# Patient Record
Sex: FEMALE | Race: WHITE | NOT HISPANIC OR LATINO | Employment: UNEMPLOYED | URBAN - METROPOLITAN AREA
[De-identification: names, ages, dates, MRNs, and addresses within clinical notes are randomized per-mention and may not be internally consistent; named-entity substitution may affect disease eponyms.]

---

## 2017-02-14 ENCOUNTER — OFFICE VISIT (OUTPATIENT)
Dept: DERMATOLOGY | Facility: CLINIC | Age: 57
End: 2017-02-14
Payer: COMMERCIAL

## 2017-02-14 VITALS — WEIGHT: 168 LBS | BODY MASS INDEX: 28.68 KG/M2 | HEIGHT: 64 IN

## 2017-02-14 DIAGNOSIS — L57.3 POIKILODERMA OF CIVATTE: ICD-10-CM

## 2017-02-14 DIAGNOSIS — Z12.83 SKIN CANCER SCREENING: ICD-10-CM

## 2017-02-14 DIAGNOSIS — D22.9 MULTIPLE BENIGN NEVI: ICD-10-CM

## 2017-02-14 DIAGNOSIS — Z80.8 FAMILY HISTORY OF MELANOMA: Primary | ICD-10-CM

## 2017-02-14 DIAGNOSIS — L81.4 LENTIGINES: ICD-10-CM

## 2017-02-14 PROCEDURE — 99999 PR PBB SHADOW E&M-EST. PATIENT-LVL II: CPT | Mod: PBBFAC,,, | Performed by: DERMATOLOGY

## 2017-02-14 PROCEDURE — 99214 OFFICE O/P EST MOD 30 MIN: CPT | Mod: S$GLB,,, | Performed by: DERMATOLOGY

## 2017-02-14 NOTE — PROGRESS NOTES
Subjective:       Patient ID:  Clemencia Knight is a 56 y.o. female who presents for   Chief Complaint   Patient presents with    Skin Check    Lesion     HPI Comments: Pt here for IV skin check. Last seen by Betzaida Benson NP on 7-28-14  Discoloration on neck for 2 years not treated  Lesion on right cheek for 6 months, asymptomatic, not treated    No phx of skin cancer  Brother hx of Melanoma       Review of Systems   Skin: Positive for daily sunscreen use and activity-related sunscreen use. Negative for recent sunburn.        Objective:    Physical Exam   Constitutional: She appears well-developed and well-nourished. No distress.   HENT:   Head:       Mouth/Throat: Lips normal.    Eyes: Lids are normal.  No conjunctival no injection.   Cardiovascular: There is no local extremity swelling and no dependent edema.     Neurological: She is alert and oriented to person, place, and time. She is not disoriented.   Psychiatric: She has a normal mood and affect.   Skin:   Areas Examined (abnormalities noted in diagram):   Head / Face Inspection Performed  Neck Inspection Performed  Chest / Axilla Inspection Performed  Abdomen Inspection Performed  Genitals / Buttocks / Groin Inspection Performed  Back Inspection Performed  RUE Inspected  LUE Inspection Performed  RLE Inspected  LLE Inspection Performed              Diagram Legend     Erythematous scaling macule/papule c/w actinic keratosis       Vascular papule c/w angioma      Pigmented verrucoid papule/plaque c/w seborrheic keratosis      Yellow umbilicated papule c/w sebaceous hyperplasia      Irregularly shaped tan macule c/w lentigo     1-2 mm smooth white papules consistent with Milia      Movable subcutaneous cyst with punctum c/w epidermal inclusion cyst      Subcutaneous movable cyst c/w pilar cyst      Firm pink to brown papule c/w dermatofibroma      Pedunculated fleshy papule(s) c/w skin tag(s)      Evenly pigmented macule c/w junctional nevus     Mildly  variegated pigmented, slightly irregular-bordered macule c/w mildly atypical nevus      Flesh colored to evenly pigmented papule c/w intradermal nevus       Pink pearly papule/plaque c/w basal cell carcinoma      Erythematous hyperkeratotic cursted plaque c/w SCC      Surgical scar with no sign of skin cancer recurrence      Open and closed comedones      Inflammatory papules and pustules      Verrucoid papule consistent consistent with wart     Erythematous eczematous patches and plaques     Dystrophic onycholytic nail with subungual debris c/w onychomycosis     Umbilicated papule    Erythematous-base heme-crusted tan verrucoid plaque consistent with inflamed seborrheic keratosis     Erythematous Silvery Scaling Plaque c/w Psoriasis     See annotation      Assessment / Plan:        Family history of melanoma    Total body skin examination performed today including at least 12 points as noted in physical examination. No lesions suspicious for malignancy noted.      Poikiloderma of [place]  Reassurance  Discussed with patient the importance of sun precautions, including broad spectrum sunscreen use with minimum SPF 30, wearing sun protective clothing and wide-brim hat as well as sun avoidance during peak hours between 10 am and 4 pm.   Consider laser therapy in future, Dr. Jaime Abebe, declines today    Skin cancer screening    Total body skin examination performed today including at least 12 points as noted in physical examination. No lesions suspicious for malignancy noted.      Lentigines, face and trunk  This is a benign hyperpigmented sun induced lesion. Daily sun protection will reduce the number of new lesions. Treatment of these benign lesions are considered cosmetic.      Multiple benign nevi, trunk and extremities  Discussed ABCDE's of nevi.  Monitor for new mole or moles that are becoming bigger, darker, irritated, or developing irregular borders.                Return in about 1 year (around 2/14/2018).

## 2017-03-07 ENCOUNTER — PATIENT MESSAGE (OUTPATIENT)
Dept: OBSTETRICS AND GYNECOLOGY | Facility: CLINIC | Age: 57
End: 2017-03-07

## 2017-03-07 ENCOUNTER — PATIENT MESSAGE (OUTPATIENT)
Dept: FAMILY MEDICINE | Facility: CLINIC | Age: 57
End: 2017-03-07

## 2017-03-09 ENCOUNTER — OFFICE VISIT (OUTPATIENT)
Dept: FAMILY MEDICINE | Facility: CLINIC | Age: 57
End: 2017-03-09
Payer: COMMERCIAL

## 2017-03-09 VITALS
HEART RATE: 78 BPM | SYSTOLIC BLOOD PRESSURE: 116 MMHG | BODY MASS INDEX: 29.74 KG/M2 | WEIGHT: 174.19 LBS | HEIGHT: 64 IN | DIASTOLIC BLOOD PRESSURE: 76 MMHG

## 2017-03-09 DIAGNOSIS — F41.9 ANXIETY: Primary | ICD-10-CM

## 2017-03-09 PROCEDURE — 99213 OFFICE O/P EST LOW 20 MIN: CPT | Mod: S$GLB,,, | Performed by: NURSE PRACTITIONER

## 2017-03-09 PROCEDURE — 99999 PR PBB SHADOW E&M-EST. PATIENT-LVL III: CPT | Mod: PBBFAC,,, | Performed by: NURSE PRACTITIONER

## 2017-03-09 PROCEDURE — 1160F RVW MEDS BY RX/DR IN RCRD: CPT | Mod: S$GLB,,, | Performed by: NURSE PRACTITIONER

## 2017-03-09 RX ORDER — ESCITALOPRAM OXALATE 10 MG/1
10 TABLET ORAL DAILY
Qty: 30 TABLET | Refills: 1 | Status: SHIPPED | OUTPATIENT
Start: 2017-03-09 | End: 2017-04-06 | Stop reason: SDUPTHER

## 2017-03-09 NOTE — MR AVS SNAPSHOT
[facility]  [address] [facility] [address]  [facility] [address]  Phone: [phone]  Fax: [phone]                  Clemencia Knight   3/9/2017 8:40 AM   Office Visit    Description:  Female : 1960   Provider:  Belinda Duran NP   Department:  [facility]           Reason for Visit     Anxiety           Diagnoses this Visit        Comments    Anxiety    -  Primary            To Do List           Future Appointments        Provider Department Dept Phone    2017 7:40 AM Belinda Duran NP [facility] [phone]    2017 9:40 AM Mary Franklin MD [facility] [phone]      Goals (5 Years of Data)     None      Follow-Up and Disposition     Return in about 4 weeks (around 2017).       These Medications        Disp Refills Start End    escitalopram oxalate (LEXAPRO) 10 MG tablet 30 tablet 1 3/9/2017 2017    Take 1 tablet (10 mg total) by mouth once daily. - Oral    [facility]: [facility] [address] [address] [address] [facility] [email or web address] Ph #: [phone]         [facility]     [facility] Care [facility] -  Assistance  Registered nurses in the [facility] provide clinical advisement, health education, appointment booking, and other advisory services.  Call for this free service at [phone].             Medications           Message regarding Medications     Verify the changes and/or additions to your medication regime listed below are the same as discussed with your clinician today.  If any of these changes or additions are incorrect, please notify your healthcare provider.        START taking these NEW medications        Refills    escitalopram oxalate (LEXAPRO) 10 MG tablet 1    Sig: Take 1 tablet (10 mg total) by mouth once daily.    Class: Normal    Route: Oral           Verify that the below list of medications is an accurate representation of the  "medications you are currently taking.  If none reported, the list may be blank. If incorrect, please contact your healthcare provider. Carry this list with you in case of emergency.           Current Medications     acetaminophen (TYLENOL) 325 MG tablet Take 650 mg by mouth every 6 (six) hours as needed for Pain.    ascorbic acid, vitamin C, (VITAMIN C) 100 MG tablet Take 100 mg by mouth once daily.    b complex vitamins capsule Take 1 capsule by mouth once daily.    bifidobacterium infantis (ALIGN) 4 mg Cap Take by mouth.    calcium phosphate-vitamin D3 125 mg calcium- 125 unit Chew Take by mouth. VITAFUSION CHEWABLES    cetirizine (ZYRTEC) 10 MG tablet Take 10 mg by mouth once daily.    fluticasone (FLONASE) 50 mcg/actuation nasal spray 1 spray by Each Nare route once daily.    folic acid (FOLVITE) 1 MG tablet Take 1 mg by mouth once daily.    glucosamine-chondroitin 500-400 mg tablet Take 1 tablet by mouth 3 (three) times daily.    MULTIVITAMIN ORAL Take by mouth once daily.    escitalopram oxalate (LEXAPRO) 10 MG tablet Take 1 tablet (10 mg total) by mouth once daily.           Clinical Reference Information           Your Vitals Were     BP Pulse Height Weight BMI    116/76 (BP Location: Left arm, Patient Position: Sitting, BP Method: Manual) 78 5' 4" (1.626 m) 79 kg (174 lb 2.6 oz) 29.9 kg/m2      Blood Pressure          Most Recent Value    BP  116/76      Allergies as of 3/9/2017     Flexeril [Cyclobenzaprine]    Hydrocodone      Immunizations Administered on Date of Encounter - 3/9/2017     None      Language Assistance Services     ATTENTION: Language assistance services are available, free of charge. Please call [phone].      ATENCIÓN: Si habla español, tiene a krishna disposición servicios gratuitos de asistencia lingüística. Llame al [phone].     PAULA Ý: N?u b?n nói Ti?ng Vi?t, có các d?ch v? h? tr? ngôn ng? mi?n phí dành cho b?n. G?i s? [phone].         [facility] " complies with applicable Federal civil rights laws and does not discriminate on the basis of race, color, national origin, age, disability, or sex.

## 2017-03-21 NOTE — PROGRESS NOTES
Subjective:       Patient ID: Clemencia Knight is a 56 y.o. female.    Chief Complaint: Anxiety    Anxiety   Presents for follow-up visit. Symptoms include decreased concentration, irritability and nervous/anxious behavior. Patient reports no chest pain, compulsions, confusion, dizziness, excessive worry, feeling of choking, hyperventilation, impotence, insomnia, malaise, muscle tension, nausea, obsessions, palpitations, panic, restlessness, shortness of breath or suicidal ideas.         Review of Systems   Constitutional: Positive for activity change and irritability. Negative for unexpected weight change.   HENT: Negative for hearing loss, rhinorrhea and trouble swallowing.    Eyes: Negative for discharge and visual disturbance.   Respiratory: Negative for chest tightness, shortness of breath and wheezing.    Cardiovascular: Negative for chest pain and palpitations.   Gastrointestinal: Negative for blood in stool, constipation, diarrhea, nausea and vomiting.   Endocrine: Negative for polydipsia and polyuria.   Genitourinary: Negative for difficulty urinating, dysuria, hematuria, impotence and menstrual problem.   Musculoskeletal: Positive for arthralgias. Negative for joint swelling.   Neurological: Negative for dizziness, weakness and headaches.   Psychiatric/Behavioral: Positive for decreased concentration and dysphoric mood. Negative for confusion and suicidal ideas. The patient is nervous/anxious. The patient does not have insomnia.        Objective:      Physical Exam   Constitutional: She is oriented to person, place, and time. She appears well-nourished.   Cardiovascular: Normal rate, regular rhythm, normal heart sounds and intact distal pulses.    Pulmonary/Chest: Effort normal and breath sounds normal. She has no wheezes. She has no rales.   Abdominal: Soft. Bowel sounds are normal. There is no tenderness.   Neurological: She is alert and oriented to person, place, and time.   Skin: Skin is warm and dry. No  rash noted.   Psychiatric: She has a normal mood and affect. Her behavior is normal. Judgment and thought content normal.   Vitals reviewed.      Assessment:       1. Anxiety        Plan:       Clemencia was seen today for anxiety.    Diagnoses and all orders for this visit:    Anxiety  -     escitalopram oxalate (LEXAPRO) 10 MG tablet; Take 1 tablet (10 mg total) by mouth once daily.    Return in about 4 weeks (around 4/6/2017).

## 2017-04-06 ENCOUNTER — OFFICE VISIT (OUTPATIENT)
Dept: FAMILY MEDICINE | Facility: CLINIC | Age: 57
End: 2017-04-06
Payer: COMMERCIAL

## 2017-04-06 VITALS
DIASTOLIC BLOOD PRESSURE: 80 MMHG | HEIGHT: 64 IN | SYSTOLIC BLOOD PRESSURE: 142 MMHG | WEIGHT: 173.5 LBS | HEART RATE: 68 BPM | BODY MASS INDEX: 29.62 KG/M2

## 2017-04-06 DIAGNOSIS — Z00.00 ROUTINE MEDICAL EXAM: ICD-10-CM

## 2017-04-06 DIAGNOSIS — F41.9 ANXIETY: Primary | ICD-10-CM

## 2017-04-06 PROCEDURE — 99999 PR PBB SHADOW E&M-EST. PATIENT-LVL III: CPT | Mod: PBBFAC,,, | Performed by: NURSE PRACTITIONER

## 2017-04-06 PROCEDURE — 1160F RVW MEDS BY RX/DR IN RCRD: CPT | Mod: S$GLB,,, | Performed by: NURSE PRACTITIONER

## 2017-04-06 PROCEDURE — 99213 OFFICE O/P EST LOW 20 MIN: CPT | Mod: S$GLB,,, | Performed by: NURSE PRACTITIONER

## 2017-04-06 RX ORDER — ESCITALOPRAM OXALATE 10 MG/1
TABLET ORAL
Qty: 45 TABLET | Refills: 0 | Status: SHIPPED | OUTPATIENT
Start: 2017-04-06 | End: 2017-04-06 | Stop reason: SDUPTHER

## 2017-04-06 RX ORDER — ESCITALOPRAM OXALATE 10 MG/1
TABLET ORAL
Qty: 45 TABLET | Refills: 5 | Status: SHIPPED | OUTPATIENT
Start: 2017-04-06 | End: 2018-06-28 | Stop reason: SDUPTHER

## 2017-04-06 NOTE — MR AVS SNAPSHOT
[facility]  [address]  [place] [address]  Phone: [phone]  Fax: [phone]                  Clemencia Knight   2017 7:40 AM   Office Visit    Description:  Female : 1960   Provider:  Belinda Duran NP   Department:  [facility]           Reason for Visit     Follow-up     Otalgia           Diagnoses this Visit        Comments    Anxiety    -  Primary     Routine medical exam                To Do List           Future Appointments        Provider Department Dept Phone    2017 9:40 AM Mary Franklin MD [facility] - [phone]      Goals (5 Years of Data)     None       These Medications        Disp Refills Start End    escitalopram oxalate (LEXAPRO) 10 MG tablet 45 tablet 5 2017     Take 1/2 tablets daily    Pharmacy: [facility] [address] #: [phone]         [facility] On Call     [facility] On Call Nurse Care Line -  Assistance  Unless otherwise directed by your provider, please contact [facility], our nurse care line that is available for  assistance.     Registered nurses in the [facility] On Call Center provide: appointment scheduling, clinical advisement, health education, and other advisory services.  Call: [phone] (toll free)               Medications           Message regarding Medications     Verify the changes and/or additions to your medication regime listed below are the same as discussed with your clinician today.  If any of these changes or additions are incorrect, please notify your healthcare provider.        CHANGE how you are taking these medications     Start Taking Instead of    escitalopram oxalate (LEXAPRO) 10 MG tablet escitalopram oxalate (LEXAPRO) 10 MG tablet    Dosage:  Take 1/2 tablets daily Dosage:  Take 1 tablet (10 mg total) by mouth once daily.    Reason for Change:  Reorder       STOP taking these medications      "bifidobacterium infantis (ALIGN) 4 mg Cap Take by mouth.    folic acid (FOLVITE) 1 MG tablet Take 1 mg by mouth once daily.    glucosamine-chondroitin 500-400 mg tablet Take 1 tablet by mouth 3 (three) times daily.           Verify that the below list of medications is an accurate representation of the medications you are currently taking.  If none reported, the list may be blank. If incorrect, please contact your healthcare provider. Carry this list with you in case of emergency.           Current Medications     acetaminophen (TYLENOL) 325 MG tablet Take 650 mg by mouth every 6 (six) hours as needed for Pain.    ascorbic acid, vitamin C, (VITAMIN C) 100 MG tablet Take 100 mg by mouth once daily.    b complex vitamins capsule Take 1 capsule by mouth once daily.    calcium phosphate-vitamin D3 125 mg calcium- 125 unit Chew Take by mouth. VITAFUSION CHEWABLES    cetirizine (ZYRTEC) 10 MG tablet Take 10 mg by mouth once daily.    escitalopram oxalate (LEXAPRO) 10 MG tablet Take 1/2 tablets daily    fluticasone (FLONASE) 50 mcg/actuation nasal spray 1 spray by Each Nare route once daily.    MULTIVITAMIN ORAL Take by mouth once daily.           Clinical Reference Information           Your Vitals Were     BP Pulse Height Weight BMI    142/80 68 5' 4" (1.626 m) 78.7 kg (173 lb 8 oz) 29.78 kg/m2      Blood Pressure          Most Recent Value    BP  (!)  142/80      Allergies as of 4/6/2017     Flexeril [Cyclobenzaprine]    Hydrocodone      Immunizations Administered on Date of Encounter - 4/6/2017     None      Orders Placed During Today's Visit     Future Labs/Procedures Expected by Expires    Comprehensive metabolic panel  4/6/2017 7/5/2017    Hepatitis C antibody  4/6/2017 6/5/2018    Lipid panel  4/6/2017 6/5/2018      Language Assistance Services     ATTENTION: Language assistance services are available, free of charge. Please call [phone].      ATENCIÓN: Si britni baldwin a krishna disposición servicios " aartios de asistencia lingüística. Elliot leiva [phone].     PAULA Ý: N?u b?n nói Ti?ng Vi?t, có các d?ch v? h? tr? ngôn ng? mi?n phí dành cho b?n. G?i s? [phone].         [facility] complies with applicable Federal civil rights laws and does not discriminate on the basis of race, color, national origin, age, disability, or sex.

## 2017-04-07 ENCOUNTER — LAB VISIT (OUTPATIENT)
Dept: LAB | Facility: HOSPITAL | Age: 57
End: 2017-04-07
Attending: FAMILY MEDICINE
Payer: COMMERCIAL

## 2017-04-07 DIAGNOSIS — Z00.00 ROUTINE MEDICAL EXAM: ICD-10-CM

## 2017-04-07 LAB
ALBUMIN SERPL BCP-MCNC: 3.9 G/DL
ALP SERPL-CCNC: 85 U/L
ALT SERPL W/O P-5'-P-CCNC: 11 U/L
ANION GAP SERPL CALC-SCNC: 12 MMOL/L
AST SERPL-CCNC: 16 U/L
BILIRUB SERPL-MCNC: 0.8 MG/DL
BUN SERPL-MCNC: 22 MG/DL
CALCIUM SERPL-MCNC: 9.6 MG/DL
CHLORIDE SERPL-SCNC: 104 MMOL/L
CHOLEST/HDLC SERPL: 4.8 {RATIO}
CO2 SERPL-SCNC: 25 MMOL/L
CREAT SERPL-MCNC: 0.8 MG/DL
EST. GFR  (AFRICAN AMERICAN): >60 ML/MIN/1.73 M^2
EST. GFR  (NON AFRICAN AMERICAN): >60 ML/MIN/1.73 M^2
GLUCOSE SERPL-MCNC: 103 MG/DL
HCV AB SERPL QL IA: NEGATIVE
HDL/CHOLESTEROL RATIO: 20.7 %
HDLC SERPL-MCNC: 222 MG/DL
HDLC SERPL-MCNC: 46 MG/DL
LDLC SERPL CALC-MCNC: 137 MG/DL
NONHDLC SERPL-MCNC: 176 MG/DL
POTASSIUM SERPL-SCNC: 4.5 MMOL/L
PROT SERPL-MCNC: 7.6 G/DL
SODIUM SERPL-SCNC: 141 MMOL/L
TRIGL SERPL-MCNC: 195 MG/DL

## 2017-04-07 PROCEDURE — 36415 COLL VENOUS BLD VENIPUNCTURE: CPT | Mod: PO

## 2017-04-07 PROCEDURE — 80053 COMPREHEN METABOLIC PANEL: CPT

## 2017-04-07 PROCEDURE — 86803 HEPATITIS C AB TEST: CPT

## 2017-04-07 PROCEDURE — 80061 LIPID PANEL: CPT

## 2017-04-10 NOTE — PROGRESS NOTES
Subjective:       Patient ID: Clemencia Knight is a 56 y.o. female.    Chief Complaint: Follow-up (anxiety) and Otalgia (L ear)    Anxiety   Presents for follow-up visit. Symptoms include nervous/anxious behavior (improved). Patient reports no chest pain, compulsions, confusion, depressed mood, dizziness, dry mouth, excessive worry, feeling of choking, hyperventilation, impotence, insomnia, irritability, malaise, muscle tension, nausea, obsessions, palpitations, panic, restlessness, shortness of breath or suicidal ideas. The quality of sleep is good.         Review of Systems   Constitutional: Negative for irritability.   HENT: Positive for ear pain. Negative for ear discharge, hearing loss, rhinorrhea and sore throat.    Respiratory: Negative for cough and shortness of breath.    Cardiovascular: Negative for chest pain and palpitations.   Gastrointestinal: Negative for abdominal pain, diarrhea, nausea and vomiting.   Genitourinary: Negative for impotence.   Musculoskeletal: Negative for neck pain.   Skin: Negative for rash.   Neurological: Positive for headaches. Negative for dizziness.   Psychiatric/Behavioral: Negative for confusion and suicidal ideas. The patient is nervous/anxious (improved). The patient does not have insomnia.        Objective:      Physical Exam   Constitutional: She is oriented to person, place, and time. She appears well-nourished.   HENT:   Head: Normocephalic and atraumatic.   Right Ear: Hearing, tympanic membrane, external ear and ear canal normal.   Left Ear: Hearing, tympanic membrane, external ear and ear canal normal.   Nose: No mucosal edema or rhinorrhea.   Cardiovascular: Normal rate, regular rhythm, normal heart sounds and intact distal pulses.    Pulmonary/Chest: Effort normal and breath sounds normal.   Abdominal: Soft. Bowel sounds are normal. There is no tenderness.   Neurological: She is alert and oriented to person, place, and time.   Skin: Skin is warm and dry. No rash  noted.   Vitals reviewed.      Assessment:       1. Anxiety    2. Routine medical exam        Plan:       Clemencia was seen today for follow-up and otalgia.    Diagnoses and all orders for this visit:    Anxiety  -     escitalopram oxalate (LEXAPRO) 10 MG tablet; Take 1/2 tablets daily  Doing well. Continue current dose    Routine medical exam  -     Hepatitis C antibody; Future  -     Comprehensive metabolic panel; Future  -     Lipid panel; Future  Adequate rest and increase fluids  Encouraged healthy eating routine exercise  Keep appt with PCP for routine follow up

## 2017-04-21 ENCOUNTER — TELEPHONE (OUTPATIENT)
Dept: FAMILY MEDICINE | Facility: CLINIC | Age: 57
End: 2017-04-21

## 2017-04-21 NOTE — TELEPHONE ENCOUNTER
Called pt, notified of Dr. Higgins message and she verbally understood. Also scheduled appt for Monday she verbally understood.

## 2017-04-21 NOTE — TELEPHONE ENCOUNTER
Spoke to pt. Pt states she is congested and is leaving on 5/1 to go out of the country. Pt wondering what she can take OTC to clear it up. Please advise.

## 2017-04-21 NOTE — TELEPHONE ENCOUNTER
----- Message from Magaly Parham sent at 4/21/2017  8:51 AM CDT -----  Contact: self  Patient would like to speak to a nurse   She called for an appointment for a cold with congestion   She is leaving to out of the country soon   Nothing showing until 4/27/17   Please call  [phone] to advise.    Thanks

## 2017-04-28 ENCOUNTER — PATIENT MESSAGE (OUTPATIENT)
Dept: PLASTIC SURGERY | Facility: CLINIC | Age: 57
End: 2017-04-28

## 2017-05-03 DIAGNOSIS — F41.9 ANXIETY: ICD-10-CM

## 2017-05-03 RX ORDER — ESCITALOPRAM OXALATE 10 MG/1
TABLET ORAL
Qty: 30 TABLET | Refills: 0 | Status: SHIPPED | OUTPATIENT
Start: 2017-05-03 | End: 2017-06-05

## 2017-05-26 RX ORDER — SALMONELLA TYPHI TY2 VI POLYSACCHARIDE ANTIGEN 25 UG/.5ML
INJECTION, SOLUTION INTRAMUSCULAR
Refills: 0 | COMMUNITY
Start: 2017-04-06 | End: 2019-03-22

## 2017-05-26 RX ORDER — ESCITALOPRAM OXALATE 10 MG/1
15 TABLET ORAL DAILY
Qty: 135 TABLET | Refills: 3 | Status: SHIPPED | OUTPATIENT
Start: 2017-05-26 | End: 2017-06-05

## 2017-05-26 NOTE — TELEPHONE ENCOUNTER
Pt needs [facility] the 1.5 tablet daily, the other rx was sent to [facility]. please advise, rx pending.

## 2017-05-26 NOTE — TELEPHONE ENCOUNTER
----- Message from Danica Dee sent at 5/26/2017  8:55 AM CDT -----  Contact: Patient  Patient needs to have a new prescription for Escitalopram oxalate (LEXAPRO) 10 MG tablet with dosage 1 and 1/2 tablet rivera. Please send new prescription to [facility].  Any questions call patient at [phone].

## 2017-06-05 ENCOUNTER — OFFICE VISIT (OUTPATIENT)
Dept: OBSTETRICS AND GYNECOLOGY | Facility: CLINIC | Age: 57
End: 2017-06-05
Payer: COMMERCIAL

## 2017-06-05 ENCOUNTER — HOSPITAL ENCOUNTER (OUTPATIENT)
Dept: RADIOLOGY | Facility: HOSPITAL | Age: 57
Discharge: HOME | End: 2017-06-05
Attending: OBSTETRICS & GYNECOLOGY
Payer: COMMERCIAL

## 2017-06-05 VITALS — SYSTOLIC BLOOD PRESSURE: 122 MMHG | DIASTOLIC BLOOD PRESSURE: 80 MMHG | WEIGHT: 165 LBS | BODY MASS INDEX: 28.32 KG/M2

## 2017-06-05 DIAGNOSIS — Z12.31 VISIT FOR SCREENING MAMMOGRAM: ICD-10-CM

## 2017-06-05 DIAGNOSIS — Z78.0 MENOPAUSE: ICD-10-CM

## 2017-06-05 DIAGNOSIS — Z01.419 ROUTINE GYNECOLOGICAL EXAMINATION: Primary | ICD-10-CM

## 2017-06-05 PROCEDURE — 77067 SCR MAMMO BI INCL CAD: CPT | Mod: TC

## 2017-06-05 PROCEDURE — 99396 PREV VISIT EST AGE 40-64: CPT | Mod: S$GLB,,, | Performed by: OBSTETRICS & GYNECOLOGY

## 2017-06-05 PROCEDURE — 77067 SCR MAMMO BI INCL CAD: CPT | Mod: 26,,, | Performed by: RADIOLOGY

## 2017-06-05 PROCEDURE — 77063 BREAST TOMOSYNTHESIS BI: CPT | Mod: 26,,, | Performed by: RADIOLOGY

## 2017-06-05 PROCEDURE — 99999 PR PBB SHADOW E&M-EST. PATIENT-LVL III: CPT | Mod: PBBFAC,,, | Performed by: OBSTETRICS & GYNECOLOGY

## 2017-06-05 RX ORDER — HYDROCORTISONE 25 MG/G
CREAM TOPICAL 2 TIMES DAILY
Qty: 28 G | Refills: 1 | Status: SHIPPED | OUTPATIENT
Start: 2017-06-05 | End: 2018-10-03

## 2017-06-05 NOTE — PROGRESS NOTES
Chief Complaint   Patient presents with    Well Woman     Pelvic only, mammogram    Hemorrhoids     requesting medication to treat     History of Present Illness: Clemencia Knight is a 56 y.o. female that presents today 6/5/2017 for well gyn visit.    Past Medical History:   Diagnosis Date    Allergy     General anesthetics causing adverse effect in therapeutic use     pt. somewhat aware of extubation with one of her surgeries    GERD (gastroesophageal reflux disease)     Restless leg syndrome     Sciatica        Past Surgical History:   Procedure Laterality Date    BELT ABDOMINOPLASTY      breast implants      CERVICAL FUSION      COLONOSCOPY  10/2012    repeat in 10    DEXA      WNL    HYSTERECTOMY  2000    OOPHORECTOMY  7/16/2013    laparotomy BSO for benign 10cm tubal cyst    SALPINGOOPHORECTOMY  2013    with removal of fallopian cyst    SHOULDER SURGERY      right    TLH  2000    ovaries remain, benign    TONSILLECTOMY, ADENOIDECTOMY, BILATERAL MYRINGOTOMY AND TUBES         Current Outpatient Prescriptions   Medication Sig Dispense Refill    ascorbic acid, vitamin C, (VITAMIN C) 100 MG tablet Take 100 mg by mouth once daily.      b complex vitamins capsule Take 1 capsule by mouth once daily.      calcium phosphate-vitamin D3 125 mg calcium- 125 unit Chew Take by mouth. VITAFUSION CHEWABLES      cetirizine (ZYRTEC) 10 MG tablet Take 10 mg by mouth once daily.      fluticasone (FLONASE) 50 mcg/actuation nasal spray 1 spray by Each Nare route once daily.      MULTIVITAMIN ORAL Take by mouth once daily.      acetaminophen (TYLENOL) 325 MG tablet Take 650 mg by mouth every 6 (six) hours as needed for Pain.      escitalopram oxalate (LEXAPRO) 10 MG tablet Take 1/2 tablets daily 45 tablet 5    hydrocortisone 2.5 % cream Apply topically 2 (two) times daily. 28 g 1    TYPHIM VI 25 mcg/0.5 mL injection ADM 0.5ML IM UTD  0     No current facility-administered medications for this visit.         Review of patient's allergies indicates:   Allergen Reactions    Flexeril [cyclobenzaprine] Itching    Hydrocodone Itching       Family History   Problem Relation Age of Onset    Cancer Father      bladder    Diabetes Father     Heart disease Father     Diabetes Mother     Melanoma Brother     Breast cancer Neg Hx     Ovarian cancer Neg Hx     Anesthesia problems Neg Hx        Social History     Social History    Marital status:      Spouse name: N/A    Number of children: N/A    Years of education: N/A     Occupational History     Other     Social History Main Topics    Smoking status: Never Smoker    Smokeless tobacco: Never Used    Alcohol use Yes      Comment: rarely    Drug use: No    Sexual activity: Yes     Partners: Male     Birth control/ protection: Surgical     Other Topics Concern    Are You Pregnant Or Think You May Be? No     Social History Narrative    None       OB History    Para Term  AB Living   2 2 2   2   SAB TAB Ectopic Multiple Live Births       2      # Outcome Date GA Lbr Marc/2nd Weight Sex Delivery Anes PTL Lv   2 Term 86   3.544 kg (7 lb 13 oz) F Vag-Spont EPI N VARSHA   1 Term 85   3.572 kg (7 lb 14 oz) M Vag-Spont EPI N VARSHA          Review of Symptoms:  GENERAL: Denies weight gain or weight loss. Feeling well overall.   SKIN: Denies rash or lesions.   HEAD: Denies head injury or headache.   NODES: Denies enlarged lymph nodes.   CHEST: Denies chest pain or shortness of breath.   CARDIOVASCULAR: Denies palpitations or left sided chest pain.   ABDOMEN: No abdominal pain, constipation, diarrhea, nausea, vomiting or rectal bleeding.   URINARY: No frequency, dysuria, hematuria, or burning on urination.  HEMATOLOGIC: No easy bruisability or excessive bleeding.   MUSCULOSKELETAL: Denies joint pain or swelling.     /80   Wt 74.8 kg (165 lb)   Physical Exam:  APPEARANCE: Well nourished, well developed, in no acute distress.  SKIN:  Normal skin turgor, no lesions.  NECK: Neck symmetric without masses   RESPIRATORY: Normal respiratory effort with no retractions or use of accessory muscles  CARDIOVASCULAR: Peripheral vascular system with no swelling no varicosities and palpation of pulses normal  LYMPHATIC: No enlargements of the lymph nodes noted in the neck, axillae, or groin  ABDOMEN: Soft. No tenderness or masses. No hepatosplenomegaly. No hernias.  BREASTS: Symmetrical, no skin changes or visible lesions. No palpable masses, nipple discharge or adenopathy bilaterally.  PELVIC: Normal external female genitalia without lesions. Normal hair distribution. Adequate perineal body, normal urethral meatus. Urethra with no masses.  Bladder nontender. Vagina moist and well rugated without lesions or discharge. No significant cystocele or rectocele.  Adnexa without masses or tenderness. Urethra and bladder normal.   EXTREMITIES: No clubbing cyanosis or edema.    ASSESSMENT/PLAN:  Routine gynecological examination    Visit for screening mammogram  -     Mammo Digital Screening Bilat With CAD; Future; Expected date: 06/05/2017    Menopause  -     DXA Bone Density Spine And Hip; Future; Expected date: 06/05/2017    Other orders  -     Cancel: Liquid-based pap smear, screening  -     Cancel: HPV High Risk Genotypes, PCR  -     hydrocortisone 2.5 % cream; Apply topically 2 (two) times daily.  Dispense: 28 g; Refill: 1          Patient was counseled today on Pap guidelines, recommendation for yearly exams, mammograms every other year after the age of 40 and annually after the age of 50, Colonoscopy after the age of 50, Dexa Bone Scan and calcium and vitamin D supplementation in menopause and to see her PCP for other health maintenance.   FOLLOW-UP:prn

## 2017-06-22 DIAGNOSIS — F41.9 ANXIETY: ICD-10-CM

## 2017-06-22 RX ORDER — ESCITALOPRAM OXALATE 10 MG/1
TABLET ORAL
Qty: 30 TABLET | Refills: 5 | Status: SHIPPED | OUTPATIENT
Start: 2017-06-22 | End: 2018-06-05 | Stop reason: SDUPTHER

## 2017-07-21 ENCOUNTER — PATIENT MESSAGE (OUTPATIENT)
Dept: OBSTETRICS AND GYNECOLOGY | Facility: CLINIC | Age: 57
End: 2017-07-21

## 2017-07-24 ENCOUNTER — HOSPITAL ENCOUNTER (OUTPATIENT)
Dept: RADIOLOGY | Facility: HOSPITAL | Age: 57
Discharge: HOME | End: 2017-07-24
Attending: OBSTETRICS & GYNECOLOGY
Payer: COMMERCIAL

## 2017-07-24 DIAGNOSIS — Z78.0 MENOPAUSE: ICD-10-CM

## 2017-07-24 PROCEDURE — 77080 DXA BONE DENSITY AXIAL: CPT | Mod: TC,PO

## 2017-07-24 PROCEDURE — 77080 DXA BONE DENSITY AXIAL: CPT | Mod: 26,,, | Performed by: RADIOLOGY

## 2017-07-27 ENCOUNTER — PATIENT MESSAGE (OUTPATIENT)
Dept: OBSTETRICS AND GYNECOLOGY | Facility: CLINIC | Age: 57
End: 2017-07-27

## 2017-08-14 ENCOUNTER — PATIENT MESSAGE (OUTPATIENT)
Dept: OBSTETRICS AND GYNECOLOGY | Facility: CLINIC | Age: 57
End: 2017-08-14

## 2017-11-28 DIAGNOSIS — M25.569 KNEE PAIN, UNSPECIFIED CHRONICITY, UNSPECIFIED LATERALITY: Primary | ICD-10-CM

## 2017-11-29 ENCOUNTER — OFFICE VISIT (OUTPATIENT)
Dept: ORTHOPEDICS | Facility: CLINIC | Age: 57
End: 2017-11-29
Payer: COMMERCIAL

## 2017-11-29 ENCOUNTER — HOSPITAL ENCOUNTER (OUTPATIENT)
Dept: RADIOLOGY | Facility: HOSPITAL | Age: 57
Discharge: HOME | End: 2017-11-29
Attending: ORTHOPAEDIC SURGERY
Payer: COMMERCIAL

## 2017-11-29 VITALS
WEIGHT: 165 LBS | DIASTOLIC BLOOD PRESSURE: 86 MMHG | BODY MASS INDEX: 28.17 KG/M2 | SYSTOLIC BLOOD PRESSURE: 142 MMHG | HEART RATE: 70 BPM | HEIGHT: 64 IN

## 2017-11-29 DIAGNOSIS — M25.569 KNEE PAIN, UNSPECIFIED CHRONICITY, UNSPECIFIED LATERALITY: ICD-10-CM

## 2017-11-29 DIAGNOSIS — M72.2 PLANTAR FASCIITIS: Primary | ICD-10-CM

## 2017-11-29 PROCEDURE — 73564 X-RAY EXAM KNEE 4 OR MORE: CPT | Mod: 26,RT,, | Performed by: RADIOLOGY

## 2017-11-29 PROCEDURE — 99999 PR PBB SHADOW E&M-EST. PATIENT-LVL III: CPT | Mod: PBBFAC,,, | Performed by: ORTHOPAEDIC SURGERY

## 2017-11-29 PROCEDURE — 99213 OFFICE O/P EST LOW 20 MIN: CPT | Mod: S$GLB,,, | Performed by: ORTHOPAEDIC SURGERY

## 2017-11-29 PROCEDURE — 73562 X-RAY EXAM OF KNEE 3: CPT | Mod: 26,59,LT, | Performed by: RADIOLOGY

## 2017-11-29 PROCEDURE — 73564 X-RAY EXAM KNEE 4 OR MORE: CPT | Mod: TC,PO,RT

## 2017-11-29 NOTE — PROGRESS NOTES
Past Medical History:   Diagnosis Date    Allergy     General anesthetics causing adverse effect in therapeutic use     pt. somewhat aware of extubation with one of her surgeries    GERD (gastroesophageal reflux disease)     Restless leg syndrome     Sciatica        Past Surgical History:   Procedure Laterality Date    BELT ABDOMINOPLASTY      breast implants      CERVICAL FUSION      COLONOSCOPY  10/2012    repeat in 10    DEXA      WNL    HYSTERECTOMY  2000    OOPHORECTOMY  7/16/2013    laparotomy BSO for benign 10cm tubal cyst    SALPINGOOPHORECTOMY  2013    with removal of fallopian cyst    SHOULDER SURGERY      right    TLH  2000    ovaries remain, benign    TONSILLECTOMY, ADENOIDECTOMY, BILATERAL MYRINGOTOMY AND TUBES         Current Outpatient Prescriptions   Medication Sig    acetaminophen (TYLENOL) 325 MG tablet Take 650 mg by mouth every 6 (six) hours as needed for Pain.    ascorbic acid, vitamin C, (VITAMIN C) 100 MG tablet Take 100 mg by mouth once daily.    b complex vitamins capsule Take 1 capsule by mouth once daily.    calcium phosphate-vitamin D3 125 mg calcium- 125 unit Chew Take by mouth. VITAFUSION CHEWABLES    cetirizine (ZYRTEC) 10 MG tablet Take 10 mg by mouth once daily.    escitalopram oxalate (LEXAPRO) 10 MG tablet Take 1/2 tablets daily    escitalopram oxalate (LEXAPRO) 10 MG tablet TAKE 1 TABLET BY MOUTH ONCE DAILY    fluticasone (FLONASE) 50 mcg/actuation nasal spray 1 spray by Each Nare route once daily.    MULTIVITAMIN ORAL Take by mouth once daily.    TYPHIM VI 25 mcg/0.5 mL injection ADM 0.5ML IM UTD    hydrocortisone 2.5 % cream Apply topically 2 (two) times daily.     No current facility-administered medications for this visit.        Allergies   Allergen Reactions    Flexeril [Cyclobenzaprine] Itching    Hydrocodone Itching       Family History   Problem Relation Age of Onset    Cancer Father      bladder    Diabetes Father     Heart disease Father      Diabetes Mother     Melanoma Brother     Breast cancer Neg Hx     Ovarian cancer Neg Hx     Anesthesia problems Neg Hx        Social History     Social History    Marital status:      Spouse name: N/A    Number of children: N/A    Years of education: N/A     Occupational History     Other     Social History Main Topics    Smoking status: Never Smoker    Smokeless tobacco: Never Used    Alcohol use Yes      Comment: rarely    Drug use: No    Sexual activity: Yes     Partners: Male     Birth control/ protection: Surgical     Other Topics Concern    Are You Pregnant Or Think You May Be? No     Social History Narrative    No narrative on file       Chief Complaint:   Chief Complaint   Patient presents with    Right Knee - Pain    Right Foot - Pain       History of present illness: Is a 57-year-old female seen for right heel and knee pain seen in consultation for Belinda Duran.  Patient started having similar pain about a year ago.  She's tried the stretching and it got better to some degree.  Feels like a hot poker in her heel occasionally.  Pain in the knee is located on the medial knee.  Gets bruising spontaneously sometimes.  She rates the pain as a 3 out of 10.      Review of Systems:    Constitution: Negative for chills, fever, and sweats.  Negative for unexplained weight loss.    HENT:  Negative for headaches and blurry vision.    Cardiovascular:Negative for chest pain or irregular heart beat. Negative for hypertension.    Respiratory:  Negative for cough and shortness of breath.    Gastrointestinal: Negative for abdominal pain, heartburn, melena, nausea, and vomitting.    Genitourinary:  Negative bladder incontinence and dysuria.    Musculoskeletal:  See HPI    Neurological: Negative for numbness.    Psychiatric/Behavioral: Negative for depression.  The patient is not nervous/anxious.      Endocrine: Negative for polyuria    Hematologic/Lymphatic: Negative for bleeding problem.  Does  not bruise/bleed easily.    Skin: Negative for poor would healing and rash      Physical Examination:    Vital Signs:    Vitals:    11/29/17 1342   BP: (!) 142/86   Pulse: 70       Body mass index is 28.32 kg/m².    This a well-developed, well nourished patient in no acute distress.  They are alert and oriented and cooperative to examination.  Pt. walks without an antalgic gait.      Examination of the patient's right foot and ankle shows no signs of rashes or erythema. The patient has no ecchymosis or effusion or masses. The patient has a negative anterior drawer and talar tilting exam. The patient has full range of motion of ankle dorsiflexion, plantarflexion, inversion, and eversion. Patient has 5 out of 5 motor strength in all muscle groups. Patient has 2+ dorsalis pedis pulses and intact light touch sensation. The patient is significant tender of the plantar fascial origin        Examination of the right knee shows no rashes or erythema. There are no masses ecchymosis or effusion. Patient has full range of motion from 0-130°. Patient is nontender to palpation over lateral joint line and mildly tender to palpation over the medial joint line. Patient has a - Lachman exam, - anterior drawer exam, and - posterior drawer exam. - Ginger's exam. Knee is stable to varus and valgus stress. 5 out of 5 motor strength. Palpable distal pulses. Intact light touch sensation. Negative Patellofemoral crepitus      X-rays: X-rays of the right foot are  reviewed which show no sign of acute fracture injury  X-rays of the right knee are ordered and reviewed which show well-maintained joint space     Assessment:: Right plantar fasciitis    Plan:  I reviewed the findings with her today.  I recommended formal physical therapy for modalities for the plantar fasciitis.  She's had this for over a year now and is done the self-directed stretching but it has not gone away.

## 2018-03-31 ENCOUNTER — PATIENT MESSAGE (OUTPATIENT)
Dept: OBSTETRICS AND GYNECOLOGY | Facility: CLINIC | Age: 58
End: 2018-03-31

## 2018-04-30 ENCOUNTER — PATIENT MESSAGE (OUTPATIENT)
Dept: FAMILY MEDICINE | Facility: CLINIC | Age: 58
End: 2018-04-30

## 2018-04-30 RX ORDER — ESCITALOPRAM OXALATE 10 MG/1
TABLET ORAL
Qty: 135 TABLET | Refills: 0 | Status: SHIPPED | OUTPATIENT
Start: 2018-04-30 | End: 2018-06-05 | Stop reason: SDUPTHER

## 2018-04-30 NOTE — TELEPHONE ENCOUNTER
Temporary refill given for lexapro. She needs an appt with her PCP team. Please schedule. Thank you

## 2018-05-16 ENCOUNTER — TELEPHONE (OUTPATIENT)
Dept: FAMILY MEDICINE | Facility: CLINIC | Age: 58
End: 2018-05-16

## 2018-05-16 DIAGNOSIS — I10 HYPERTENSION, UNSPECIFIED TYPE: Primary | ICD-10-CM

## 2018-05-16 NOTE — TELEPHONE ENCOUNTER
----- Message from Rip Mandel sent at 5/16/2018 12:30 PM CDT -----  Contact: Clemencia  Calling to get lab orders for annual put in 08/10 for fasting labs. Please call her when it is done so they will be aware. Thanks!

## 2018-05-23 ENCOUNTER — PATIENT MESSAGE (OUTPATIENT)
Dept: NEUROSURGERY | Facility: CLINIC | Age: 58
End: 2018-05-23

## 2018-05-23 ENCOUNTER — TELEPHONE (OUTPATIENT)
Dept: NEUROSURGERY | Facility: CLINIC | Age: 58
End: 2018-05-23

## 2018-05-23 DIAGNOSIS — Z98.1 S/P CERVICAL SPINAL FUSION: Primary | ICD-10-CM

## 2018-05-29 ENCOUNTER — HOSPITAL ENCOUNTER (OUTPATIENT)
Dept: RADIOLOGY | Facility: HOSPITAL | Age: 58
Discharge: HOME | End: 2018-05-29
Attending: NEUROLOGICAL SURGERY
Payer: COMMERCIAL

## 2018-05-29 DIAGNOSIS — Z98.1 S/P CERVICAL SPINAL FUSION: ICD-10-CM

## 2018-05-29 PROCEDURE — 72040 X-RAY EXAM NECK SPINE 2-3 VW: CPT | Mod: 26,,, | Performed by: RADIOLOGY

## 2018-05-29 PROCEDURE — 72040 X-RAY EXAM NECK SPINE 2-3 VW: CPT | Mod: TC,FY,PO

## 2018-06-05 ENCOUNTER — OFFICE VISIT (OUTPATIENT)
Dept: NEUROSURGERY | Facility: CLINIC | Age: 58
End: 2018-06-05
Payer: COMMERCIAL

## 2018-06-05 VITALS
WEIGHT: 174.63 LBS | HEIGHT: 64 IN | HEART RATE: 70 BPM | BODY MASS INDEX: 29.81 KG/M2 | TEMPERATURE: 98 F | SYSTOLIC BLOOD PRESSURE: 139 MMHG | DIASTOLIC BLOOD PRESSURE: 79 MMHG

## 2018-06-05 DIAGNOSIS — M50.10 CERVICAL DISC DISORDER WITH RADICULOPATHY: Primary | ICD-10-CM

## 2018-06-05 DIAGNOSIS — R93.7 ABNORMAL X-RAY OF CERVICAL SPINE: ICD-10-CM

## 2018-06-05 PROCEDURE — 99214 OFFICE O/P EST MOD 30 MIN: CPT | Mod: S$GLB,,, | Performed by: NEUROLOGICAL SURGERY

## 2018-06-05 PROCEDURE — 99999 PR PBB SHADOW E&M-EST. PATIENT-LVL III: CPT | Mod: PBBFAC,,, | Performed by: NEUROLOGICAL SURGERY

## 2018-06-05 RX ORDER — METHOCARBAMOL 750 MG/1
750 TABLET, FILM COATED ORAL
Qty: 90 TABLET | Refills: 2 | Status: SHIPPED | OUTPATIENT
Start: 2018-06-05 | End: 2018-06-15

## 2018-06-05 RX ORDER — NAPROXEN 500 MG/1
500 TABLET ORAL 2 TIMES DAILY WITH MEALS
Qty: 90 TABLET | Refills: 2 | Status: SHIPPED | OUTPATIENT
Start: 2018-06-05 | End: 2018-07-05

## 2018-06-05 NOTE — PROGRESS NOTES
"Subjective:    I, Cony Miranda, attest that this documentation has been prepared under the direction and in the presence of TAMIKA White MD.     Patient ID: Clemencia Knight is a 57 y.o. female.    Chief Complaint: Consult    HPI   Pt is a 57 y.o. female who presents for evaluation of of cervical spine. She had a C6-7 ACDF done in 2009. Pt states that she endures chronic neck pain, described as "tightness". Pt states that she began to endure RUE pain, similar to preop, radiating to middle finger and thumb.Pt has received PT, currently on her 37 th visit. Pt denies tobacco use.     Review of Systems   Constitutional: Negative for chills, fatigue and fever.   HENT: Negative for sinus pressure and trouble swallowing.    Eyes: Negative.  Negative for visual disturbance.   Respiratory: Negative.    Cardiovascular: Negative.    Gastrointestinal: Negative.  Negative for nausea and vomiting.   Endocrine: Negative.    Genitourinary: Negative.    Musculoskeletal: Positive for neck pain and neck stiffness.   Neurological: Negative for dizziness, seizures, syncope, speech difficulty, weakness and numbness.       Objective:      Physical Exam:  Nursing note and vitals reviewed.    Constitutional: She appears well-developed.     Eyes: Pupils are equal, round, and reactive to light. Conjunctivae and EOM are normal.     Cardiovascular: Normal rate, regular rhythm, normal pulses and intact distal pulses.     Abdominal: Soft.     Psych/Behavior: She is alert. She is oriented to person, place, and time. She has a normal mood and affect.     Musculoskeletal: Gait is normal.        Neck: Range of motion is full. There is no tenderness. Muscle strength is 5/5. Tone is normal.        Back: Range of motion is full. There is no tenderness. Muscle strength is 5/5. Tone is normal.        Right Upper Extremities: Range of motion is full. There is no tenderness. Muscle strength is 5/5. Tone is normal.        Left Upper Extremities: Range of motion is " full. There is no tenderness. Muscle strength is 5/5. Tone is normal.       Right Lower Extremities: Range of motion is full. There is no tenderness. Muscle strength is 5/5. Tone is normal.        Left Lower Extremities: Range of motion is full. There is no tenderness. Muscle strength is 5/5. Tone is normal.     Neurological:        Coordination: She has a normal Romberg Test, normal finger to nose coordination, normal heel to shin coordination and normal tandem walking coordination.        DTRs: DTRs are normal. Tricep reflexes are 2+ on the right side and 2+ on the left side. Bicep reflexes are 2+ on the right side and 2+ on the left side. Brachioradialis reflexes are 2+ on the right side and 2+ on the left side. Patellar reflexes are 2+ on the right side and 2+ on the left side. Achilles reflexes are 2+ on the right side and 2+ on the left side.        Cranial nerves: Cranial nerve(s) II, III, IV, V, VI, VII, VIII, IX, X, XI and XII are intact.       Pt has done very well for many years after ACDF, but has developed some neck pain paraspinal pain to the left. Radiculopathy radiating into the thumb and first 2 digits and right sided radiculopathy. Pt has had some PT, but has not improved.     Full strength.   No Reyes's, no clonus.   Normal sensation.   Complain of pain in right C6 distribution.   Wound well healed.     Imaging:   X-ray C spine, dated 5/29/2018, shows hardware is at C6-7.     TAMIKA SIERRA MD, personally reviewed the imaging and interpreted independent of the radiology report.    Assessment/Plan:   Pt with distant history of C6-7 ACDF in 2009. Now with clinical signs of C6 radiculopathy. Possibility of an adjacent level disc herniation. We will need to get updated MRI scan and I would also like to get a CT scan to evaluate fusion and hardware. We will also get her some anti and muscle relaxants and continue with PT. See me back after new scans are done.     TAMIKA SIERRA MD, personally performed the  services described in this documentation. All medical record entries made by the scribe, Cony Miranda, were at my direction and in my presence.  I have reviewed the chart and agree that the record reflects my personal performance and is accurate and complete.

## 2018-06-28 DIAGNOSIS — F41.9 ANXIETY: ICD-10-CM

## 2018-06-29 RX ORDER — ESCITALOPRAM OXALATE 10 MG/1
TABLET ORAL
Qty: 135 TABLET | Refills: 0 | Status: SHIPPED | OUTPATIENT
Start: 2018-06-29 | End: 2018-08-14 | Stop reason: ALTCHOICE

## 2018-07-18 ENCOUNTER — OFFICE VISIT (OUTPATIENT)
Dept: NEUROSURGERY | Facility: CLINIC | Age: 58
End: 2018-07-18
Attending: NEUROLOGICAL SURGERY
Payer: COMMERCIAL

## 2018-07-18 ENCOUNTER — HOSPITAL ENCOUNTER (OUTPATIENT)
Dept: RADIOLOGY | Facility: HOSPITAL | Age: 58
Discharge: HOME | End: 2018-07-18
Attending: NEUROLOGICAL SURGERY
Payer: COMMERCIAL

## 2018-07-18 VITALS
BODY MASS INDEX: 30 KG/M2 | TEMPERATURE: 98 F | SYSTOLIC BLOOD PRESSURE: 132 MMHG | DIASTOLIC BLOOD PRESSURE: 73 MMHG | HEART RATE: 73 BPM | WEIGHT: 174.81 LBS

## 2018-07-18 DIAGNOSIS — M50.10 CERVICAL DISC DISORDER WITH RADICULOPATHY: Primary | ICD-10-CM

## 2018-07-18 DIAGNOSIS — R93.7 ABNORMAL X-RAY OF CERVICAL SPINE: ICD-10-CM

## 2018-07-18 DIAGNOSIS — Z98.1 S/P CERVICAL SPINAL FUSION: ICD-10-CM

## 2018-07-18 PROCEDURE — 3008F BODY MASS INDEX DOCD: CPT | Mod: CPTII,S$GLB,, | Performed by: NEUROLOGICAL SURGERY

## 2018-07-18 PROCEDURE — 72141 MRI NECK SPINE W/O DYE: CPT | Mod: 26,,, | Performed by: RADIOLOGY

## 2018-07-18 PROCEDURE — 99214 OFFICE O/P EST MOD 30 MIN: CPT | Mod: S$GLB,,, | Performed by: NEUROLOGICAL SURGERY

## 2018-07-18 PROCEDURE — 72125 CT NECK SPINE W/O DYE: CPT | Mod: 26,,, | Performed by: RADIOLOGY

## 2018-07-18 PROCEDURE — 3075F SYST BP GE 130 - 139MM HG: CPT | Mod: CPTII,S$GLB,, | Performed by: NEUROLOGICAL SURGERY

## 2018-07-18 PROCEDURE — 72125 CT NECK SPINE W/O DYE: CPT | Mod: TC

## 2018-07-18 PROCEDURE — 99999 PR PBB SHADOW E&M-EST. PATIENT-LVL III: CPT | Mod: PBBFAC,,, | Performed by: NEUROLOGICAL SURGERY

## 2018-07-18 PROCEDURE — 3078F DIAST BP <80 MM HG: CPT | Mod: CPTII,S$GLB,, | Performed by: NEUROLOGICAL SURGERY

## 2018-07-18 PROCEDURE — 72141 MRI NECK SPINE W/O DYE: CPT | Mod: TC

## 2018-07-18 RX ORDER — METHOCARBAMOL 750 MG/1
500 TABLET, FILM COATED ORAL DAILY PRN
COMMUNITY
End: 2018-08-14 | Stop reason: ALTCHOICE

## 2018-07-18 RX ORDER — NAPROXEN 500 MG/1
TABLET ORAL
Refills: 2 | COMMUNITY
Start: 2018-07-07 | End: 2018-08-14

## 2018-07-18 NOTE — PROGRESS NOTES
Subjective:    I, Cony Miranda, attest that this documentation has been prepared under the direction and in the presence of TAMIKA White MD.     Patient ID: Clemencia Knight is a 57 y.o. female.    Chief Complaint: Follow-up    HPI   Pt is a 57 y.o. female who is a patient with history of  C6-7 ACDF last time there were concerning signs for radiculopathy and prompted up to evaluate for adjacent level disease. Pt states that she continues to endure neck pain, unable to do minimal house work. Pt does note she receives significant relief with muscle relaxants. Pt denies previous injections or recent PT.     Review of Systems   Constitutional: Negative for chills, fatigue and fever.   HENT: Negative for sinus pressure and trouble swallowing.    Eyes: Negative.  Negative for visual disturbance.   Respiratory: Negative.    Cardiovascular: Negative.    Gastrointestinal: Negative.  Negative for nausea and vomiting.   Endocrine: Negative.    Genitourinary: Negative.    Musculoskeletal: Positive for back pain and neck pain.   Neurological: Negative for dizziness, seizures, syncope, speech difficulty, weakness and numbness.       Objective:      Physical Exam:  Nursing note and vitals reviewed.    Constitutional: She appears well-developed.     Eyes: Pupils are equal, round, and reactive to light. Conjunctivae and EOM are normal.     Cardiovascular: Normal rate, regular rhythm, normal pulses and intact distal pulses.     Abdominal: Soft.     Psych/Behavior: She is alert. She is oriented to person, place, and time. She has a normal mood and affect.     Musculoskeletal: Gait is normal.        Neck: Range of motion is full. There is no tenderness. Muscle strength is 5/5. Tone is normal.        Back: Range of motion is full. There is no tenderness. Muscle strength is 5/5. Tone is normal.        Right Upper Extremities: Range of motion is full. There is no tenderness. Muscle strength is 5/5. Tone is normal.        Left Upper Extremities:  Range of motion is full. There is no tenderness. Muscle strength is 5/5. Tone is normal.       Right Lower Extremities: Range of motion is full. There is no tenderness. Muscle strength is 5/5. Tone is normal.        Left Lower Extremities: Range of motion is full. There is no tenderness. Muscle strength is 5/5. Tone is normal.     Neurological:        Coordination: She has a normal Romberg Test, normal finger to nose coordination, normal heel to shin coordination and normal tandem walking coordination.        DTRs: DTRs are normal. Tricep reflexes are 2+ on the right side and 2+ on the left side. Bicep reflexes are 2+ on the right side and 2+ on the left side. Brachioradialis reflexes are 2+ on the right side and 2+ on the left side. Patellar reflexes are 2+ on the right side and 2+ on the left side. Achilles reflexes are 2+ on the right side and 2+ on the left side.        Cranial nerves: Cranial nerve(s) II, III, IV, V, VI, VII, VIII, IX, X, XI and XII are intact.       Pt still has fair amount of neck pain and right shoulder and arm pain.    No real myelopathy.  No weakness.   Full strength.   Normal sensation.     Imaging:   MRI C spine, dated 7/18/2018 shows progression of kyphosis and disc bulge at C4-5, C5-6 that is slightly more prominent than it was in 2014. Mild central stenosis and moderate foraminal stenosis.     CT C spine, dated 7/18/2018 shows she is fused across previous level fusion     ITAMIKA MD, personally reviewed the imaging and interpreted independent of the radiology report.    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 She has disc protrusions at 2 levels above and some kyphosis. I think she is symptomatic from C5-6. I don't think this is surgical at this point. I would like to try to get her PT with traction and try right sided transforaminal injection and see if that would be beneficial.     TAMIKA SIERRA MD, personally performed the services described in this  documentation. All medical record entries made by the scribe, Cony Miranda, were at my direction and in my presence.  I have reviewed the chart and agree that the record reflects my personal performance and is accurate and complete.

## 2018-07-24 ENCOUNTER — PATIENT MESSAGE (OUTPATIENT)
Dept: NEUROSURGERY | Facility: CLINIC | Age: 58
End: 2018-07-24

## 2018-07-31 ENCOUNTER — TELEPHONE (OUTPATIENT)
Dept: PAIN MEDICINE | Facility: CLINIC | Age: 58
End: 2018-07-31

## 2018-07-31 ENCOUNTER — TELEPHONE (OUTPATIENT)
Dept: NEUROSURGERY | Facility: CLINIC | Age: 58
End: 2018-07-31

## 2018-07-31 DIAGNOSIS — M50.10 CERVICAL DISC DISORDER WITH RADICULOPATHY: Primary | ICD-10-CM

## 2018-07-31 NOTE — TELEPHONE ENCOUNTER
----- Message from Caroline Alvarado RN sent at 7/31/2018  2:58 PM CDT -----  Can we get this patient scheduled for Right C5-6 TF MAGGI please

## 2018-08-01 NOTE — TELEPHONE ENCOUNTER
----- Message from Caroline Alvarado RN sent at 8/1/2018  2:30 PM CDT -----  Can we get this patient scheduled for a right transforaminal C5-6 MAGGI please

## 2018-08-04 ENCOUNTER — PATIENT MESSAGE (OUTPATIENT)
Dept: NEUROSURGERY | Facility: CLINIC | Age: 58
End: 2018-08-04

## 2018-08-06 ENCOUNTER — TELEPHONE (OUTPATIENT)
Dept: NEUROSURGERY | Facility: CLINIC | Age: 58
End: 2018-08-06

## 2018-08-06 ENCOUNTER — PATIENT MESSAGE (OUTPATIENT)
Dept: NEUROSURGERY | Facility: CLINIC | Age: 58
End: 2018-08-06

## 2018-08-06 ENCOUNTER — CLINICAL SUPPORT (OUTPATIENT)
Dept: REHABILITATION | Facility: HOSPITAL | Age: 58
End: 2018-08-06
Attending: NEUROLOGICAL SURGERY
Payer: COMMERCIAL

## 2018-08-06 DIAGNOSIS — M54.12 CERVICAL RADICULAR PAIN: ICD-10-CM

## 2018-08-06 DIAGNOSIS — R29.898 DECREASED ROM OF NECK: ICD-10-CM

## 2018-08-06 PROCEDURE — 97110 THERAPEUTIC EXERCISES: CPT | Mod: PO | Performed by: PHYSICAL THERAPIST

## 2018-08-06 PROCEDURE — 97161 PT EVAL LOW COMPLEX 20 MIN: CPT | Mod: PO | Performed by: PHYSICAL THERAPIST

## 2018-08-06 PROCEDURE — 97140 MANUAL THERAPY 1/> REGIONS: CPT | Mod: PO | Performed by: PHYSICAL THERAPIST

## 2018-08-06 NOTE — PATIENT INSTRUCTIONS
Flexibility: Upper Trapezius Stretch    Sit up tall and place one hand behind your back or under your thigh and the other on top of your head.  Gently draw your head towards the side of your top hand. Do not over-stretch.  Hold 10 seconds.   Repeat 3 times each side per set. Do 1 sets per session. Do 2 sessions per day.      Levator Scapula Stretch, Sitting        Chin Tuck, Sitting / Standing    Stand or sit, head in comfortable, centered position. Draw chin in towards throat, pulling head straight back, keeping jaw and eyes level. Do not hold your breath. Hold 5 seconds.  Repeat 10 times per session. Do 5 sessions per day.      Chin Tuck, Supine    Lie on your back, head on small, rolled towel. Gently tuck chin and bring toward your throat while pushing the back of your head down. Do not lift your head or look towards your feet. Hold 5 seconds. Do not hold your breath.  Repeat 5 times per session. Do 2 sessions per day.      Scapular Retraction (Standing)    With arms at sides, squeeze shoulder blades together. Do not shrug and do not hold your breath. Hold 5 seconds.  Repeat 10 times per session. Do 5 sessions per day.         CERVICAL ROTATION    Turn your head towards the side, then return back to looking straight ahead. 3x 10 sec ea

## 2018-08-06 NOTE — PLAN OF CARE
[facility]  Physical Therapy Initial Evaluation    Name: Clemencia Knight  Clinic Number: [id number]    Therapy Diagnosis:   Encounter Diagnoses   Name Primary?    Decreased ROM of neck     Cervical radicular pain      Physician: Trav White MD    Physician Orders: PT Eval and Treat, include cervical traction  Medical Diagnosis from Referral: Cervical disc disorder with radiculopathy, S/P cervical spinal fusion  Evaluation Date: 8/6/2018  Authorization Period Expiration: 12/31/18  Plan of Care Expiration: October 1, 2018  Visit # / Visits authorized: 1/ 30    Time In: 12:00  Time Out: 1:00  Total Billable Time: 60 minutes    Precautions: Standard    Subjective   Date of onset: March 2018  History of current condition - Clemencia reports: having had a fusion of C6-C7 in 2009.  She had current onset of pain in march of 2018.  She states cervical pain is constant and worsens with movement of her head and neck. She feels like her head is too heavy for her neck.  She has headaches 2-3x/week.  Sleep is limited to 4 hours.  Pain radiates to right elbow and wrist and has paresthesias in first three digits.  She feels right hand is swollen. Cervical pain is worse on left.  She occasionally has pain in left UE as well. Traction has helped in past.  She has difficulty with housework and doing work with arms while leaning forward.       Past Medical History:   Diagnosis Date    Allergy     General anesthetics causing adverse effect in therapeutic use     pt. somewhat aware of extubation with one of her surgeries    GERD (gastroesophageal reflux disease)     Restless leg syndrome     Sciatica      Clemencia Knight  has a past surgical history that includes breast implants; Cervical fusion; Shoulder surgery; TONSILLECTOMY, ADENOIDECTOMY, BILATERAL yringotomy and tubes; TLH (2000); Colonoscopy (10/2012); DEXA; Oophorectomy (7/16/2013); Hysterectomy (2000); Salpingoophorectomy (2013); and Belt  abdominoplasty.    Clemencia has a current medication list which includes the following prescription(s): acetaminophen, ascorbic acid (vitamin c), b complex vitamins, calcium phosphate-vitamin d3, cetirizine, escitalopram oxalate, fluticasone, hydrocortisone, methocarbamol, multivitamin, naproxen, typhim vi, and albuterol.    Review of patient's allergies indicates:   Allergen Reactions    Flexeril [cyclobenzaprine] Itching    Hydrocodone Itching        Imaging, MRI studies:Degenerative changes at C5-C6, above level of fusion.    Prior Therapy: yes, 37 visits at [facility] History:  lives with their spouse  Occupation: housewife  Prior Level of Function: Pt was independent with household chores  Current Level of Function: Moderate difficulty with vacuuming, mopping and reading.    Pain:  Current 7/10, worst 10/10, best 3/10   Location: bilateral arms and neck   Description: Aching, Throbbing, Tight, Sharp and Shooting  Aggravating Factors: Lifting, sleeping, household chores  Easing Factors: lying down and rest    Pts goals: To have no cervical pain and to be able to sleep comfortably.    Objective     Posture: Pt presents with slight forward head posturing and shoulders elevated with scapular protraction.    Cervical Range of Motion: No reproduction of right UE sx with cervical mobility testing   Degrees Pain   Flexion 30 Left cervical     Extension 30 Left cervical     Right Rotation 70 no     Left Rotation 65 Left cervical     Right Side Bending 10 Left cervical   Left Side Bending 10 Left cervical          Upper Extremity Strength  (R) UE  (L) UE    Shoulder flexion: 5/5 Shoulder flexion: 5/5   Shoulder Abduction: 5/5 Shoulder abduction: 5/5   Shoulder ER 5/5 Shoulder ER 5/5   Shoulder IR 5/5 Shoulder IR 5/5   Elbow flexion: 5/5 Elbow flexion: 5/5   Elbow extension: 5/5 Elbow extension: 5/5   Wrist flexion: 5/5 Wrist flexion: 5/5   Wrist extension: 5/5 Wrist extension: 5/5    5/5 : 5/5    Lower Trap 4/5 Lower Trap 4/5   Middle Trap 4/5 Middle Trap 4/5   Rhomboids 4/5 Rhomboids 4/5         Special Tests:  Distraction Relief of sx   Compression Increased sx     Palpation: Pt has moderate palpable tightness of upper traps, levator scapulae, suboccipital and paracervical muscles.      Sensation: intact    Flexibility: Tight pectorals and latissimus dorsi muscles.        CMS Impairment/Limitation/Restriction for FOTO cervical Survey    Therapist reviewed FOTO scores for Clemencia Knight on 8/6/2018.   FOTO documents entered into EPIC - see Media section.    Limitation Score: 34%  Category: Body Position    Current : CJ = at least 20% but < 40% impaired, limited or restricted  Goal: CJ = at least 20% but < 40% impaired, limited or restricted         TREATMENT   Treatment Time In: 12;30  Treatment Time Out: 1:00  Total Treatment time separate from Evaluation: 30 minutes    Clemencia received therapeutic exercises to develop ROM, flexibility and posture for 15 minutes including:  Cervical retraction x5 sitting  Scapula retraction x 10  Cervical sidebending 10 sec x 3  Cervical sidebend with diagonal stretch 10 sec 3x  Cervical rotation 10 sec x3    Clemencia received the following manual therapy techniques: Soft tissue mobilzation/massage of cervical/upper trap musculature and manual stretching for 15 minutes to improve ROM and flexibility of cervical spine.    Home Exercises and Patient Education Provided    Education provided:   - Pt was instructed in posture, sleep position, body mechanics, cervical ROM and scapular strengthening.    Written Home Exercises Provided: yes.  Exercises were reviewed and Clemencia was able to demonstrate them prior to the end of the session.  Clemencia demonstrated good  understanding of the education provided.     See EMR under Patient Instructions for exercises provided 8/6/2018.    Assessment   Clemencia is a 57 y.o. female referred to outpatient Physical Therapy with a medical diagnosis of  cervical disc disorder wuth radiculopathy. Pt presents with limited cervical ROM, poor posture and sleep position, palpable tightness of cervical musculature and decreased scapulo-thoracic strength.    Pt prognosis is Good.   Pt will benefit from skilled outpatient Physical Therapy to address the deficits stated above and in the chart below, provide pt/family education, and to maximize pt's level of independence.     Plan of care discussed with patient: Yes  Pt's spiritual, cultural and educational needs considered and patient is agreeable to the plan of care and goals as stated below:     Anticipated Barriers for therapy: none    Medical Necessity is demonstrated by the following  History  Co-morbidities and personal factors that may impact the plan of care Co-morbidities:   difficulty sleeping and prior cervical surgery    Personal Factors:   no deficits     low   Examination  Body Structures and Functions, activity limitations and participation restrictions that may impact the plan of care Body Regions:   neck    Body Systems:    ROM  strength    Participation Restrictions:   None      Activity limitations:   Learning and applying knowledge  no deficits    General Tasks and Commands  no deficits    Communication  no deficits    Mobility  lifting and carrying objects    Self care  no deficits    Domestic Life  doing house work (cleaning house, washing dishes, laundry)    Interactions/Relationships  no deficits    Life Areas  no deficits    Community and Social Life  no deficits         moderate   Clinical Presentation stable and uncomplicated low   Decision Making/ Complexity Score: low     Goals:  Short Term Goals: 4 weeks   Pt will be able to sleep 6 hours with better positioning without increase of pain.  Pt demonstrates improved cervical rotation by 10 degrees for improved turning head to drive,    Long Term Goals: 8 weeks   Pt has pain less than 2/10 with household chores.  Pt sleeps 8 hours with corrected  sleep position awakening with pain less than 2/10  Pt demonstrates cervical ROM WFL in ADLS and ex.  Pt aware of corrected posture and is able to reduce pain with improved posture.  5/5 scapulo-thoracic strength needed for improved ability to do household chores and have decreased pain.    Plan   Plan of care Certification: 8/6/2018 to 10/1/18.    Outpatient Physical Therapy 2 times weekly for 8 weeks to include the following interventions: Cervical/Lumbar Traction, Manual Therapy, Neuromuscular Re-ed, Patient Education and Therapeutic Exercise.   Add dry needling.    Yajaira Cameron, PT

## 2018-08-08 ENCOUNTER — CLINICAL SUPPORT (OUTPATIENT)
Dept: REHABILITATION | Facility: HOSPITAL | Age: 58
End: 2018-08-08
Attending: NEUROLOGICAL SURGERY
Payer: COMMERCIAL

## 2018-08-08 ENCOUNTER — PATIENT MESSAGE (OUTPATIENT)
Dept: NEUROSURGERY | Facility: CLINIC | Age: 58
End: 2018-08-08

## 2018-08-08 DIAGNOSIS — M54.2 NECK PAIN ON LEFT SIDE: ICD-10-CM

## 2018-08-08 DIAGNOSIS — R29.898 DECREASED ROM OF NECK: ICD-10-CM

## 2018-08-08 PROCEDURE — 97110 THERAPEUTIC EXERCISES: CPT | Mod: PO | Performed by: PHYSICAL THERAPIST

## 2018-08-08 PROCEDURE — 97140 MANUAL THERAPY 1/> REGIONS: CPT | Mod: PO | Performed by: PHYSICAL THERAPIST

## 2018-08-08 NOTE — PROGRESS NOTES
Physical Therapy Daily Treatment Note     Name: Clemencia Knight  Clinic Number: [id number]    Therapy Diagnosis:   Encounter Diagnoses   Name Primary?    Decreased ROM of neck     Neck pain on left side      Physician: Trav White MD    Visit Date: 8/8/2018  Physician Orders: PT Eval and Treat, include cervical traction  Medical Diagnosis from Referral: Cervical disc disorder with radiculopathy, S/P cervical spinal fusion  Evaluation Date: 8/6/2018  Authorization Period Expiration: 12/31/18  Plan of Care Expiration: October 1, 2018  Visit # / Visits authorized: 2/ 30      Time In: 2:45  Time Out: 3:20  Total Billable Time: 35 minutes    Precautions: Standard    Subjective     Pt reports: some difficulty getting towel right in pillow. Couldn't quite get the same position as in clinic.  Some increase in right UE sx, but also had some radicular pain on left..  She was compliant with home exercise program.  Response to previous treatment: Continued right UE pain and cervical tightness greater on left.  Functional change: No change yet.    Pain: 5/10  Location: right arm, left cervical    Objective   Clemencia received therapeutic exercises to develop ROM, flexibility and posture for 15 minutes including:  Cervical retraction x5 sitting  Scapula retraction x 10  Cervical sidebending 10 sec x 3  Cervical sidebend with diagonal stretch 10 sec 3x  Cervical rotation 10 sec x3  ( Add scapulo-thoracic strengthening next visit)    Clemencia received the following manual therapy techniques: Soft tissue mobilzation/massage of cervical/upper trap musculature and manual stretching for 20 minutes to improve ROM and flexibility of cervical spine, as well as manual cervical traction.       Home Exercises Provided and Patient Education Provided     Education provided:   - Continue focus on posture, sleep position and frequent stretching.    Written Home Exercises Provided: Patient instructed to cont prior HEP.  Exercises were reviewed and  Clemencia was able to demonstrate them prior to the end of the session.  Clemencia demonstrated good  understanding of the education provided.     See EMR under Patient Instructions for exercises provided prior visit.    Assessment     Clemencia has significant tightness of cervical and upper scapular musculature and moderate loss of cervical sidebending and rotation. Peripheral sx are most often in right UE, as well as complaints of swelling in right hand. Cervical pain is greater on left.  She does occasionally have left UE sx as well.  Clemencia is progressing well towards her goals.   Pt prognosis is Good.     Pt will continue to benefit from skilled outpatient physical therapy to address the deficits listed in the problem list box on initial evaluation, provide pt/family education and to maximize pt's level of independence in the home and community environment.     Pt's spiritual, cultural and educational needs considered and pt agreeable to plan of care and goals.    Anticipated barriers to physical therapy: none    Goals:   Short Term Goals: 4 weeks   Pt will be able to sleep 6 hours with better positioning without increase of pain.  Pt demonstrates improved cervical rotation by 10 degrees for improved turning head to drive,     Long Term Goals: 8 weeks   Pt has pain less than 2/10 with household chores.  Pt sleeps 8 hours with corrected sleep position awakening with pain less than 2/10  Pt demonstrates cervical ROM WFL in ADLS and ex.  Pt aware of corrected posture and is able to reduce pain with improved posture.  5/5 scapulo-thoracic strength needed for improved ability to do household chores and have decreased pain.       Plan     Continue with manual cervical traction, soft tissue mobilization, manual and self stretching ex.     Outpatient Physical Therapy 2 times weekly for 8 weeks to include the following interventions: Cervical/Lumbar Traction, Manual Therapy, Neuromuscular Re-ed, Patient Education and Therapeutic  Exercise.          Yajaira Cameron, PT

## 2018-08-09 ENCOUNTER — LAB VISIT (OUTPATIENT)
Dept: LAB | Facility: HOSPITAL | Age: 58
End: 2018-08-09
Attending: FAMILY MEDICINE
Payer: COMMERCIAL

## 2018-08-09 DIAGNOSIS — I10 HYPERTENSION, UNSPECIFIED TYPE: ICD-10-CM

## 2018-08-09 LAB
ALBUMIN SERPL BCP-MCNC: 4.3 G/DL
ALP SERPL-CCNC: 67 U/L
ALT SERPL W/O P-5'-P-CCNC: 15 U/L
ANION GAP SERPL CALC-SCNC: 7 MMOL/L
AST SERPL-CCNC: 19 U/L
BILIRUB SERPL-MCNC: 0.7 MG/DL
BUN SERPL-MCNC: 22 MG/DL
CALCIUM SERPL-MCNC: 9.9 MG/DL
CHLORIDE SERPL-SCNC: 104 MMOL/L
CHOLEST SERPL-MCNC: 238 MG/DL
CHOLEST/HDLC SERPL: 4.3 {RATIO}
CO2 SERPL-SCNC: 28 MMOL/L
CREAT SERPL-MCNC: 0.9 MG/DL
EST. GFR  (AFRICAN AMERICAN): >60 ML/MIN/1.73 M^2
EST. GFR  (NON AFRICAN AMERICAN): >60 ML/MIN/1.73 M^2
GLUCOSE SERPL-MCNC: 107 MG/DL
HDLC SERPL-MCNC: 56 MG/DL
HDLC SERPL: 23.5 %
LDLC SERPL CALC-MCNC: 162.6 MG/DL
NONHDLC SERPL-MCNC: 182 MG/DL
POTASSIUM SERPL-SCNC: 4.3 MMOL/L
PROT SERPL-MCNC: 7.8 G/DL
SODIUM SERPL-SCNC: 139 MMOL/L
TRIGL SERPL-MCNC: 97 MG/DL

## 2018-08-09 PROCEDURE — 80061 LIPID PANEL: CPT

## 2018-08-09 PROCEDURE — 36415 COLL VENOUS BLD VENIPUNCTURE: CPT | Mod: PO

## 2018-08-09 PROCEDURE — 80053 COMPREHEN METABOLIC PANEL: CPT

## 2018-08-14 ENCOUNTER — OFFICE VISIT (OUTPATIENT)
Dept: FAMILY MEDICINE | Facility: CLINIC | Age: 58
End: 2018-08-14
Payer: COMMERCIAL

## 2018-08-14 VITALS
WEIGHT: 176.56 LBS | HEART RATE: 61 BPM | HEIGHT: 64 IN | DIASTOLIC BLOOD PRESSURE: 84 MMHG | SYSTOLIC BLOOD PRESSURE: 124 MMHG | BODY MASS INDEX: 30.14 KG/M2

## 2018-08-14 DIAGNOSIS — M54.12 CERVICAL RADICULAR PAIN: ICD-10-CM

## 2018-08-14 DIAGNOSIS — Z00.00 ROUTINE HEALTH MAINTENANCE: Primary | ICD-10-CM

## 2018-08-14 PROCEDURE — 90471 IMMUNIZATION ADMIN: CPT | Mod: S$GLB,,, | Performed by: FAMILY MEDICINE

## 2018-08-14 PROCEDURE — 3079F DIAST BP 80-89 MM HG: CPT | Mod: CPTII,S$GLB,, | Performed by: FAMILY MEDICINE

## 2018-08-14 PROCEDURE — 3074F SYST BP LT 130 MM HG: CPT | Mod: CPTII,S$GLB,, | Performed by: FAMILY MEDICINE

## 2018-08-14 PROCEDURE — 99999 PR PBB SHADOW E&M-EST. PATIENT-LVL IV: CPT | Mod: PBBFAC,,, | Performed by: FAMILY MEDICINE

## 2018-08-14 PROCEDURE — 90715 TDAP VACCINE 7 YRS/> IM: CPT | Mod: S$GLB,,, | Performed by: FAMILY MEDICINE

## 2018-08-14 PROCEDURE — 99396 PREV VISIT EST AGE 40-64: CPT | Mod: 25,S$GLB,, | Performed by: FAMILY MEDICINE

## 2018-08-14 RX ORDER — DIAZEPAM 5 MG/1
TABLET ORAL
Refills: 0 | COMMUNITY
Start: 2018-08-06 | End: 2018-08-14 | Stop reason: SINTOL

## 2018-08-14 RX ORDER — TIZANIDINE 4 MG/1
4 TABLET ORAL EVERY 8 HOURS
Qty: 90 TABLET | Refills: 3 | Status: SHIPPED | OUTPATIENT
Start: 2018-08-14 | End: 2018-12-10

## 2018-08-14 RX ORDER — DULOXETIN HYDROCHLORIDE 60 MG/1
60 CAPSULE, DELAYED RELEASE ORAL DAILY
Qty: 90 CAPSULE | Refills: 3 | Status: SHIPPED | OUTPATIENT
Start: 2018-08-14 | End: 2018-09-13

## 2018-08-14 RX ORDER — DULOXETIN HYDROCHLORIDE 30 MG/1
30 CAPSULE, DELAYED RELEASE ORAL DAILY
Qty: 30 CAPSULE | Refills: 0 | Status: SHIPPED | OUTPATIENT
Start: 2018-08-14 | End: 2018-08-28 | Stop reason: DRUGHIGH

## 2018-08-14 NOTE — PROGRESS NOTES
HPI  Clemencia Knight is a 57 y.o. female with multiple medical diagnoses as listed in the medical history and problem list that presents for Annual Exam  .      HPI  Here today for routine health maintenance.    PAST MEDICAL HISTORY:  Past Medical History:   Diagnosis Date    Allergy     General anesthetics causing adverse effect in therapeutic use     pt. somewhat aware of extubation with one of her surgeries    GERD (gastroesophageal reflux disease)     Restless leg syndrome     Sciatica        PAST SURGICAL HISTORY:  Past Surgical History:   Procedure Laterality Date    BELT ABDOMINOPLASTY      breast implants      CERVICAL FUSION      COLONOSCOPY  10/2012    repeat in 10    DEXA      WNL    HYSTERECTOMY  2000    OOPHORECTOMY  7/16/2013    laparotomy BSO for benign 10cm tubal cyst    SALPINGOOPHORECTOMY  2013    with removal of fallopian cyst    SHOULDER SURGERY      right    TLH  2000    ovaries remain, benign    TONSILLECTOMY, ADENOIDECTOMY, BILATERAL MYRINGOTOMY AND TUBES         SOCIAL HISTORY:  Social History     Socioeconomic History    Marital status:      Spouse name: Not on file    Number of children: Not on file    Years of education: Not on file    Highest education level: Not on file   Social Needs    Financial resource strain: Not on file    Food insecurity - worry: Not on file    Food insecurity - inability: Not on file    Transportation needs - medical: Not on file    Transportation needs - non-medical: Not on file   Occupational History     Employer: OTHER   Tobacco Use    Smoking status: Never Smoker    Smokeless tobacco: Never Used   Substance and Sexual Activity    Alcohol use: Yes     Comment: rarely    Drug use: No    Sexual activity: Yes     Partners: Male     Birth control/protection: Surgical   Other Topics Concern    Are you pregnant or think you may be? No    Breast-feeding Not Asked   Social History Narrative    Not on file       FAMILY  HISTORY:  Family History   Problem Relation Age of Onset    Cancer Father         bladder    Diabetes Father     Heart disease Father     Diabetes Mother     Melanoma Brother     Charcot-Karla-Tooth disease Brother     Breast cancer Neg Hx     Ovarian cancer Neg Hx     Anesthesia problems Neg Hx        ALLERGIES AND MEDICATIONS: updated and reviewed.  Review of patient's allergies indicates:   Allergen Reactions    Flexeril [cyclobenzaprine] Itching    Hydrocodone Itching     Current Outpatient Medications   Medication Sig Dispense Refill    acetaminophen (TYLENOL) 325 MG tablet Take 650 mg by mouth every 6 (six) hours as needed for Pain.      cetirizine (ZYRTEC) 10 MG tablet Take 10 mg by mouth once daily.      TYPHIM VI 25 mcg/0.5 mL injection ADM 0.5ML IM UTD  0    ascorbic acid, vitamin C, (VITAMIN C) 100 MG tablet Take 100 mg by mouth once daily.      b complex vitamins capsule Take 1 capsule by mouth once daily.      calcium phosphate-vitamin D3 125 mg calcium- 125 unit Chew Take by mouth. VITAFUSION CHEWABLES      DULoxetine (CYMBALTA) 30 MG capsule Take 1 capsule (30 mg total) by mouth once daily. 30 capsule 0    DULoxetine (CYMBALTA) 60 MG capsule Take 1 capsule (60 mg total) by mouth once daily. 90 capsule 3    fluticasone (FLONASE) 50 mcg/actuation nasal spray 1 spray by Each Nare route once daily.      hydrocortisone 2.5 % cream Apply topically 2 (two) times daily. 28 g 1    MULTIVITAMIN ORAL Take by mouth once daily.      tiZANidine (ZANAFLEX) 4 MG tablet Take 1 tablet (4 mg total) by mouth every 8 (eight) hours. 90 tablet 3     No current facility-administered medications for this visit.        ROS  Review of Systems   Constitutional: Positive for activity change. Negative for unexpected weight change.   HENT: Positive for hearing loss (seeintg ENT). Negative for rhinorrhea and trouble swallowing.    Eyes: Negative for discharge and visual disturbance.   Respiratory: Negative for  "chest tightness and wheezing.    Cardiovascular: Negative for chest pain and palpitations.   Gastrointestinal: Negative for blood in stool, constipation, diarrhea and vomiting.   Endocrine: Negative for polydipsia and polyuria.   Genitourinary: Negative for difficulty urinating, dysuria, hematuria and menstrual problem.   Musculoskeletal: Positive for arthralgias, joint swelling and neck pain (seeing Neurosurgery who recommended Pain Management and PT.  No relief with Robaxin).   Neurological: Positive for weakness and headaches.   Psychiatric/Behavioral: Positive for dysphoric mood. Negative for confusion.     Physical Exam  Vitals:    08/14/18 0742   BP: 124/84   Pulse: 61    Body mass index is 30.31 kg/m².  Weight: 80.1 kg (176 lb 9.4 oz)   Height: 5' 4" (162.6 cm)     Physical Exam   Constitutional: She is oriented to person, place, and time. She appears well-developed and well-nourished.   HENT:   Head: Normocephalic and atraumatic.   Right Ear: External ear normal.   Left Ear: External ear normal.   Nose: Nose normal.   Mouth/Throat: Oropharynx is clear and moist.   Eyes: Conjunctivae and EOM are normal. Pupils are equal, round, and reactive to light. No scleral icterus.   Neck: Normal range of motion. Neck supple. No JVD present. No thyromegaly present.   Cardiovascular: Normal rate, regular rhythm and normal heart sounds. Exam reveals no gallop and no friction rub.   No murmur heard.  Pulmonary/Chest: Effort normal and breath sounds normal. She has no wheezes. She has no rales.   Abdominal: Soft. Bowel sounds are normal. She exhibits no distension and no mass. There is no tenderness. There is no rebound and no guarding.   Musculoskeletal: Normal range of motion. She exhibits no edema.   Lymphadenopathy:     She has no cervical adenopathy.   Neurological: She is alert and oriented to person, place, and time. She has normal strength. No cranial nerve deficit or sensory deficit.   Skin: Skin is warm and dry. No " rash noted.   Vitals reviewed.    Results for orders placed or performed in visit on 08/09/18   Comprehensive metabolic panel   Result Value Ref Range    Sodium 139 136 - 145 mmol/L    Potassium 4.3 3.5 - 5.1 mmol/L    Chloride 104 95 - 110 mmol/L    CO2 28 23 - 29 mmol/L    Glucose 107 70 - 110 mg/dL    BUN, Bld 22 (H) 6 - 20 mg/dL    Creatinine 0.9 0.5 - 1.4 mg/dL    Calcium 9.9 8.7 - 10.5 mg/dL    Total Protein 7.8 6.0 - 8.4 g/dL    Albumin 4.3 3.5 - 5.2 g/dL    Total Bilirubin 0.7 0.1 - 1.0 mg/dL    Alkaline Phosphatase 67 55 - 135 U/L    AST 19 10 - 40 U/L    ALT 15 10 - 44 U/L    Anion Gap 7 (L) 8 - 16 mmol/L    eGFR if African American >60.0 >60 mL/min/1.73 m^2    eGFR if non African American >60.0 >60 mL/min/1.73 m^2   Lipid panel   Result Value Ref Range    Cholesterol 238 (H) 120 - 199 mg/dL    Triglycerides 97 30 - 150 mg/dL    HDL 56 40 - 75 mg/dL    LDL Cholesterol 162.6 (H) 63.0 - 159.0 mg/dL    HDL/Chol Ratio 23.5 20.0 - 50.0 %    Total Cholesterol/HDL Ratio 4.3 2.0 - 5.0    Non-HDL Cholesterol 182 mg/dL         Health Maintenance       Date Due Completion Date    TETANUS VACCINE 08/21/1980 ---    Influenza Vaccine 08/01/2018 11/3/2015    Mammogram 06/07/2019 6/7/2017    Colonoscopy 10/30/2022 10/30/2012    Lipid Panel 08/09/2023 8/9/2018          Assessment & Plan    Routine health maintenance  - Health maintenance reviewed  - Diet and exercise education.    Cervical radicular pain  -     DULoxetine (CYMBALTA) 30 MG capsule; Take 1 capsule (30 mg total) by mouth once daily.  Dispense: 30 capsule; Refill: 0  -     DULoxetine (CYMBALTA) 60 MG capsule; Take 1 capsule (60 mg total) by mouth once daily.  Dispense: 90 capsule; Refill: 3  - Follow-up in about 3 months (around 11/14/2018).    Other orders  -     tiZANidine (ZANAFLEX) 4 MG tablet; Take 1 tablet (4 mg total) by mouth every 8 (eight) hours.  Dispense: 90 tablet; Refill: 3        Follow-up in about 3 months (around 11/14/2018).

## 2018-08-15 ENCOUNTER — CLINICAL SUPPORT (OUTPATIENT)
Dept: REHABILITATION | Facility: HOSPITAL | Age: 58
End: 2018-08-15
Attending: NEUROLOGICAL SURGERY
Payer: COMMERCIAL

## 2018-08-15 DIAGNOSIS — M54.2 NECK PAIN ON LEFT SIDE: ICD-10-CM

## 2018-08-15 DIAGNOSIS — R29.898 DECREASED ROM OF NECK: ICD-10-CM

## 2018-08-15 PROCEDURE — 97110 THERAPEUTIC EXERCISES: CPT | Mod: PO | Performed by: PHYSICAL THERAPIST

## 2018-08-15 PROCEDURE — 97140 MANUAL THERAPY 1/> REGIONS: CPT | Mod: PO | Performed by: PHYSICAL THERAPIST

## 2018-08-15 NOTE — PATIENT INSTRUCTIONS
RETRACTION / CHIN TUCK    Slowly draw your head back so that your ears line up with your shoulders.        SCAPULAR RETRACTIONS    Draw your shoulder blades back and down.          90 degree Pec stretch    Step into doorway with one foot in front of other. Elbows even with shoulders, use hips to push forward Into doorway.       Shoulder External Rotation at 90 Degree Abduction     Shoulder External Rotation with Towel Roll    Stand against wall with a towel roll along your spine.  Bring your arms up level with the floor and bend your elbows to 90 degrees.  First, squeeze your shoulders together.  Then, try to press the backs of your hands against the wall.  DO NOT overstretch into pain.  Keep it to a light stretch.  Relax and repeat.        Pec Stretch on Foam Roller     Laying on a foam roller, keep your knees bent and arms out to the side. The stretch can be changed by moving arms at different angles       Foam Roller Progression Level 1, Arm Scissors    Arm Scissors, Part 1  Start with one of your arms above your head and the other arm at your side. Raise one arm over your head and simultaneously bring your other arm down toward your side.      CHEST STRETCH FOAM ROLLER - Y    Lie down on a foam roll and raise your arms overhead as shown.     Hold for a gentle stretch across your chest.    Hugs on Foam Roll    Lie down length-wise on a foam roller, as shown. Place your feet flat on the floor, giving yourself a stable base of support.  Open your arms out wide, with your palms up towards the ceiling, until you feel a stretch through your chest.  Then, bring your arms across your chest, hugging yourself.      Each time you hug yourself, alternate which arm sits on top.        ELASTIC BAND ROWS     Holding elastic band with both hands, draw back the band as you bend your elbows. Keep your elbows near the side of your body.      Shoulder extension w/scap stability    Start position: pull your shoulder blades  together and maintain position    End position: while maintaining scapular stability pull your arms into extension against resistance from the band. Slowly return to start position.    ELASTIC BAND LAT PULLS    Hold an elastic band with both arms in front of you and with your elbows straight. Your arms should be elevated. Next, pull the band downwards and back towards your side as you bend your elbows.

## 2018-08-15 NOTE — PROGRESS NOTES
"  Physical Therapy Daily Treatment Note     Name: Clemencia Knight  Clinic Number: [id number]    Therapy Diagnosis:   Encounter Diagnoses   Name Primary?    Decreased ROM of neck     Neck pain on left side      Physician: Trav White MD    Visit Date: 8/15/2018  Physician Orders: PT Eval and Treat, include cervical traction  Medical Diagnosis from Referral: Cervical disc disorder with radiculopathy, S/P cervical spinal fusion  Evaluation Date: 8/6/2018  Authorization Period Expiration: 12/31/18  Plan of Care Expiration: October 1, 2018  Visit # / Visits authorized: 3/ 30      Time In: 10:40  Time Out: 11:25  Total Billable Time: 45 minutes    Precautions: Standard    Subjective     Pt reports: Increased pain in neck and upper traps with stretching.  Pt states she has been stretching a lot, but gets no relief.  She has not had UE pain or paresthesias or swelling in right hand. Dr. Higgins put her on Symbalta which helped her sleep better. Cervical roll has also helped.  She was compliant with home exercise program.  Response to previous treatment: Cervical pain worse with stretching.  Functional change: Sleeping better. Decreased radicular sx.    Pain: 6/10  Location: right arm, left cervical    Objective   Clemencia received therapeutic exercises to develop ROM, flexibility and posture for 25 minutes including:  Cervical retraction x5 sitting  Scapula retraction x 10  Cervical sidebending 10 sec x 3 NP  Cervical sidebend with diagonal stretch 10 sec 3x NP  Cervical rotation 10 sec x3 NP  Foam roll: Lying with arms out 1 min                   Alternate arms overhead x 10 ea                   Both arms 'V' overhead x 10                   "Hug the barrel" x 10  Latissimus stretch by wall 10x 5 sec ea  Pec stretch in doorway 3 ways 10 sec ea  Tband orange: Rows x 20                           Extension x 20                          Pull down x 20    Clemencia received the following manual therapy techniques: Soft tissue " mobilzation/massage of cervical/upper trap musculature and manual stretching for 20 minutes to improve ROM and flexibility of cervical spine, as well as manual cervical traction.       Home Exercises Provided and Patient Education Provided     Education provided:   - Continue focus on posture, sleep position and frequent stretching.    Written Home Exercises Provided: Patient instructed to cont prior HEP.  Exercises were reviewed and Clemencia was able to demonstrate them prior to the end of the session.  Clemencia demonstrated good  understanding of the education provided.     See EMR under Patient Instructions for exercises provided prior visit.    Assessment     Clemencia has significant tightness of cervical and upper scapular musculature and moderate loss of cervical sidebending and rotation.  Increased pain with cervical stretching, so focused on manual techniques to decrease tension in cervical muscles, scapulo-thoracic mob and stretching and strengthening to pull scapulae back and down as antagonists to overactive upper traps and scalenes.  .Clemencia is progressing well towards her goals.   Pt prognosis is Good.     Pt will continue to benefit from skilled outpatient physical therapy to address the deficits listed in the problem list box on initial evaluation, provide pt/family education and to maximize pt's level of independence in the home and community environment.     Pt's spiritual, cultural and educational needs considered and pt agreeable to plan of care and goals.    Anticipated barriers to physical therapy: none    Goals:   Short Term Goals: 4 weeks   Pt will be able to sleep 6 hours with better positioning without increase of pain. NOT MET  Pt demonstrates improved cervical rotation by 10 degrees for improved turning head to drive. NOT MET     Long Term Goals: 8 weeks NOT MET  Pt has pain less than 2/10 with household chores.  Pt sleeps 8 hours with corrected sleep position awakening with pain less than 2/10  Pt  demonstrates cervical ROM WFL in ADLS and ex.  Pt aware of corrected posture and is able to reduce pain with improved posture.  5/5 scapulo-thoracic strength needed for improved ability to do household chores and have decreased pain.       Plan     Continue with manual cervical traction, soft tissue mobilization, scapula mobilization, pec and latissimus stretching and scap-thor strengthening.  Request trial of dry needling for next visit.     Outpatient Physical Therapy 2 times weekly for 8 weeks to include the following interventions: Cervical/Lumbar Traction, Manual Therapy, Neuromuscular Re-ed, Patient Education and Therapeutic Exercise.          Yajaira Cameron, PT

## 2018-08-17 ENCOUNTER — CLINICAL SUPPORT (OUTPATIENT)
Dept: REHABILITATION | Facility: HOSPITAL | Age: 58
End: 2018-08-17
Attending: NEUROLOGICAL SURGERY
Payer: COMMERCIAL

## 2018-08-17 DIAGNOSIS — M54.2 NECK PAIN ON LEFT SIDE: ICD-10-CM

## 2018-08-17 DIAGNOSIS — R29.898 DECREASED ROM OF NECK: ICD-10-CM

## 2018-08-17 PROCEDURE — 97140 MANUAL THERAPY 1/> REGIONS: CPT | Mod: PO | Performed by: PHYSICAL THERAPIST

## 2018-08-17 NOTE — PROGRESS NOTES
"  Physical Therapy Daily Treatment Note     Name: Clemencia Knight  Clinic Number: [id number]    Therapy Diagnosis:   Encounter Diagnoses   Name Primary?    Decreased ROM of neck     Neck pain on left side      Physician: Trav White MD    Visit Date: 8/17/2018  Physician Orders: PT Eval and Treat, include cervical traction  Medical Diagnosis from Referral: Cervical disc disorder with radiculopathy, S/P cervical spinal fusion  Evaluation Date: 8/6/2018  Authorization Period Expiration: 12/31/18  Plan of Care Expiration: October 1, 2018  Visit # / Visits authorized: 4/ 30      Time In: 8:59  Time Out: 9:20  Total Billable Time: 30 minutes    Precautions: Standard    Subjective     Pt reports: Increased cervical and right UE sx following last visit with paresthesias in first three digits.  Requests not to do ex today.  :She was compliant with home exercise program.  Response to previous treatment: Increased pain and headache  Functional change: Difficulty sleeping and with household activities x last two days    Pain: 6/10  Location: right arm, left cervical    Objective     HELD ON ALL EXERCISES TODAY  Clemencia received therapeutic exercises to develop ROM, flexibility and posture for 0 minutes including:  Cervical retraction x5 sitting NP  Scapula retraction x 10 NP  Cervical sidebending 10 sec x 3 NP  Cervical sidebend with diagonal stretch 10 sec 3x NP  Cervical rotation 10 sec x3 NP  Foam roll: Lying with arms out 1 min NP                   Alternate arms overhead x 10 ea NP                   Both arms 'V' overhead x 10 NP                   "Hug the barrel" x 10 NP  Latissimus stretch by wall 10x 5 sec ea NP  Pec stretch in doorway 3 ways 10 sec ea NP  Tband orange: Rows x 20 NP                           Extension x 20 NP                          Pull down x 20 NP    Clemencia received the following manual therapy techniques: Soft tissue mobilzation/massage of cervical/upper trap musculature and manual stretching for 30 " minutes to improve ROM and flexibility of cervical spine, as well as manual cervical traction.       Home Exercises Provided and Patient Education Provided     Education provided:   - Continue focus on posture, sleep position and frequent stretching.    Written Home Exercises Provided: Patient instructed to cont prior HEP.  Exercises were reviewed and Clemencia was able to demonstrate them prior to the end of the session.  Clemencia demonstrated good  understanding of the education provided.     See EMR under Patient Instructions for exercises provided prior visit.    Assessment     Clemencia reports increased pain x last two days.  Held on all exercise today and focused on manual techniques of cervical traction and soft tissue mobilization.  If she begins feeling better this weekend, she will resume scapular exercises first.  .Clemencia is progressing well towards her goals.   Pt prognosis is Good.     Pt will continue to benefit from skilled outpatient physical therapy to address the deficits listed in the problem list box on initial evaluation, provide pt/family education and to maximize pt's level of independence in the home and community environment.     Pt's spiritual, cultural and educational needs considered and pt agreeable to plan of care and goals.    Anticipated barriers to physical therapy: none    Goals:   Short Term Goals: 4 weeks   Pt will be able to sleep 6 hours with better positioning without increase of pain. NOT MET  Pt demonstrates improved cervical rotation by 10 degrees for improved turning head to drive. NOT MET     Long Term Goals: 8 weeks NOT MET  Pt has pain less than 2/10 with household chores.  Pt sleeps 8 hours with corrected sleep position awakening with pain less than 2/10  Pt demonstrates cervical ROM WFL in ADLS and ex.  Pt aware of corrected posture and is able to reduce pain with improved posture.  5/5 scapulo-thoracic strength needed for improved ability to do household chores and have  decreased pain.       Plan     Continue with manual cervical traction, soft tissue mobilization, scapula mobilization, pec and latissimus stretching and scap-thor strengthening.  Request trial of dry needling for next visit.     Outpatient Physical Therapy 2 times weekly for 8 weeks to include the following interventions: Cervical/Lumbar Traction, Manual Therapy, Neuromuscular Re-ed, Patient Education and Therapeutic Exercise.          Yajaira Cameron, PT

## 2018-08-20 ENCOUNTER — PATIENT MESSAGE (OUTPATIENT)
Dept: FAMILY MEDICINE | Facility: CLINIC | Age: 58
End: 2018-08-20

## 2018-08-20 ENCOUNTER — CLINICAL SUPPORT (OUTPATIENT)
Dept: REHABILITATION | Facility: HOSPITAL | Age: 58
End: 2018-08-20
Attending: NEUROLOGICAL SURGERY
Payer: COMMERCIAL

## 2018-08-20 DIAGNOSIS — M54.2 NECK PAIN ON LEFT SIDE: ICD-10-CM

## 2018-08-20 DIAGNOSIS — R29.898 DECREASED ROM OF NECK: ICD-10-CM

## 2018-08-20 PROCEDURE — 97140 MANUAL THERAPY 1/> REGIONS: CPT | Mod: PO | Performed by: PHYSICAL THERAPIST

## 2018-08-20 PROCEDURE — 97110 THERAPEUTIC EXERCISES: CPT | Mod: PO | Performed by: PHYSICAL THERAPIST

## 2018-08-20 NOTE — PROGRESS NOTES
"  Physical Therapy Daily Treatment Note     Name: Clemencia Knight  Clinic Number: [id number]    Therapy Diagnosis:   Encounter Diagnoses   Name Primary?    Decreased ROM of neck     Neck pain on left side      Physician: Trav White MD    Visit Date: 8/20/2018  Physician Orders: PT Eval and Treat, include cervical traction  Medical Diagnosis from Referral: Cervical disc disorder with radiculopathy, S/P cervical spinal fusion  Evaluation Date: 8/6/2018  Authorization Period Expiration: 12/31/18  Plan of Care Expiration: October 1, 2018  Visit # / Visits authorized: 5/ 30      Time In: 12:45  Time Out: 1:20  Total Billable Time: 35 minutes    Precautions: Standard    Subjective     Pt reports: Difficulty sleeping last night.  Most pain last few days with sleeping, not during day.  No peripheral sx today. To have epidural injection tomorrow.  :She was compliant with home exercise program.  Response to previous treatment: Decreased pain and tightness; no Has.  Functional change: Difficulty sleeping.    Pain: 4/10  Location: right arm, left cervical    Objective     Clemencia received therapeutic exercises to develop ROM, flexibility and posture for20 minutes including:  Cervical retraction x5 sitting   Scapula retraction x 10   Cervical sidebending 10 sec x 3 NP  Cervical sidebend with diagonal stretch 10 sec 3x NP  Cervical rotation 10 sec x3 NP  Foam roll: Lying with arms out 1 min                    Alternate arms overhead x 10 ea                    Both arms 'V' overhead x 10                    "Hug the barrel" x 10   Latissimus stretch by wall 10x 5 sec ea   Pec stretch in doorway 3 ways 10 sec ea   Tband orange: Rows x 20                            Extension x 20                           Pull down x 20     Clemencia received the following manual therapy techniques: Soft tissue mobilzation/massage of cervical/upper trap musculature and manual stretching for 15 minutes to improve ROM and flexibility of cervical spine, as " well as manual cervical traction.       Home Exercises Provided and Patient Education Provided     Education provided:   - Continue focus on posture, sleep position and frequent stretching.    Written Home Exercises Provided: Patient instructed to cont prior HEP.  Exercises were reviewed and Clemencia was able to demonstrate them prior to the end of the session.  Clemencia demonstrated good  understanding of the education provided.     See EMR under Patient Instructions for exercises provided prior visit.    Assessment     Clemencia reports pain as 4/10 prior to Rx.  Able to resume postural and scapulo-thoracic ex without sx.  Did not resume cervical stretching as pt feels this increases her sx.  Pain 0/10 following treatment above.  .Clemencia is progressing well towards her goals.   Pt prognosis is Good.     Pt will continue to benefit from skilled outpatient physical therapy to address the deficits listed in the problem list box on initial evaluation, provide pt/family education and to maximize pt's level of independence in the home and community environment.     Pt's spiritual, cultural and educational needs considered and pt agreeable to plan of care and goals.    Anticipated barriers to physical therapy: none    Goals:   Short Term Goals: 4 weeks   Pt will be able to sleep 6 hours with better positioning without increase of pain. NOT MET  Pt demonstrates improved cervical rotation by 10 degrees for improved turning head to drive.PROGRESSING, NOT MET     Long Term Goals: 8 weeks PROGRESSING,NOT MET  Pt has pain less than 2/10 with household chores.  Pt sleeps 8 hours with corrected sleep position awakening with pain less than 2/10  Pt demonstrates cervical ROM WFL in ADLS and ex.  Pt aware of corrected posture and is able to reduce pain with improved posture.  5/5 scapulo-thoracic strength needed for improved ability to do household chores and have decreased pain.       Plan     Continue with manual cervical traction, soft  tissue mobilization, scapula mobilization, pec and latissimus stretching and scap-thor strengthening.  Request trial of dry needling for next visit.     Outpatient Physical Therapy 2 times weekly for 8 weeks to include the following interventions: Cervical/Lumbar Traction, Manual Therapy, Neuromuscular Re-ed, Patient Education and Therapeutic Exercise.          Yajaira Cameron, PT

## 2018-08-21 ENCOUNTER — HOSPITAL ENCOUNTER (OUTPATIENT)
Facility: OTHER | Age: 58
Discharge: HOME | End: 2018-08-21
Attending: ANESTHESIOLOGY | Admitting: ANESTHESIOLOGY
Payer: COMMERCIAL

## 2018-08-21 VITALS
TEMPERATURE: 99 F | HEART RATE: 66 BPM | HEIGHT: 64 IN | OXYGEN SATURATION: 95 % | RESPIRATION RATE: 18 BRPM | DIASTOLIC BLOOD PRESSURE: 82 MMHG | WEIGHT: 170 LBS | SYSTOLIC BLOOD PRESSURE: 129 MMHG | BODY MASS INDEX: 29.02 KG/M2

## 2018-08-21 DIAGNOSIS — M54.12 CERVICAL RADICULAR PAIN: Primary | ICD-10-CM

## 2018-08-21 DIAGNOSIS — M54.12 CERVICAL RADICULOPATHY: ICD-10-CM

## 2018-08-21 PROCEDURE — 25500020 PHARM REV CODE 255: Performed by: ANESTHESIOLOGY

## 2018-08-21 PROCEDURE — 99152 MOD SED SAME PHYS/QHP 5/>YRS: CPT | Mod: ,,, | Performed by: ANESTHESIOLOGY

## 2018-08-21 PROCEDURE — 63600175 PHARM REV CODE 636 W HCPCS: Performed by: ANESTHESIOLOGY

## 2018-08-21 PROCEDURE — 64479 NJX AA&/STRD TFRM EPI C/T 1: CPT | Performed by: ANESTHESIOLOGY

## 2018-08-21 PROCEDURE — 25000003 PHARM REV CODE 250: Performed by: STUDENT IN AN ORGANIZED HEALTH CARE EDUCATION/TRAINING PROGRAM

## 2018-08-21 PROCEDURE — 25000003 PHARM REV CODE 250: Performed by: ANESTHESIOLOGY

## 2018-08-21 PROCEDURE — 64479 NJX AA&/STRD TFRM EPI C/T 1: CPT | Mod: RT,,, | Performed by: ANESTHESIOLOGY

## 2018-08-21 RX ORDER — DEXAMETHASONE SODIUM PHOSPHATE 100 MG/10ML
INJECTION INTRAMUSCULAR; INTRAVENOUS
Status: DISCONTINUED | OUTPATIENT
Start: 2018-08-21 | End: 2018-08-21 | Stop reason: HOSPADM

## 2018-08-21 RX ORDER — SODIUM CHLORIDE 9 MG/ML
500 INJECTION, SOLUTION INTRAVENOUS CONTINUOUS
Status: DISCONTINUED | OUTPATIENT
Start: 2018-08-21 | End: 2018-08-21 | Stop reason: HOSPADM

## 2018-08-21 RX ORDER — LIDOCAINE HYDROCHLORIDE 10 MG/ML
INJECTION INFILTRATION; PERINEURAL
Status: DISCONTINUED | OUTPATIENT
Start: 2018-08-21 | End: 2018-08-21 | Stop reason: HOSPADM

## 2018-08-21 RX ORDER — LIDOCAINE HYDROCHLORIDE 10 MG/ML
INJECTION, SOLUTION EPIDURAL; INFILTRATION; INTRACAUDAL; PERINEURAL
Status: DISCONTINUED | OUTPATIENT
Start: 2018-08-21 | End: 2018-08-21 | Stop reason: HOSPADM

## 2018-08-21 RX ORDER — FENTANYL CITRATE 50 UG/ML
INJECTION, SOLUTION INTRAMUSCULAR; INTRAVENOUS
Status: DISCONTINUED | OUTPATIENT
Start: 2018-08-21 | End: 2018-08-21 | Stop reason: HOSPADM

## 2018-08-21 RX ORDER — MIDAZOLAM HYDROCHLORIDE 1 MG/ML
INJECTION INTRAMUSCULAR; INTRAVENOUS
Status: DISCONTINUED | OUTPATIENT
Start: 2018-08-21 | End: 2018-08-21 | Stop reason: HOSPADM

## 2018-08-21 RX ADMIN — SODIUM CHLORIDE 500 ML: 0.9 INJECTION, SOLUTION INTRAVENOUS at 12:08

## 2018-08-21 NOTE — H&P
"HPI  Patient is a 58 year old female with h/o cervical radiculopathy here today for right C5 and C6 TFESI.    PMHx, PSHx, Allergies, Medications reviewed in epic    ROS negative except pain complaints in HPI    OBJECTIVE:    /68   Pulse 65   Temp 98.7 °F (37.1 °C) (Oral)   Resp 18   Ht 5' 4" (1.626 m)   Wt 77.1 kg (170 lb)   SpO2 98%   BMI 29.18 kg/m²     PHYSICAL EXAMINATION:    GENERAL: Well appearing, in no acute distress, alert and oriented x3.  PSYCH:  Mood and affect appropriate.  SKIN: Skin color, texture, turgor normal, no rashes or lesions.  CV: RRR with palpation of the radial artery.  PULM: No evidence of respiratory difficulty, symmetric chest rise. Clear to auscultation.  NEURO: Cranial nerves grossly intact.    Plan:    Proceed with procedure as planned - right C5 and C6 TFESI    Michelet Jarvis  08/21/2018    "

## 2018-08-21 NOTE — DISCHARGE INSTRUCTIONS
Thank you for allowing us to care for you today. You may receive a survey about the care we provided. Your feedback is valuable and helps us provide excellent care throughout the community.     Home Care Instructions for Pain Management:    1. DIET:   You may resume your normal diet today.   2. BATHING:   You may shower with luke warm water. No tub baths or anything that will soak injection sites under water for the next 24 hours.  3. DRESSING:   You may remove your bandage today.   4. ACTIVITY LEVEL:   You may resume your normal activities 24 hrs after your procedure. Nothing strenuous today.  5. MEDICATIONS:   You may resume your normal medications today. To restart blood thinners, ask your doctor.  6. DRIVING    If you have received any sedatives by mouth today, you may not drive for 12 hours.    If you have received any sedation through your IV, you may not drive for 24 hrs.   7. SPECIAL INSTRUCTIONS:   No heat to the injection site for 24 hrs including, hot bath or shower, heating pad, moist heat, or hot tubs.    Use ice pack to injection site for any pain or discomfort.  Apply ice packs for 20 minute intervals as needed.    IF you have diabetes, be sure to monitor your blood sugar more closely. IF your injection contained steroids your blood sugar levels may become higher than normal.    If you are still having pain upon discharge:  Your pain may improve over the next 48 hours. The anesthetic (numbing medication) works immediately to 48 hours. IF your injection contained a steroid (anti-inflammatory medication), it takes approximately 3 days to start feeling relief and 7-10 days to see your greatest results from the medication. It is possible you may need subsequent injections. This would be discussed at your follow up appointment with pain management or your referring doctor.      PLEASE CALL YOUR DOCTOR IF:  1. Redness or swelling around the injection site.  2. Fever of 101 degrees or more  3. Drainage  (pus) from the injection site.  4. For any continuous bleeding (some dried blood over the incision is normal.)    FOR EMERGENCIES:   If any unusual problems or difficulties occur during clinic hours, call [phone] or 151. Adult Procedural Sedation Instructions    Recovery After Procedural Sedation (Adult)  You have been given medicine by vein to make you sleep during your surgery. This may have included both a pain medicine and sleeping medicine. Most of the effects have worn off. But you may still have some drowsiness for the next 6 to 8 hours.  Home care  Follow these guidelines when you get home:  · For the next 8 hours, you should be watched by a responsible adult. This person should make sure your condition is not getting worse.  · Don't drink any alcohol for the next 24 hours.  · Don't drive, operate dangerous machinery, or make important business or personal decisions during the next 24 hours.  Note: Your healthcare provider may tell you not to take any medicine by mouth for pain or sleep in the next 4 hours. These medicines may react with the medicines you were given in the hospital. This could cause a much stronger response than usual.  Follow-up care  Follow up with your healthcare provider if you are not alert and back to your usual level of activity within 12 hours.  When to seek medical advice  Call your healthcare provider right away if any of these occur:  · Drowsiness gets worse  · Weakness or dizziness gets worse  · Repeated vomiting  · You can't be awakened   Date Last Reviewed: 10/18/2016  © 4446-9200 [employer]. [address] [place] [address], [place] [address]. All rights reserved. This information is not intended as a substitute for professional medical care. Always follow your healthcare professional's instructions.

## 2018-08-21 NOTE — OP NOTE
1. Cervical Transforaminal Epidural Steroid Injection  Time-out taken to identify patient and procedure side prior to starting the procedure.   I attest that I have reviewed the patient's home medications prior to the procedure and no contraindication have been identified. I  re-evaluated the patient after the patient was positioned for the procedure in the procedure room immediately before the procedural time-out. The vital signs are current and represent the current state of the patient which has not significantly changed since the preprocedure assessment.                                                                Date of Service: 08/21/2018    PCP: Dallas Higgins MD    Referring Physician:    PROCEDURE:  Right C6 cervical transforaminal epidural steroid injection under fluoroscopy    REASON FOR PROCEDURE: right Osteoarthritis of cervical spine, unspecified spinal osteoarthritis complication status [M47.812].  1. Cervical radicular pain    2. Cervical radiculopathy      POSTPROCEDURE DIAGNOSIS:   Osteoarthritis of cervical spine, unspecified spinal osteoarthritis complication status [M47.812]    1. Cervical radicular pain    2. Cervical radiculopathy           PHYSICIAN: Denita Richards MD  ASSISTANTS: Donald Beatty MD    MEDICATIONS INJECTED: Preservative-free dexamethasone 5mg and 0.5ml of Xylocaine-MPF 1%  SEDATION MEDICATIONS: Midazolam 2 mg, Fentanyl 50 mcg.    ESTIMATED BLOOD LOSS:  None.  COMPLICATION:  None.    TECHNIQUE:   Laying in theleftoblique position, the patient was prepped and draped in the usual sterile fashion using ChloraPrep and fenestrated drape.  The area to be injected was determined under fluoroscopic guidance.  A 26-gauge 3.5 inch spinal needed was introduced towards the desired pedicle.  When the tip of the needle reached the periosteum it was worked anteriorly along the facet joint line.  Negative pressure applied to make sure no blood is seen in the hub.  Omnipaque was  injected and digital subtraction was applied to make sure that there was no vascular runoff whatsoever.  It was also injected to confirm placement in the appropriate area.  The medication was then injected slowly.  The patient tolerated the procedure well.    PAIN BEFORE THE PROCEDURE:  7/10.    PAIN AFTER THE PROCEDURE:  4/10.    The patient was monitored after the procedure.  Patient was given post procedure and discharge instructions to follow at home.  We will see the patient back in two weeks or the patient may call to inform of status. The patient was discharged in a stable condition

## 2018-08-21 NOTE — DISCHARGE SUMMARY
Discharge Diagnosis:Osteoarthritis of cervical spine, unspecified spinal osteoarthritis complication status [M47.812]  Condition on Discharge: Stable.  Diet on Discharge: Same as before.  Activity: as per instruction sheet.  Discharge to: Home with a responsible adult.  Follow up: 2-4 weeks &/or as per Discharge instructions

## 2018-08-22 ENCOUNTER — PATIENT MESSAGE (OUTPATIENT)
Dept: FAMILY MEDICINE | Facility: CLINIC | Age: 58
End: 2018-08-22

## 2018-08-22 DIAGNOSIS — H91.90 HEARING DIFFICULTY, UNSPECIFIED LATERALITY: Primary | ICD-10-CM

## 2018-08-23 ENCOUNTER — OFFICE VISIT (OUTPATIENT)
Dept: OTOLARYNGOLOGY | Facility: CLINIC | Age: 58
End: 2018-08-23
Payer: COMMERCIAL

## 2018-08-23 ENCOUNTER — PATIENT MESSAGE (OUTPATIENT)
Dept: OTOLARYNGOLOGY | Facility: CLINIC | Age: 58
End: 2018-08-23

## 2018-08-23 ENCOUNTER — CLINICAL SUPPORT (OUTPATIENT)
Dept: AUDIOLOGY | Facility: CLINIC | Age: 58
End: 2018-08-23
Payer: COMMERCIAL

## 2018-08-23 VITALS — WEIGHT: 170 LBS | BODY MASS INDEX: 29.02 KG/M2 | HEIGHT: 64 IN

## 2018-08-23 DIAGNOSIS — H90.41 SENSORINEURAL HEARING LOSS (SNHL) OF RIGHT EAR WITH UNRESTRICTED HEARING OF LEFT EAR: Primary | ICD-10-CM

## 2018-08-23 DIAGNOSIS — H90.3 ASYMMETRICAL SENSORINEURAL HEARING LOSS: Primary | ICD-10-CM

## 2018-08-23 DIAGNOSIS — H93.13 TINNITUS, BILATERAL: ICD-10-CM

## 2018-08-23 PROCEDURE — 99204 OFFICE O/P NEW MOD 45 MIN: CPT | Mod: S$GLB,,, | Performed by: OTOLARYNGOLOGY

## 2018-08-23 PROCEDURE — 99999 PR PBB SHADOW E&M-EST. PATIENT-LVL I: CPT | Mod: PBBFAC,,,

## 2018-08-23 PROCEDURE — 92567 TYMPANOMETRY: CPT | Mod: S$GLB,,, | Performed by: AUDIOLOGIST-HEARING AID FITTER

## 2018-08-23 PROCEDURE — 92557 COMPREHENSIVE HEARING TEST: CPT | Mod: S$GLB,,, | Performed by: AUDIOLOGIST-HEARING AID FITTER

## 2018-08-23 PROCEDURE — 3008F BODY MASS INDEX DOCD: CPT | Mod: CPTII,S$GLB,, | Performed by: OTOLARYNGOLOGY

## 2018-08-23 PROCEDURE — 99999 PR PBB SHADOW E&M-EST. PATIENT-LVL III: CPT | Mod: PBBFAC,,, | Performed by: OTOLARYNGOLOGY

## 2018-08-23 NOTE — PROGRESS NOTES
Clemencia Knight was seen 08/23/2018 for an audiological evaluation. Patient complains of hearing loss. Pt reports she has difficulty hearing conversation, especially in noisy environments. She denies family Hx of hearing loss and noise exposure Hx. She reports very light tinnitus AU.     Results reveal a mild-to-moderate sensorineural hearing loss for the right ear, and  normal hearing for the left ear.    Speech Reception Thresholds were  35 dBHL for the right ear and 10 dBHL for the left ear.    Word recognition scores were excellent for the right ear and good for the left ear.   Tympanograms were Type A for the right ear and Type A for the left ear.    Audiogram results were reviewed in detail with patient and all questions were answered. Results will be reviewed by ENT at the completion of this note. Recommend further medical eval for the asymmetry, right ear amplification pending medical clearance, annual hearing tests to monitor hearing and hearing protection in loud noise. Pt scheduled a HA consult for 8/27/28 at 3pm.

## 2018-08-23 NOTE — LETTER
August 24, 2018      Dallas Higgins MD  [address] [facility] [address] [place] [address]           [place] [facility]  [address] [facility] [address] [place] [address]  Phone: [phone]  Fax: [phone]          Patient: Clemencia Knight   MR Number: [id number]   YOB: 1960   Date of Visit: 8/23/2018       Dear Dr. Dallas Higgins:    Thank you for referring Clemencia Knight to me for evaluation. Attached you will find relevant portions of my assessment and plan of care.    If you have questions, please do not hesitate to call me. I look forward to following Clemencia Knight along with you.    Sincerely,    Jonathan Krishnan MD    Enclosure  CC:  No Recipients    If you would like to receive this communication electronically, please contact [email or web address] or [phone] to request more information on [employer] Link access.    For providers and/or their staff who would like to refer a patient to [facility], please contact us through our one-stop-shop provider referral line, [facility], at [phone].    If you feel you have received this communication in error or would no longer like to receive these types of communications, please e-mail [email or web address]

## 2018-08-24 ENCOUNTER — CLINICAL SUPPORT (OUTPATIENT)
Dept: REHABILITATION | Facility: HOSPITAL | Age: 58
End: 2018-08-24
Attending: NEUROLOGICAL SURGERY
Payer: COMMERCIAL

## 2018-08-24 DIAGNOSIS — M54.2 NECK PAIN ON LEFT SIDE: ICD-10-CM

## 2018-08-24 DIAGNOSIS — R29.898 DECREASED ROM OF NECK: ICD-10-CM

## 2018-08-24 PROCEDURE — 97140 MANUAL THERAPY 1/> REGIONS: CPT | Mod: PO | Performed by: PHYSICAL THERAPIST

## 2018-08-24 PROCEDURE — 97110 THERAPEUTIC EXERCISES: CPT | Mod: PO | Performed by: PHYSICAL THERAPIST

## 2018-08-24 NOTE — PROGRESS NOTES
"  Physical Therapy Daily Treatment Note     Name: Clemencia Knight  Clinic Number: [id number]    Therapy Diagnosis:   No diagnosis found.  Physician: Trav White MD    Visit Date: 8/24/2018  Physician Orders: PT Eval and Treat, include cervical traction  Medical Diagnosis from Referral: Cervical disc disorder with radiculopathy, S/P cervical spinal fusion  Evaluation Date: 8/6/2018  Authorization Period Expiration: 12/31/18  Plan of Care Expiration: October 1, 2018  Visit # / Visits authorized: 6/ 30      Time In: 9:30  Time Out: 10:05  Total Billable Time: 35 minutes    Precautions: Standard    Subjective     Pt reports: Pt had injection of right cervical area 3 days ago.  Reports significant decrease in pain and improvement in flexibility.  Continues with left upper trap tightness. She expresses desire to have left side injected.  :She was compliant with home exercise program.  Response to previous treatment:Feeling much better since injection.  Functional change: Sleeping better.  Able to tilt head and turn better.  Pain: 0/10  Location: right arm, left cervical    Objective     Clemencia received therapeutic exercises to develop ROM, flexibility and posture for20 minutes including:  Cervical retraction x5 sitting   Scapula retraction x 10   Cervical sidebending 10 sec x 3 NP  Cervical sidebend with diagonal stretch 10 sec 3x NP  Cervical rotation 10 sec x3 NP  Foam roll: Lying with arms out 1 min                    Alternate arms overhead x 10 ea                    Both arms 'V' overhead x 10                    "Hug the barrel" x 10   Latissimus stretch by wall 10x 5 sec ea   Pec stretch in doorway 3 ways 10 sec ea   Tband orange: Rows x 20                            Extension x 20                           Pull down x 20     Clemencia received the following manual therapy techniques: Soft tissue mobilzation/massage of cervical/upper trap musculature and manual stretching for 15 minutes to improve ROM and flexibility of " cervical spine, as well as manual cervical traction.       Home Exercises Provided and Patient Education Provided     Education provided:   - Continue focus on posture, sleep position and frequent stretching.    Written Home Exercises Provided: Patient instructed to cont prior HEP.  Exercises were reviewed and Clemencia was able to demonstrate them prior to the end of the session.  Clemencia demonstrated good  understanding of the education provided.     See EMR under Patient Instructions for exercises provided prior visit.    Assessment     Pt has moderate palpable tightness of left upper trap and levator scapula.  She feels tight with right SB. Improved SB following manual soft tissue mob techniques. Left shoulder is elevated compared to right.  Right cervical tightness much improved.   Pain 0/10 following treatment above.  .Clemencia is progressing well towards her goals.   Pt prognosis is Good.     Pt will continue to benefit from skilled outpatient physical therapy to address the deficits listed in the problem list box on initial evaluation, provide pt/family education and to maximize pt's level of independence in the home and community environment.     Pt's spiritual, cultural and educational needs considered and pt agreeable to plan of care and goals.    Anticipated barriers to physical therapy: none    Goals:   Short Term Goals: 4 weeks   Pt will be able to sleep 6 hours with better positioning without increase of pain. PROGRESSING  Pt demonstrates improved cervical rotation by 10 degrees for improved turning head to drive. MET     Long Term Goals: 8 weeks PROGRESSING,NOT MET  Pt has pain less than 2/10 with household chores.  Pt sleeps 8 hours with corrected sleep position awakening with pain less than 2/10  Pt demonstrates cervical ROM WFL in ADLS and ex.  Pt aware of corrected posture and is able to reduce pain with improved posture.  5/5 scapulo-thoracic strength needed for improved ability to do household chores  and have decreased pain.       Plan     Continue with manual cervical traction, soft tissue mobilization, scapula mobilization, pec and latissimus stretching and scap-thor strengthening.  Request trial of dry needling for next visit.     Outpatient Physical Therapy 2 times weekly for 8 weeks to include the following interventions: Cervical/Lumbar Traction, Manual Therapy, Neuromuscular Re-ed, Patient Education and Therapeutic Exercise.          Yajaira Cameron, PT

## 2018-08-27 ENCOUNTER — CLINICAL SUPPORT (OUTPATIENT)
Dept: AUDIOLOGY | Facility: CLINIC | Age: 58
End: 2018-08-27
Payer: COMMERCIAL

## 2018-08-27 ENCOUNTER — CLINICAL SUPPORT (OUTPATIENT)
Dept: REHABILITATION | Facility: HOSPITAL | Age: 58
End: 2018-08-27
Attending: NEUROLOGICAL SURGERY
Payer: COMMERCIAL

## 2018-08-27 DIAGNOSIS — R29.898 DECREASED ROM OF NECK: ICD-10-CM

## 2018-08-27 DIAGNOSIS — Z71.89 HEARING AID CONSULTATION: Primary | ICD-10-CM

## 2018-08-27 DIAGNOSIS — M54.2 NECK PAIN ON LEFT SIDE: ICD-10-CM

## 2018-08-27 PROCEDURE — 97110 THERAPEUTIC EXERCISES: CPT | Mod: PO | Performed by: PHYSICAL THERAPIST

## 2018-08-27 PROCEDURE — 97140 MANUAL THERAPY 1/> REGIONS: CPT | Mod: PO | Performed by: PHYSICAL THERAPIST

## 2018-08-27 NOTE — PROGRESS NOTES
"  Physical Therapy Daily Treatment Note     Name: Clemencia Knight  Clinic Number: [id number]    Therapy Diagnosis:   Encounter Diagnoses   Name Primary?    Decreased ROM of neck     Neck pain on left side      Physician: Trav White MD    Visit Date: 8/27/2018  Physician Orders: PT Eval and Treat, include cervical traction  Medical Diagnosis from Referral: Cervical disc disorder with radiculopathy, S/P cervical spinal fusion  Evaluation Date: 8/6/2018  Authorization Period Expiration: 12/31/18  Plan of Care Expiration: October 1, 2018  Visit # / Visits authorized: 7/ 30      Time In: 10:55  Time Out: 11:30  Total Billable Time: 35 minutes    Precautions: Standard    Subjective     Pt reports: increased pain and tightness, greater on left after doing some housework yesterday.  :She was compliant with home exercise program.  Response to previous treatment:Decreased pain and tightness following last visit.  Functional change: Difficulty doing light housework  Pain: 7/10  Location: right arm, left cervical    Objective     Clemencia received therapeutic exercises to develop ROM, flexibility and posture for20 minutes including:  Cervical retraction x5 sitting   Scapula retraction x 10   Cervical sidebending 10 sec x 3 NP  Cervical sidebend with diagonal stretch 10 sec 3x NP  Cervical rotation 10 sec x3 NP  Foam roll: Lying with arms out 1 min                    Alternate arms overhead x 10 ea                    Both arms 'V' overhead x 10                    "Hug the barrel" x 10   Latissimus stretch by wall 10x 5 sec ea   Pec stretch in doorway 3 ways 10 sec ea   Tband orange: Rows x 20                            Extension x 20                           Pull down x 20     Clemencia received the following manual therapy techniques: Soft tissue mobilzation/massage of cervical/upper trap musculature and manual stretching for 15 minutes to improve ROM and flexibility of cervical spine, as well as manual cervical traction.   "     Home Exercises Provided and Patient Education Provided     Education provided:   - Continue focus on posture, sleep position and frequent stretching.    Written Home Exercises Provided: Patient instructed to cont prior HEP.  Exercises were reviewed and Clemencia was able to demonstrate them prior to the end of the session.  Clemencia demonstrated good  understanding of the education provided.     See EMR under Patient Instructions for exercises provided prior visit.    Assessment     Pt frustrated that she continues to have tightness of cervical musculature, that it doesn't stay looser.  She does not do cervical stretching when she is hurting, as she feels it makes her worse.  Left upper trap and levator scapulae have moderate palpable tightness.  Right is better since injection.  She also has mild aching in right upper arm, but not more distal sx.  .Clemencia is progressing well towards her goals.   Pt prognosis is Good.     Pt will continue to benefit from skilled outpatient physical therapy to address the deficits listed in the problem list box on initial evaluation, provide pt/family education and to maximize pt's level of independence in the home and community environment.     Pt's spiritual, cultural and educational needs considered and pt agreeable to plan of care and goals.    Anticipated barriers to physical therapy: none    Goals:   Short Term Goals: 4 weeks   Pt will be able to sleep 6 hours with better positioning without increase of pain. PROGRESSING  Pt demonstrates improved cervical rotation by 10 degrees for improved turning head to drive. MET     Long Term Goals: 8 weeks PROGRESSING,NOT MET  Pt has pain less than 2/10 with household chores.  Pt sleeps 8 hours with corrected sleep position awakening with pain less than 2/10  Pt demonstrates cervical ROM WFL in ADLS and ex.  Pt aware of corrected posture and is able to reduce pain with improved posture.  5/5 scapulo-thoracic strength needed for improved  ability to do household chores and have decreased pain.       Plan     Continue with manual cervical traction, soft tissue mobilization, scapula mobilization, pec and latissimus stretching and scap-thor strengthening.  Request trial of dry needling for next visit.     Outpatient Physical Therapy 2 times weekly for 8 weeks to include the following interventions: Cervical/Lumbar Traction, Manual Therapy, Neuromuscular Re-ed, Patient Education and Therapeutic Exercise.          Yajaira Cameron, PT

## 2018-08-27 NOTE — PROGRESS NOTES
Clemencia Knight was seen on 08/27/2018 for a hearing aid consult. Patient's audiogram was discussed in detail. We discussed the different sizes and levels of technology and decided based on the patients lifestyle that the best choice would be Phonak Virto B70-10 in color pink for the right ear. The price quoted was $2650.oo for 1 with tax included. Pt was informed that the hearing test would be valid for 6 months for the purchase of hearing aids and that they would need to pay for the hearing aids in full and be reimbursed by their insurance company for any hearing aid benefits they may have. [employer] said they would cover the cost of her hearing aid so pt will file on her own with the 1500 form to be given at time of fitting. Pt scheduled a HA fitting on 9/11/18.

## 2018-08-28 ENCOUNTER — PATIENT MESSAGE (OUTPATIENT)
Dept: FAMILY MEDICINE | Facility: CLINIC | Age: 58
End: 2018-08-28

## 2018-08-28 ENCOUNTER — OFFICE VISIT (OUTPATIENT)
Dept: NEUROSURGERY | Facility: CLINIC | Age: 58
End: 2018-08-28
Payer: COMMERCIAL

## 2018-08-28 ENCOUNTER — HOSPITAL ENCOUNTER (OUTPATIENT)
Dept: RADIOLOGY | Facility: HOSPITAL | Age: 58
Discharge: HOME | End: 2018-08-28
Attending: PHYSICIAN ASSISTANT
Payer: COMMERCIAL

## 2018-08-28 VITALS
WEIGHT: 174.63 LBS | DIASTOLIC BLOOD PRESSURE: 3 MMHG | TEMPERATURE: 98 F | BODY MASS INDEX: 29.97 KG/M2 | SYSTOLIC BLOOD PRESSURE: 141 MMHG | HEART RATE: 69 BPM

## 2018-08-28 DIAGNOSIS — M62.838 MUSCLE SPASMS OF NECK: ICD-10-CM

## 2018-08-28 DIAGNOSIS — M54.2 CHRONIC NECK PAIN: ICD-10-CM

## 2018-08-28 DIAGNOSIS — M47.22 CERVICAL SPONDYLOSIS WITH RADICULOPATHY: Primary | ICD-10-CM

## 2018-08-28 DIAGNOSIS — G89.29 CHRONIC NECK PAIN: ICD-10-CM

## 2018-08-28 PROCEDURE — 3077F SYST BP >= 140 MM HG: CPT | Mod: CPTII,S$GLB,, | Performed by: PHYSICIAN ASSISTANT

## 2018-08-28 PROCEDURE — 72100 X-RAY EXAM L-S SPINE 2/3 VWS: CPT | Mod: TC

## 2018-08-28 PROCEDURE — 99214 OFFICE O/P EST MOD 30 MIN: CPT | Mod: S$GLB,,, | Performed by: PHYSICIAN ASSISTANT

## 2018-08-28 PROCEDURE — 3078F DIAST BP <80 MM HG: CPT | Mod: CPTII,S$GLB,, | Performed by: PHYSICIAN ASSISTANT

## 2018-08-28 PROCEDURE — 72100 X-RAY EXAM L-S SPINE 2/3 VWS: CPT | Mod: 26,,, | Performed by: RADIOLOGY

## 2018-08-28 PROCEDURE — 99999 PR PBB SHADOW E&M-EST. PATIENT-LVL IV: CPT | Mod: PBBFAC,,, | Performed by: PHYSICIAN ASSISTANT

## 2018-08-28 PROCEDURE — 3008F BODY MASS INDEX DOCD: CPT | Mod: CPTII,S$GLB,, | Performed by: PHYSICIAN ASSISTANT

## 2018-08-28 NOTE — PROGRESS NOTES
CHIEF COMPLAINT:  Cervical radiculopathy.    HISTORY OF PRESENT ILLNESS:  Ms. Knight is a 58-year-old female with history of   ACDF at C6-C7 with adjacent level disk disease, most significant at C5-C6.  The   patient was last seen by Dr. White on July 23rd of this year.  She also has loss   of normal lordotic curvature with mild kyphosis.  She has been going to physical   therapy with some improvement, but still having considerable neck pain and   primarily left-sided muscle spasm.  She had an epidural steroid injection that   was done at C5-C6 on the right.  On 08/21/2018, the patient states that she got   about 50% improvement of her right-sided radicular symptoms.  She is currently   happy with progress; however, currently still having considerable left-sided   muscle spasm.  She denies any new weakness today.  She denies change in function   of her hands.  She denies balance difficulty.  No bowel or bladder dysfunction.    PHYSICAL EXAMINATION:  GENERAL:  The patient is awake, alert, oriented, in no apparent distress.  NEUROLOGIC:  Cranial nerves II-XII are grossly intact.  Her gait is normal.  She   can stand on her heels.  She can stand on her toes.  Strength in her upper   extremities is 5/5 bilaterally at deltoids, biceps, triceps, wrist flexion,   wrist extension, hand  and hand intrinsics.  Lower extremity strength is 5/5   bilaterally in hip flexion, knee flexion, knee extension, dorsiflexion,   plantarflexion and EHL.  Sensation is intact to light touch.  She has negative   Hans sign, negative ankle clonus.    IMAGING:  No recent.    ASSESSMENT AND PLAN:  Ms. Knight is a 58-year-old female with history of ACF at   C6-C7 and now adjacent level disk disease at C5-C6 and right-sided cervical   radiculopathy, which is now improving status post transforaminal epidural   steroid injection.  She is neurologically intact without any focal weakness or   sinus symptoms or cervical myelopathy.  She is having  some continued left-sided   muscle spasms.  She is taking tizanidine.  She says does help, but makes her   tired and discussed switching to something milder like Robaxin; however, she   would like to hold off at this point in time.  She has been going to physical   therapy and making some progress instead doing some dry needing, I have given   her a prescription for this.  In addition, she has a pain management doctor back   home, has more convenient for her.  We give her prescription for this,   potential trigger point injections of the neck.  She has chronic back pain with   intermittent right lower extremity radiculopathy.  She has previous imaging with   her lumbar spinous prior that she will updated lumbar x-rays.  Today, we plan   on see her back in six to eight weeks if she develops any new or worsening   issues or ask any questions or concern, she is encouraged to give us a call.    DICTATED BY:  Etienne Kiran Jr., TERESITA PAVON/NEELAM  dd: 08/28/2018 10:59:10 (CDT)  td: 08/29/2018 02:31:05 (CDT)  Doc ID   #[id number]  Job ID #[id number]    CC:

## 2018-08-29 ENCOUNTER — CLINICAL SUPPORT (OUTPATIENT)
Dept: REHABILITATION | Facility: HOSPITAL | Age: 58
End: 2018-08-29
Attending: NEUROLOGICAL SURGERY
Payer: COMMERCIAL

## 2018-08-29 DIAGNOSIS — R29.898 DECREASED ROM OF NECK: ICD-10-CM

## 2018-08-29 DIAGNOSIS — M54.2 NECK PAIN ON LEFT SIDE: ICD-10-CM

## 2018-08-29 PROCEDURE — 97140 MANUAL THERAPY 1/> REGIONS: CPT | Mod: PO | Performed by: PHYSICAL THERAPIST

## 2018-08-29 PROCEDURE — 97110 THERAPEUTIC EXERCISES: CPT | Mod: PO | Performed by: PHYSICAL THERAPIST

## 2018-08-29 NOTE — TELEPHONE ENCOUNTER
----- Message from Shiloh Reilly sent at 8/29/2018  9:28 AM CDT -----  Type: Needs Medical Advice    Who Called: spouse- Hu Knight  Symptoms (please be specific):  Na  How long has patient had these symptoms:  [facility]  Pharmacy name and phone #:  [facility]  Best Call Back Number: [phone]    Additional Information:Stating has a referral to pain management for [place], but would like recommendation for pain management on the [facility] area. Please advise

## 2018-08-29 NOTE — PROGRESS NOTES
"  Physical Therapy Daily Treatment Note     Name: Clemencia Knight  Clinic Number: [id number]    Therapy Diagnosis:   Encounter Diagnoses   Name Primary?    Decreased ROM of neck     Neck pain on left side      Physician: Trav White MD    Visit Date: 8/29/2018  Physician Orders: PT Eval and Treat, include cervical traction  Medical Diagnosis from Referral: Cervical disc disorder with radiculopathy, S/P cervical spinal fusion  Evaluation Date: 8/6/2018  Authorization Period Expiration: 12/31/18  Plan of Care Expiration: October 1, 2018  Visit # / Visits authorized: 8/ 30      Time In: 1050  Time Out: 10:20  Total Billable Time: 30 minutes    Precautions: Standard    Subjective     Pt reports: Received orders for dry needling.  Left not quite as tight as it was.  Right side feeling better since injection.  :She was compliant with home exercise program.  Response to previous treatment:Typically feels better following PT, but tightness returns later.  Functional change:Overall better, but no recent change.  Pain: 5/10  Location: right arm, left cervical    Objective     Clemencia received therapeutic exercises to develop ROM, flexibility and posture for20 minutes including:  Cervical retraction x5 sitting   Scapula retraction x 10   Cervical sidebending 10 sec x 3 NP  Cervical sidebend with diagonal stretch 10 sec 3x NP  Cervical rotation 10 sec x3 NP  Foam roll: Lying with arms out 1 min                    Alternate arms overhead x 10 ea                    Both arms 'V' overhead x 10                    "Hug the barrel" x 10   Latissimus stretch by wall 10x 5 sec ea   Pec stretch in doorway 3 ways 10 sec ea   Tband orange: Rows x 20                            Extension x 20                           Pull down x 20     Clemencia received the following manual therapy techniques: Soft tissue mobilzation/massage of cervical/upper trap musculature and manual stretching for 15 minutes to improve ROM and flexibility of cervical " spine, as well as manual cervical traction.       Home Exercises Provided and Patient Education Provided     Education provided:   - Continue focus on posture, sleep position and frequent stretching.    Written Home Exercises Provided: Patient instructed to cont prior HEP.  Exercises were reviewed and Clemencia was able to demonstrate them prior to the end of the session.  Clemencia demonstrated good  understanding of the education provided.     See EMR under Patient Instructions for exercises provided prior visit.    Assessment     Pt frustrated that she continues to have tightness of cervical musculature, that it doesn't stay looser.  She does not do cervical stretching when she is hurting, as she feels it makes her worse.  Left upper trap and levator scapulae have moderate palpable tightness.  Right is better since injection.  Initiated dry needling.  Pt feels she has plateaued with current course of treatment.  .Clemencia is progressing well towards her goals.   Pt prognosis is Good.     Pt will continue to benefit from skilled outpatient physical therapy to address the deficits listed in the problem list box on initial evaluation, provide pt/family education and to maximize pt's level of independence in the home and community environment.     Pt's spiritual, cultural and educational needs considered and pt agreeable to plan of care and goals.    Anticipated barriers to physical therapy: none    Goals:   Short Term Goals: 4 weeks   Pt will be able to sleep 6 hours with better positioning without increase of pain. PROGRESSING  Pt demonstrates improved cervical rotation by 10 degrees for improved turning head to drive. MET     Long Term Goals: 8 weeks   Pt has pain less than 2/10 with household chores.PROGRESSING  Pt sleeps 8 hours with corrected sleep position awakening with pain less than 2/10. PROGRESSING  Pt demonstrates cervical ROM WFL in ADLS and ex. PROGRESSING  Pt aware of corrected posture and is able to reduce pain  with improved posture.PROGRESSING  5/5 scapulo-thoracic strength needed for improved ability to do household chores and have decreased pain.MET       Plan     Pt to continue with Benedicto Vicente PT to try a course of dry needling.  She plans to continue on her own with her HEP.          Yajaira Cameron, PT

## 2018-08-29 NOTE — TELEPHONE ENCOUNTER
Inquired about pain management doctors at the [facility]. Provided patient with providers listed. Stated will make an appt.

## 2018-08-29 NOTE — PROGRESS NOTES
Physical Therapy Daily Treatment Note     Name: Clemencia Knight  Clinic Number: [id number]    Therapy Diagnosis:   Encounter Diagnoses   Name Primary?    Decreased ROM of neck     Neck pain on left side      Physician: Trav White MD    Visit Date: 8/29/2018  Physician Orders: PT Eval and Treat, include cervical traction  Medical Diagnosis from Referral: Cervical disc disorder with radiculopathy, S/P cervical spinal fusion  Evaluation Date: 8/6/2018  Authorization Period Expiration: 12/31/18  Plan of Care Expiration: October 1, 2018  Visit # / Visits authorized: 8/ 30    Time In: 10:20  Time Out: 10:50  Total Billable Time: 30 minutes    Precautions: Standard    Subjective     Pt reports: wanting to attempt dry needling due to continued neck pain .      Objective         Clemencia received the following manual therapy techniques: DNT was performed:  25 minutes, including:  C5,6,7 paraspinals on the left, dorsal scapular point, and spinal accessory point on the left. Needle left in situ for 20 minutes       Home Exercises Provided and Patient Education Provided     Education provided:   - On possible soreness post DNT  - On possible side effects of DNT        Assessment   Reduced muscle tightness following DNT. She will benefit from   Clemencia is progressing well towards her goals.   Pt prognosis is Good.     Pt will continue to benefit from skilled outpatient physical therapy to address the deficits listed in the problem list box on initial evaluation, provide pt/family education and to maximize pt's level of independence in the home and community environment.     Pt's spiritual, cultural and educational needs considered and pt agreeable to plan of care and goals.    Anticipated barriers to physical therapy:     Goals:   Short Term Goals: 4 weeks   Pt will be able to sleep 6 hours with better positioning without increase of pain. PROGRESSING  Pt demonstrates improved cervical rotation by 10 degrees for improved  turning head to drive. MET     Long Term Goals: 8 weeks   Pt has pain less than 2/10 with household chores.PROGRESSING  Pt sleeps 8 hours with corrected sleep position awakening with pain less than 2/10. PROGRESSING  Pt demonstrates cervical ROM WFL in ADLS and ex. PROGRESSING  Pt aware of corrected posture and is able to reduce pain with improved posture.PROGRESSING  5/5 scapulo-thoracic strength needed for improved ability to do household chores and have decreased pain.MET       Plan     Continue with DNT and determine frequency next week     Benedicto Vicente, PT

## 2018-09-04 ENCOUNTER — PATIENT MESSAGE (OUTPATIENT)
Dept: NEUROSURGERY | Facility: CLINIC | Age: 58
End: 2018-09-04

## 2018-09-05 ENCOUNTER — CLINICAL SUPPORT (OUTPATIENT)
Dept: REHABILITATION | Facility: HOSPITAL | Age: 58
End: 2018-09-05
Attending: NEUROLOGICAL SURGERY
Payer: COMMERCIAL

## 2018-09-05 DIAGNOSIS — M54.2 NECK PAIN ON LEFT SIDE: ICD-10-CM

## 2018-09-05 DIAGNOSIS — R29.898 DECREASED ROM OF NECK: ICD-10-CM

## 2018-09-05 PROCEDURE — 97140 MANUAL THERAPY 1/> REGIONS: CPT | Mod: PO | Performed by: PHYSICAL THERAPIST

## 2018-09-05 NOTE — PROGRESS NOTES
Physical Therapy Daily Treatment Note     Name: Clemencia Knight  Clinic Number: [id number]    Therapy Diagnosis:   Encounter Diagnoses   Name Primary?    Decreased ROM of neck     Neck pain on left side      Physician: Etienne Kiran, *    Visit Date: 9/5/2018  Physician Orders: PT Eval and Treat, include cervical traction  Medical Diagnosis from Referral: Cervical disc disorder with radiculopathy, S/P cervical spinal fusion  Evaluation Date: 8/6/2018  Authorization Period Expiration: 12/31/18  Plan of Care Expiration: October 1, 2018  Visit # / Visits authorized: 8/ 30    Time In: 3:00  Time Out: 3:32  Total Billable Time: 32 minutes    Precautions: Standard    Subjective     Pt reports: Reduced neck pain following initial dry needling session. I did something to my back over the weekend and it is still bothering me. The pain is going down my leg.       Objective       Clemencia received the following manual therapy techniques: DNT was performed:  25 minutes, including:  C5,6,7 paraspinals on the left, dorsal scapular point, and spinal accessory point on the left. Needle left in situ for 20 minutes       Home Exercises Provided and Patient Education Provided     Education provided:   -Avoidance of exacerbating activities for LBP  -Extension based activities for LBP       Assessment   Decreased neck pain and muscle tightness following dry needling. Very positive response.   Clemencia is progressing well towards her goals.   Pt prognosis is Good.     Pt will continue to benefit from skilled outpatient physical therapy to address the deficits listed in the problem list box on initial evaluation, provide pt/family education and to maximize pt's level of independence in the home and community environment.     Pt's spiritual, cultural and educational needs considered and pt agreeable to plan of care and goals.    Anticipated barriers to physical therapy:     Goals:   Short Term Goals: 4 weeks   Pt will be able to sleep  6 hours with better positioning without increase of pain. PROGRESSING  Pt demonstrates improved cervical rotation by 10 degrees for improved turning head to drive. MET     Long Term Goals: 8 weeks   Pt has pain less than 2/10 with household chores.PROGRESSING  Pt sleeps 8 hours with corrected sleep position awakening with pain less than 2/10. PROGRESSING  Pt demonstrates cervical ROM WFL in ADLS and ex. PROGRESSING  Pt aware of corrected posture and is able to reduce pain with improved posture.PROGRESSING  5/5 scapulo-thoracic strength needed for improved ability to do household chores and have decreased pain.MET       Plan     Continue with DNT and determine frequency next week     Benedicto Vicente, PT

## 2018-09-06 DIAGNOSIS — M54.16 LUMBAR RADICULOPATHY: Primary | ICD-10-CM

## 2018-09-07 ENCOUNTER — CLINICAL SUPPORT (OUTPATIENT)
Dept: REHABILITATION | Facility: HOSPITAL | Age: 58
End: 2018-09-07
Attending: NEUROLOGICAL SURGERY
Payer: COMMERCIAL

## 2018-09-07 DIAGNOSIS — M54.2 NECK PAIN ON LEFT SIDE: ICD-10-CM

## 2018-09-07 DIAGNOSIS — R29.898 DECREASED ROM OF NECK: ICD-10-CM

## 2018-09-07 PROCEDURE — 97140 MANUAL THERAPY 1/> REGIONS: CPT | Mod: PO | Performed by: PHYSICAL THERAPIST

## 2018-09-07 NOTE — PROGRESS NOTES
"    Physical Therapy Daily Treatment Note     Name: Clemencia Knight  Clinic Number: [id number]    Therapy Diagnosis:   Encounter Diagnoses   Name Primary?    Decreased ROM of neck     Neck pain on left side      Physician: Trav White MD    Visit Date: 9/7/2018  Physician Orders: PT Eval and Treat, include cervical traction  Medical Diagnosis from Referral: Cervical disc disorder with radiculopathy, S/P cervical spinal fusion  Evaluation Date: 8/6/2018  Authorization Period Expiration: 12/31/18  Plan of Care Expiration: October 1, 2018  Visit # / Visits authorized: 8/ 30    Time In: 10:00  Time Out: 10:23  Total Billable Time: 23 minutes    Precautions: Standard    Subjective     Pt reports: Still having a "knot" in her neck, but feels like the dry needling is helping       Objective     Clemencia received the following manual therapy techniques: DNT was performed:  23 minutes, including:  C5,6,7 paraspinals on the left, dorsal scapular point, and spinal accessory point on the left. Needle left in situ for 18 minutes       Home Exercises Provided and Patient Education Provided     Education provided:   -Avoidance of exacerbating activities for LBP  -Extension based activities for LBP       Assessment   Positive response to early DNT. Back pain remains elevated    Clemencia is progressing well towards her goals.   Pt prognosis is Good.     Pt will continue to benefit from skilled outpatient physical therapy to address the deficits listed in the problem list box on initial evaluation, provide pt/family education and to maximize pt's level of independence in the home and community environment.     Pt's spiritual, cultural and educational needs considered and pt agreeable to plan of care and goals.    Anticipated barriers to physical therapy:     Goals:   Short Term Goals: 4 weeks   Pt will be able to sleep 6 hours with better positioning without increase of pain. PROGRESSING  Pt demonstrates improved cervical rotation by 10 " degrees for improved turning head to drive. MET     Long Term Goals: 8 weeks   Pt has pain less than 2/10 with household chores.PROGRESSING  Pt sleeps 8 hours with corrected sleep position awakening with pain less than 2/10. PROGRESSING  Pt demonstrates cervical ROM WFL in ADLS and ex. PROGRESSING  Pt aware of corrected posture and is able to reduce pain with improved posture.PROGRESSING  5/5 scapulo-thoracic strength needed for improved ability to do household chores and have decreased pain.MET       Plan     Pt. To have injections in her neck next week and we will determine need for continued physical therapy after that.     Benedicto Vicente, PT

## 2018-09-10 ENCOUNTER — OFFICE VISIT (OUTPATIENT)
Dept: PAIN MEDICINE | Facility: CLINIC | Age: 58
End: 2018-09-10
Payer: COMMERCIAL

## 2018-09-10 VITALS — HEIGHT: 64 IN | BODY MASS INDEX: 29.71 KG/M2 | WEIGHT: 174 LBS

## 2018-09-10 DIAGNOSIS — M51.36 DDD (DEGENERATIVE DISC DISEASE), LUMBAR: ICD-10-CM

## 2018-09-10 DIAGNOSIS — M50.30 DDD (DEGENERATIVE DISC DISEASE), CERVICAL: Primary | ICD-10-CM

## 2018-09-10 DIAGNOSIS — M54.12 CERVICAL RADICULOPATHY: Primary | ICD-10-CM

## 2018-09-10 DIAGNOSIS — M54.12 CERVICAL RADICULAR PAIN: ICD-10-CM

## 2018-09-10 DIAGNOSIS — M47.892 OTHER SPONDYLOSIS, CERVICAL REGION: ICD-10-CM

## 2018-09-10 PROCEDURE — 99244 OFF/OP CNSLTJ NEW/EST MOD 40: CPT | Mod: S$GLB,,, | Performed by: ANESTHESIOLOGY

## 2018-09-10 PROCEDURE — 99999 PR PBB SHADOW E&M-EST. PATIENT-LVL III: CPT | Mod: PBBFAC,,, | Performed by: ANESTHESIOLOGY

## 2018-09-10 RX ORDER — DULOXETIN HYDROCHLORIDE 30 MG/1
CAPSULE, DELAYED RELEASE ORAL
Qty: 30 CAPSULE | Refills: 11 | Status: SHIPPED | OUTPATIENT
Start: 2018-09-10 | End: 2018-10-03

## 2018-09-10 NOTE — PROGRESS NOTES
This note was completed with dictation software and grammatical errors may exist.    Referring Physician: Dallas Higgins MD    PCP: Dallas Higgins MD      CC: neck and lower back pain    HPI:   Clemencia Knight is a 58 y.o. female referred for neck and lower back pain. Patient with history of cervical fusion in C6-7 in 2009.  She initially had cervical fusion due neck and radiating right arm pain. Pain improved postoperatively but has gradually worsened over past 6 months.  No recent traumatic incident.  She presents with constant aching, deep posterior neck pain.  Pain radiates to her bilateral shoulders.  Pain worsens bending, flexing, getting up.  She also has worsening lower back pain.  Pain radiates down her bilateral legs.  Pain worsens with sitting, bending, flexing, lifting getting up as well.  She recently has tried physical therapy with some mild benefits.  She recently had updated cervical MRI.  She is scheduled for lumbar MRI near future.  Neck pain is currently more bothersome.  She underwent a right C5-6 transforaminal epidural steroid injection recently which provided moderate benefit.  She continues to have pain over the left side of her neck.  She wishes to have similar procedure refer her cervical spine.  She denies any worsening weakness.  No bowel bladder changes.    ROS:  CONSTITUTIONAL: No fevers, chills, night sweats, wt. loss, appetite changes  SKIN: no rashes or itching  ENT: No headaches, head trauma, vision changes, or eye pain  LYMPH NODES: None noticed   CV: No chest pain, palpitations.   RESP: No shortness of breath, dyspnea on exertion, cough, wheezing, or hemoptysis  GI: No nausea, emesis, diarrhea, constipation, melena, hematochezia, pain.    : No dysuria, hematuria, urgency, or frequency   HEME: No easy bruising, bleeding problems  PSYCHIATRIC: No depression, anxiety, psychosis, hallucinations.  NEURO: No seizures, memory loss, dizziness or difficulty sleeping  MSK:   Positive HPI      Past Medical History:   Diagnosis Date    Allergy     General anesthetics causing adverse effect in therapeutic use     pt. somewhat aware of extubation with one of her surgeries    GERD (gastroesophageal reflux disease)     Restless leg syndrome     Sciatica      Past Surgical History:   Procedure Laterality Date    ABDOMINOPLASTY     Abdominoplasty with Lipo Abdomen Bilateral 5/30/2016    Performed by [facility] at [facility] OR 2ND FLR    BELT ABDOMINOPLASTY      breast implants      CAPSULECTOMY-BREAST Removal of Implants Bilateral 5/30/2016    Performed by [facility] at [facility] OR 2ND FLR    CERVICAL FUSION      COLONOSCOPY  10/2012    repeat in 10    COLONOSCOPY N/A 10/30/2012    [facility]    DEXA      WNL    MAGGI-TRANSFORAMINAL N/A 6/4/2015    Performed by [facility] CATH LAB    HYSTERECTOMY  2000    INJECTION,STEROID,EPIDURAL,TRANSFORAMINAL APPROACH Right 8/21/2018    Performed by [facility] Richards [facility] at [facility] PAIN MGT    LIPOSUCTION Hips and Flanks Bilateral 5/30/2016    Performed by [facility] at [facility] OR 2ND FLR    MASTOPEXY Implants Removal with Mastopexy Bilateral 5/30/2016    Performed by [facility] at [facility] OR 2ND FLR    OOPHORECTOMY  7/16/2013    laparotomy BSO for benign 10cm tubal cyst    SALPINGOOPHORECTOMY  2013    with removal of fallopian cyst    SHOULDER SURGERY      right    TLH  2000    ovaries remain, benign    TONSILLECTOMY, ADENOIDECTOMY, BILATERAL MYRINGOTOMY AND TUBES       Family History   Problem Relation Age of Onset    Cancer Father         bladder    Diabetes Father     Heart disease Father     Diabetes Mother     Melanoma Brother     Charcot-Karla-Tooth disease Brother     Breast cancer Neg Hx     Ovarian cancer Neg Hx     Anesthesia problems Neg Hx      Social History     Socioeconomic History    Marital status:       "Spouse name: None    Number of children: None    Years of education: None    Highest education level: None   Social Needs    Financial resource strain: None    Food insecurity - worry: None    Food insecurity - inability: None    Transportation needs - medical: None    Transportation needs - non-medical: None   Occupational History     Employer: OTHER   Tobacco Use    Smoking status: Never Smoker    Smokeless tobacco: Never Used   Substance and Sexual Activity    Alcohol use: Yes     Comment: rarely    Drug use: No    Sexual activity: Yes     Partners: Male     Birth control/protection: Surgical   Other Topics Concern    Are you pregnant or think you may be? No    Breast-feeding Not Asked   Social History Narrative    None         Medications/Allergies: See med card    Vitals:    09/10/18 0826   Weight: 78.9 kg (174 lb)   Height: 5' 4" (1.626 m)   PainSc:   8   PainLoc: Neck         Physical exam:    GENERAL: A and O x3, the patient appears well groomed and is in no acute distress.  Skin: No rashes or obvious lesions  HEENT: normocephalic, atraumatic  CARDIOVASCULAR:  Palpable peripheral pulses  LUNGS: easy work of breathing  ABDOMEN: soft, nontender   UPPER EXTREMITIES: Normal alignment, normal range of motion, no atrophy, no skin changes,  hair growth and nail growth normal and equal bilaterally. No swelling, no tenderness.    LOWER EXTREMITIES:  Normal alignment, normal range of motion, no atrophy, no skin changes,  hair growth and nail growth normal and equal bilaterally. No swelling, no tenderness.  CERVICAL SPINE:  Cervical spine: ROM is full in flexion, extension and lateral rotation with moderate increased pain.  Spurling's maneuver causes no neck pain to either side.  Myofascial exam: Moderate Tenderness to palpation across cervical paraspinous region bilaterally.      LUMBAR SPINE  Lumbar spine: ROM is full with flexion extension and oblique extension with mild increased pain.    Silvio's " test causes no increased pain on either side.    Supine straight leg raise is negative bilaterally.    Internal and external rotation of the hip causes no increased pain on either side.  Myofascial exam: No tenderness to palpation across lumbar paraspinous muscles.      MENTAL STATUS: normal orientation, speech, language, and fund of knowledge for social situation.  Emotional state appropriate.    CRANIAL NERVES:  II:  PERRL bilaterally,   III,IV,VI: EOMI.    V:  Facial sensation equal bilaterally  VII:  Facial motor function normal.  VIII:  Hearing equal to finger rub bilaterally  IX/X: Gag normal, palate symmetric  XI:  Shoulder shrug equal, head turn equal  XII:  Tongue midline without fasciculations      MOTOR: Tone and bulk: normal bilateral upper and lower Strength: normal   Delt Bi Tri WE WF     R 5 5 5 5 5 5   L 5 5 5 5 5 5     IP ADD ABD Quad TA Gas HAM  R 5 5 5 5 5 5 5  L 5 5 5 5 5 5 5    SENSATION: Light touch and pinprick intact bilaterally  REFLEXES: normal, symmetric, nonbrisk.  Toes down, no clonus. No hoffmans.  GAIT: normal rise, base, steps, and arm swing.        Imaging:  MRI C-spine 7/2018  C2-C3: Facet arthropathy, left greater than right.  No significant spinal canal stenosis or neural foraminal narrowing.    C3-C4: Mild posterior disc osteophyte complex resulting in mild right neural foraminal narrowing.  No significant spinal canal stenosis or left neural foraminal narrowing.    C4-C5: Mild posterior disc osteophyte complex.  No significant spinal canal stenosis or neural foraminal narrowing.    C5-C6: Retrolisthesis as above.  Posterior disc osteophyte complex and uncovertebral spurring resulting in mild spinal canal stenosis and moderate right, mild left neural foraminal narrowing.    C6-C7: Postoperative changes above.  No significant spinal canal stenosis or neural foraminal narrowing.    C7-T1: No significant disease.  No significant spinal canal stenosis or neural foraminal  narrowing.    Assessment:  Patient referred for neck and lower back pain  1. DDD (degenerative disc disease), cervical    2. DDD (degenerative disc disease), lumbar    3. Other spondylosis, cervical region          Plan:  1. I have stressed the importance of physical activity and exercise to improve overall health  2.  Reviewed MRI imaging with patient.  3. I think that the patient's neck pain and radicular arm symptoms are due to degenerative disc disease and have recommended a cervical epidural steroid injection.   4. Follow up after procedure      Thank you for referring this interesting patient, and I look forward to continuing to collaborate in her care.

## 2018-09-10 NOTE — H&P (VIEW-ONLY)
This note was completed with dictation software and grammatical errors may exist.    Referring Physician: Dallas Higgins MD    PCP: Dallas Higgins MD      CC: neck and lower back pain    HPI:   Clemencia Knight is a 58 y.o. female referred for neck and lower back pain. Patient with history of cervical fusion in C6-7 in 2009.  She initially had cervical fusion due neck and radiating right arm pain. Pain improved postoperatively but has gradually worsened over past 6 months.  No recent traumatic incident.  She presents with constant aching, deep posterior neck pain.  Pain radiates to her bilateral shoulders.  Pain worsens bending, flexing, getting up.  She also has worsening lower back pain.  Pain radiates down her bilateral legs.  Pain worsens with sitting, bending, flexing, lifting getting up as well.  She recently has tried physical therapy with some mild benefits.  She recently had updated cervical MRI.  She is scheduled for lumbar MRI near future.  Neck pain is currently more bothersome.  She underwent a right C5-6 transforaminal epidural steroid injection recently which provided moderate benefit.  She continues to have pain over the left side of her neck.  She wishes to have similar procedure refer her cervical spine.  She denies any worsening weakness.  No bowel bladder changes.    ROS:  CONSTITUTIONAL: No fevers, chills, night sweats, wt. loss, appetite changes  SKIN: no rashes or itching  ENT: No headaches, head trauma, vision changes, or eye pain  LYMPH NODES: None noticed   CV: No chest pain, palpitations.   RESP: No shortness of breath, dyspnea on exertion, cough, wheezing, or hemoptysis  GI: No nausea, emesis, diarrhea, constipation, melena, hematochezia, pain.    : No dysuria, hematuria, urgency, or frequency   HEME: No easy bruising, bleeding problems  PSYCHIATRIC: No depression, anxiety, psychosis, hallucinations.  NEURO: No seizures, memory loss, dizziness or difficulty sleeping  MSK:   Positive HPI      Past Medical History:   Diagnosis Date    Allergy     General anesthetics causing adverse effect in therapeutic use     pt. somewhat aware of extubation with one of her surgeries    GERD (gastroesophageal reflux disease)     Restless leg syndrome     Sciatica      Past Surgical History:   Procedure Laterality Date    ABDOMINOPLASTY     Abdominoplasty with Lipo Abdomen Bilateral 5/30/2016    [facility] at [facility] OR 2ND FLR    BELT ABDOMINOPLASTY      breast implants      CAPSULECTOMY-BREAST Removal of Implants Bilateral 5/30/2016    Performed [facility] at [facility] OR 2ND FLR    CERVICAL FUSION      COLONOSCOPY  10/2012    repeat in 10    COLONOSCOPY N/A 10/30/2012    Performed by [facility] Paul [facility] at [facility]    DEXA      WNL    MAGGI-TRANSFORAMINAL N/A 6/4/2015    Performed by [facility] CATH LAB    HYSTERECTOMY  2000    INJECTION,STEROID,EPIDURAL,TRANSFORAMINAL APPROACH Right 8/21/2018    Performed by [facility] Richards [facility] at [facility] PAIN MGT    LIPOSUCTION Hips and Flanks Bilateral 5/30/2016    Performed by [facility] at [facility] OR 2ND FLR    MASTOPEXY Implants Removal with Mastopexy Bilateral 5/30/2016    Performed by [facility] at [facility] OR 2ND FLR    OOPHORECTOMY  7/16/2013    laparotomy BSO for benign 10cm tubal cyst    SALPINGOOPHORECTOMY  2013    with removal of fallopian cyst    SHOULDER SURGERY      right    TLH  2000    ovaries remain, benign    TONSILLECTOMY, ADENOIDECTOMY, BILATERAL MYRINGOTOMY AND TUBES       Family History   Problem Relation Age of Onset    Cancer Father         bladder    Diabetes Father     Heart disease Father     Diabetes Mother     Melanoma Brother     Charcot-Karla-Tooth disease Brother     Breast cancer Neg Hx     Ovarian cancer Neg Hx     Anesthesia problems Neg Hx      Social History     Socioeconomic History    Marital status:       "Spouse name: None    Number of children: None    Years of education: None    Highest education level: None   Social Needs    Financial resource strain: None    Food insecurity - worry: None    Food insecurity - inability: None    Transportation needs - medical: None    Transportation needs - non-medical: None   Occupational History     Employer: OTHER   Tobacco Use    Smoking status: Never Smoker    Smokeless tobacco: Never Used   Substance and Sexual Activity    Alcohol use: Yes     Comment: rarely    Drug use: No    Sexual activity: Yes     Partners: Male     Birth control/protection: Surgical   Other Topics Concern    Are you pregnant or think you may be? No    Breast-feeding Not Asked   Social History Narrative    None         Medications/Allergies: See med card    Vitals:    09/10/18 0826   Weight: 78.9 kg (174 lb)   Height: 5' 4" (1.626 m)   PainSc:   8   PainLoc: Neck         Physical exam:    GENERAL: A and O x3, the patient appears well groomed and is in no acute distress.  Skin: No rashes or obvious lesions  HEENT: normocephalic, atraumatic  CARDIOVASCULAR:  Palpable peripheral pulses  LUNGS: easy work of breathing  ABDOMEN: soft, nontender   UPPER EXTREMITIES: Normal alignment, normal range of motion, no atrophy, no skin changes,  hair growth and nail growth normal and equal bilaterally. No swelling, no tenderness.    LOWER EXTREMITIES:  Normal alignment, normal range of motion, no atrophy, no skin changes,  hair growth and nail growth normal and equal bilaterally. No swelling, no tenderness.  CERVICAL SPINE:  Cervical spine: ROM is full in flexion, extension and lateral rotation with moderate increased pain.  Spurling's maneuver causes no neck pain to either side.  Myofascial exam: Moderate Tenderness to palpation across cervical paraspinous region bilaterally.      LUMBAR SPINE  Lumbar spine: ROM is full with flexion extension and oblique extension with mild increased pain.    Silvio's " test causes no increased pain on either side.    Supine straight leg raise is negative bilaterally.    Internal and external rotation of the hip causes no increased pain on either side.  Myofascial exam: No tenderness to palpation across lumbar paraspinous muscles.      MENTAL STATUS: normal orientation, speech, language, and fund of knowledge for social situation.  Emotional state appropriate.    CRANIAL NERVES:  II:  PERRL bilaterally,   III,IV,VI: EOMI.    V:  Facial sensation equal bilaterally  VII:  Facial motor function normal.  VIII:  Hearing equal to finger rub bilaterally  IX/X: Gag normal, palate symmetric  XI:  Shoulder shrug equal, head turn equal  XII:  Tongue midline without fasciculations      MOTOR: Tone and bulk: normal bilateral upper and lower Strength: normal   Delt Bi Tri WE WF     R 5 5 5 5 5 5   L 5 5 5 5 5 5     IP ADD ABD Quad TA Gas HAM  R 5 5 5 5 5 5 5  L 5 5 5 5 5 5 5    SENSATION: Light touch and pinprick intact bilaterally  REFLEXES: normal, symmetric, nonbrisk.  Toes down, no clonus. No hoffmans.  GAIT: normal rise, base, steps, and arm swing.        Imaging:  MRI C-spine 7/2018  C2-C3: Facet arthropathy, left greater than right.  No significant spinal canal stenosis or neural foraminal narrowing.    C3-C4: Mild posterior disc osteophyte complex resulting in mild right neural foraminal narrowing.  No significant spinal canal stenosis or left neural foraminal narrowing.    C4-C5: Mild posterior disc osteophyte complex.  No significant spinal canal stenosis or neural foraminal narrowing.    C5-C6: Retrolisthesis as above.  Posterior disc osteophyte complex and uncovertebral spurring resulting in mild spinal canal stenosis and moderate right, mild left neural foraminal narrowing.    C6-C7: Postoperative changes above.  No significant spinal canal stenosis or neural foraminal narrowing.    C7-T1: No significant disease.  No significant spinal canal stenosis or neural foraminal  narrowing.    Assessment:  Patient referred for neck and lower back pain  1. DDD (degenerative disc disease), cervical    2. DDD (degenerative disc disease), lumbar    3. Other spondylosis, cervical region          Plan:  1. I have stressed the importance of physical activity and exercise to improve overall health  2.  Reviewed MRI imaging with patient.  3. I think that the patient's neck pain and radicular arm symptoms are due to degenerative disc disease and have recommended a cervical epidural steroid injection.   4. Follow up after procedure      Thank you for referring this interesting patient, and I look forward to continuing to collaborate in her care.

## 2018-09-11 ENCOUNTER — HOSPITAL ENCOUNTER (OUTPATIENT)
Dept: RADIOLOGY | Facility: HOSPITAL | Age: 58
Discharge: HOME | End: 2018-09-11
Attending: OTOLARYNGOLOGY
Payer: COMMERCIAL

## 2018-09-11 ENCOUNTER — CLINICAL SUPPORT (OUTPATIENT)
Dept: AUDIOLOGY | Facility: CLINIC | Age: 58
End: 2018-09-11

## 2018-09-11 ENCOUNTER — HOSPITAL ENCOUNTER (OUTPATIENT)
Dept: RADIOLOGY | Facility: HOSPITAL | Age: 58
Discharge: HOME | End: 2018-09-11
Attending: PHYSICIAN ASSISTANT
Payer: COMMERCIAL

## 2018-09-11 DIAGNOSIS — Z46.1 ENCOUNTER FOR HEARING AID FITTING OF RIGHT EAR: Primary | ICD-10-CM

## 2018-09-11 DIAGNOSIS — M54.16 LUMBAR RADICULOPATHY: ICD-10-CM

## 2018-09-11 DIAGNOSIS — H90.3 ASYMMETRICAL SENSORINEURAL HEARING LOSS: ICD-10-CM

## 2018-09-11 PROCEDURE — 25500020 PHARM REV CODE 255: Mod: PO | Performed by: OTOLARYNGOLOGY

## 2018-09-11 PROCEDURE — V5050 HEARING AID MONAURAL IN EAR: HCPCS | Mod: CSM,,, | Performed by: AUDIOLOGIST-HEARING AID FITTER

## 2018-09-11 PROCEDURE — 72148 MRI LUMBAR SPINE W/O DYE: CPT | Mod: TC,PO

## 2018-09-11 PROCEDURE — 70553 MRI BRAIN STEM W/O & W/DYE: CPT | Mod: TC,PO

## 2018-09-11 PROCEDURE — 70553 MRI BRAIN STEM W/O & W/DYE: CPT | Mod: 26,,, | Performed by: RADIOLOGY

## 2018-09-11 PROCEDURE — 72148 MRI LUMBAR SPINE W/O DYE: CPT | Mod: 26,,, | Performed by: RADIOLOGY

## 2018-09-11 PROCEDURE — COVTAX COVINGTON PRESCRIBED 4.25%: Mod: CSM,,, | Performed by: AUDIOLOGIST-HEARING AID FITTER

## 2018-09-11 PROCEDURE — A9585 GADOBUTROL INJECTION: HCPCS | Mod: PO | Performed by: OTOLARYNGOLOGY

## 2018-09-11 RX ORDER — GADOBUTROL 604.72 MG/ML
7 INJECTION INTRAVENOUS
Status: COMPLETED | OUTPATIENT
Start: 2018-09-11 | End: 2018-09-11

## 2018-09-11 RX ADMIN — GADOBUTROL 7 ML: 604.72 INJECTION INTRAVENOUS at 02:09

## 2018-09-11 NOTE — PROGRESS NOTES
Clemencia Knight was seen on 09/11/2018  for a hearing aid fitting.     HA model and style: Phonak virto B70-10   Right S/N: [id number]    Repair warranty expires: 10/3/21    Set target gain to 80% with instructions that pt will not be hearing to total capacity for a couple weeks when the target gain is increased, performed feedback test, set occlusion compensation to weak, deactivated push button, demonstrated low battery signal to pt. Paired ComPilot II (SN:[id number]) with instructions on use. Paired CP with her phone and performed a test phone call. Reviewed insertion and removal, daily care and maintenance, and battery and wax filter change procedure with patient. Patient was able to manipulate hearing aids well and reported satisfaction with sound quality to date.  She was counseled on realistic expectations and acclimation strategies.  She was given a copy of the hearing aid purchase agreement and will pay in full today and was reminded that she should file for insurance reimbursement on her own. Patient's one month trial period will begin today and was informed of the 30 day return policy with a $200 fitting fee if returned. She will follow up in one week for adjustments. Pt was reminded that she should file for insurance reimbursement on her own.

## 2018-09-12 ENCOUNTER — PATIENT MESSAGE (OUTPATIENT)
Dept: NEUROSURGERY | Facility: CLINIC | Age: 58
End: 2018-09-12

## 2018-09-14 ENCOUNTER — PATIENT MESSAGE (OUTPATIENT)
Dept: SURGERY | Facility: AMBULARY SURGERY CENTER | Age: 58
End: 2018-09-14

## 2018-09-18 ENCOUNTER — CLINICAL SUPPORT (OUTPATIENT)
Dept: AUDIOLOGY | Facility: CLINIC | Age: 58
End: 2018-09-18
Payer: COMMERCIAL

## 2018-09-18 DIAGNOSIS — Z97.4 WEARS HEARING AID IN RIGHT EAR: Primary | ICD-10-CM

## 2018-09-18 NOTE — PROGRESS NOTES
Clemencia Knight came in on 09/18/2018 for a 1 week hearing aid follow up. Pt stated she has had trouble hearing with the CP. Set TG to auto acc from % over 7 days and activated VC for volume up with instructions on use. Demonstrated cleaning with pt cleaning her own aid and changing the wax filter. Reviewed insertion and removal, daily care and maintenance, and battery and wax filter change procedure with patient. Pt will return to the clinic in 3 weeks for her 1 month f/u.

## 2018-09-24 ENCOUNTER — HOSPITAL ENCOUNTER (OUTPATIENT)
Facility: AMBULARY SURGERY CENTER | Age: 58
Discharge: HOME | End: 2018-09-24
Attending: ANESTHESIOLOGY | Admitting: ANESTHESIOLOGY
Payer: COMMERCIAL

## 2018-09-24 DIAGNOSIS — M54.12 CERVICAL RADICULAR PAIN: Primary | ICD-10-CM

## 2018-09-24 DIAGNOSIS — M54.12 CERVICAL RADICULITIS: ICD-10-CM

## 2018-09-24 PROCEDURE — 99152 MOD SED SAME PHYS/QHP 5/>YRS: CPT | Mod: ,,, | Performed by: ANESTHESIOLOGY

## 2018-09-24 PROCEDURE — 62321 NJX INTERLAMINAR CRV/THRC: CPT | Performed by: ANESTHESIOLOGY

## 2018-09-24 PROCEDURE — 62321 NJX INTERLAMINAR CRV/THRC: CPT | Mod: ,,, | Performed by: ANESTHESIOLOGY

## 2018-09-24 RX ORDER — MIDAZOLAM HYDROCHLORIDE 2 MG/2ML
INJECTION, SOLUTION INTRAMUSCULAR; INTRAVENOUS
Status: DISCONTINUED | OUTPATIENT
Start: 2018-09-24 | End: 2018-09-24 | Stop reason: HOSPADM

## 2018-09-24 RX ORDER — MIDAZOLAM HYDROCHLORIDE 1 MG/ML
INJECTION INTRAMUSCULAR; INTRAVENOUS
Status: DISCONTINUED
Start: 2018-09-24 | End: 2018-09-24 | Stop reason: HOSPADM

## 2018-09-24 RX ORDER — METHYLPREDNISOLONE ACETATE 80 MG/ML
INJECTION, SUSPENSION INTRA-ARTICULAR; INTRALESIONAL; INTRAMUSCULAR; SOFT TISSUE
Status: DISCONTINUED
Start: 2018-09-24 | End: 2018-09-24 | Stop reason: HOSPADM

## 2018-09-24 RX ORDER — LIDOCAINE HYDROCHLORIDE 10 MG/ML
INJECTION, SOLUTION EPIDURAL; INFILTRATION; INTRACAUDAL; PERINEURAL
Status: DISCONTINUED | OUTPATIENT
Start: 2018-09-24 | End: 2018-09-24 | Stop reason: HOSPADM

## 2018-09-24 RX ORDER — METHYLPREDNISOLONE ACETATE 80 MG/ML
INJECTION, SUSPENSION INTRA-ARTICULAR; INTRALESIONAL; INTRAMUSCULAR; SOFT TISSUE
Status: DISCONTINUED | OUTPATIENT
Start: 2018-09-24 | End: 2018-09-24 | Stop reason: HOSPADM

## 2018-09-24 RX ORDER — FENTANYL CITRATE 50 UG/ML
INJECTION, SOLUTION INTRAMUSCULAR; INTRAVENOUS
Status: DISCONTINUED | OUTPATIENT
Start: 2018-09-24 | End: 2018-09-24 | Stop reason: HOSPADM

## 2018-09-24 RX ORDER — FENTANYL CITRATE 50 UG/ML
INJECTION, SOLUTION INTRAMUSCULAR; INTRAVENOUS
Status: DISCONTINUED
Start: 2018-09-24 | End: 2018-09-24 | Stop reason: HOSPADM

## 2018-09-24 RX ORDER — SODIUM CHLORIDE, SODIUM LACTATE, POTASSIUM CHLORIDE, CALCIUM CHLORIDE 600; 310; 30; 20 MG/100ML; MG/100ML; MG/100ML; MG/100ML
INJECTION, SOLUTION INTRAVENOUS CONTINUOUS
Status: DISCONTINUED | OUTPATIENT
Start: 2018-09-24 | End: 2018-09-24 | Stop reason: HOSPADM

## 2018-09-24 RX ORDER — SODIUM CHLORIDE 9 MG/ML
INJECTION, SOLUTION INTRAMUSCULAR; INTRAVENOUS; SUBCUTANEOUS
Status: DISCONTINUED | OUTPATIENT
Start: 2018-09-24 | End: 2018-09-24 | Stop reason: HOSPADM

## 2018-09-24 RX ADMIN — SODIUM CHLORIDE, SODIUM LACTATE, POTASSIUM CHLORIDE, CALCIUM CHLORIDE: 600; 310; 30; 20 INJECTION, SOLUTION INTRAVENOUS at 11:09

## 2018-09-24 NOTE — OP NOTE
PROCEDURE DATE: 9/24/2018    Procedure: C7-T1 cervical interlaminar epidural steroid injection under utilizing fluoroscopy.    Diagnosis: Cervical Degenerative Disc Diease; Cervical Radiculitis  POSTOP DIAGNOSIS: SAME    Physician: Myles Rocha MD    Medications injected:  Depomedrol 80mg followed by a slow injection of 4 mL sterile, preservative-free normal saline.    Local anesthetic used: Lidocaine 1%, 2 ml.    Sedation Medications: RN IV sedation    Complications:  None    Estimated blood loss: None    Technique:  A time-out was taken to identify patient and procedure prior to starting the procedure.  With the patient laying in a prone position with the neck in a mid-flexed forward position, the area was prepped and draped in the usual sterile fashion using ChloraPrep and a fenestrated drape.  The area was determined under AP fluoroscopic guidance.  Local anesthetic was given using a 25-gauge 1.5 inch needle by raising a wheal and then infiltrating ventrally.  A 3.5 inch 20-gauge Touhy needle was introduced under fluoroscopic guidance to meet the lamina of C7.  The needle was then hinged under the lamina then advanced using loss of resistance technique.  Once the tip of the needle was in the desired position, the 1ml contrast dye Omnipaque was injected to determine placement and no uptake.  The steroid was then injected slowly followed by a slow injection of 4 mL of the sterile preservative-free normal saline.  The patient tolerated the procedure well.    The patient was monitored after the procedure and was given post-procedure and discharge instructions to follow at home. The patient was discharged in a stable condition.

## 2018-09-24 NOTE — PLAN OF CARE
Patient is going home with her . She was able to walk out of the building without problems. Her upcoming appointments were reviewed.

## 2018-09-24 NOTE — DISCHARGE SUMMARY
[facility]  Discharge Note  Short Stay    Admit Date: 9/24/2018    Discharge Date and Time: 9/24/2018    Attending Physician: Myles Rocha MD     Discharge Provider: Myles Rocha    Diagnoses:  Active Hospital Problems    Diagnosis  POA    *Cervical radiculitis [M54.12]  Yes      Resolved Hospital Problems   No resolved problems to display.       Hospital Course: Cervical MAGGI  Discharged Condition: Good    Final Diagnoses:   Active Hospital Problems    Diagnosis  POA    *Cervical radiculitis [M54.12]  Yes      Resolved Hospital Problems   No resolved problems to display.       Disposition: Home or Self Care    Follow up/Patient Instructions:    Medications:  Reconciled Home Medications:      Medication List      CHANGE how you take these medications    DULoxetine 30 MG capsule  Commonly known as:  CYMBALTA  TAKE 1 CAPSULE(30 MG) BY MOUTH EVERY DAY  What changed:  See the new instructions.        CONTINUE taking these medications    acetaminophen 325 MG tablet  Commonly known as:  TYLENOL  Take 650 mg by mouth every 6 (six) hours as needed for Pain.     cetirizine 10 MG tablet  Commonly known as:  ZYRTEC  Take 10 mg by mouth once daily.     fluticasone 50 mcg/actuation nasal spray  Commonly known as:  FLONASE  1 spray by Each Nare route once daily.     hydrocortisone 2.5 % cream  Apply topically 2 (two) times daily.     MULTIVITAMIN ORAL  Take by mouth once daily.     tiZANidine 4 MG tablet  Commonly known as:  ZANAFLEX  Take 1 tablet (4 mg total) by mouth every 8 (eight) hours.     TYPHIM VI 25 mcg/0.5 mL injection  Generic drug:  typhoid polysaccharide  ADM 0.5ML IM UTD          Discharge Procedure Orders   Call MD for:  temperature >100.4     Call MD for:  persistent nausea and vomiting or diarrhea     Call MD for:  severe uncontrolled pain     Call MD for:  redness, tenderness, or signs of infection (pain, swelling, redness, odor or green/yellow discharge around incision site)     Call MD for:   difficulty breathing or increased cough     Call MD for:  severe persistent headache        Follow up with MD in 2-3 weeks    Discharge Procedure Orders (must include Diet, Follow-up, Activity):   Discharge Procedure Orders (must include Diet, Follow-up, Activity)   Call MD for:  temperature >100.4     Call MD for:  persistent nausea and vomiting or diarrhea     Call MD for:  severe uncontrolled pain     Call MD for:  redness, tenderness, or signs of infection (pain, swelling, redness, odor or green/yellow discharge around incision site)     Call MD for:  difficulty breathing or increased cough     Call MD for:  severe persistent headache

## 2018-09-24 NOTE — INTERVAL H&P NOTE
The patient has been examined and the H&P has been reviewed:    I concur with the findings and no changes have occurred since H&P was written.   This patient has been cleared for surgery in an ambulatory surgical facility    ASA 3,  Mallampatti Score 3  No history of anesthetic complications  Plan for RN IV sedation      Anesthesia/Surgery risks, benefits and alternative options discussed and understood by patient/family.          Active Hospital Problems    Diagnosis  POA    Cervical radiculitis [M54.12]  Yes      Resolved Hospital Problems   No resolved problems to display.

## 2018-09-24 NOTE — DISCHARGE INSTRUCTIONS
Recovery After Procedural Sedation (Adult)  You have been given medicine by vein to make you sleep during your surgery. This may have included both a pain medicine and sleeping medicine. Most of the effects have worn off. But you may still have some drowsiness for the next 6 to 8 hours.  Home care  Follow these guidelines when you get home:  · For the next 8 hours, you should be watched by a responsible adult. This person should make sure your condition is not getting worse.  · Don't drink any alcohol for the next 24 hours.  · Don't drive, operate dangerous machinery, or make important business or personal decisions during the next 24 hours.  Note: Your healthcare provider may tell you not to take any medicine by mouth for pain or sleep in the next 4 hours. These medicines may react with the medicines you were given in the hospital. This could cause a much stronger response than usual.  Follow-up care  Follow up with your healthcare provider if you are not alert and back to your usual level of activity within 12 hours.  When to seek medical advice  Call your healthcare provider right away if any of these occur:  · Drowsiness gets worse  · Weakness or dizziness gets worse  · Repeated vomiting  · You can't be awakened   Date Last Reviewed: 10/18/2016  © 0269-2644 [employer]. [address] [place] [address], [place] [address]. All rights reserved. This information is not intended as a substitute for professional medical care. Always follow your healthcare professional's instructions.      Pain injection instructions:     Steroids take about a 2 weeks to relieve pain.  Initially you may get pain relief from the local anesthetic but this may wear off before the steroid works.    No driving for 24 hrs.   Activity as tolerated- gradually increase activities.  Dont lift over 10 lbs for 24 hrs   No heat at injection sites x 2 days. No heating pads, hot tubs, saunas, or swimming in any body of water or pool for 2  days.  Use ice pack for mild swelling and for comfort , apply for 20 minutes, remove for 20 minute intervals. No direct contact of ice itself  to skin.  May shower today. No tub baths for two days.      Resume Aspirin, Plavix, or Coumadin the day after the procedure unless otherwise instructed.   If diabetic,monitor your glucose carefully as steroids can increase your glucose level    Seek immediate medical help for:   Severe increase in your usual pain or appearance of new pain.  Prolonged (mor than 8 hours) or increasing weakness or numbness in the legs or arms.    - Numbing medicine was injected that affects nerves that carry information from       muscles to the  brain and the brain to the muscles.  This numbness can last 6-8 hrs so be very careful and get assistance when standing or walking.    Fever above 101 ,Drainage,redness,active bleeding, or increased swelling at the injection site.  Headache, shortness of breath, chest pain, or breathing problems.

## 2018-09-25 VITALS
RESPIRATION RATE: 17 BRPM | OXYGEN SATURATION: 97 % | DIASTOLIC BLOOD PRESSURE: 72 MMHG | BODY MASS INDEX: 29.71 KG/M2 | SYSTOLIC BLOOD PRESSURE: 118 MMHG | TEMPERATURE: 98 F | WEIGHT: 174 LBS | HEART RATE: 66 BPM | HEIGHT: 64 IN

## 2018-10-03 ENCOUNTER — OFFICE VISIT (OUTPATIENT)
Dept: NEUROSURGERY | Facility: CLINIC | Age: 58
End: 2018-10-03
Payer: COMMERCIAL

## 2018-10-03 VITALS
WEIGHT: 173.63 LBS | BODY MASS INDEX: 29.8 KG/M2 | SYSTOLIC BLOOD PRESSURE: 123 MMHG | DIASTOLIC BLOOD PRESSURE: 70 MMHG | TEMPERATURE: 98 F | HEART RATE: 78 BPM

## 2018-10-03 DIAGNOSIS — M47.816 LUMBAR FACET ARTHROPATHY: ICD-10-CM

## 2018-10-03 DIAGNOSIS — M47.22 CERVICAL SPONDYLOSIS WITH RADICULOPATHY: Primary | ICD-10-CM

## 2018-10-03 PROCEDURE — 99999 PR PBB SHADOW E&M-EST. PATIENT-LVL III: CPT | Mod: PBBFAC,,, | Performed by: PHYSICIAN ASSISTANT

## 2018-10-03 PROCEDURE — 3074F SYST BP LT 130 MM HG: CPT | Mod: CPTII,S$GLB,, | Performed by: PHYSICIAN ASSISTANT

## 2018-10-03 PROCEDURE — 3008F BODY MASS INDEX DOCD: CPT | Mod: CPTII,S$GLB,, | Performed by: PHYSICIAN ASSISTANT

## 2018-10-03 PROCEDURE — 99214 OFFICE O/P EST MOD 30 MIN: CPT | Mod: S$GLB,,, | Performed by: PHYSICIAN ASSISTANT

## 2018-10-03 PROCEDURE — 3078F DIAST BP <80 MM HG: CPT | Mod: CPTII,S$GLB,, | Performed by: PHYSICIAN ASSISTANT

## 2018-10-03 RX ORDER — DULOXETIN HYDROCHLORIDE 60 MG/1
60 CAPSULE, DELAYED RELEASE ORAL DAILY
COMMUNITY
Start: 2018-08-14 | End: 2019-08-26 | Stop reason: SDUPTHER

## 2018-10-03 NOTE — LETTER
October 3, 2018      [place] Higgins MD  [address] [facility] [address] [place] [address]           [facility] - Neurosurgery 7th Fl  [address]  [facility] [address]  Phone: [phone]          Patient: Clemencia Knight   MR Number: [id number]   YOB: 1960   Date of Visit: 10/3/2018       Dear Dr. [place] Higgins:    Thank you for referring Clemencia Knight to me for evaluation. Attached you will find relevant portions of my assessment and plan of care.    If you have questions, please do not hesitate to call me. I look forward to following Clemencia Knight along with you.    Sincerely,    GRANT Quiroz    Enclosure  CC:  No Recipients    If you would like to receive this communication electronically, please contact [email or web address] or [phone] to request more information on [employer] Link access.    For providers and/or their staff who would like to refer a patient to [facility], please contact us through our one-stop-shop provider referral line, [facility], at [phone].    If you feel you have received this communication in error or would no longer like to receive these types of communications, please e-mail [email or web address]

## 2018-10-04 ENCOUNTER — PATIENT MESSAGE (OUTPATIENT)
Dept: FAMILY MEDICINE | Facility: CLINIC | Age: 58
End: 2018-10-04

## 2018-10-04 NOTE — PROGRESS NOTES
CHIEF COMPLAINT:  Lower extremity pain, paresthesias.    HISTORY OF PRESENT ILLNESS:  Ms. Knight is a 58-year-old female who is here   today for followup of nighttime pain and paresthesias in bilateral lower   extremities.  I had followed her previously for cervical radiculopathy.  She has   a prior ACDF at C6 to C7, adjacent level disk disease and radiculopathy.  She   does not have any symptoms of cervical myelopathy, we send her for steroid   injection, which improved.  She at last visit was complaining of paresthesias in   the bilateral lower extremities, nondermatomal, occur primarily at night.  Got   updated MRI of the lumbar spine ____.  She denies any weakness.  She denies any   bowel or bladder dysfunction.  She denies clumsiness in her hands.  She denies   weakness.  She denies balance difficulty.    PHYSICAL EXAMINATION:  GENERAL:  The patient awake, alert, oriented, in no apparent distress.  NEUROLOGIC:  Cranial nerves II through XII are grossly intact.  Her gait is   normal.  She can stand on her heels.  She can stand on her toes.  No difficulty   with tandem gait.  Strength in her upper extremities is 5/5 bilaterally in   deltoids, biceps, triceps, wrist flexion, wrist extension and hand  and hand   intrinsics.  Lower extremity strength is 5/5 bilaterally in hip flexion, knee   flexion, knee extension, dorsiflexion, plantar flexion and EHL.  She has   negative Hans sign.  Negative ankle clonus.  Toes are downgoing.  Negative   straight leg raise.    IMAGING:  MRI of the lumbar spine that was obtained on 09/11/2018 shows mild   multilevel degenerative changes of the lumbar spine with facet arthropathy seen   at L3 to L4 and L4 to L5 with no significant central canal stenosis or foraminal   stenosis appreciated.    ASSESSMENT AND PLAN:  Ms. Knight is a 58-year-old female with a prior ACDF at C6   to C7 and adjacent level disease in her neck.  She has gotten continued relief   from epidural  steroid injections for her neck.  She has no signs and symptoms of   cervical myelopathy.  She is neurologically intact.  Her low back MRI shows   mild facet arthropathy, but no significant central canal or neuroforaminal   stenosis to explain her lower extremity symptoms.  I discussed with her.  She   has a followup with her primary care doctor.  Sounds like some of her symptoms   again recently after starting Cymbalta.  Have her follow up regarding this.  She   may need to be evaluated by Neurology for restless legs syndrome.  Otherwise,   she can follow up with Neurosurgery in three to four months.  She was encouraged   to call the clinic with questions or concerns that she might have in the   meantime.    DICTATED BY:  TERESITA Ford  dd: 10/03/2018 10:43:43 (CDT)  td: 10/04/2018 02:28:59 (CDT)  Doc ID   #[id number]  Job ID #[id number]    CC:

## 2018-10-23 ENCOUNTER — IMMUNIZATION (OUTPATIENT)
Dept: PHARMACY | Facility: CLINIC | Age: 58
End: 2018-10-23
Payer: COMMERCIAL

## 2018-10-29 ENCOUNTER — TELEPHONE (OUTPATIENT)
Dept: AUDIOLOGY | Facility: CLINIC | Age: 58
End: 2018-10-29

## 2018-10-29 ENCOUNTER — DOCUMENTATION ONLY (OUTPATIENT)
Dept: REHABILITATION | Facility: HOSPITAL | Age: 58
End: 2018-10-29

## 2018-10-29 DIAGNOSIS — R29.898 DECREASED ROM OF NECK: ICD-10-CM

## 2018-10-29 DIAGNOSIS — M54.2 NECK PAIN ON LEFT SIDE: ICD-10-CM

## 2018-10-29 NOTE — PROGRESS NOTES
Outpatient Therapy Discharge Summary     Name: Clemencia Knight  Clinic Number: [id number]    Therapy Diagnosis:   Encounter Diagnoses   Name Primary?    Decreased ROM of neck     Neck pain on left side      Physician: Trav White MD     Physician Orders: PT Eval and Treat, include cervical traction  Medical Diagnosis from Referral: Cervical disc disorder with radiculopathy, S/P cervical spinal fusion  Evaluation Date: 8/6/2018      Date of Last visit: 8/29/18  Total Visits Received: 8  Cancelled Visits: 0  No Show Visits: 0    Assessment    Goals:   Short Term Goals: 4 weeks   Pt will be able to sleep 6 hours with better positioning without increase of pain. PROGRESSING  Pt demonstrates improved cervical rotation by 10 degrees for improved turning head to drive. MET     Long Term Goals: 8 weeks   Pt has pain less than 2/10 with household chores.PROGRESSING  Pt sleeps 8 hours with corrected sleep position awakening with pain less than 2/10. PROGRESSING  Pt demonstrates cervical ROM WFL in ADLS and ex. PROGRESSING  Pt aware of corrected posture and is able to reduce pain with improved posture.PROGRESSING  5/5 scapulo-thoracic strength needed for improved ability to do household chores and have decreased pain.MET         Discharge reason: Patient has reached the maximum rehab potential for the present time    Plan   This patient is discharged from Physical Therapy

## 2018-10-30 ENCOUNTER — TELEPHONE (OUTPATIENT)
Dept: FAMILY MEDICINE | Facility: CLINIC | Age: 58
End: 2018-10-30

## 2018-10-30 ENCOUNTER — PATIENT MESSAGE (OUTPATIENT)
Dept: PAIN MEDICINE | Facility: CLINIC | Age: 58
End: 2018-10-30

## 2018-10-30 NOTE — TELEPHONE ENCOUNTER
Spoke to pt. Pt states she gets about 2 headaches a day and has some nausea with it. Pt wondering if it could be the celebrex or from her neck? Pt wondering what she should do. Please advise.

## 2018-10-30 NOTE — TELEPHONE ENCOUNTER
----- Message from Jennifer Crowley sent at 10/30/2018 11:45 AM CDT -----  Contact: Hu  Type: Needs Medical Advice    Who Called:  Patient's spouse hu  Symptoms (please be specific):    How long has patient had these symptoms:    Pharmacy name and phone #:   Best Call Back Number: [phone]  Additional Information: requesting a call back regarding medication,having severe headaches

## 2018-11-01 NOTE — TELEPHONE ENCOUNTER
Spoke to pt and notified pt of message below. Pt verbalized understanding and states she will wait to see Dr. Higgins on 11/14.

## 2018-11-07 DIAGNOSIS — M54.12 CERVICAL RADICULOPATHY: Primary | ICD-10-CM

## 2018-11-14 ENCOUNTER — PATIENT MESSAGE (OUTPATIENT)
Dept: FAMILY MEDICINE | Facility: CLINIC | Age: 58
End: 2018-11-14

## 2018-11-14 ENCOUNTER — OFFICE VISIT (OUTPATIENT)
Dept: FAMILY MEDICINE | Facility: CLINIC | Age: 58
End: 2018-11-14
Payer: COMMERCIAL

## 2018-11-14 VITALS
RESPIRATION RATE: 16 BRPM | OXYGEN SATURATION: 98 % | HEIGHT: 64 IN | DIASTOLIC BLOOD PRESSURE: 89 MMHG | HEART RATE: 77 BPM | BODY MASS INDEX: 30.71 KG/M2 | SYSTOLIC BLOOD PRESSURE: 130 MMHG | WEIGHT: 179.88 LBS | TEMPERATURE: 98 F

## 2018-11-14 DIAGNOSIS — M54.12 CERVICAL RADICULOPATHY: Primary | ICD-10-CM

## 2018-11-14 PROCEDURE — 99214 OFFICE O/P EST MOD 30 MIN: CPT | Mod: S$GLB,,, | Performed by: FAMILY MEDICINE

## 2018-11-14 PROCEDURE — 3075F SYST BP GE 130 - 139MM HG: CPT | Mod: CPTII,S$GLB,, | Performed by: FAMILY MEDICINE

## 2018-11-14 PROCEDURE — 3008F BODY MASS INDEX DOCD: CPT | Mod: CPTII,S$GLB,, | Performed by: FAMILY MEDICINE

## 2018-11-14 PROCEDURE — 3079F DIAST BP 80-89 MM HG: CPT | Mod: CPTII,S$GLB,, | Performed by: FAMILY MEDICINE

## 2018-11-14 PROCEDURE — 99999 PR PBB SHADOW E&M-EST. PATIENT-LVL III: CPT | Mod: PBBFAC,,, | Performed by: FAMILY MEDICINE

## 2018-11-14 RX ORDER — PREGABALIN 75 MG/1
75 CAPSULE ORAL 2 TIMES DAILY
Qty: 60 CAPSULE | Refills: 3 | Status: SHIPPED | OUTPATIENT
Start: 2018-11-14 | End: 2018-11-20 | Stop reason: SDUPTHER

## 2018-11-14 RX ORDER — PREGABALIN 75 MG/1
75 CAPSULE ORAL 2 TIMES DAILY
Qty: 60 CAPSULE | Refills: 3 | Status: SHIPPED | OUTPATIENT
Start: 2018-11-14 | End: 2018-11-14 | Stop reason: SDUPTHER

## 2018-11-14 NOTE — TELEPHONE ENCOUNTER
----- Message from Radha rBito sent at 11/14/2018  9:55 AM CST -----  Contact: pt  ..Type: Needs Medical Advice    Who Called:  Pt  Pharmacy name and phone #  [facility] [place]   [address] [address]  [phone]  Best Call Back Number: [phone]  Additional Information: Pt is requesting for a prescription  For (pregabalin (LYRICA) 75 MG capsule) to be sent to [facility]. Pt stated  medication cost $311.00 at [facility] which is too expensive.  Please call to advise  Thanks

## 2018-11-14 NOTE — H&P (VIEW-ONLY)
Subjective:       Patient ID: Clemencia Knight is a 58 y.o. female    Chief Complaint: Follow-up    HPI  Here today for interval evaluation  She has had stabilization of her mood.    She has had continued neck pain. She has a pending epidural steroid injection    Review of Systems   Constitutional: Positive for activity change. Negative for unexpected weight change.   HENT: Negative for hearing loss, rhinorrhea and trouble swallowing.    Eyes: Negative for discharge and visual disturbance.   Respiratory: Negative for chest tightness and wheezing.    Cardiovascular: Negative for chest pain and palpitations.   Gastrointestinal: Negative for blood in stool, constipation, diarrhea and vomiting.   Endocrine: Negative for polydipsia and polyuria.   Genitourinary: Negative for difficulty urinating, dysuria, hematuria and menstrual problem.   Musculoskeletal: Positive for arthralgias, joint swelling and neck pain.   Neurological: Positive for headaches (increased clenching of her jaw). Negative for weakness.   Psychiatric/Behavioral: Negative for confusion and dysphoric mood.        Objective:   Physical Exam   Constitutional: She is oriented to person, place, and time. She appears well-developed and well-nourished.   Neurological: She is alert and oriented to person, place, and time.   Vitals reviewed.       Assessment:       1. Cervical radiculopathy          Plan:       Cervical radiculopathy  -     ADD pregabalin (LYRICA) 75 MG capsule; Take 1 capsule (75 mg total) by mouth 2 (two) times daily.  Dispense: 60 capsule; Refill: 3  - Continue current therapy  - Continue Pain Management  - Consider [facility]  - Follow-up in about 3 months (around 2/14/2019).  - Dental evaluation for possible bite guard

## 2018-11-15 NOTE — TELEPHONE ENCOUNTER
----- Message from Farrah Grande sent at 11/15/2018  3:27 PM CST -----  Type:  Patient Returning Call    Who Called:  /Hu  Who Left Message for Patient:  AMELIA LAM  Does the patient know what this is regarding?:  Medications  Best Call Back Number:  [phone]

## 2018-11-15 NOTE — TELEPHONE ENCOUNTER
----- Message from Stephany Arnold RT sent at 11/15/2018  9:16 AM CST -----  Contact: Hu,,[phone]   Hu,,[phone], requesting a call back soon seeking medical dvise for the pt's medication lyrica, thanks.

## 2018-11-15 NOTE — TELEPHONE ENCOUNTER
Spoke to pt . Pt  states that the lyrica is too expensive and is wondering of there is an alternative that is cheaper, and is also wondering what would be the best thing for pt to take for headaches. Please advise.

## 2018-11-16 NOTE — TELEPHONE ENCOUNTER
Please see message below from Dayna. Please advise.    Yony Alegria,     So I spoke to Ms. Knight. A more affordable option is to have Dr. Higgins change the Lyrica Rx to 150mg 1 po once a day to decrease the quantity. I have a coupon I can add to lower the cost some more, but If he is ok with that please send the Rx here to our pharmacy in [place] so I can provide savings. Thanks (Routing comment)

## 2018-11-16 NOTE — TELEPHONE ENCOUNTER
Good morning,    This pt was prescribed Lyrica, but it is too expensive for her. Can you assist please? Thank you!

## 2018-11-20 ENCOUNTER — PATIENT MESSAGE (OUTPATIENT)
Dept: FAMILY MEDICINE | Facility: CLINIC | Age: 58
End: 2018-11-20

## 2018-11-20 ENCOUNTER — HOSPITAL ENCOUNTER (OUTPATIENT)
Facility: AMBULARY SURGERY CENTER | Age: 58
Discharge: HOME | End: 2018-11-20
Attending: ANESTHESIOLOGY | Admitting: ANESTHESIOLOGY
Payer: COMMERCIAL

## 2018-11-20 DIAGNOSIS — M54.12 CERVICAL RADICULITIS: Primary | ICD-10-CM

## 2018-11-20 PROCEDURE — 99152 MOD SED SAME PHYS/QHP 5/>YRS: CPT | Mod: ,,, | Performed by: ANESTHESIOLOGY

## 2018-11-20 PROCEDURE — 62321 NJX INTERLAMINAR CRV/THRC: CPT | Performed by: ANESTHESIOLOGY

## 2018-11-20 PROCEDURE — 62321 NJX INTERLAMINAR CRV/THRC: CPT | Mod: ,,, | Performed by: ANESTHESIOLOGY

## 2018-11-20 RX ORDER — MIDAZOLAM HYDROCHLORIDE 2 MG/2ML
INJECTION, SOLUTION INTRAMUSCULAR; INTRAVENOUS
Status: DISCONTINUED | OUTPATIENT
Start: 2018-11-20 | End: 2018-11-20 | Stop reason: HOSPADM

## 2018-11-20 RX ORDER — PREGABALIN 75 MG/1
75 CAPSULE ORAL 2 TIMES DAILY
Qty: 60 CAPSULE | Refills: 3 | Status: CANCELLED | OUTPATIENT
Start: 2018-11-20 | End: 2019-05-21

## 2018-11-20 RX ORDER — SODIUM CHLORIDE 9 MG/ML
INJECTION, SOLUTION INTRAMUSCULAR; INTRAVENOUS; SUBCUTANEOUS
Status: DISCONTINUED | OUTPATIENT
Start: 2018-11-20 | End: 2018-11-20 | Stop reason: HOSPADM

## 2018-11-20 RX ORDER — FENTANYL CITRATE 50 UG/ML
INJECTION, SOLUTION INTRAMUSCULAR; INTRAVENOUS
Status: DISCONTINUED
Start: 2018-11-20 | End: 2018-11-20 | Stop reason: HOSPADM

## 2018-11-20 RX ORDER — SODIUM CHLORIDE, SODIUM LACTATE, POTASSIUM CHLORIDE, CALCIUM CHLORIDE 600; 310; 30; 20 MG/100ML; MG/100ML; MG/100ML; MG/100ML
INJECTION, SOLUTION INTRAVENOUS CONTINUOUS
Status: DISCONTINUED | OUTPATIENT
Start: 2018-11-20 | End: 2018-11-20 | Stop reason: HOSPADM

## 2018-11-20 RX ORDER — FENTANYL CITRATE 50 UG/ML
INJECTION, SOLUTION INTRAMUSCULAR; INTRAVENOUS
Status: DISCONTINUED | OUTPATIENT
Start: 2018-11-20 | End: 2018-11-20 | Stop reason: HOSPADM

## 2018-11-20 RX ORDER — MIDAZOLAM HYDROCHLORIDE 1 MG/ML
INJECTION INTRAMUSCULAR; INTRAVENOUS
Status: DISCONTINUED
Start: 2018-11-20 | End: 2018-11-20 | Stop reason: HOSPADM

## 2018-11-20 RX ORDER — DEXAMETHASONE SODIUM PHOSPHATE 10 MG/ML
INJECTION INTRAMUSCULAR; INTRAVENOUS
Status: DISCONTINUED
Start: 2018-11-20 | End: 2018-11-20 | Stop reason: HOSPADM

## 2018-11-20 RX ORDER — PREGABALIN 75 MG/1
75 CAPSULE ORAL 2 TIMES DAILY
Qty: 180 CAPSULE | Refills: 0 | Status: SHIPPED | OUTPATIENT
Start: 2018-11-20 | End: 2019-02-21

## 2018-11-20 RX ORDER — DEXAMETHASONE SODIUM PHOSPHATE 10 MG/ML
INJECTION INTRAMUSCULAR; INTRAVENOUS
Status: DISCONTINUED | OUTPATIENT
Start: 2018-11-20 | End: 2018-11-20 | Stop reason: HOSPADM

## 2018-11-20 RX ORDER — LIDOCAINE HYDROCHLORIDE 10 MG/ML
INJECTION, SOLUTION EPIDURAL; INFILTRATION; INTRACAUDAL; PERINEURAL
Status: DISCONTINUED
Start: 2018-11-20 | End: 2018-11-20 | Stop reason: HOSPADM

## 2018-11-20 RX ORDER — LIDOCAINE HYDROCHLORIDE 10 MG/ML
INJECTION, SOLUTION EPIDURAL; INFILTRATION; INTRACAUDAL; PERINEURAL
Status: DISCONTINUED | OUTPATIENT
Start: 2018-11-20 | End: 2018-11-20 | Stop reason: HOSPADM

## 2018-11-20 RX ADMIN — SODIUM CHLORIDE, SODIUM LACTATE, POTASSIUM CHLORIDE, CALCIUM CHLORIDE: 600; 310; 30; 20 INJECTION, SOLUTION INTRAVENOUS at 11:11

## 2018-11-20 NOTE — DISCHARGE SUMMARY
[facility]  Discharge Note  Short Stay    Admit Date: 11/20/2018    Discharge Date and Time: 11/20/2018    Attending Physician: Myles Rocha MD     Discharge Provider: Myles Rocha    Diagnoses:  Active Hospital Problems    Diagnosis  POA    *Cervical radiculitis [M54.12]  Yes      Resolved Hospital Problems   No resolved problems to display.       Hospital Course: Cervical MAGGI  Discharged Condition: Good    Final Diagnoses:   Active Hospital Problems    Diagnosis  POA    *Cervical radiculitis [M54.12]  Yes      Resolved Hospital Problems   No resolved problems to display.       Disposition: Home or Self Care    Follow up/Patient Instructions:    Medications:  Reconciled Home Medications:      Medication List      CONTINUE taking these medications    acetaminophen 325 MG tablet  Commonly known as:  TYLENOL  Take 650 mg by mouth every 6 (six) hours as needed for Pain.     cetirizine 10 MG tablet  Commonly known as:  ZYRTEC  Take 10 mg by mouth once daily.     DULoxetine 60 MG capsule  Commonly known as:  CYMBALTA  Take 60 mg by mouth once daily.     fluticasone 50 mcg/actuation nasal spray  Commonly known as:  FLONASE  1 spray by Each Nare route once daily.     LYRICA 75 MG capsule  Generic drug:  pregabalin  Take 1 capsule (75 mg total) by mouth 2 (two) times daily.     MULTIVITAMIN ORAL  Take by mouth once daily.     tiZANidine 4 MG tablet  Commonly known as:  ZANAFLEX  Take 1 tablet (4 mg total) by mouth every 8 (eight) hours.     TYPHIM VI 25 mcg/0.5 mL injection  Generic drug:  typhoid polysaccharide  ADM 0.5ML IM UTD          Discharge Procedure Orders   Call MD for:  temperature >100.4     Call MD for:  persistent nausea and vomiting or diarrhea     Call MD for:  severe uncontrolled pain     Call MD for:  redness, tenderness, or signs of infection (pain, swelling, redness, odor or green/yellow discharge around incision site)     Call MD for:  difficulty breathing or increased cough     Call MD for:   severe persistent headache        Follow up with MD in 2-3 weeks    Discharge Procedure Orders (must include Diet, Follow-up, Activity):   Discharge Procedure Orders (must include Diet, Follow-up, Activity)   Call MD for:  temperature >100.4     Call MD for:  persistent nausea and vomiting or diarrhea     Call MD for:  severe uncontrolled pain     Call MD for:  redness, tenderness, or signs of infection (pain, swelling, redness, odor or green/yellow discharge around incision site)     Call MD for:  difficulty breathing or increased cough     Call MD for:  severe persistent headache

## 2018-11-20 NOTE — PLAN OF CARE
"Pt states ready to go home , stable, tolerating fluids. Pt states pain "3", slight headache .. Injection site EDUIN no drainage noted. Ambulated to car accompanied by nurse. Home w/ spouse.  "

## 2018-11-20 NOTE — OP NOTE
PROCEDURE DATE: 11/20/2018    Procedure: C7-T1 cervical interlaminar epidural steroid injection under utilizing fluoroscopy.    Diagnosis: Cervical Degenerative Disc Diease; Cervical Radiculitis  POSTOP DIAGNOSIS: SAME    Physician: Myles Rocha MD    Medications injected:  Dexamethasone 10mg followed by a slow injection of 4 mL sterile, preservative-free normal saline.    Local anesthetic used: Lidocaine 1%, 2 ml.    Sedation Medications: RN IV sedation    Complications:  None    Estimated blood loss: None    Technique:  A time-out was taken to identify patient and procedure prior to starting the procedure.  With the patient laying in a prone position with the neck in a mid-flexed forward position, the area was prepped and draped in the usual sterile fashion using ChloraPrep and a fenestrated drape.  The area was determined under AP fluoroscopic guidance.  Local anesthetic was given using a 25-gauge 1.5 inch needle by raising a wheal and then infiltrating ventrally.  A 3.5 inch 20-gauge Touhy needle was introduced under fluoroscopic guidance to meet the lamina of C7.  The needle was then hinged under the lamina then advanced using loss of resistance technique.  Once the tip of the needle was in the desired position, the 1ml contrast dye Omnipaque was injected to determine placement and no uptake.  The steroid was then injected slowly followed by a slow injection of 4 mL of the sterile preservative-free normal saline.  The patient tolerated the procedure well.    The patient was monitored after the procedure and was given post-procedure and discharge instructions to follow at home. The patient was discharged in a stable condition.

## 2018-11-21 VITALS
BODY MASS INDEX: 30.56 KG/M2 | DIASTOLIC BLOOD PRESSURE: 92 MMHG | WEIGHT: 179 LBS | HEART RATE: 71 BPM | RESPIRATION RATE: 18 BRPM | HEIGHT: 64 IN | OXYGEN SATURATION: 100 % | SYSTOLIC BLOOD PRESSURE: 135 MMHG | TEMPERATURE: 98 F

## 2018-12-10 ENCOUNTER — OFFICE VISIT (OUTPATIENT)
Dept: FAMILY MEDICINE | Facility: CLINIC | Age: 58
End: 2018-12-10
Payer: COMMERCIAL

## 2018-12-10 VITALS
OXYGEN SATURATION: 96 % | WEIGHT: 179.69 LBS | TEMPERATURE: 98 F | HEART RATE: 87 BPM | BODY MASS INDEX: 30.84 KG/M2 | SYSTOLIC BLOOD PRESSURE: 136 MMHG | DIASTOLIC BLOOD PRESSURE: 88 MMHG

## 2018-12-10 DIAGNOSIS — J01.01 ACUTE RECURRENT MAXILLARY SINUSITIS: Primary | ICD-10-CM

## 2018-12-10 PROCEDURE — 99999 PR PBB SHADOW E&M-EST. PATIENT-LVL III: CPT | Mod: PBBFAC,,, | Performed by: PHYSICIAN ASSISTANT

## 2018-12-10 PROCEDURE — 3079F DIAST BP 80-89 MM HG: CPT | Mod: CPTII,S$GLB,, | Performed by: PHYSICIAN ASSISTANT

## 2018-12-10 PROCEDURE — 3008F BODY MASS INDEX DOCD: CPT | Mod: CPTII,S$GLB,, | Performed by: PHYSICIAN ASSISTANT

## 2018-12-10 PROCEDURE — 99214 OFFICE O/P EST MOD 30 MIN: CPT | Mod: S$GLB,,, | Performed by: PHYSICIAN ASSISTANT

## 2018-12-10 PROCEDURE — 3075F SYST BP GE 130 - 139MM HG: CPT | Mod: CPTII,S$GLB,, | Performed by: PHYSICIAN ASSISTANT

## 2018-12-10 RX ORDER — PROMETHAZINE HYDROCHLORIDE AND DEXTROMETHORPHAN HYDROBROMIDE 6.25; 15 MG/5ML; MG/5ML
5 SYRUP ORAL NIGHTLY PRN
Qty: 118 ML | Refills: 0 | Status: SHIPPED | OUTPATIENT
Start: 2018-12-10 | End: 2018-12-27 | Stop reason: SDUPTHER

## 2018-12-10 RX ORDER — AMOXICILLIN AND CLAVULANATE POTASSIUM 875; 125 MG/1; MG/1
1 TABLET, FILM COATED ORAL EVERY 12 HOURS
Qty: 20 TABLET | Refills: 0 | Status: SHIPPED | OUTPATIENT
Start: 2018-12-10 | End: 2018-12-20

## 2018-12-10 RX ORDER — TIZANIDINE 4 MG/1
4 TABLET ORAL EVERY 6 HOURS PRN
COMMUNITY
End: 2019-07-15 | Stop reason: SDUPTHER

## 2018-12-10 RX ORDER — PREDNISONE 20 MG/1
40 TABLET ORAL DAILY
Qty: 6 TABLET | Refills: 0 | Status: SHIPPED | OUTPATIENT
Start: 2018-12-10 | End: 2018-12-13

## 2018-12-10 RX ORDER — AMOXICILLIN AND CLAVULANATE POTASSIUM 875; 125 MG/1; MG/1
1 TABLET, FILM COATED ORAL EVERY 12 HOURS
Qty: 20 TABLET | Refills: 0 | Status: SHIPPED | OUTPATIENT
Start: 2018-12-10 | End: 2018-12-10 | Stop reason: SDUPTHER

## 2018-12-10 NOTE — PROGRESS NOTES
Subjective:       Patient ID: Clemencia Knight is a 58 y.o. female    Chief Complaint: URI    HPI  The patient is new to me PCP is Dr. Higgins.  She presents today complaining of sinus congestion for over 1 week.  She complains of cough, sinus pressure and fatigue.  The cough is worse at night when she lies back to sleep and is keeping her awake.    Review of Systems   Constitutional: Negative for chills and fever.   HENT: Positive for congestion.    Eyes: Negative for visual disturbance.   Respiratory: Positive for cough.    Cardiovascular: Negative for chest pain.   Gastrointestinal: Negative for abdominal pain, diarrhea, nausea and vomiting.   Skin: Negative for rash.   Neurological: Negative for headaches.        Objective:   Physical Exam   Constitutional: She is oriented to person, place, and time. She appears well-developed and well-nourished.   HENT:   Head: Normocephalic and atraumatic.   Right Ear: External ear normal.   Left Ear: External ear normal.   Nose: Nose normal.   Mouth/Throat: Oropharynx is clear and moist. No oropharyngeal exudate.   Eyes: Conjunctivae and EOM are normal. Pupils are equal, round, and reactive to light.   Neck: Normal range of motion. Neck supple. No thyromegaly present.   Cardiovascular: Normal rate, regular rhythm, normal heart sounds and intact distal pulses. Exam reveals no gallop and no friction rub.   No murmur heard.  Pulmonary/Chest: Effort normal and breath sounds normal. No respiratory distress. She has no wheezes. She has no rales.   Musculoskeletal: Normal range of motion. She exhibits no edema.   Lymphadenopathy:     She has no cervical adenopathy.   Neurological: She is alert and oriented to person, place, and time. She has normal reflexes.   Skin: Skin is warm and dry. No rash noted.   Psychiatric: She has a normal mood and affect. Her behavior is normal. Judgment and thought content normal.        Assessment:       1. Acute recurrent maxillary sinusitis   amoxicillin-clavulanate 875-125mg (AUGMENTIN) 875-125 mg per tablet    predniSONE (DELTASONE) 20 MG tablet    promethazine-dextromethorphan (PROMETHAZINE-DM) 6.25-15 mg/5 mL Syrp    DISCONTINUED: amoxicillin-clavulanate 875-125mg (AUGMENTIN) 875-125 mg per tablet        Plan:       Acute recurrent maxillary sinusitis  -     amoxicillin-clavulanate 875-125mg (AUGMENTIN) 875-125 mg per tablet; Take 1 tablet by mouth every 12 (twelve) hours. for 10 days  Dispense: 20 tablet; Refill: 0  -     predniSONE (DELTASONE) 20 MG tablet; Take 2 tablets (40 mg total) by mouth once daily. for 3 days  Dispense: 6 tablet; Refill: 0  -     promethazine-dextromethorphan (PROMETHAZINE-DM) 6.25-15 mg/5 mL Syrp; Take 5 mLs by mouth nightly as needed (cough).  Dispense: 118 mL; Refill: 0

## 2018-12-13 ENCOUNTER — TELEPHONE (OUTPATIENT)
Dept: AUDIOLOGY | Facility: CLINIC | Age: 58
End: 2018-12-13

## 2018-12-13 NOTE — TELEPHONE ENCOUNTER
Spoke with Clemencia and her  on speaker phone regarding the insurance claim denial for the hearing aid fit on 9/11/18.Her  said the claim was denied because on pre authorization was obtained. I reminded him that we don't usually do pre authorization because we don't take insurance for hearing aids. I reminded him that I did make a courtesy call to [facility] for preauthorization and the agent told me that it would probably go through. I suggested he get the medical records from Dr Krishnan's visit stating that she needed a hearing aid to send in to the company. I called [facility] once again who said they need a fax with a letter of medical necessity. Will call the pt again to let them know when the letter is done for them to  at the clinic to fax to [facility] themselves.         12/14/18 Spoke with Ms Knight to let her know I left the letter, audiogram and copy of the purchase agreement at the . Informed her that she should file the information with [facility] on her own.

## 2018-12-17 ENCOUNTER — OFFICE VISIT (OUTPATIENT)
Dept: PAIN MEDICINE | Facility: CLINIC | Age: 58
End: 2018-12-17
Payer: COMMERCIAL

## 2018-12-17 VITALS
SYSTOLIC BLOOD PRESSURE: 126 MMHG | WEIGHT: 179 LBS | HEIGHT: 64 IN | DIASTOLIC BLOOD PRESSURE: 83 MMHG | HEART RATE: 82 BPM | BODY MASS INDEX: 30.56 KG/M2

## 2018-12-17 DIAGNOSIS — M50.30 DDD (DEGENERATIVE DISC DISEASE), CERVICAL: ICD-10-CM

## 2018-12-17 DIAGNOSIS — M54.12 CERVICAL RADICULOPATHY: Primary | ICD-10-CM

## 2018-12-17 DIAGNOSIS — M51.36 DDD (DEGENERATIVE DISC DISEASE), LUMBAR: ICD-10-CM

## 2018-12-17 PROCEDURE — 3074F SYST BP LT 130 MM HG: CPT | Mod: CPTII,S$GLB,, | Performed by: PHYSICIAN ASSISTANT

## 2018-12-17 PROCEDURE — 99999 PR PBB SHADOW E&M-EST. PATIENT-LVL III: CPT | Mod: PBBFAC,,, | Performed by: PHYSICIAN ASSISTANT

## 2018-12-17 PROCEDURE — 99214 OFFICE O/P EST MOD 30 MIN: CPT | Mod: S$GLB,,, | Performed by: PHYSICIAN ASSISTANT

## 2018-12-17 PROCEDURE — 3008F BODY MASS INDEX DOCD: CPT | Mod: CPTII,S$GLB,, | Performed by: PHYSICIAN ASSISTANT

## 2018-12-17 PROCEDURE — 3079F DIAST BP 80-89 MM HG: CPT | Mod: CPTII,S$GLB,, | Performed by: PHYSICIAN ASSISTANT

## 2018-12-17 NOTE — PROGRESS NOTES
Referring Physician: No ref. provider found    PCP: Dallas Higgins MD      CC: neck and lower back pain    Interval History:  Clemencia Knight is a 58 y.o. female with chronic neck and low back pain who presents today for f/u s/p cervical MAGGI. Reports 80% relief. She is very happy with results. She feels this was more beneficial than TF MAGGI at C5-6 on right. She does have some low back pain but it is tolerable. Denies b/b changes or LE weakness. Pain today is rated 2/10.    Pt has been seen in the clinic before, however pt is new to me.     History below per Dr. Rocha    HPI:   Clemencia Knight is a 58 y.o. female referred for neck and lower back pain. Patient with history of cervical fusion in C6-7 in 2009.  She initially had cervical fusion due neck and radiating right arm pain. Pain improved postoperatively but has gradually worsened over past 6 months.  No recent traumatic incident.  She presents with constant aching, deep posterior neck pain.  Pain radiates to her bilateral shoulders.  Pain worsens bending, flexing, getting up.  She also has worsening lower back pain.  Pain radiates down her bilateral legs.  Pain worsens with sitting, bending, flexing, lifting getting up as well.  She recently has tried physical therapy with some mild benefits.  She recently had updated cervical MRI.  She is scheduled for lumbar MRI near future.  Neck pain is currently more bothersome.  She underwent a right C5-6 transforaminal epidural steroid injection recently which provided moderate benefit.  She continues to have pain over the left side of her neck.  She wishes to have similar procedure refer her cervical spine.  She denies any worsening weakness.  No bowel bladder changes.    ROS:  CONSTITUTIONAL: No fevers, chills, night sweats, wt. loss, appetite changes  SKIN: no rashes or itching  ENT: No headaches, head trauma, vision changes, or eye pain  LYMPH NODES: None noticed   CV: No chest pain, palpitations.   RESP: No shortness  of breath, dyspnea on exertion, cough, wheezing, or hemoptysis  GI: No nausea, emesis, diarrhea, constipation, melena, hematochezia, pain.    : No dysuria, hematuria, urgency, or frequency   HEME: No easy bruising, bleeding problems  PSYCHIATRIC: No depression, anxiety, psychosis, hallucinations.  NEURO: No seizures, memory loss, dizziness or difficulty sleeping  MSK:  Positive HPI      Past Medical History:   Diagnosis Date    Allergy     General anesthetics causing adverse effect in therapeutic use     pt. somewhat aware of extubation with one of her surgeries    GERD (gastroesophageal reflux disease)     Restless leg syndrome     Sciatica      Past Surgical History:   Procedure Laterality Date    ABDOMINOPLASTY     Abdominoplasty with Lipo Abdomen Bilateral 5/30/2016    Performed by [facility] at [facility] OR 2ND FLR    BELT ABDOMINOPLASTY      breast implants      CAPSULECTOMY-BREAST Removal of Implants Bilateral 5/30/2016    Performed by [facility] at [facility] OR 2ND FLR    CERVICAL FUSION      COLONOSCOPY  10/2012    repeat in 10    COLONOSCOPY N/A 10/30/2012    [facility] at [facility] ENDO    DEXA      WNL    EPIDURAL STEROID INJECTION INTO CERVICAL SPINE N/A 9/24/2018    Procedure: Injection-steroid-epidural-cervical;  Surgeon: Myles Rocha MD;  Location: [facility] OR;  Service: Pain Management;  Laterality: N/A;  C7-T1    EPIDURAL STEROID INJECTION INTO CERVICAL SPINE N/A 11/20/2018    Procedure: Injection-steroid-epidural-cervical;  Surgeon: Myles Rocha MD;  Location: [facility] OR;  Service: Pain Management;  Laterality: N/A;  C7-T1    MAGGI-TRANSFORAMINAL N/A 6/4/2015    Performed by Dosc [facility] Provider at [facility] CATH LAB    HYSTERECTOMY  2000    INJECTION,STEROID,EPIDURAL,TRANSFORAMINAL APPROACH Right 8/21/2018    Performed by Denita Richards [facility] at [facility] PAIN MGT    Injection-steroid-epidural-cervical N/A 11/20/2018    Performed by [facility]les [facility] at [facility]  "OR    Injection-steroid-epidural-cervical N/A 9/24/2018    Performed by [facility]les [facility] at [facility] OR    LIPOSUCTION Hips and Flanks Bilateral 5/30/2016    Performed by [facility]o MD at [facility] OR 2ND FLR    MASTOPEXY Implants Removal with Mastopexy Bilateral 5/30/2016    Performed by Silas Cao MD at [facility] OR 2ND FLR    OOPHORECTOMY  7/16/2013    laparotomy BSO for benign 10cm tubal cyst    SALPINGOOPHORECTOMY  2013    with removal of fallopian cyst    SHOULDER SURGERY      right    TLH  2000    ovaries remain, benign    TONSILLECTOMY, ADENOIDECTOMY, BILATERAL MYRINGOTOMY AND TUBES       Family History   Problem Relation Age of Onset    Cancer Father         bladder    Diabetes Father     Heart disease Father     Diabetes Mother     Melanoma Brother     Charcot-Karla-Tooth disease Brother     Breast cancer Neg Hx     Ovarian cancer Neg Hx     Anesthesia problems Neg Hx      Social History     Socioeconomic History    Marital status:      Spouse name: None    Number of children: None    Years of education: None    Highest education level: None   Social Needs    Financial resource strain: None    Food insecurity - worry: None    Food insecurity - inability: None    Transportation needs - medical: None    Transportation needs - non-medical: None   Occupational History     Employer: OTHER   Tobacco Use    Smoking status: Never Smoker    Smokeless tobacco: Never Used   Substance and Sexual Activity    Alcohol use: Yes     Comment: rarely    Drug use: No    Sexual activity: Yes     Partners: Male     Birth control/protection: Surgical   Other Topics Concern    Are you pregnant or think you may be? No    Breast-feeding Not Asked   Social History Narrative    None         Medications/Allergies: See med card    Vitals:    12/17/18 1054   BP: 126/83   Pulse: 82   Weight: 81.2 kg (179 lb)   Height: 5' 4" (1.626 m)   PainSc:   2   PainLoc: Neck         Physical " exam:    GENERAL: A and O x3, the patient appears well groomed and is in no acute distress.  Skin: No rashes or obvious lesions  HEENT: normocephalic, atraumatic  CARDIOVASCULAR:  RRR  LUNGS: non labored  breathing  ABDOMEN: soft, nontender   UPPER EXTREMITIES: Normal alignment, normal range of motion, no atrophy, no skin changes,  hair growth and nail growth normal and equal bilaterally. No swelling, no tenderness.    LOWER EXTREMITIES:  Normal alignment, normal range of motion, no atrophy, no skin changes,  hair growth and nail growth normal and equal bilaterally. No swelling, no tenderness.  CERVICAL SPINE:  Cervical spine: ROM is full in flexion, extension and lateral rotation with mild increased pain.  Spurling's maneuver causes no neck pain to either side.  Myofascial exam: Mild Tenderness to palpation across cervical paraspinous region bilaterally.      LUMBAR SPINE  Lumbar spine: ROM is full with flexion extension and oblique extension with mild increased pain.    Silvio's test causes no increased pain on either side.    Supine straight leg raise is negative bilaterally.    Internal and external rotation of the hip causes no increased pain on either side.  Myofascial exam: No tenderness to palpation across lumbar paraspinous muscles.      MENTAL STATUS: normal orientation, speech, language, and fund of knowledge for social situation.  Emotional state appropriate.    CRANIAL NERVES:  II:  PERRL bilaterally,   III,IV,VI: EOMI.    V:  Facial sensation equal bilaterally  VII:  Facial motor function normal.  VIII:  Hearing equal to finger rub bilaterally  IX/X: Gag normal, palate symmetric  XI:  Shoulder shrug equal, head turn equal  XII:  Tongue midline without fasciculations      MOTOR: Tone and bulk: normal bilateral upper and lower Strength: normal   Delt Bi Tri WE WF     R 5 5 5 5 5 5   L 5 5 5 5 5 5     IP ADD ABD Quad TA Gas HAM  R 5 5 5 5 5 5 5  L 5 5 5 5 5 5 5    SENSATION: Light touch and pinprick  intact bilaterally  REFLEXES: normal, symmetric, nonbrisk.  Toes down, no clonus. No hoffmans.  GAIT: normal rise, base, steps, and arm swing.        Imaging:  MRI C-spine 7/2018  C2-C3: Facet arthropathy, left greater than right.  No significant spinal canal stenosis or neural foraminal narrowing.    C3-C4: Mild posterior disc osteophyte complex resulting in mild right neural foraminal narrowing.  No significant spinal canal stenosis or left neural foraminal narrowing.    C4-C5: Mild posterior disc osteophyte complex.  No significant spinal canal stenosis or neural foraminal narrowing.    C5-C6: Retrolisthesis as above.  Posterior disc osteophyte complex and uncovertebral spurring resulting in mild spinal canal stenosis and moderate right, mild left neural foraminal narrowing.    C6-C7: Postoperative changes above.  No significant spinal canal stenosis or neural foraminal narrowing.    C7-T1: No significant disease.  No significant spinal canal stenosis or neural foraminal narrowing.    Lumbar MRI   1. There is mild degenerative change.  There is also some degree of facet joint arthropathy at multiple levels.  There is no fracture or malalignment.  There is no spinal stenosis.  2. At the L3-4 level there is mild-to-moderate facet joint arthropathy with a diffuse disc bulge but without spinal canal or foraminal stenosis.  3. At the L4-5 level there is a diffuse disc bulge with mild-to-moderate facet joint arthropathy contributing to mild bilateral foraminal stenosis without spinal stenosis.  These changes have very slightly progressed.  4. There is no spinal canal or foraminal stenosis at the remainder of the lumbar levels.  Please see above discussion.  Assessment:  Clemencia Knight is a 58 y.o. female with neck and lower back pain  1. Cervical radiculopathy    2. DDD (degenerative disc disease), cervical    3. DDD (degenerative disc disease), lumbar      Plan:  1. I have stressed the importance of physical activity  and exercise to improve overall health  2. Reviewed MRI imaging with patient.  3. Monitor progress and consider repeat cervical epidural steroid injection.   4. Consider lumbar MBB work up in the future if low back pain worsens  5. F/u prn

## 2018-12-27 ENCOUNTER — PATIENT MESSAGE (OUTPATIENT)
Dept: FAMILY MEDICINE | Facility: CLINIC | Age: 58
End: 2018-12-27

## 2018-12-27 DIAGNOSIS — J01.01 ACUTE RECURRENT MAXILLARY SINUSITIS: ICD-10-CM

## 2018-12-27 RX ORDER — PROMETHAZINE HYDROCHLORIDE AND DEXTROMETHORPHAN HYDROBROMIDE 6.25; 15 MG/5ML; MG/5ML
5 SYRUP ORAL NIGHTLY PRN
Qty: 118 ML | Refills: 0 | Status: SHIPPED | OUTPATIENT
Start: 2018-12-27 | End: 2018-12-31

## 2018-12-31 ENCOUNTER — OFFICE VISIT (OUTPATIENT)
Dept: FAMILY MEDICINE | Facility: CLINIC | Age: 58
End: 2018-12-31
Payer: COMMERCIAL

## 2018-12-31 VITALS
SYSTOLIC BLOOD PRESSURE: 120 MMHG | DIASTOLIC BLOOD PRESSURE: 94 MMHG | HEIGHT: 64 IN | HEART RATE: 72 BPM | BODY MASS INDEX: 31.54 KG/M2 | WEIGHT: 184.75 LBS

## 2018-12-31 DIAGNOSIS — J40 BRONCHITIS: Primary | ICD-10-CM

## 2018-12-31 PROCEDURE — 3074F SYST BP LT 130 MM HG: CPT | Mod: CPTII,S$GLB,, | Performed by: PHYSICIAN ASSISTANT

## 2018-12-31 PROCEDURE — 3080F DIAST BP >= 90 MM HG: CPT | Mod: CPTII,S$GLB,, | Performed by: PHYSICIAN ASSISTANT

## 2018-12-31 PROCEDURE — 96372 THER/PROPH/DIAG INJ SC/IM: CPT | Mod: 59,S$GLB,, | Performed by: PHYSICIAN ASSISTANT

## 2018-12-31 PROCEDURE — 3008F BODY MASS INDEX DOCD: CPT | Mod: CPTII,S$GLB,, | Performed by: PHYSICIAN ASSISTANT

## 2018-12-31 PROCEDURE — 99214 OFFICE O/P EST MOD 30 MIN: CPT | Mod: 25,S$GLB,, | Performed by: PHYSICIAN ASSISTANT

## 2018-12-31 PROCEDURE — 99999 PR PBB SHADOW E&M-EST. PATIENT-LVL III: CPT | Mod: PBBFAC,,, | Performed by: PHYSICIAN ASSISTANT

## 2018-12-31 RX ORDER — METHYLPREDNISOLONE 4 MG/1
TABLET ORAL
Qty: 1 PACKAGE | Refills: 0 | Status: SHIPPED | OUTPATIENT
Start: 2018-12-31 | End: 2019-01-21

## 2018-12-31 RX ORDER — BETAMETHASONE SODIUM PHOSPHATE AND BETAMETHASONE ACETATE 3; 3 MG/ML; MG/ML
6 INJECTION, SUSPENSION INTRA-ARTICULAR; INTRALESIONAL; INTRAMUSCULAR; SOFT TISSUE
Status: COMPLETED | OUTPATIENT
Start: 2018-12-31 | End: 2018-12-31

## 2018-12-31 RX ORDER — CODEINE PHOSPHATE AND GUAIFENESIN 10; 100 MG/5ML; MG/5ML
5 SOLUTION ORAL 3 TIMES DAILY PRN
Qty: 236 ML | Refills: 0 | Status: SHIPPED | OUTPATIENT
Start: 2018-12-31 | End: 2019-01-16

## 2018-12-31 RX ORDER — METHYLPREDNISOLONE 4 MG/1
TABLET ORAL
Qty: 1 PACKAGE | Refills: 0 | Status: SHIPPED | OUTPATIENT
Start: 2018-12-31 | End: 2018-12-31 | Stop reason: SDUPTHER

## 2018-12-31 RX ADMIN — BETAMETHASONE SODIUM PHOSPHATE AND BETAMETHASONE ACETATE 6 MG: 3; 3 INJECTION, SUSPENSION INTRA-ARTICULAR; INTRALESIONAL; INTRAMUSCULAR; SOFT TISSUE at 03:12

## 2018-12-31 NOTE — PROGRESS NOTES
"Subjective:       Patient ID: Clemencia Knight is a 58 y.o. female    Chief Complaint: Cough (still coughing for 4 weeks, dry cough and also feeling fatigue. No fever,bodyaches or chills. Aslo ears itching "feels like there is something in them")    HPI  Mrs Khanna presents today with her  complaining of a persistent cough that has been going on since she was last seen on 12/10/2018.  She has used all for cough medicine without relief.  She denies any fevers no sinus pain or pressure.  The cough is nonproductive but it is persistant and she has not been able to sleep in the same room with her  because her coughing keeps him up at night    .Review of Systems   Constitutional: Negative for chills and fever.   HENT: Negative for ear pain, postnasal drip, rhinorrhea and sore throat.    Respiratory: Positive for cough and wheezing. Negative for shortness of breath.    Cardiovascular: Negative for chest pain.   Musculoskeletal: Negative for myalgias.   Skin: Negative for rash.   Allergic/Immunologic: Negative for environmental allergies.   Neurological: Positive for headaches.        Objective:   Physical Exam   Constitutional: She is oriented to person, place, and time. She appears well-developed and well-nourished. No distress.   HENT:   Head: Normocephalic and atraumatic.   Eyes: EOM are normal. Pupils are equal, round, and reactive to light.   Neck: Normal range of motion. Neck supple.   Cardiovascular: Normal rate, regular rhythm, normal heart sounds and intact distal pulses. Exam reveals no gallop and no friction rub.   No murmur heard.  Pulmonary/Chest: Effort normal and breath sounds normal. No respiratory distress. She has no wheezes. She has no rales. She exhibits no tenderness.   Mild rhonchi heard bilaterally   Musculoskeletal: Normal range of motion.   Neurological: She is alert and oriented to person, place, and time.   Skin: Skin is warm and dry. No rash noted. She is not diaphoretic. "   Psychiatric: She has a normal mood and affect. Her behavior is normal. Judgment and thought content normal.        Assessment:       1. Bronchitis  betamethasone acetate-betamethasone sodium phosphate injection 6 mg    methylPREDNISolone (MEDROL DOSEPACK) 4 mg tablet        Plan:       Bronchitis  -     betamethasone acetate-betamethasone sodium phosphate injection 6 mg  -     methylPREDNISolone (MEDROL DOSEPACK) 4 mg tablet; use as directed  Dispense: 1 Package; Refill: 0  - message sent to Dr Higgins for codeine cough medication (cheratussin)

## 2019-01-29 ENCOUNTER — TELEPHONE (OUTPATIENT)
Dept: OTOLARYNGOLOGY | Facility: CLINIC | Age: 59
End: 2019-01-29

## 2019-01-29 NOTE — TELEPHONE ENCOUNTER
----- Message from Silas Roche sent at 1/29/2019  2:03 PM CST -----  Contact: Patient  Type: Needs Medical Advice    Who Called:  Patient  Best Call Back Number: [phone]  Additional Information: Patient is requesting physician NPI number for insurance verification. Please advise

## 2019-01-29 NOTE — TELEPHONE ENCOUNTER
I spoke with Mr Lui he did need Adriane Torres NPI # [id number] and Tax ID # for [facility] given [id number].

## 2019-02-19 ENCOUNTER — OFFICE VISIT (OUTPATIENT)
Dept: FAMILY MEDICINE | Facility: CLINIC | Age: 59
End: 2019-02-19
Payer: COMMERCIAL

## 2019-02-19 VITALS
SYSTOLIC BLOOD PRESSURE: 122 MMHG | DIASTOLIC BLOOD PRESSURE: 76 MMHG | BODY MASS INDEX: 31.09 KG/M2 | HEIGHT: 64 IN | HEART RATE: 74 BPM | WEIGHT: 182.13 LBS

## 2019-02-19 DIAGNOSIS — M54.12 CERVICAL RADICULOPATHY: Primary | ICD-10-CM

## 2019-02-19 DIAGNOSIS — H60.501 ACUTE OTITIS EXTERNA OF RIGHT EAR, UNSPECIFIED TYPE: ICD-10-CM

## 2019-02-19 PROCEDURE — 3074F PR MOST RECENT SYSTOLIC BLOOD PRESSURE < 130 MM HG: ICD-10-PCS | Mod: CPTII,S$GLB,, | Performed by: FAMILY MEDICINE

## 2019-02-19 PROCEDURE — 3078F DIAST BP <80 MM HG: CPT | Mod: CPTII,S$GLB,, | Performed by: FAMILY MEDICINE

## 2019-02-19 PROCEDURE — 99999 PR PBB SHADOW E&M-EST. PATIENT-LVL III: CPT | Mod: PBBFAC,,, | Performed by: FAMILY MEDICINE

## 2019-02-19 PROCEDURE — 3074F SYST BP LT 130 MM HG: CPT | Mod: CPTII,S$GLB,, | Performed by: FAMILY MEDICINE

## 2019-02-19 PROCEDURE — 99999 PR PBB SHADOW E&M-EST. PATIENT-LVL III: ICD-10-PCS | Mod: PBBFAC,,, | Performed by: FAMILY MEDICINE

## 2019-02-19 PROCEDURE — 3008F BODY MASS INDEX DOCD: CPT | Mod: CPTII,S$GLB,, | Performed by: FAMILY MEDICINE

## 2019-02-19 PROCEDURE — 99213 OFFICE O/P EST LOW 20 MIN: CPT | Mod: S$GLB,,, | Performed by: FAMILY MEDICINE

## 2019-02-19 PROCEDURE — 3008F PR BODY MASS INDEX (BMI) DOCUMENTED: ICD-10-PCS | Mod: CPTII,S$GLB,, | Performed by: FAMILY MEDICINE

## 2019-02-19 PROCEDURE — 99213 PR OFFICE/OUTPT VISIT, EST, LEVL III, 20-29 MIN: ICD-10-PCS | Mod: S$GLB,,, | Performed by: FAMILY MEDICINE

## 2019-02-19 PROCEDURE — 3078F PR MOST RECENT DIASTOLIC BLOOD PRESSURE < 80 MM HG: ICD-10-PCS | Mod: CPTII,S$GLB,, | Performed by: FAMILY MEDICINE

## 2019-02-19 RX ORDER — NEOMYCIN SULFATE, POLYMYXIN B SULFATE, HYDROCORTISONE 3.5; 10000; 1 MG/ML; [USP'U]/ML; MG/ML
2 SOLUTION/ DROPS AURICULAR (OTIC) EVERY 8 HOURS
Qty: 10 ML | Refills: 0 | Status: SHIPPED | OUTPATIENT
Start: 2019-02-19 | End: 2019-02-26

## 2019-02-19 NOTE — PROGRESS NOTES
Subjective:       Patient ID: Clemencia Knight is a 58 y.o. female    Chief Complaint: Cervical radiculopathy and Ear Drainage (x 1 month, bilateral)    HPI  Here today for interval evaluation  Her neck and radiculopathy has improved  She noticed an improvement from 8/10 to 2/10 shortly after starting the Lyrica.  She has drainage in the right ear with some mild discomfort.    Review of Systems     Objective:   Physical Exam   Constitutional: She is oriented to person, place, and time. She appears well-developed and well-nourished.   HENT:   Head: Normocephalic and atraumatic.   Right Ear: External ear normal. There is drainage and tenderness.   Left Ear: Tympanic membrane, external ear and ear canal normal.   Neurological: She is alert and oriented to person, place, and time.   Vitals reviewed.       Assessment:       1. Cervical radiculopathy     2. Acute otitis externa of right ear, unspecified type  neomycin-polymyxin-hydrocortisone (CORTISPORIN) otic solution        Plan:       Cervical radiculopathy  - Continue current therapy  - Continue Pain Management    Acute otitis externa of right ear, unspecified type  -     neomycin-polymyxin-hydrocortisone (CORTISPORIN) otic solution; Place 2 drops into the right ear every 8 (eight) hours. for 7 days  Dispense: 10 mL; Refill: 0  - ENT pending  - Follow-up in about 6 months (around 8/19/2019).

## 2019-02-20 ENCOUNTER — OFFICE VISIT (OUTPATIENT)
Dept: OTOLARYNGOLOGY | Facility: CLINIC | Age: 59
End: 2019-02-20
Payer: COMMERCIAL

## 2019-02-20 VITALS — BODY MASS INDEX: 31.32 KG/M2 | HEIGHT: 64 IN | WEIGHT: 183.44 LBS

## 2019-02-20 DIAGNOSIS — H61.21 RIGHT EAR IMPACTED CERUMEN: ICD-10-CM

## 2019-02-20 DIAGNOSIS — H60.543 DERMATITIS OF BOTH EAR CANALS: ICD-10-CM

## 2019-02-20 DIAGNOSIS — H60.311 ACUTE DIFFUSE OTITIS EXTERNA OF RIGHT EAR: Primary | ICD-10-CM

## 2019-02-20 PROCEDURE — 99214 OFFICE O/P EST MOD 30 MIN: CPT | Mod: 25,S$GLB,, | Performed by: NURSE PRACTITIONER

## 2019-02-20 PROCEDURE — 99999 PR PBB SHADOW E&M-EST. PATIENT-LVL IV: CPT | Mod: PBBFAC,,, | Performed by: NURSE PRACTITIONER

## 2019-02-20 PROCEDURE — 87070 CULTURE OTHR SPECIMN AEROBIC: CPT

## 2019-02-20 PROCEDURE — 99999 PR PBB SHADOW E&M-EST. PATIENT-LVL IV: ICD-10-PCS | Mod: PBBFAC,,, | Performed by: NURSE PRACTITIONER

## 2019-02-20 PROCEDURE — 87106 FUNGI IDENTIFICATION YEAST: CPT

## 2019-02-20 PROCEDURE — 69210 REMOVE IMPACTED EAR WAX UNI: CPT | Mod: S$GLB,,, | Performed by: NURSE PRACTITIONER

## 2019-02-20 PROCEDURE — 69210 PR REMOVAL IMPACTED CERUMEN REQUIRING INSTRUMENTATION, UNILATERAL: ICD-10-PCS | Mod: S$GLB,,, | Performed by: NURSE PRACTITIONER

## 2019-02-20 PROCEDURE — 99214 PR OFFICE/OUTPT VISIT, EST, LEVL IV, 30-39 MIN: ICD-10-PCS | Mod: 25,S$GLB,, | Performed by: NURSE PRACTITIONER

## 2019-02-20 PROCEDURE — 3008F BODY MASS INDEX DOCD: CPT | Mod: CPTII,S$GLB,, | Performed by: NURSE PRACTITIONER

## 2019-02-20 PROCEDURE — 87147 CULTURE TYPE IMMUNOLOGIC: CPT

## 2019-02-20 PROCEDURE — 3008F PR BODY MASS INDEX (BMI) DOCUMENTED: ICD-10-PCS | Mod: CPTII,S$GLB,, | Performed by: NURSE PRACTITIONER

## 2019-02-20 RX ORDER — CLOTRIMAZOLE AND BETAMETHASONE DIPROPIONATE 10; .64 MG/G; MG/G
CREAM TOPICAL 2 TIMES DAILY
Qty: 15 G | Refills: 0 | Status: SHIPPED | OUTPATIENT
Start: 2019-02-20 | End: 2019-03-22

## 2019-02-20 NOTE — PROGRESS NOTES
"Subjective:       Patient ID: Clemencia Knight is a 58 y.o. female.    Chief Complaint: Ear Drainage (x 1 month); Itching; and Swelling (rt ear)    HPI   Patient reports right ear draining X 1 month. She wears a hearing aid in her right ear only, and has been unable to wear her hearing aid X 1 month. She reports very mild otalgia ("ache") and mild pruritis AD. No throbbing or severe/intense aural symptoms. She was given Cortisporin yesterday.     Review of Systems   Constitutional: Negative.    HENT: Positive for ear discharge and ear pain.    Eyes: Negative.    Respiratory: Negative.    Cardiovascular: Negative.    Gastrointestinal: Negative.    Musculoskeletal: Negative.    Skin: Negative.    Neurological: Negative.    Hematological: Negative.    Psychiatric/Behavioral: Negative.        Objective:      Physical Exam   Constitutional: She is oriented to person, place, and time. Vital signs are normal. She appears well-developed and well-nourished. She is cooperative. She does not appear ill. No distress.   HENT:   Head: Normocephalic and atraumatic.   Right Ear: Hearing, tympanic membrane, external ear and ear canal normal. No swelling or tenderness. Tympanic membrane is not erythematous. No middle ear effusion.   Left Ear: Hearing, tympanic membrane, external ear and ear canal normal. No swelling or tenderness. Tympanic membrane is not erythematous.  No middle ear effusion.   Ears:    Nose: Nose normal.   Mouth/Throat: Uvula is midline, oropharynx is clear and moist and mucous membranes are normal.     SEPARATE PROCEDURE IN OFFICE:   Procedure: Removal of impacted cerumen, RIGHT   Pre Procedure Diagnosis: Cerumen Impaction   Post Procedure Diagnosis: Cerumen Impaction   Verbal informed consent in regards to risk of trauma to ear canal, ear drum or hearing, discomfort during procedure and/or inability to remove cerumen impaction in one session or unforeseen events or complications.   No anesthesia.     Procedure in " detail:   Ear canal visualized bilateral with appropriate size ear speculum utilizing Operating Head Binocular Otomicroscope   Utilizing the following:  Ring curet, alligator forceps, and/or suction cannula was used. The impacted cerumen of the ear canals was removed atraumatically. The TM and EAC were then inspected and found to be clear of wax. See description of TMs/EACs in PE above.   Complications: No   Condition: Improved/Good    Culture sent from right EAC      Eyes: EOM and lids are normal. Right eye exhibits no discharge. Left eye exhibits no discharge. No scleral icterus.   Neck: Trachea normal and normal range of motion. Neck supple. No tracheal deviation present.   Cardiovascular: Normal rate.   Pulmonary/Chest: Effort normal. No stridor. No respiratory distress. She has no wheezes.   Musculoskeletal: Normal range of motion.   Neurological: She is alert and oriented to person, place, and time. She has normal strength. Coordination and gait normal.   Skin: Skin is warm, dry and intact. She is not diaphoretic. No cyanosis. No pallor.   Psychiatric: She has a normal mood and affect. Her speech is normal and behavior is normal. Judgment and thought content normal. Cognition and memory are normal.   Nursing note and vitals reviewed.      Assessment:       1. Acute diffuse otitis externa of right ear    2. Dermatitis of both ear canals        Plan:    Culture sent from patient's right EAC today -- will notify pt of results as soon as available     Until culture results are available, continue current ototopical regimen.   Lotrisone to jhonny AU  Call back if symptoms worsen/persist

## 2019-02-22 RX ORDER — PREGABALIN 75 MG/1
75 CAPSULE ORAL 2 TIMES DAILY
Qty: 180 CAPSULE | Refills: 0 | Status: SHIPPED | OUTPATIENT
Start: 2019-02-22 | End: 2019-06-15 | Stop reason: SDUPTHER

## 2019-02-23 LAB
BACTERIA SPEC AEROBE CULT: NORMAL
BACTERIA SPEC AEROBE CULT: NORMAL

## 2019-02-25 ENCOUNTER — TELEPHONE (OUTPATIENT)
Dept: OTOLARYNGOLOGY | Facility: CLINIC | Age: 59
End: 2019-02-25

## 2019-02-25 ENCOUNTER — OFFICE VISIT (OUTPATIENT)
Dept: OTOLARYNGOLOGY | Facility: CLINIC | Age: 59
End: 2019-02-25
Payer: COMMERCIAL

## 2019-02-25 ENCOUNTER — PATIENT MESSAGE (OUTPATIENT)
Dept: OTOLARYNGOLOGY | Facility: CLINIC | Age: 59
End: 2019-02-25

## 2019-02-25 VITALS — HEIGHT: 64 IN | BODY MASS INDEX: 31.32 KG/M2 | WEIGHT: 183.44 LBS

## 2019-02-25 DIAGNOSIS — H61.21 RIGHT EAR IMPACTED CERUMEN: ICD-10-CM

## 2019-02-25 DIAGNOSIS — H60.311 ACUTE DIFFUSE OTITIS EXTERNA OF RIGHT EAR: Primary | ICD-10-CM

## 2019-02-25 PROCEDURE — 99213 OFFICE O/P EST LOW 20 MIN: CPT | Mod: 25,S$GLB,, | Performed by: NURSE PRACTITIONER

## 2019-02-25 PROCEDURE — 3008F PR BODY MASS INDEX (BMI) DOCUMENTED: ICD-10-PCS | Mod: CPTII,S$GLB,, | Performed by: NURSE PRACTITIONER

## 2019-02-25 PROCEDURE — 69210 REMOVE IMPACTED EAR WAX UNI: CPT | Mod: S$GLB,,, | Performed by: NURSE PRACTITIONER

## 2019-02-25 PROCEDURE — 3008F BODY MASS INDEX DOCD: CPT | Mod: CPTII,S$GLB,, | Performed by: NURSE PRACTITIONER

## 2019-02-25 PROCEDURE — 69210 PR REMOVAL IMPACTED CERUMEN REQUIRING INSTRUMENTATION, UNILATERAL: ICD-10-PCS | Mod: S$GLB,,, | Performed by: NURSE PRACTITIONER

## 2019-02-25 PROCEDURE — 99213 PR OFFICE/OUTPT VISIT, EST, LEVL III, 20-29 MIN: ICD-10-PCS | Mod: 25,S$GLB,, | Performed by: NURSE PRACTITIONER

## 2019-02-25 PROCEDURE — 99999 PR PBB SHADOW E&M-EST. PATIENT-LVL III: ICD-10-PCS | Mod: PBBFAC,,, | Performed by: NURSE PRACTITIONER

## 2019-02-25 PROCEDURE — 99999 PR PBB SHADOW E&M-EST. PATIENT-LVL III: CPT | Mod: PBBFAC,,, | Performed by: NURSE PRACTITIONER

## 2019-02-25 NOTE — PROGRESS NOTES
Subjective:       Patient ID: Clemencia Knight is a 58 y.o. female.    Chief Complaint: Other (tx for fungal infection of rt ear)    HPI   Patient reports right ear draining X 1 month. She wears a hearing aid in her right ear only, and has been unable to wear her hearing aid X 1 month. Her culture grew (+)strep and candida.     Review of Systems   Constitutional: Negative.    HENT: Positive for ear discharge and ear pain.    Eyes: Negative.    Respiratory: Negative.    Cardiovascular: Negative.    Gastrointestinal: Negative.    Musculoskeletal: Negative.    Skin: Negative.    Neurological: Negative.    Hematological: Negative.    Psychiatric/Behavioral: Negative.        Objective:      Physical Exam   Constitutional: She is oriented to person, place, and time. Vital signs are normal. She appears well-developed and well-nourished. She is cooperative. She does not appear ill. No distress.   HENT:   Head: Normocephalic and atraumatic.   Right Ear: Hearing, tympanic membrane, external ear and ear canal normal. No swelling or tenderness. Tympanic membrane is not erythematous. No middle ear effusion.   Left Ear: Hearing, tympanic membrane, external ear and ear canal normal. No swelling or tenderness. Tympanic membrane is not erythematous.  No middle ear effusion.   Ears:    Nose: Nose normal.   Mouth/Throat: Uvula is midline, oropharynx is clear and moist and mucous membranes are normal.     SEPARATE PROCEDURE IN OFFICE:   Procedure: Removal of impacted cerumen, RIGHT   Pre Procedure Diagnosis: Cerumen Impaction   Post Procedure Diagnosis: Cerumen Impaction   Verbal informed consent in regards to risk of trauma to ear canal, ear drum or hearing, discomfort during procedure and/or inability to remove cerumen impaction in one session or unforeseen events or complications.   No anesthesia.     Procedure in detail:   Ear canal visualized bilateral with appropriate size ear speculum utilizing Operating Head Binocular  Otomicroscope   Utilizing the following:  Ssuction cannula was used AD. The impacted cerumen of the ear canals was removed atraumatically. The TM and EAC were then inspected and found to be clear of wax. See description of TMs/EACs in PE above.   Complications: No   Condition: Improved/Good    Gentian violet and CSF powder was applied AD.     Eyes: EOM and lids are normal. Right eye exhibits no discharge. Left eye exhibits no discharge. No scleral icterus.   Neck: Trachea normal and normal range of motion. Neck supple. No tracheal deviation present.   Cardiovascular: Normal rate.   Pulmonary/Chest: Effort normal. No stridor. No respiratory distress. She has no wheezes.   Musculoskeletal: Normal range of motion.   Neurological: She is alert and oriented to person, place, and time. She has normal strength. Coordination and gait normal.   Skin: Skin is warm, dry and intact. She is not diaphoretic. No cyanosis. No pallor.   Psychiatric: She has a normal mood and affect. Her speech is normal and behavior is normal. Judgment and thought content normal. Cognition and memory are normal.   Nursing note and vitals reviewed.      Assessment:     Strep AND Candida both growing from right EAC      Plan:    Gentian violet and CSF powder applied AD. Large Doc's proplugs given and demonstrated. Keep ears dry.    Lotrisone to jhonny AU  Recheck in 2-3 weeks

## 2019-02-25 NOTE — TELEPHONE ENCOUNTER
Spoke with patient gave results also advised to come in and see Dennys Adam.  Patient has an appointment scheduled for today

## 2019-02-25 NOTE — TELEPHONE ENCOUNTER
----- Message from Diana Medina NP sent at 2/25/2019  7:54 AM CST -----  There are bacteria and yeast in your ears. I encourage you to return for a topical application of Gentian Violet and CSF powder. Keep your ears entirely dry -- no moisture.

## 2019-03-22 ENCOUNTER — HOSPITAL ENCOUNTER (OUTPATIENT)
Dept: RADIOLOGY | Facility: HOSPITAL | Age: 59
Discharge: HOME | End: 2019-03-22
Attending: OBSTETRICS & GYNECOLOGY
Payer: COMMERCIAL

## 2019-03-22 ENCOUNTER — OFFICE VISIT (OUTPATIENT)
Dept: OBSTETRICS AND GYNECOLOGY | Facility: CLINIC | Age: 59
End: 2019-03-22
Payer: COMMERCIAL

## 2019-03-22 VITALS
HEIGHT: 64 IN | BODY MASS INDEX: 31.07 KG/M2 | WEIGHT: 182.31 LBS | SYSTOLIC BLOOD PRESSURE: 110 MMHG | BODY MASS INDEX: 31.12 KG/M2 | WEIGHT: 182 LBS | HEIGHT: 64 IN | DIASTOLIC BLOOD PRESSURE: 62 MMHG

## 2019-03-22 DIAGNOSIS — N94.10 DYSPAREUNIA IN FEMALE: ICD-10-CM

## 2019-03-22 DIAGNOSIS — N89.8 VAGINAL DRYNESS: ICD-10-CM

## 2019-03-22 DIAGNOSIS — K64.9 HEMORRHOIDS, UNSPECIFIED HEMORRHOID TYPE: ICD-10-CM

## 2019-03-22 DIAGNOSIS — Z01.411 ENCOUNTER FOR GYNECOLOGICAL EXAMINATION WITH ABNORMAL FINDING: Primary | ICD-10-CM

## 2019-03-22 DIAGNOSIS — Z12.31 VISIT FOR SCREENING MAMMOGRAM: ICD-10-CM

## 2019-03-22 DIAGNOSIS — R53.83 FATIGUE, UNSPECIFIED TYPE: ICD-10-CM

## 2019-03-22 PROCEDURE — 77067 MAMMO DIGITAL SCREENING BILAT WITH TOMOSYNTHESIS_CAD: ICD-10-PCS | Mod: 26,,, | Performed by: RADIOLOGY

## 2019-03-22 PROCEDURE — 77067 SCR MAMMO BI INCL CAD: CPT | Mod: 26,,, | Performed by: RADIOLOGY

## 2019-03-22 PROCEDURE — 3078F PR MOST RECENT DIASTOLIC BLOOD PRESSURE < 80 MM HG: ICD-10-PCS | Mod: CPTII,S$GLB,, | Performed by: OBSTETRICS & GYNECOLOGY

## 2019-03-22 PROCEDURE — 3074F SYST BP LT 130 MM HG: CPT | Mod: CPTII,S$GLB,, | Performed by: OBSTETRICS & GYNECOLOGY

## 2019-03-22 PROCEDURE — 99396 PR PREVENTIVE VISIT,EST,40-64: ICD-10-PCS | Mod: S$GLB,,, | Performed by: OBSTETRICS & GYNECOLOGY

## 2019-03-22 PROCEDURE — 99999 PR PBB SHADOW E&M-EST. PATIENT-LVL III: ICD-10-PCS | Mod: PBBFAC,,, | Performed by: OBSTETRICS & GYNECOLOGY

## 2019-03-22 PROCEDURE — 3074F PR MOST RECENT SYSTOLIC BLOOD PRESSURE < 130 MM HG: ICD-10-PCS | Mod: CPTII,S$GLB,, | Performed by: OBSTETRICS & GYNECOLOGY

## 2019-03-22 PROCEDURE — 99999 PR PBB SHADOW E&M-EST. PATIENT-LVL III: CPT | Mod: PBBFAC,,, | Performed by: OBSTETRICS & GYNECOLOGY

## 2019-03-22 PROCEDURE — 99396 PREV VISIT EST AGE 40-64: CPT | Mod: S$GLB,,, | Performed by: OBSTETRICS & GYNECOLOGY

## 2019-03-22 PROCEDURE — 77063 BREAST TOMOSYNTHESIS BI: CPT | Mod: 26,,, | Performed by: RADIOLOGY

## 2019-03-22 PROCEDURE — 77063 MAMMO DIGITAL SCREENING BILAT WITH TOMOSYNTHESIS_CAD: ICD-10-PCS | Mod: 26,,, | Performed by: RADIOLOGY

## 2019-03-22 PROCEDURE — 77067 SCR MAMMO BI INCL CAD: CPT | Mod: TC,PN

## 2019-03-22 PROCEDURE — 3078F DIAST BP <80 MM HG: CPT | Mod: CPTII,S$GLB,, | Performed by: OBSTETRICS & GYNECOLOGY

## 2019-03-22 RX ORDER — HYDROCORTISONE 25 MG/G
CREAM TOPICAL 2 TIMES DAILY
Qty: 30 G | Refills: 1 | Status: SHIPPED | OUTPATIENT
Start: 2019-03-22 | End: 2022-03-10 | Stop reason: CLARIF

## 2019-03-22 RX ORDER — CLOBETASOL PROPIONATE 0.5 MG/G
CREAM TOPICAL 2 TIMES DAILY
Qty: 30 G | Refills: 1 | Status: SHIPPED | OUTPATIENT
Start: 2019-03-22 | End: 2020-09-28

## 2019-03-22 RX ORDER — HYDROCORTISONE ACETATE 25 MG/1
25 SUPPOSITORY RECTAL 2 TIMES DAILY PRN
Qty: 6 SUPPOSITORY | Refills: 1 | Status: SHIPPED | OUTPATIENT
Start: 2019-03-22 | End: 2019-06-14 | Stop reason: ALTCHOICE

## 2019-03-22 RX ORDER — ESTRADIOL 0.1 MG/G
1 CREAM VAGINAL
Qty: 42.5 G | Refills: 1 | Status: SHIPPED | OUTPATIENT
Start: 2019-03-25 | End: 2019-06-14

## 2019-03-22 NOTE — PROGRESS NOTES
Chief Complaint   Patient presents with    vaginal dryness    Vulvar Itch    Dyspareunia    Mammogram    Hemorrhoids     History of Present Illness: Clemencia Knight is a 58 y.o. female that presents today 3/22/2019 for well gyn visit. She reports vaginal itching and dryness and painful itching, skin thinning. She reports some urinary stress leakage, fatigue.     Past Medical History:   Diagnosis Date    Allergy     General anesthetics causing adverse effect in therapeutic use     pt. somewhat aware of extubation with one of her surgeries    GERD (gastroesophageal reflux disease)     Hearing loss     Restless leg syndrome     Sciatica        Past Surgical History:   Procedure Laterality Date    ABDOMINOPLASTY     Abdominoplasty with Lipo Abdomen Bilateral 5/30/2016    Performed by [facility]as [facility]o [facility] at [facility] OR 2ND FLR    BELT ABDOMINOPLASTY      breast implants      CAPSULECTOMY-BREAST Removal of Implants Bilateral 5/30/2016    Performed by [facility] at [facility] OR 2ND FLR    CERVICAL FUSION      COLONOSCOPY  10/2012    repeat in 10    COLONOSCOPY N/A 10/30/2012    Performed by [facility]an [facility] at [facility] ENDO    DEXA      WNL    MAGGI-TRANSFORAMINAL N/A 6/4/2015    Performed by [facility] CATH LAB    HYSTERECTOMY  2000    INJECTION,STEROID,EPIDURAL,TRANSFORAMINAL APPROACH Right 8/21/2018    Performed by Denita Richards MD at [facility] PAIN MGT    Injection-steroid-epidural-cervical N/A 11/20/2018    Performed by [facility] at [facility] OR    Injection-steroid-epidural-cervical N/A 9/24/2018    Performed by Mlyes Rocha [facility] at [facility] OR    LIPOSUCTION Hips and Flanks Bilateral 5/30/2016    Performed by [facility]o [facility] at [facility] OR 2ND FLR    MASTOPEXY Implants Removal with Mastopexy Bilateral 5/30/2016    Performed by [facility]o [facility] at [facility] OR 2ND FLR    OOPHORECTOMY  7/16/2013    laparotomy BSO for benign 10cm tubal cyst     SALPINGOOPHORECTOMY  2013    with removal of fallopian cyst    SHOULDER SURGERY      right    TLH  2000    ovaries remain, benign    TONSILLECTOMY, ADENOIDECTOMY, BILATERAL MYRINGOTOMY AND TUBES         Current Outpatient Medications   Medication Sig Dispense Refill    acetaminophen (TYLENOL) 325 MG tablet Take 650 mg by mouth every 6 (six) hours as needed for Pain.      calcium carbonate (CALCIUM 600 ORAL) Take 600 mg by mouth 2 (two) times daily.      cetirizine (ZYRTEC) 10 MG tablet Take 10 mg by mouth once daily.      clotrimazole-betamethasone 1-0.05% (LOTRISONE) cream Apply topically 2 (two) times daily. 15 g 0    DULoxetine (CYMBALTA) 60 MG capsule Take 60 mg by mouth once daily.       fluticasone (FLONASE) 50 mcg/actuation nasal spray 1 spray by Each Nare route once daily.      MULTIVITAMIN ORAL Take by mouth once daily.      pregabalin (LYRICA) 75 MG capsule Take 1 capsule (75 mg total) by mouth 2 (two) times daily. 180 capsule 0    tiZANidine (ZANAFLEX) 4 MG tablet Take 4 mg by mouth every 6 (six) hours as needed.      clobetasol (TEMOVATE) 0.05 % cream Apply topically 2 (two) times daily. 30 g 1    [START ON 3/25/2019] estradiol (ESTRACE) 0.01 % (0.1 mg/gram) vaginal cream Place 1 g vaginally every Monday and Thursday. 42.5 g 1    hydrocortisone (ANUSOL-HC) 25 mg suppository Place 1 suppository (25 mg total) rectally 2 (two) times daily as needed for Hemorrhoids. 6 suppository 1    hydrocortisone 2.5 % cream Apply topically 2 (two) times daily. 20 g 1     No current facility-administered medications for this visit.        Review of patient's allergies indicates:   Allergen Reactions    Flexeril [cyclobenzaprine] Itching    Hydrocodone Itching       Family History   Problem Relation Age of Onset    Cancer Father         bladder    Diabetes Father     Heart disease Father     Diabetes Mother     Melanoma Brother     Charcot-Karla-Tooth disease Brother     Breast cancer Neg Hx      "Ovarian cancer Neg Hx     Anesthesia problems Neg Hx        Social History     Socioeconomic History    Marital status:      Spouse name: None    Number of children: None    Years of education: None    Highest education level: None   Social Needs    Financial resource strain: None    Food insecurity - worry: None    Food insecurity - inability: None    Transportation needs - medical: None    Transportation needs - non-medical: None   Occupational History     Employer: OTHER   Tobacco Use    Smoking status: Never Smoker    Smokeless tobacco: Never Used   Substance and Sexual Activity    Alcohol use: Yes     Comment: rarely    Drug use: No    Sexual activity: Yes     Partners: Male     Birth control/protection: Surgical   Other Topics Concern    Are you pregnant or think you may be? No    Breast-feeding Not Asked   Social History Narrative    None       OB History    Para Term  AB Living   2 2 2     2   SAB TAB Ectopic Multiple Live Births           2      # Outcome Date GA Lbr Marc/2nd Weight Sex Delivery Anes PTL Lv   2 Term 86   3.544 kg (7 lb 13 oz) F Vag-Spont EPI N VARSHA   1 Term 85   3.572 kg (7 lb 14 oz) M Vag-Spont EPI N VARSHA          Review of Symptoms:  GENERAL: Denies weight gain or weight loss. Feeling well overall.   SKIN: Denies rash or lesions.   HEAD: Denies head injury or headache.   NODES: Denies enlarged lymph nodes.   CHEST: Denies chest pain or shortness of breath.   CARDIOVASCULAR: Denies palpitations or left sided chest pain.   ABDOMEN: No abdominal pain, constipation, diarrhea, nausea, vomiting or rectal bleeding.   URINARY: No frequency, dysuria, hematuria, or burning on urination.  HEMATOLOGIC: No easy bruisability or excessive bleeding.   MUSCULOSKELETAL: Denies joint pain or swelling.     /62   Ht 5' 4" (1.626 m)   Wt 82.7 kg (182 lb 5.1 oz)   Physical Exam:  APPEARANCE: Well nourished, well developed, in no acute distress.  SKIN: " Normal skin turgor, no lesions.  NECK: Neck symmetric without masses   RESPIRATORY: Normal respiratory effort with no retractions or use of accessory muscles  CARDIOVASCULAR: Peripheral vascular system with no swelling no varicosities and palpation of pulses normal  LYMPHATIC: No enlargements of the lymph nodes noted in the neck, axillae, or groin  ABDOMEN: Soft. No tenderness or masses. No hepatosplenomegaly. No hernias.  BREASTS: Symmetrical, no skin changes or visible lesions. No palpable masses, nipple discharge or adenopathy bilaterally.  PELVIC: Normal external female genitalia without lesions but lichen changes to labia right and clitoris . Normal hair distribution. Adequate perineal body, normal urethral meatus. Urethra with no masses.  Bladder nontender. Vagina moist and well rugated without lesions or discharge. No significant cystocele or rectocele.  Adnexa without masses or tenderness. Urethra and bladder normal.   EXTREMITIES: No clubbing cyanosis or edema.    ASSESSMENT/PLAN:  Encounter for gynecological examination with abnormal finding    Visit for screening mammogram  -     Cancel: Mammo Digital Screening Bilat With CAD; Future; Expected date: 03/22/2019    Hemorrhoids, unspecified hemorrhoid type  -     hydrocortisone (ANUSOL-HC) 25 mg suppository; Place 1 suppository (25 mg total) rectally 2 (two) times daily as needed for Hemorrhoids.  Dispense: 6 suppository; Refill: 1  -     hydrocortisone 2.5 % cream; Apply topically 2 (two) times daily.  Dispense: 20 g; Refill: 1    Fatigue, unspecified type  -     TSH; Future; Expected date: 03/22/2019    Vaginal dryness  -     estradiol (ESTRACE) 0.01 % (0.1 mg/gram) vaginal cream; Place 1 g vaginally every Monday and Thursday.  Dispense: 42.5 g; Refill: 1    Dyspareunia in female  -     estradiol (ESTRACE) 0.01 % (0.1 mg/gram) vaginal cream; Place 1 g vaginally every Monday and Thursday.  Dispense: 42.5 g; Refill: 1    Other orders  -     clobetasol  (TEMOVATE) 0.05 % cream; Apply topically 2 (two) times daily.  Dispense: 30 g; Refill: 1          Patient was counseled today on Pap guidelines. We discussed the discontinuing the pap smear after hysterectomy except in certain high risk cases.  We discussed the need for pelvic exams.   We discussed STD screening if at high risk for an STD.  We discussed breast cancer screening with mammograms every other year after the age of 40 and annually after the age of 50.    We discussed colon cancer screening.   Osteoporosis screening with the Dexa Bone Scan discussed when indicated.   She will see her PCP for other health maintenance.       FOLLOW-UP:prn

## 2019-03-25 ENCOUNTER — OFFICE VISIT (OUTPATIENT)
Dept: OTOLARYNGOLOGY | Facility: CLINIC | Age: 59
End: 2019-03-25
Payer: COMMERCIAL

## 2019-03-25 VITALS — BODY MASS INDEX: 31.27 KG/M2 | HEIGHT: 64 IN | WEIGHT: 183.19 LBS

## 2019-03-25 DIAGNOSIS — H61.21 RIGHT EAR IMPACTED CERUMEN: Primary | ICD-10-CM

## 2019-03-25 DIAGNOSIS — B37.84: ICD-10-CM

## 2019-03-25 PROCEDURE — 99999 PR PBB SHADOW E&M-EST. PATIENT-LVL III: CPT | Mod: PBBFAC,,, | Performed by: NURSE PRACTITIONER

## 2019-03-25 PROCEDURE — 69210 PR REMOVAL IMPACTED CERUMEN REQUIRING INSTRUMENTATION, UNILATERAL: ICD-10-PCS | Mod: RT,S$GLB,, | Performed by: NURSE PRACTITIONER

## 2019-03-25 PROCEDURE — 99213 OFFICE O/P EST LOW 20 MIN: CPT | Mod: 25,S$GLB,, | Performed by: NURSE PRACTITIONER

## 2019-03-25 PROCEDURE — 99999 PR PBB SHADOW E&M-EST. PATIENT-LVL III: ICD-10-PCS | Mod: PBBFAC,,, | Performed by: NURSE PRACTITIONER

## 2019-03-25 PROCEDURE — 3008F BODY MASS INDEX DOCD: CPT | Mod: CPTII,S$GLB,, | Performed by: NURSE PRACTITIONER

## 2019-03-25 PROCEDURE — 69210 REMOVE IMPACTED EAR WAX UNI: CPT | Mod: RT,S$GLB,, | Performed by: NURSE PRACTITIONER

## 2019-03-25 PROCEDURE — 3008F PR BODY MASS INDEX (BMI) DOCUMENTED: ICD-10-PCS | Mod: CPTII,S$GLB,, | Performed by: NURSE PRACTITIONER

## 2019-03-25 PROCEDURE — 99213 PR OFFICE/OUTPT VISIT, EST, LEVL III, 20-29 MIN: ICD-10-PCS | Mod: 25,S$GLB,, | Performed by: NURSE PRACTITIONER

## 2019-03-25 NOTE — PROGRESS NOTES
Subjective:       Patient ID: Clemencia Knight is a 58 y.o. female.    Chief Complaint: Otitis Externa (right ear follow up)    HPI   Patient's right ear culture grew (+)strep and candida. Treated with Gentian violet and CSF powder. No further drainage. Still has scaling, dry skin, and itching.     Review of Systems   Constitutional: Negative.    HENT: Negative for ear discharge and ear pain.    Eyes: Negative.    Respiratory: Negative.    Cardiovascular: Negative.    Gastrointestinal: Negative.    Musculoskeletal: Negative.    Skin: Negative.    Neurological: Negative.    Hematological: Negative.    Psychiatric/Behavioral: Negative.        Objective:      Physical Exam   Constitutional: She is oriented to person, place, and time. Vital signs are normal. She appears well-developed and well-nourished. She is cooperative. She does not appear ill. No distress.   HENT:   Head: Normocephalic and atraumatic.   Right Ear: Hearing, tympanic membrane, external ear and ear canal normal. No swelling or tenderness. Tympanic membrane is not erythematous. No middle ear effusion.   Left Ear: Hearing, tympanic membrane, external ear and ear canal normal. No swelling or tenderness. Tympanic membrane is not erythematous.  No middle ear effusion.   Ears:    Nose: Nose normal.   Mouth/Throat: Uvula is midline, oropharynx is clear and moist and mucous membranes are normal.     SEPARATE PROCEDURE IN OFFICE:   Procedure: Removal of impacted cerumen, RIGHT   Pre Procedure Diagnosis: Cerumen Impaction   Post Procedure Diagnosis: Cerumen Impaction   Verbal informed consent in regards to risk of trauma to ear canal, ear drum or hearing, discomfort during procedure and/or inability to remove cerumen impaction in one session or unforeseen events or complications.   No anesthesia.     Procedure in detail:   Ear canal visualized bilateral with appropriate size ear speculum utilizing Operating Head Binocular Otomicroscope   Utilizing the following:   Suction cannula was used AD. The impacted cerumen of the ear canals was removed atraumatically. The TM and EAC were then inspected and found to be clear of wax. See description of TMs/EACs in PE above.   Complications: No   Condition: Improved/Good    CSF powder was reapplied AD, and applied AS.     Eyes: EOM and lids are normal. Right eye exhibits no discharge. Left eye exhibits no discharge. No scleral icterus.   Neck: Trachea normal and normal range of motion. Neck supple. No tracheal deviation present.   Cardiovascular: Normal rate.   Pulmonary/Chest: Effort normal. No stridor. No respiratory distress. She has no wheezes.   Musculoskeletal: Normal range of motion.   Neurological: She is alert and oriented to person, place, and time. She has normal strength. Coordination and gait normal.   Skin: Skin is warm, dry and intact. She is not diaphoretic. No cyanosis. No pallor.   Psychiatric: She has a normal mood and affect. Her speech is normal and behavior is normal. Judgment and thought content normal. Cognition and memory are normal.   Nursing note and vitals reviewed.      Assessment:     Strep and Candida grew from right EAC, improved      Plan:    CSF powder reapplied AD. Continue to keep ears dry.     Coconut oil to ears AU  Recheck as needed for further ENT concerns

## 2019-05-08 ENCOUNTER — PATIENT MESSAGE (OUTPATIENT)
Dept: OBSTETRICS AND GYNECOLOGY | Facility: CLINIC | Age: 59
End: 2019-05-08

## 2019-05-08 RX ORDER — HYDROCORTISONE ACETATE 25 MG/1
25 SUPPOSITORY RECTAL 2 TIMES DAILY
Qty: 24 SUPPOSITORY | Refills: 1 | Status: SHIPPED | OUTPATIENT
Start: 2019-05-08 | End: 2019-06-14 | Stop reason: ALTCHOICE

## 2019-05-09 ENCOUNTER — OFFICE VISIT (OUTPATIENT)
Dept: SURGERY | Facility: CLINIC | Age: 59
End: 2019-05-09
Payer: COMMERCIAL

## 2019-05-09 VITALS
TEMPERATURE: 98 F | HEART RATE: 76 BPM | DIASTOLIC BLOOD PRESSURE: 75 MMHG | WEIGHT: 189.13 LBS | HEIGHT: 64 IN | SYSTOLIC BLOOD PRESSURE: 133 MMHG | BODY MASS INDEX: 32.29 KG/M2

## 2019-05-09 DIAGNOSIS — K64.1 GRADE II HEMORRHOIDS: Primary | ICD-10-CM

## 2019-05-09 DIAGNOSIS — R21 SKIN RASH: ICD-10-CM

## 2019-05-09 PROCEDURE — 99999 PR PBB SHADOW E&M-EST. PATIENT-LVL III: CPT | Mod: PBBFAC,,, | Performed by: SURGERY

## 2019-05-09 PROCEDURE — 3075F SYST BP GE 130 - 139MM HG: CPT | Mod: CPTII,S$GLB,, | Performed by: SURGERY

## 2019-05-09 PROCEDURE — 3008F PR BODY MASS INDEX (BMI) DOCUMENTED: ICD-10-PCS | Mod: CPTII,S$GLB,, | Performed by: SURGERY

## 2019-05-09 PROCEDURE — 3075F PR MOST RECENT SYSTOLIC BLOOD PRESS GE 130-139MM HG: ICD-10-PCS | Mod: CPTII,S$GLB,, | Performed by: SURGERY

## 2019-05-09 PROCEDURE — 46600 DIAGNOSTIC ANOSCOPY SPX: CPT | Mod: S$GLB,,, | Performed by: SURGERY

## 2019-05-09 PROCEDURE — 46600 PR DIAG2STIC A2SCOPY: ICD-10-PCS | Mod: S$GLB,,, | Performed by: SURGERY

## 2019-05-09 PROCEDURE — 99203 OFFICE O/P NEW LOW 30 MIN: CPT | Mod: 25,S$GLB,, | Performed by: SURGERY

## 2019-05-09 PROCEDURE — 3008F BODY MASS INDEX DOCD: CPT | Mod: CPTII,S$GLB,, | Performed by: SURGERY

## 2019-05-09 PROCEDURE — 3078F PR MOST RECENT DIASTOLIC BLOOD PRESSURE < 80 MM HG: ICD-10-PCS | Mod: CPTII,S$GLB,, | Performed by: SURGERY

## 2019-05-09 PROCEDURE — 99203 PR OFFICE/OUTPT VISIT, NEW, LEVL III, 30-44 MIN: ICD-10-PCS | Mod: 25,S$GLB,, | Performed by: SURGERY

## 2019-05-09 PROCEDURE — 99999 PR PBB SHADOW E&M-EST. PATIENT-LVL III: ICD-10-PCS | Mod: PBBFAC,,, | Performed by: SURGERY

## 2019-05-09 PROCEDURE — 3078F DIAST BP <80 MM HG: CPT | Mod: CPTII,S$GLB,, | Performed by: SURGERY

## 2019-05-09 RX ORDER — PRENATAL VIT 91/IRON/FOLIC/DHA 28-975-200
COMBINATION PACKAGE (EA) ORAL 2 TIMES DAILY
COMMUNITY
End: 2020-09-28

## 2019-05-09 NOTE — PROGRESS NOTES
Subjective:       Patient ID: Clemencia Knight is a 58 y.o. female.    Chief Complaint: Hemorrhoids (Internal bleeding hemorrhoids)    HPI  Pleasant 59 yo F referred to me for evaluationof her hemorrhoids. Pt notes that she has been having some perianal discomfort and pain for the last several weeks.  She feels that this is from her hemorrhoids.  States that pain is particularly exacerbated with Bms.  Notes that she has a significant rash in the perianal area.  Denies any bleeding.   Has noted her self to be more constipated in recent weeks.  Denies abdominal or rectal surgery.  Pt has GERD but is otherwise healthy.  Last cscope 7 years ago  Review of Systems   Constitutional: Negative for activity change, appetite change, fever and unexpected weight change.   HENT: Negative for congestion and nosebleeds.    Respiratory: Negative for chest tightness, shortness of breath and wheezing.    Cardiovascular: Negative for chest pain.   Gastrointestinal: Positive for rectal pain. Negative for abdominal distention, abdominal pain, anal bleeding, blood in stool, constipation, diarrhea, nausea and vomiting.   Genitourinary: Negative for difficulty urinating, dysuria and frequency.   Skin: Positive for rash. Negative for wound.   Neurological: Negative for dizziness.   Hematological: Negative for adenopathy. Does not bruise/bleed easily.   Psychiatric/Behavioral: Negative for agitation and decreased concentration.       Objective:      Physical Exam   Constitutional: She is oriented to person, place, and time. She appears well-developed and well-nourished.   HENT:   Head: Normocephalic and atraumatic.   Eyes: Pupils are equal, round, and reactive to light.   Neck: Normal range of motion. Neck supple. No tracheal deviation present. No thyromegaly present.   Cardiovascular: Normal rate, regular rhythm and normal heart sounds.   No murmur heard.  Pulmonary/Chest: Effort normal and breath sounds normal. She exhibits no tenderness.    Abdominal: Soft. Bowel sounds are normal. She exhibits no distension, no abdominal bruit, no pulsatile midline mass and no mass. There is no hepatosplenomegaly. There is no tenderness. There is no rigidity, no rebound, no guarding, no tenderness at McBurney's point and negative Plunkett's sign. No hernia. Hernia confirmed negative in the ventral area.   Genitourinary: Rectum normal.   Genitourinary Comments: Ext exam demonstrates a rash bilaterally in the gluteal cleft.  Mild ext hemorrhoids.  No fissure.  CINTIA with normal sphincter tone.  Anoscopy demonstrating moderately enlarged int hemorrhoids in the R ant, R post and L lateral position   Musculoskeletal: Normal range of motion.   Neurological: She is alert and oriented to person, place, and time.   Skin: Skin is warm. No rash noted. No erythema.   Psychiatric: She has a normal mood and affect.   Vitals reviewed.      Assessment:     Hemorrhoids  Perianal rash  No diagnosis found.    Plan:       D/w pt. I have recommended fiber daily.  Balneol wipes.  Limited use of soaps and dyes.  Gentle cleansing.  D/w pt importance of barrier cream.  Suspect that this is a contact dermatitis.  She will RTC in 4 weeks for evaluation

## 2019-05-13 ENCOUNTER — OFFICE VISIT (OUTPATIENT)
Dept: OBSTETRICS AND GYNECOLOGY | Facility: CLINIC | Age: 59
End: 2019-05-13
Payer: COMMERCIAL

## 2019-05-13 VITALS
HEIGHT: 64 IN | BODY MASS INDEX: 33 KG/M2 | SYSTOLIC BLOOD PRESSURE: 134 MMHG | RESPIRATION RATE: 15 BRPM | DIASTOLIC BLOOD PRESSURE: 78 MMHG | WEIGHT: 193.31 LBS

## 2019-05-13 DIAGNOSIS — B37.9 CANDIDIASIS: Primary | ICD-10-CM

## 2019-05-13 PROCEDURE — 3008F PR BODY MASS INDEX (BMI) DOCUMENTED: ICD-10-PCS | Mod: CPTII,S$GLB,, | Performed by: OBSTETRICS & GYNECOLOGY

## 2019-05-13 PROCEDURE — 99999 PR PBB SHADOW E&M-EST. PATIENT-LVL IV: ICD-10-PCS | Mod: PBBFAC,,, | Performed by: OBSTETRICS & GYNECOLOGY

## 2019-05-13 PROCEDURE — 3078F PR MOST RECENT DIASTOLIC BLOOD PRESSURE < 80 MM HG: ICD-10-PCS | Mod: CPTII,S$GLB,, | Performed by: OBSTETRICS & GYNECOLOGY

## 2019-05-13 PROCEDURE — 87480 CANDIDA DNA DIR PROBE: CPT

## 2019-05-13 PROCEDURE — 99213 OFFICE O/P EST LOW 20 MIN: CPT | Mod: S$GLB,,, | Performed by: OBSTETRICS & GYNECOLOGY

## 2019-05-13 PROCEDURE — 3075F PR MOST RECENT SYSTOLIC BLOOD PRESS GE 130-139MM HG: ICD-10-PCS | Mod: CPTII,S$GLB,, | Performed by: OBSTETRICS & GYNECOLOGY

## 2019-05-13 PROCEDURE — 3008F BODY MASS INDEX DOCD: CPT | Mod: CPTII,S$GLB,, | Performed by: OBSTETRICS & GYNECOLOGY

## 2019-05-13 PROCEDURE — 99213 PR OFFICE/OUTPT VISIT, EST, LEVL III, 20-29 MIN: ICD-10-PCS | Mod: S$GLB,,, | Performed by: OBSTETRICS & GYNECOLOGY

## 2019-05-13 PROCEDURE — 99999 PR PBB SHADOW E&M-EST. PATIENT-LVL IV: CPT | Mod: PBBFAC,,, | Performed by: OBSTETRICS & GYNECOLOGY

## 2019-05-13 PROCEDURE — 87510 GARDNER VAG DNA DIR PROBE: CPT

## 2019-05-13 PROCEDURE — 3078F DIAST BP <80 MM HG: CPT | Mod: CPTII,S$GLB,, | Performed by: OBSTETRICS & GYNECOLOGY

## 2019-05-13 PROCEDURE — 3075F SYST BP GE 130 - 139MM HG: CPT | Mod: CPTII,S$GLB,, | Performed by: OBSTETRICS & GYNECOLOGY

## 2019-05-13 RX ORDER — FLUCONAZOLE 150 MG/1
150 TABLET ORAL
Qty: 3 TABLET | Refills: 1 | Status: SHIPPED | OUTPATIENT
Start: 2019-05-13 | End: 2019-05-25

## 2019-05-13 RX ORDER — MICONAZOLE NITRATE 2 %
POWDER (GRAM) TOPICAL
COMMUNITY
End: 2019-08-14

## 2019-05-13 NOTE — PROGRESS NOTES
Chief Complaint   Patient presents with    Rash       History of Present Illness: Clemencia Knight is a 58 y.o. female that presents today 5/13/2019 for   Chief Complaint   Patient presents with    Rash   severe red for 1 months itching no relief.       Past Medical History:   Diagnosis Date    Allergy     General anesthetics causing adverse effect in therapeutic use     pt. somewhat aware of extubation with one of her surgeries    GERD (gastroesophageal reflux disease)     Hearing loss     Restless leg syndrome     Sciatica        Past Surgical History:   Procedure Laterality Date    ABDOMINOPLASTY     Abdominoplasty with Lipo Abdomen Bilateral 5/30/2016    Performed by [facility] at [facility] OR 2ND FLR    AUGMENTATION OF BREAST      BELT ABDOMINOPLASTY      breast implants      CAPSULECTOMY-BREAST Removal of Implants Bilateral 5/30/2016    Performed by [facility] MD at [facility] OR 2ND FLR    CERVICAL FUSION      COLONOSCOPY  10/2012    repeat in 10    COLONOSCOPY N/A 10/30/2012    [facility] ENDO    DEXA      WNL    MAGGI-TRANSFORAMINAL N/A 6/4/2015    Performed by [facility] at [facility] CATH LAB    HYSTERECTOMY  2000    INJECTION,STEROID,EPIDURAL,TRANSFORAMINAL APPROACH Right 8/21/2018    Performed by [facility] Richards MD at [facility] PAIN MGT    Injection-steroid-epidural-cervical N/A 11/20/2018    Per[facility] by [facility] at [facility] OR    Injection-steroid-epidural-cervical N/A 9/24/2018    Performed by [facility] at [facility] OR    LIPOSUCTION Hips and Flanks Bilateral 5/30/2016    Performed by [facility] at [facility] OR 2ND FLR    MASTOPEXY Implants Removal with Mastopexy Bilateral 5/30/2016    Performed by [facility] MD at [facility] OR 2ND FLR    OOPHORECTOMY  7/16/2013    laparotomy BSO for benign 10cm tubal cyst    SALPINGOOPHORECTOMY  2013    with removal of fallopian cyst    SHOULDER SURGERY      right    [facility]   2000    ovaries remain, benign    TONSILLECTOMY, ADENOIDECTOMY, BILATERAL MYRINGOTOMY AND TUBES         Current Outpatient Medications   Medication Sig Dispense Refill    acetaminophen (TYLENOL) 325 MG tablet Take 650 mg by mouth every 6 (six) hours as needed for Pain.      cetirizine (ZYRTEC) 10 MG tablet Take 10 mg by mouth once daily.      DULoxetine (CYMBALTA) 60 MG capsule Take 60 mg by mouth once daily.       estradiol (ESTRACE) 0.01 % (0.1 mg/gram) vaginal cream Place 1 gram vaginally every Monday and Thursday. 42.5 g 1    fluticasone (FLONASE) 50 mcg/actuation nasal spray 1 spray by Each Nare route once daily.      hydrocortisone 2.5 % cream Apply topically 2 (two) times daily. 30 g 1    miconazole NITRATE 2 % (MICOTIN) 2 % top powder Apply topically as needed for Itching.      mineral,lanolin oils/prop glyc (BALNEOL TOP) Apply topically.      MULTIVITAMIN ORAL Take by mouth once daily.      pregabalin (LYRICA) 75 MG capsule Take 1 capsule (75 mg total) by mouth 2 (two) times daily. 180 capsule 0    tiZANidine (ZANAFLEX) 4 MG tablet Take 4 mg by mouth every 6 (six) hours as needed.      calcium carbonate (CALCIUM 600 ORAL) Take 600 mg by mouth 2 (two) times daily.      clobetasol (TEMOVATE) 0.05 % cream Apply topically 2 (two) times daily. 30 g 1    fluconazole (DIFLUCAN) 150 MG Tab Take 1 tablet (150 mg total) by mouth Every 3 (three) days. for 3 doses 3 tablet 1    hydrocortisone (ANUSOL-HC) 25 mg suppository Unwrap and place 1 suppository (25 mg total) rectally 2 (two) times daily as needed for Hemorrhoids. 6 suppository 1    hydrocortisone (ANUSOL-HC) 25 mg suppository Place 1 suppository (25 mg total) rectally 2 (two) times daily. 24 suppository 1    terbinafine HCl (LAMISIL) 1 % cream Apply topically 2 (two) times daily.       No current facility-administered medications for this visit.        Review of patient's allergies indicates:   Allergen Reactions    Flexeril [cyclobenzaprine]  "Itching    Hydrocodone Itching       Family History   Problem Relation Age of Onset    Cancer Father         bladder    Diabetes Father     Heart disease Father     Diabetes Mother     Melanoma Brother     Charcot-Karla-Tooth disease Brother     Breast cancer Neg Hx     Ovarian cancer Neg Hx     Anesthesia problems Neg Hx        Social History     Tobacco Use    Smoking status: Never Smoker    Smokeless tobacco: Never Used   Substance Use Topics    Alcohol use: Yes     Comment: rarely    Drug use: No       OB History    Para Term  AB Living   2 2 2     2   SAB TAB Ectopic Multiple Live Births           2      # Outcome Date GA Lbr Marc/2nd Weight Sex Delivery Anes PTL Lv   2 Term 84   3.544 kg (7 lb 13 oz) F Vag-Spont EPI N VARSHA   1 Term 83   3.572 kg (7 lb 14 oz) M Vag-Spont EPI N VARSHA       Review of Symptoms:  GENERAL: Denies weight gain or weight loss. Feeling well overall.   SKIN: Denies rash or lesions.   HEAD: Denies head injury or headache.   NODES: Denies enlarged lymph nodes.   CHEST: Denies chest pain or shortness of breath.   CARDIOVASCULAR: Denies palpitations or left sided chest pain.   ABDOMEN: No abdominal pain, constipation, diarrhea, nausea, vomiting or rectal bleeding.   URINARY: No frequency, dysuria, hematuria, or burning on urination.  HEMATOLOGIC: No easy bruisability or excessive bleeding.   MUSCULOSKELETAL: Denies joint pain or swelling.     /78   Resp 15   Ht 5' 4" (1.626 m)   Wt 87.7 kg (193 lb 5.5 oz)   Physical Exam:  APPEARANCE: Well nourished, well developed, in no acute distress.  SKIN: Normal skin turgor, no lesions.  NECK: Neck symmetric without masses   RESPIRATORY: Normal respiratory effort with no retractions or use of accessory muscles  CARDIOVASCULAR: Peripheral vascular system with no swelling no varicosities and palpation of pulses normal  LYMPHATIC: No enlargements of the lymph nodes noted in the neck, axillae, or groin  ABDOMEN: " Soft. No tenderness or masses. No hepatosplenomegaly. No hernias.  PELVIC: Normal external female genitalia with erythema from vagina to buttock ++ Normal hair distribution. Adequate perineal body, normal urethral meatus. Urethra with no masses.  Bladder nontender. Vagina moist and well rugated without lesions or discharge.  No significant cystocele or rectocele. Adnexa without masses or tenderness. Urethra and bladder normal.  EXTREMITIES: No clubbing cyanosis or edema.    ASSESSMENT/PLAN:  Candidiasis  -     fluconazole (DIFLUCAN) 150 MG Tab; Take 1 tablet (150 mg total) by mouth Every 3 (three) days. for 3 doses  Dispense: 3 tablet; Refill: 1  -     Vaginosis Screen by DNA Probe      15 minutes spent today with this patient. Greater than half spent in counseling today.

## 2019-05-14 LAB
BACTERIAL VAGINOSIS DNA: NEGATIVE
CANDIDA GLABRATA DNA: NEGATIVE
CANDIDA KRUSEI DNA: NEGATIVE
CANDIDA RRNA VAG QL PROBE: NEGATIVE
T VAGINALIS RRNA GENITAL QL PROBE: NEGATIVE

## 2019-05-15 ENCOUNTER — OFFICE VISIT (OUTPATIENT)
Dept: DERMATOLOGY | Facility: CLINIC | Age: 59
End: 2019-05-15
Payer: COMMERCIAL

## 2019-05-15 ENCOUNTER — LAB VISIT (OUTPATIENT)
Dept: LAB | Facility: HOSPITAL | Age: 59
End: 2019-05-15
Attending: DERMATOLOGY
Payer: COMMERCIAL

## 2019-05-15 VITALS — HEIGHT: 64 IN | WEIGHT: 193 LBS | BODY MASS INDEX: 32.95 KG/M2

## 2019-05-15 DIAGNOSIS — L40.0 PSORIASIS VULGARIS: Primary | ICD-10-CM

## 2019-05-15 DIAGNOSIS — L40.8 INVERSE PSORIASIS: ICD-10-CM

## 2019-05-15 DIAGNOSIS — Z51.81 ENCOUNTER FOR MEDICATION MONITORING: ICD-10-CM

## 2019-05-15 LAB
ALBUMIN SERPL BCP-MCNC: 4.3 G/DL (ref 3.5–5.2)
ALP SERPL-CCNC: 85 U/L (ref 55–135)
ALT SERPL W/O P-5'-P-CCNC: 18 U/L (ref 10–44)
ANION GAP SERPL CALC-SCNC: 9 MMOL/L (ref 8–16)
AST SERPL-CCNC: 21 U/L (ref 10–40)
BASOPHILS # BLD AUTO: 0.03 K/UL (ref 0–0.2)
BASOPHILS NFR BLD: 0.4 % (ref 0–1.9)
BILIRUB SERPL-MCNC: 0.4 MG/DL (ref 0.1–1)
BUN SERPL-MCNC: 17 MG/DL (ref 6–20)
CALCIUM SERPL-MCNC: 9.8 MG/DL (ref 8.7–10.5)
CHLORIDE SERPL-SCNC: 102 MMOL/L (ref 95–110)
CO2 SERPL-SCNC: 27 MMOL/L (ref 23–29)
CREAT SERPL-MCNC: 0.8 MG/DL (ref 0.5–1.4)
DIFFERENTIAL METHOD: ABNORMAL
EOSINOPHIL # BLD AUTO: 0.3 K/UL (ref 0–0.5)
EOSINOPHIL NFR BLD: 3.8 % (ref 0–8)
ERYTHROCYTE [DISTWIDTH] IN BLOOD BY AUTOMATED COUNT: 13.1 % (ref 11.5–14.5)
EST. GFR  (AFRICAN AMERICAN): >60 ML/MIN/1.73 M^2
EST. GFR  (NON AFRICAN AMERICAN): >60 ML/MIN/1.73 M^2
GLUCOSE SERPL-MCNC: 93 MG/DL (ref 70–110)
HCT VFR BLD AUTO: 44.7 % (ref 37–48.5)
HGB BLD-MCNC: 14.4 G/DL (ref 12–16)
IMM GRANULOCYTES # BLD AUTO: 0.02 K/UL (ref 0–0.04)
IMM GRANULOCYTES NFR BLD AUTO: 0.3 % (ref 0–0.5)
LYMPHOCYTES # BLD AUTO: 2.1 K/UL (ref 1–4.8)
LYMPHOCYTES NFR BLD: 27.3 % (ref 18–48)
MCH RBC QN AUTO: 30.4 PG (ref 27–31)
MCHC RBC AUTO-ENTMCNC: 32.2 G/DL (ref 32–36)
MCV RBC AUTO: 94 FL (ref 82–98)
MONOCYTES # BLD AUTO: 0.7 K/UL (ref 0.3–1)
MONOCYTES NFR BLD: 9.1 % (ref 4–15)
NEUTROPHILS # BLD AUTO: 4.5 K/UL (ref 1.8–7.7)
NEUTROPHILS NFR BLD: 59.1 % (ref 38–73)
NRBC BLD-RTO: 0 /100 WBC
PLATELET # BLD AUTO: 358 K/UL (ref 150–350)
PMV BLD AUTO: 10.2 FL (ref 9.2–12.9)
POTASSIUM SERPL-SCNC: 4.4 MMOL/L (ref 3.5–5.1)
PROT SERPL-MCNC: 8.5 G/DL (ref 6–8.4)
RBC # BLD AUTO: 4.74 M/UL (ref 4–5.4)
SODIUM SERPL-SCNC: 138 MMOL/L (ref 136–145)
WBC # BLD AUTO: 7.62 K/UL (ref 3.9–12.7)

## 2019-05-15 PROCEDURE — 3008F BODY MASS INDEX DOCD: CPT | Mod: CPTII,S$GLB,, | Performed by: DERMATOLOGY

## 2019-05-15 PROCEDURE — 87340 HEPATITIS B SURFACE AG IA: CPT

## 2019-05-15 PROCEDURE — 99999 PR PBB SHADOW E&M-EST. PATIENT-LVL III: ICD-10-PCS | Mod: PBBFAC,,, | Performed by: DERMATOLOGY

## 2019-05-15 PROCEDURE — 85025 COMPLETE CBC W/AUTO DIFF WBC: CPT

## 2019-05-15 PROCEDURE — 99999 PR PBB SHADOW E&M-EST. PATIENT-LVL III: CPT | Mod: PBBFAC,,, | Performed by: DERMATOLOGY

## 2019-05-15 PROCEDURE — 99214 OFFICE O/P EST MOD 30 MIN: CPT | Mod: S$GLB,,, | Performed by: DERMATOLOGY

## 2019-05-15 PROCEDURE — 86706 HEP B SURFACE ANTIBODY: CPT

## 2019-05-15 PROCEDURE — 99214 PR OFFICE/OUTPT VISIT, EST, LEVL IV, 30-39 MIN: ICD-10-PCS | Mod: S$GLB,,, | Performed by: DERMATOLOGY

## 2019-05-15 PROCEDURE — 86803 HEPATITIS C AB TEST: CPT

## 2019-05-15 PROCEDURE — 80053 COMPREHEN METABOLIC PANEL: CPT

## 2019-05-15 PROCEDURE — 3008F PR BODY MASS INDEX (BMI) DOCUMENTED: ICD-10-PCS | Mod: CPTII,S$GLB,, | Performed by: DERMATOLOGY

## 2019-05-15 RX ORDER — FLUOCINONIDE TOPICAL SOLUTION USP, 0.05% 0.5 MG/ML
SOLUTION TOPICAL 2 TIMES DAILY
Qty: 60 ML | Refills: 1 | Status: SHIPPED | OUTPATIENT
Start: 2019-05-15 | End: 2022-03-14

## 2019-05-15 RX ORDER — KETOCONAZOLE 20 MG/ML
SHAMPOO, SUSPENSION TOPICAL DAILY
Qty: 120 ML | Refills: 1 | Status: SHIPPED | OUTPATIENT
Start: 2019-05-15 | End: 2021-11-15 | Stop reason: SDUPTHER

## 2019-05-15 NOTE — PROGRESS NOTES
Subjective:       Patient ID:  Clemencia Knight is a 58 y.o. female who presents for   Chief Complaint   Patient presents with    Eczema     59 y/o F presents for initial visit for rash on scalp, R ear, buttock, inguinal folds x 1 month.  She states that the rash is itchy, scaly, raw, and painful. Prior tx oral fluconazole and Zeasorb AF powder, betamethasone/clotrimazole, clobetasol.    Newest medications: Lyrica 2/19, Cymbalta 8/18  No family history of psoriasis    No h/o skin cancer.   Brother had melanoma   .  Past Medical History:   Diagnosis Date    Allergy     General anesthetics causing adverse effect in therapeutic use     pt. somewhat aware of extubation with one of her surgeries    GERD (gastroesophageal reflux disease)     Hearing loss     Restless leg syndrome     Sciatica      Review of Systems   Constitutional: Negative for fever and chills.   Musculoskeletal: Positive for joint swelling and arthralgias (cervical fusion (Dr White; Neurologist)  ;hands; back; R knee; R ankle)).   Skin: Positive for itching, rash, dry skin, daily sunscreen use, activity-related sunscreen use and wears hat.        Objective:    Physical Exam   Constitutional: She appears well-developed and well-nourished.   HENT:   Mouth/Throat: Lips normal.    Eyes: Lids are normal.    Neurological: She is alert and oriented to person, place, and time.   Psychiatric: She has a normal mood and affect.   Skin:   Areas Examined (abnormalities noted in diagram):   Scalp / Hair Palpated and Inspected  Head / Face Inspection Performed  Neck Inspection Performed  Chest / Axilla Inspection Performed  Genitals / Buttocks / Groin Inspection Performed  RUE Inspected  LUE Inspection Performed  RLE Inspected  LLE Inspection Performed  Nails and Digits Inspection Performed                           Diagram Legend     Erythematous scaling macule/papule c/w actinic keratosis       Vascular papule c/w angioma      Pigmented verrucoid papule/plaque  c/w seborrheic keratosis      Yellow umbilicated papule c/w sebaceous hyperplasia      Irregularly shaped tan macule c/w lentigo     1-2 mm smooth white papules consistent with Milia      Movable subcutaneous cyst with punctum c/w epidermal inclusion cyst      Subcutaneous movable cyst c/w pilar cyst      Firm pink to brown papule c/w dermatofibroma      Pedunculated fleshy papule(s) c/w skin tag(s)      Evenly pigmented macule c/w junctional nevus     Mildly variegated pigmented, slightly irregular-bordered macule c/w mildly atypical nevus      Flesh colored to evenly pigmented papule c/w intradermal nevus       Pink pearly papule/plaque c/w basal cell carcinoma      Erythematous hyperkeratotic cursted plaque c/w SCC      Surgical scar with no sign of skin cancer recurrence      Open and closed comedones      Inflammatory papules and pustules      Verrucoid papule consistent consistent with wart     Erythematous eczematous patches and plaques     Dystrophic onycholytic nail with subungual debris c/w onychomycosis     Umbilicated papule    Erythematous-base heme-crusted tan verrucoid plaque consistent with inflamed seborrheic keratosis     Erythematous Silvery Scaling Plaque c/w Psoriasis     See annotation      Assessment / Plan:        Psoriasis vulgaris/Inverse Psoriasis-5% TBSA with nail, scalp, and genital involvement  -     ketoconazole (NIZORAL) 2 % shampoo; Apply topically once daily.  Dispense: 120 mL; Refill: 1  -     fluocinonide (LIDEX) 0.05 % external solution; Apply topically 2 (two) times daily.  Dispense: 60 mL; Refill: 1  Discussed Dx, mgmt options including topicals and biologics     Pt would like to try to get Stelara approved.  Provided her with literature on Stelara.  Discussed  benefits and risks of Stelara including but not limited to injection site reactions, increased risk of infection, and decreased tumor surveillance. Patient counseled to avoid live vaccines.    Check baseline CBC, CMP,  Hep B, Hep C, and Quantiferon gold.     If labs are WNL, start Stelara as follows: 45mg vial SQ on day 1 then repeat in 1 month then q 3 months.     Will need CBC and LFTs q 3 months x 2 then q 3 - 6 months. Will need Quantiferon gold annually.    Encounter for medication monitoring  -     CBC auto differential; Future; Expected date: 05/15/2019  -     Hepatitis B surface antibody; Future; Expected date: 05/15/2019  -     Hepatitis B surface antigen; Future; Expected date: 05/15/2019  -     Hepatitis C antibody; Future; Expected date: 05/15/2019  -     Quantiferon Gold TB; Future; Expected date: 05/15/2019  -     Comprehensive metabolic panel; Future; Expected date: 05/15/2019         Follow up for pending labs and Stelara approval.

## 2019-05-16 ENCOUNTER — PATIENT MESSAGE (OUTPATIENT)
Dept: DERMATOLOGY | Facility: CLINIC | Age: 59
End: 2019-05-16

## 2019-05-16 LAB
HBV SURFACE AB SER-ACNC: NEGATIVE M[IU]/ML
HBV SURFACE AG SERPL QL IA: NEGATIVE
HCV AB SERPL QL IA: NEGATIVE

## 2019-05-17 ENCOUNTER — PATIENT MESSAGE (OUTPATIENT)
Dept: DERMATOLOGY | Facility: CLINIC | Age: 59
End: 2019-05-17

## 2019-05-20 DIAGNOSIS — L40.0 PSORIASIS VULGARIS: Primary | ICD-10-CM

## 2019-05-20 RX ORDER — USTEKINUMAB 45 MG/.5ML
INJECTION, SOLUTION SUBCUTANEOUS
Qty: 2 ML | Refills: 0 | Status: SHIPPED | OUTPATIENT
Start: 2019-05-20 | End: 2019-06-02

## 2019-05-21 ENCOUNTER — TELEPHONE (OUTPATIENT)
Dept: PHARMACY | Facility: CLINIC | Age: 59
End: 2019-05-21

## 2019-05-21 ENCOUNTER — PATIENT MESSAGE (OUTPATIENT)
Dept: DERMATOLOGY | Facility: CLINIC | Age: 59
End: 2019-05-21

## 2019-05-21 NOTE — TELEPHONE ENCOUNTER
Notified the patient and her  we received the prescription for stelara, and it will require authorization from the insurance company. Patient voiced understanding.

## 2019-05-28 ENCOUNTER — PATIENT MESSAGE (OUTPATIENT)
Dept: DERMATOLOGY | Facility: CLINIC | Age: 59
End: 2019-05-28

## 2019-05-30 ENCOUNTER — PATIENT MESSAGE (OUTPATIENT)
Dept: DERMATOLOGY | Facility: CLINIC | Age: 59
End: 2019-05-30

## 2019-06-02 DIAGNOSIS — Z51.81 ENCOUNTER FOR MEDICATION MONITORING: Primary | ICD-10-CM

## 2019-06-02 RX ORDER — FOLIC ACID 1 MG/1
TABLET ORAL
Qty: 30 TABLET | Refills: 5 | Status: SHIPPED | OUTPATIENT
Start: 2019-06-02 | End: 2019-09-20

## 2019-06-02 RX ORDER — METHOTREXATE 2.5 MG/1
TABLET ORAL
Qty: 16 TABLET | Refills: 2 | Status: SHIPPED | OUTPATIENT
Start: 2019-06-02 | End: 2019-08-05 | Stop reason: SDUPTHER

## 2019-06-03 ENCOUNTER — PATIENT MESSAGE (OUTPATIENT)
Dept: DERMATOLOGY | Facility: CLINIC | Age: 59
End: 2019-06-03

## 2019-06-14 ENCOUNTER — HOSPITAL ENCOUNTER (OUTPATIENT)
Dept: RADIOLOGY | Facility: HOSPITAL | Age: 59
Discharge: HOME | End: 2019-06-14
Attending: STUDENT IN AN ORGANIZED HEALTH CARE EDUCATION/TRAINING PROGRAM
Payer: COMMERCIAL

## 2019-06-14 ENCOUNTER — INITIAL CONSULT (OUTPATIENT)
Dept: RHEUMATOLOGY | Facility: CLINIC | Age: 59
End: 2019-06-14
Payer: COMMERCIAL

## 2019-06-14 ENCOUNTER — TELEPHONE (OUTPATIENT)
Dept: PHARMACY | Facility: CLINIC | Age: 59
End: 2019-06-14

## 2019-06-14 VITALS
SYSTOLIC BLOOD PRESSURE: 136 MMHG | WEIGHT: 186.38 LBS | DIASTOLIC BLOOD PRESSURE: 82 MMHG | HEART RATE: 86 BPM | HEIGHT: 64 IN | BODY MASS INDEX: 31.82 KG/M2

## 2019-06-14 DIAGNOSIS — L40.50 PSORIATIC ARTHRITIS: Primary | ICD-10-CM

## 2019-06-14 DIAGNOSIS — L40.50 PSORIATIC ARTHRITIS: ICD-10-CM

## 2019-06-14 DIAGNOSIS — Z79.899 HIGH RISK MEDICATION USE: ICD-10-CM

## 2019-06-14 DIAGNOSIS — M54.9 CHRONIC BACK PAIN, UNSPECIFIED BACK LOCATION, UNSPECIFIED BACK PAIN LATERALITY: ICD-10-CM

## 2019-06-14 DIAGNOSIS — G89.29 CHRONIC BACK PAIN, UNSPECIFIED BACK LOCATION, UNSPECIFIED BACK PAIN LATERALITY: ICD-10-CM

## 2019-06-14 DIAGNOSIS — R21 RASH: ICD-10-CM

## 2019-06-14 PROCEDURE — 99204 OFFICE O/P NEW MOD 45 MIN: CPT | Mod: S$GLB,,, | Performed by: STUDENT IN AN ORGANIZED HEALTH CARE EDUCATION/TRAINING PROGRAM

## 2019-06-14 PROCEDURE — 72202 X-RAY EXAM SI JOINTS 3/> VWS: CPT | Mod: TC,FY,PO

## 2019-06-14 PROCEDURE — 99999 PR PBB SHADOW E&M-EST. PATIENT-LVL V: CPT | Mod: PBBFAC,,, | Performed by: STUDENT IN AN ORGANIZED HEALTH CARE EDUCATION/TRAINING PROGRAM

## 2019-06-14 PROCEDURE — 3008F BODY MASS INDEX DOCD: CPT | Mod: CPTII,S$GLB,, | Performed by: STUDENT IN AN ORGANIZED HEALTH CARE EDUCATION/TRAINING PROGRAM

## 2019-06-14 PROCEDURE — 72202 XR SACROILIAC JOINTS COMPLETE: ICD-10-PCS | Mod: 26,,, | Performed by: RADIOLOGY

## 2019-06-14 PROCEDURE — 72202 X-RAY EXAM SI JOINTS 3/> VWS: CPT | Mod: 26,,, | Performed by: RADIOLOGY

## 2019-06-14 PROCEDURE — 73130 X-RAY EXAM OF HAND: CPT | Mod: 26,,, | Performed by: RADIOLOGY

## 2019-06-14 PROCEDURE — 99204 PR OFFICE/OUTPT VISIT, NEW, LEVL IV, 45-59 MIN: ICD-10-PCS | Mod: S$GLB,,, | Performed by: STUDENT IN AN ORGANIZED HEALTH CARE EDUCATION/TRAINING PROGRAM

## 2019-06-14 PROCEDURE — 73130 X-RAY EXAM OF HAND: CPT | Mod: 50,TC,FY,PO

## 2019-06-14 PROCEDURE — 99999 PR PBB SHADOW E&M-EST. PATIENT-LVL V: ICD-10-PCS | Mod: PBBFAC,,, | Performed by: STUDENT IN AN ORGANIZED HEALTH CARE EDUCATION/TRAINING PROGRAM

## 2019-06-14 PROCEDURE — 3079F PR MOST RECENT DIASTOLIC BLOOD PRESSURE 80-89 MM HG: ICD-10-PCS | Mod: CPTII,S$GLB,, | Performed by: STUDENT IN AN ORGANIZED HEALTH CARE EDUCATION/TRAINING PROGRAM

## 2019-06-14 PROCEDURE — 3008F PR BODY MASS INDEX (BMI) DOCUMENTED: ICD-10-PCS | Mod: CPTII,S$GLB,, | Performed by: STUDENT IN AN ORGANIZED HEALTH CARE EDUCATION/TRAINING PROGRAM

## 2019-06-14 PROCEDURE — 3075F SYST BP GE 130 - 139MM HG: CPT | Mod: CPTII,S$GLB,, | Performed by: STUDENT IN AN ORGANIZED HEALTH CARE EDUCATION/TRAINING PROGRAM

## 2019-06-14 PROCEDURE — 3079F DIAST BP 80-89 MM HG: CPT | Mod: CPTII,S$GLB,, | Performed by: STUDENT IN AN ORGANIZED HEALTH CARE EDUCATION/TRAINING PROGRAM

## 2019-06-14 PROCEDURE — 73130 XR HAND COMPLETE 3 VIEWS BILATERAL: ICD-10-PCS | Mod: 26,,, | Performed by: RADIOLOGY

## 2019-06-14 PROCEDURE — 3075F PR MOST RECENT SYSTOLIC BLOOD PRESS GE 130-139MM HG: ICD-10-PCS | Mod: CPTII,S$GLB,, | Performed by: STUDENT IN AN ORGANIZED HEALTH CARE EDUCATION/TRAINING PROGRAM

## 2019-06-14 RX ORDER — MUPIROCIN 20 MG/G
OINTMENT TOPICAL 3 TIMES DAILY
Qty: 22 G | Refills: 1 | Status: SHIPPED | OUTPATIENT
Start: 2019-06-14 | End: 2020-11-02

## 2019-06-14 RX ORDER — SULFAMETHOXAZOLE AND TRIMETHOPRIM 400; 80 MG/1; MG/1
1 TABLET ORAL 2 TIMES DAILY
Qty: 14 TABLET | Refills: 0 | Status: SHIPPED | OUTPATIENT
Start: 2019-06-14 | End: 2019-08-14

## 2019-06-14 NOTE — TELEPHONE ENCOUNTER
Informed Patient  that [facility] received prescription for Stelara and prior authorization is required.  [facility] will be back in touch once insurance determination is received.

## 2019-06-14 NOTE — LETTER
June 14, 2019      Zaria Tello MD  [address] [facility] [address] [place] [address]           [facility]  [address] [facility] [address]  Phone: [phone]  Fax: [phone]          Patient: Clemencia Knight   MR Number: [id number]   YOB: 1960   Date of Visit: 6/14/2019       Dear Dr. Zaria Tello:    Thank you for referring Clemencia Knight to me for evaluation. Attached you will find relevant portions of my assessment and plan of care.    If you have questions, please do not hesitate to call me. I look forward to following Clemencia Knight along with you.    Sincerely,    Linda Tabares MD    Enclosure  CC:  No Recipients    If you would like to receive this communication electronically, please contact [email or web address] or [phone] to request more information on [facility] access.    For providers and/or their staff who would like to refer a patient to [facility], please contact us through our one-stop-shop provider referral line, [facility], at [phone].    If you feel you have received this communication in error or would no longer like to receive these types of communications, please e-mail [email or web address]

## 2019-06-14 NOTE — PROGRESS NOTES
RHEUMATOLOGY OUTPATIENT CLINIC NOTE    6/14/2019    Attending Rheumatologist: Linda Tabares  Primary Care Provider: Dallas Higgins MD   Physician Requesting Consultation: Zaria Tello MD  [address] [place] [address]  Chief Complaint/Reason For Consultation:  Psoriatic arthritis      Subjective:       HPI  Clemencia Knight is a 58 y.o. White female presents for initial evaluation of joint pains / psoriatic arthritis. Recently diagnosed w psoriasis. Stelara was not approved by insurance. Currently on methotrexate 4tabs weekly +FA daily, on week 2. Main joint complaints are in right wrist, b/l PIPs, lower back. Does report episode of diffuse right third digit swelling. +Nail pitting. States joint pains acutely worsened over past few months, around onset of skin pso. +Prolonged AM stiffness. Lower back pain improves w activity, +significant AM stiffness. No alternating buttock pain, nightime awakening, or worsening w rest. +Relief w ibuprofen. Has chronic back pain x >5 years at least. Hx DDD w cervical fusion. No prior MVA or trauma. No hx dactylitis. +Family hx psoriasis and rheumatoid arthritis.     14pt ros negative except as otherwise stated above      Review of Systems   Constitutional: Positive for malaise/fatigue. Negative for fever.   HENT: Negative for congestion, sinus pain and sore throat.    Eyes: Negative for blurred vision, pain and redness.   Respiratory: Negative for cough and shortness of breath.    Cardiovascular: Negative for chest pain, claudication and leg swelling.   Gastrointestinal: Negative for constipation and diarrhea.   Genitourinary: Negative for dysuria.   Musculoskeletal: Positive for back pain, joint pain, myalgias and neck pain.   Skin: Positive for itching and rash.   Neurological: Positive for headaches. Negative for focal weakness.   Endo/Heme/Allergies: Bruises/bleeds easily.   Psychiatric/Behavioral: Negative for depression and substance abuse.       Chronic  comorbid conditions affecting medical decision making today:  Past Medical History:   Diagnosis Date    Allergy     General anesthetics causing adverse effect in therapeutic use     pt. somewhat aware of extubation with one of her surgeries    GERD (gastroesophageal reflux disease)     Hearing loss     Restless leg syndrome     Sciatica      Past Surgical History:   Procedure Laterality Date    ABDOMINOPLASTY     Abdominoplasty with Lipo Abdomen Bilateral 5/30/2016    Performed [facility] at [facility] OR 2ND FLR    AUGMENTATION OF BREAST      BELT ABDOMINOPLASTY      breast implants      CAPSULECTOMY-BREAST Removal of Implants Bilateral 5/30/2016    Performed by [facility] at [facility] OR 2ND FLR    CERVICAL FUSION      COLONOSCOPY  10/2012    repeat in 10    COLONOSCOPY N/A 10/30/2012    [facility] ENDO    DEXA      WNL    MAGGI-TRANSFORAMINAL N/A 6/4/2015    Performed by [facility] CATH LAB    HYSTERECTOMY  2000    INJECTION,STEROID,EPIDURAL,TRANSFORAMINAL APPROACH Right 8/21/2018    Performed by [facility]ita Richards [facility] at [facility] PAIN MGT    Injection-steroid-epidural-cervical N/A 11/20/2018    [facility] at [facility] OR    Injection-steroid-epidural-cervical N/A 9/24/2018    [facility] at [facility] OR    LIPOSUCTION Hips and Flanks Bilateral 5/30/2016    [facility]formed [facility] at [facility] OR 2ND FLR    MASTOPEXY Implants Removal with Mastopexy Bilateral 5/30/2016    Performed by [facility] at [facility] OR 2ND FLR    OOPHORECTOMY  7/16/2013    laparotomy BSO for benign 10cm tubal cyst    SALPINGOOPHORECTOMY  2013    with removal of fallopian cyst    SHOULDER SURGERY      right    [facility]  2000    ovaries remain, benign    TONSILLECTOMY, ADENOIDECTOMY, BILATERAL MYRINGOTOMY AND TUBES       Family History   Problem Relation Age of Onset    Cancer Father         bladder     Diabetes Father     Heart disease Father     Diabetes Mother     Melanoma Brother     Charcot-Karla-Tooth disease Brother     Breast cancer Neg Hx     Ovarian cancer Neg Hx     Anesthesia problems Neg Hx      Social History     Substance and Sexual Activity   Alcohol Use Yes    Comment: rarely     Social History     Tobacco Use   Smoking Status Never Smoker   Smokeless Tobacco Never Used     Social History     Substance and Sexual Activity   Drug Use No       Current Outpatient Medications:     acetaminophen (TYLENOL) 325 MG tablet, Take 650 mg by mouth every 6 (six) hours as needed for Pain., Disp: , Rfl:     cetirizine (ZYRTEC) 10 MG tablet, Take 10 mg by mouth once daily., Disp: , Rfl:     clobetasol (TEMOVATE) 0.05 % cream, Apply topically 2 (two) times daily., Disp: 30 g, Rfl: 1    DULoxetine (CYMBALTA) 60 MG capsule, Take 60 mg by mouth once daily. , Disp: , Rfl:     fluocinonide (LIDEX) 0.05 % external solution, Apply topically 2 (two) times daily., Disp: 60 mL, Rfl: 1    fluticasone (FLONASE) 50 mcg/actuation nasal spray, 1 spray by Each Nare route once daily., Disp: , Rfl:     folic acid (FOLVITE) 1 MG tablet, Take one tablet by mouth every day - do not take on same day as taking methotrexate, Disp: 30 tablet, Rfl: 5    hydrocortisone 2.5 % cream, Apply topically 2 (two) times daily., Disp: 30 g, Rfl: 1    ketoconazole (NIZORAL) 2 % shampoo, Apply topically once daily., Disp: 120 mL, Rfl: 1    methotrexate 2.5 MG Tab, Take 4 tablets by mouth every week, Disp: 16 tablet, Rfl: 2    miconazole NITRATE 2 % (MICOTIN) 2 % top powder, Apply topically as needed for Itching., Disp: , Rfl:     mineral,lanolin oils/prop glyc (BALNEOL TOP), Apply topically., Disp: , Rfl:     MULTIVITAMIN ORAL, Take by mouth once daily., Disp: , Rfl:     pregabalin (LYRICA) 75 MG capsule, Take 1 capsule (75 mg total) by mouth 2 (two) times daily., Disp: 180 capsule, Rfl: 0    terbinafine HCl (LAMISIL) 1 % cream,  Apply topically 2 (two) times daily., Disp: , Rfl:     tiZANidine (ZANAFLEX) 4 MG tablet, Take 4 mg by mouth every 6 (six) hours as needed., Disp: , Rfl:     mupirocin (BACTROBAN) 2 % ointment, Apply topically 3 (three) times daily., Disp: 22 g, Rfl: 1    sulfamethoxazole-trimethoprim 400-80mg (BACTRIM,SEPTRA) 400-80 mg per tablet, Take 1 tablet by mouth 2 (two) times daily., Disp: 14 tablet, Rfl: 0    ustekinumab (STELARA) 45 mg/0.5 mL Syrg syringe, Start 45mg SCx1 on wk 0, 4, then q12wk, Disp: 3 Syringe, Rfl: 0       Objective:         Vitals:    06/14/19 0902   BP: 136/82   Pulse: 86     Physical Exam   Nursing note and vitals reviewed.  Constitutional: She is oriented to person, place, and time and well-developed, well-nourished, and in no distress.   HENT:   Head: Normocephalic.   Mouth/Throat: Oropharynx is clear and moist.   Eyes: Conjunctivae are normal. Pupils are equal, round, and reactive to light.   Neck: Normal range of motion. Neck supple.   Cardiovascular: Normal rate and normal heart sounds.    Pulmonary/Chest: Effort normal. No respiratory distress.   Abdominal: Soft. There is no tenderness.   Neurological: She is alert and oriented to person, place, and time.   Skin: Skin is warm. Rash noted. No erythema.     Psychiatric: Memory and affect normal.   Musculoskeletal: She exhibits tenderness. She exhibits no edema or deformity.   No synovitis in small joints of hands and feet. No effusions over large joints. Silvio -  +leg length discrepancy L>R  +SI tenderness         Reviewed old and all outside pertinent medical records available.    All lab results personally reviewed and interpreted by me.  Lab Results   Component Value Date    WBC 7.62 05/15/2019    HGB 14.4 05/15/2019    HCT 44.7 05/15/2019    MCV 94 05/15/2019    MCH 30.4 05/15/2019    MCHC 32.2 05/15/2019    RDW 13.1 05/15/2019     (H) 05/15/2019    MPV 10.2 05/15/2019       Lab Results   Component Value Date     05/15/2019     K 4.4 05/15/2019     05/15/2019    CO2 27 05/15/2019    GLU 93 05/15/2019    BUN 17 05/15/2019    CALCIUM 9.8 05/15/2019    PROT 8.5 (H) 05/15/2019    ALBUMIN 4.3 05/15/2019    BILITOT 0.4 05/15/2019    AST 21 05/15/2019    ALKPHOS 85 05/15/2019    ALT 18 05/15/2019       Lab Results   Component Value Date    COLORU YELLOW 11/16/2009    APPEARANCEUA CLEAR 11/16/2009    SPECGRAV 1.032 (H) 11/16/2009    PHUR 7.0 11/16/2009    PROTEINUA 30 (A) 11/16/2009    KETONESU Negative 11/16/2009    LEUKOCYTESUR Negative 11/16/2009    NITRITE Negative 11/16/2009    UROBILINOGEN 1 11/16/2009       No results found for: CRP    No results found for: SEDRATE, ERYTHROCYTES    No results found for: GENE, RF, SEDRATE    No components found for: 25OHVITDTOT, 91ZUQTVK2, 15ECNMXV3, METHODNOTE    No results found for: URICACID    No components found for: TSPOTTB    Acute hep -  TB gold-    Imaging:  All imaging reviewed and independently  interpreted by me.  MRI 9/6/18  Vertebral column: The lumbar vertebral bodies maintain normal height and alignment.  There is no fracture.  Baseline marrow signal intensity is normal.  There is mild-to-moderate disc space narrowing at the L3-4 level without significant change.  The L5-S1 disc space is preserved.    Spinal canal, conus, epidural space: The spinal canal is developmentally normal.  The conus is normal in location, contour and signal intensity, terminating at the level of L1.  There is no abnormal epidural collection or mass.    Findings by level:    On the sagittal images, the T11-12 level is normal.    T12-L1: Unremarkable. There is no spinal canal or significant foraminal stenosis.  There is no definite change.    L1-2: There is mild facet joint arthropathy.  There is no spinal canal or significant foraminal stenosis.  There is no change.    L2-3: There is minimal bulging of the annulus and mild facet joint arthropathy.  There is no spinal canal or significant foraminal stenosis.   There is no significant change.    L3-4: There is mild-to-moderate disc space narrowing.  There is a diffuse disc bulge.  There is a very subtle dorsal annular fissure suspected.  There is mild-to-moderate facet joint arthropathy with ligamentum flavum thickening.  There is mild flattening of the ventral dural sac.  There is no spinal canal or significant foraminal stenosis.  There is no significant change.    L4-5: There is a diffuse disc bulge.  There is mild-to-moderate facet joint arthropathy.  There is mild bilateral foraminal stenosis without spinal stenosis.  The changes have slightly progressed.    L5-S1: There is mild facet joint arthropathy.  There is no spinal canal or significant foraminal stenosis.  There is no change.    Soft tissues, other: The prevertebral soft tissues are normal.  The aorta is normal in caliber.      Impression       1. There is mild degenerative change.  There is also some degree of facet joint arthropathy at multiple levels.  There is no fracture or malalignment.  There is no spinal stenosis.  2. At the L3-4 level there is mild-to-moderate facet joint arthropathy with a diffuse disc bulge but without spinal canal or foraminal stenosis.  3. At the L4-5 level there is a diffuse disc bulge with mild-to-moderate facet joint arthropathy contributing to mild bilateral foraminal stenosis without spinal stenosis.  These changes have very slightly progressed.  4. There is no spinal canal or foraminal stenosis at the remainder of the lumbar levels.  Please see above discussion.     Cervical MRI  Postoperative changes status post anterior cervical fusion of C6-C7.    Alignment: Grade 1 retrolisthesis of C5 on C6.    Vertebrae: Normal marrow signal. No fracture.    Discs: Disc desiccation at C5-C6 with mild height loss.    Cord: Normal.    Skull base and craniocervical junction: Normal.    Degenerative findings:    C2-C3: Facet arthropathy, left greater than right.  No significant spinal  canal stenosis or neural foraminal narrowing.    C3-C4: Mild posterior disc osteophyte complex resulting in mild right neural foraminal narrowing.  No significant spinal canal stenosis or left neural foraminal narrowing.    C4-C5: Mild posterior disc osteophyte complex.  No significant spinal canal stenosis or neural foraminal narrowing.    C5-C6: Retrolisthesis as above.  Posterior disc osteophyte complex and uncovertebral spurring resulting in mild spinal canal stenosis and moderate right, mild left neural foraminal narrowing.    C6-C7: Postoperative changes above.  No significant spinal canal stenosis or neural foraminal narrowing.    C7-T1: No significant disease.  No significant spinal canal stenosis or neural foraminal narrowing.    Paraspinal muscles & soft tissues: Unremarkable.      Impression       Status post anterior cervical fusion of C6-C7.    Degenerative changes of the cervical spine as detailed above.     XR Knee 11/16  AP standing and PA flexion views of both knees as well as a lateral and sunrise views of the right knee and a sunrise view of the left knee were obtained.    The medial and lateral compartments appear relatively well preserved. No obvious fracture or dislocation is noted. The patella appear well-positioned within the intercondylar notches bilaterally. No significant right-sided suprapatellar joint effusion is noted.     ASSESSMENT / PLAN:     Clemencia Knight is a 58 y.o. White female with:      1. Psoriatic arthritis  -Presents for initial eval. C/o recent acute onset joint pains pips, wrists. +Reported hx dactylitis. +Current pso. +Family hx pso. +Nail pitting. +Hx rheumatoid arthritis  -Does have some features c/w inflammatory back pain, although not entirely. Suspect mostly mechanical. Will get SI XR. Workup as below  -Can c/w mtx per derm. Currently on 4 tabs weekly +FA daily. Will start PA for stelara.     - Rheumatoid factor; Future  - Cyclic citrul peptide antibody, IgG;  Future  - Sedimentation rate; Standing  - C-reactive protein; Future  - X-Ray Hand Complete Bilateral; Future  - HLA B27 Antigen; Future  - X-Ray Sacroiliac Joints Complete; Future    #Cellulitis  -prx bactrim PO  -prx bactroban    #High risk med use  -Discussed risks/benefits of all meds including increased risk of infection. No systemic signs at this time. Notified to contact clinic should she have any worsening of rash and hold mtx.    #Chronic back pain  -w/ DDD s/p cervical fusion  -completed PT, encouraged to continue learned exercises at home    Follow up in about 3 months (around 9/14/2019).    Method of contact with patient concerns: [facility]      Linda Tabares M.D.  Rheumatology Department   [facility] [place] [address], [place] [address]  Phone: [phone]  Fax: [phone]

## 2019-06-15 RX ORDER — PREGABALIN 75 MG/1
75 CAPSULE ORAL 2 TIMES DAILY
Qty: 180 CAPSULE | Refills: 0 | Status: CANCELLED | OUTPATIENT
Start: 2019-06-15 | End: 2019-12-14

## 2019-06-17 ENCOUNTER — PATIENT MESSAGE (OUTPATIENT)
Dept: FAMILY MEDICINE | Facility: CLINIC | Age: 59
End: 2019-06-17

## 2019-06-17 ENCOUNTER — LAB VISIT (OUTPATIENT)
Dept: LAB | Facility: HOSPITAL | Age: 59
End: 2019-06-17
Attending: DERMATOLOGY
Payer: COMMERCIAL

## 2019-06-17 DIAGNOSIS — Z51.81 ENCOUNTER FOR MEDICATION MONITORING: ICD-10-CM

## 2019-06-17 DIAGNOSIS — L40.50 PSORIATIC ARTHRITIS: ICD-10-CM

## 2019-06-17 LAB
ALBUMIN SERPL BCP-MCNC: 4 G/DL (ref 3.5–5.2)
ALP SERPL-CCNC: 75 U/L (ref 55–135)
ALT SERPL W/O P-5'-P-CCNC: 15 U/L (ref 10–44)
ANION GAP SERPL CALC-SCNC: 7 MMOL/L (ref 8–16)
AST SERPL-CCNC: 15 U/L (ref 10–40)
BASOPHILS # BLD AUTO: 0.03 K/UL (ref 0–0.2)
BASOPHILS NFR BLD: 0.5 % (ref 0–1.9)
BILIRUB SERPL-MCNC: 0.3 MG/DL (ref 0.1–1)
BUN SERPL-MCNC: 16 MG/DL (ref 6–20)
CALCIUM SERPL-MCNC: 9.3 MG/DL (ref 8.7–10.5)
CHLORIDE SERPL-SCNC: 104 MMOL/L (ref 95–110)
CO2 SERPL-SCNC: 28 MMOL/L (ref 23–29)
CREAT SERPL-MCNC: 0.9 MG/DL (ref 0.5–1.4)
DIFFERENTIAL METHOD: NORMAL
EOSINOPHIL # BLD AUTO: 0.3 K/UL (ref 0–0.5)
EOSINOPHIL NFR BLD: 4.9 % (ref 0–8)
ERYTHROCYTE [DISTWIDTH] IN BLOOD BY AUTOMATED COUNT: 13.3 % (ref 11.5–14.5)
ERYTHROCYTE [SEDIMENTATION RATE] IN BLOOD BY WESTERGREN METHOD: 22 MM/HR (ref 0–20)
EST. GFR  (AFRICAN AMERICAN): >60 ML/MIN/1.73 M^2
EST. GFR  (NON AFRICAN AMERICAN): >60 ML/MIN/1.73 M^2
GLUCOSE SERPL-MCNC: 105 MG/DL (ref 70–110)
HCT VFR BLD AUTO: 39.3 % (ref 37–48.5)
HGB BLD-MCNC: 12.8 G/DL (ref 12–16)
IMM GRANULOCYTES # BLD AUTO: 0.01 K/UL (ref 0–0.04)
IMM GRANULOCYTES NFR BLD AUTO: 0.2 % (ref 0–0.5)
LYMPHOCYTES # BLD AUTO: 2.4 K/UL (ref 1–4.8)
LYMPHOCYTES NFR BLD: 36.6 % (ref 18–48)
MCH RBC QN AUTO: 30 PG (ref 27–31)
MCHC RBC AUTO-ENTMCNC: 32.6 G/DL (ref 32–36)
MCV RBC AUTO: 92 FL (ref 82–98)
MONOCYTES # BLD AUTO: 0.7 K/UL (ref 0.3–1)
MONOCYTES NFR BLD: 10.9 % (ref 4–15)
NEUTROPHILS # BLD AUTO: 3.1 K/UL (ref 1.8–7.7)
NEUTROPHILS NFR BLD: 46.9 % (ref 38–73)
NRBC BLD-RTO: 0 /100 WBC
PLATELET # BLD AUTO: 307 K/UL (ref 150–350)
PMV BLD AUTO: 10.1 FL (ref 9.2–12.9)
POTASSIUM SERPL-SCNC: 4 MMOL/L (ref 3.5–5.1)
PROT SERPL-MCNC: 7.4 G/DL (ref 6–8.4)
RBC # BLD AUTO: 4.27 M/UL (ref 4–5.4)
SODIUM SERPL-SCNC: 139 MMOL/L (ref 136–145)
WBC # BLD AUTO: 6.51 K/UL (ref 3.9–12.7)

## 2019-06-17 PROCEDURE — 85651 RBC SED RATE NONAUTOMATED: CPT | Mod: PO

## 2019-06-17 PROCEDURE — 80053 COMPREHEN METABOLIC PANEL: CPT | Mod: PO

## 2019-06-17 PROCEDURE — 85025 COMPLETE CBC W/AUTO DIFF WBC: CPT

## 2019-06-17 PROCEDURE — 36415 COLL VENOUS BLD VENIPUNCTURE: CPT | Mod: PO

## 2019-06-17 RX ORDER — PREGABALIN 75 MG/1
75 CAPSULE ORAL 2 TIMES DAILY
Qty: 180 CAPSULE | Refills: 0 | Status: SHIPPED | OUTPATIENT
Start: 2019-06-17 | End: 2019-09-09 | Stop reason: SDUPTHER

## 2019-06-19 NOTE — TELEPHONE ENCOUNTER
DOCUMENTATION ONLY:  Prior authorization for [place] approved from 5/20/19 to 9/17/19    Case Id: [id number]    Co-pay: Unknown, patient locked into [facility].    Patient Assistance is required.

## 2019-06-20 ENCOUNTER — PATIENT MESSAGE (OUTPATIENT)
Dept: RHEUMATOLOGY | Facility: CLINIC | Age: 59
End: 2019-06-20

## 2019-06-25 ENCOUNTER — TELEPHONE (OUTPATIENT)
Dept: RHEUMATOLOGY | Facility: CLINIC | Age: 59
End: 2019-06-25

## 2019-06-25 NOTE — TELEPHONE ENCOUNTER
Dr. Tabares   Mrs. Knight has completed the loading dose of Stelara. Do you want to continue with a maintenance dosage, if so can you send a prescription to [facility].     Thanks

## 2019-06-25 NOTE — TELEPHONE ENCOUNTER
----- Message from Yenifer Armas sent at 6/25/2019  9:58 AM CDT -----  ..Type:  Pharmacy Calling to Clarify an RX    Name of Caller: nEedelia   Pharmacy Name: [facility]   Prescription Name: guillermina  What do they need to clarify?:  Best Call Back Number: [phone]  Additional Information: Enedelia Redding )  Is requesting a call from nurse to see if the pt completed loading dosage

## 2019-06-27 ENCOUNTER — PATIENT MESSAGE (OUTPATIENT)
Dept: RHEUMATOLOGY | Facility: CLINIC | Age: 59
End: 2019-06-27

## 2019-06-27 ENCOUNTER — TELEPHONE (OUTPATIENT)
Dept: RHEUMATOLOGY | Facility: CLINIC | Age: 59
End: 2019-06-27

## 2019-06-27 NOTE — TELEPHONE ENCOUNTER
----- Message from Silvana Starr sent at 6/27/2019  9:01 AM CDT -----  Contact: [facility]  Type:  Pharmacy Calling to Clarify an RX    Name of Caller:marino  [facility]arm[facility] Name[facility]  Prescription Name:stelara  What do they need to clarify?:if pt already received loading dosage on rx   Best Call Back Number:[phone]  Additional Information: none    Thanks,  Silvana Starr

## 2019-06-27 NOTE — TELEPHONE ENCOUNTER
Spoke with Stefani @ [facility] regarding Stelara. Stefani needed to know the patient current weight.

## 2019-07-01 ENCOUNTER — PATIENT MESSAGE (OUTPATIENT)
Dept: DERMATOLOGY | Facility: CLINIC | Age: 59
End: 2019-07-01

## 2019-07-01 ENCOUNTER — LAB VISIT (OUTPATIENT)
Dept: LAB | Facility: HOSPITAL | Age: 59
End: 2019-07-01
Attending: DERMATOLOGY
Payer: COMMERCIAL

## 2019-07-01 DIAGNOSIS — Z51.81 ENCOUNTER FOR MEDICATION MONITORING: Primary | ICD-10-CM

## 2019-07-01 DIAGNOSIS — Z51.81 ENCOUNTER FOR MEDICATION MONITORING: ICD-10-CM

## 2019-07-01 LAB
ALBUMIN SERPL BCP-MCNC: 3.8 G/DL (ref 3.5–5.2)
ALP SERPL-CCNC: 78 U/L (ref 55–135)
ALT SERPL W/O P-5'-P-CCNC: 23 U/L (ref 10–44)
ANION GAP SERPL CALC-SCNC: 9 MMOL/L (ref 8–16)
AST SERPL-CCNC: 23 U/L (ref 10–40)
BASOPHILS # BLD AUTO: 0.02 K/UL (ref 0–0.2)
BASOPHILS NFR BLD: 0.3 % (ref 0–1.9)
BILIRUB SERPL-MCNC: 0.4 MG/DL (ref 0.1–1)
BUN SERPL-MCNC: 17 MG/DL (ref 6–20)
CALCIUM SERPL-MCNC: 9.6 MG/DL (ref 8.7–10.5)
CHLORIDE SERPL-SCNC: 105 MMOL/L (ref 95–110)
CO2 SERPL-SCNC: 27 MMOL/L (ref 23–29)
CREAT SERPL-MCNC: 0.9 MG/DL (ref 0.5–1.4)
DIFFERENTIAL METHOD: NORMAL
EOSINOPHIL # BLD AUTO: 0.2 K/UL (ref 0–0.5)
EOSINOPHIL NFR BLD: 3.2 % (ref 0–8)
ERYTHROCYTE [DISTWIDTH] IN BLOOD BY AUTOMATED COUNT: 13.8 % (ref 11.5–14.5)
EST. GFR  (AFRICAN AMERICAN): >60 ML/MIN/1.73 M^2
EST. GFR  (NON AFRICAN AMERICAN): >60 ML/MIN/1.73 M^2
GLUCOSE SERPL-MCNC: 98 MG/DL (ref 70–110)
HCT VFR BLD AUTO: 39.7 % (ref 37–48.5)
HGB BLD-MCNC: 13 G/DL (ref 12–16)
IMM GRANULOCYTES # BLD AUTO: 0.02 K/UL (ref 0–0.04)
IMM GRANULOCYTES NFR BLD AUTO: 0.3 % (ref 0–0.5)
LYMPHOCYTES # BLD AUTO: 2.1 K/UL (ref 1–4.8)
LYMPHOCYTES NFR BLD: 29.8 % (ref 18–48)
MCH RBC QN AUTO: 29.9 PG (ref 27–31)
MCHC RBC AUTO-ENTMCNC: 32.7 G/DL (ref 32–36)
MCV RBC AUTO: 91 FL (ref 82–98)
MONOCYTES # BLD AUTO: 0.5 K/UL (ref 0.3–1)
MONOCYTES NFR BLD: 7.1 % (ref 4–15)
NEUTROPHILS # BLD AUTO: 4.1 K/UL (ref 1.8–7.7)
NEUTROPHILS NFR BLD: 59.3 % (ref 38–73)
NRBC BLD-RTO: 0 /100 WBC
PLATELET # BLD AUTO: 331 K/UL (ref 150–350)
PMV BLD AUTO: 9.8 FL (ref 9.2–12.9)
POTASSIUM SERPL-SCNC: 4 MMOL/L (ref 3.5–5.1)
PROT SERPL-MCNC: 7.5 G/DL (ref 6–8.4)
RBC # BLD AUTO: 4.35 M/UL (ref 4–5.4)
SODIUM SERPL-SCNC: 141 MMOL/L (ref 136–145)
WBC # BLD AUTO: 6.87 K/UL (ref 3.9–12.7)

## 2019-07-01 PROCEDURE — 85025 COMPLETE CBC W/AUTO DIFF WBC: CPT

## 2019-07-01 PROCEDURE — 36415 COLL VENOUS BLD VENIPUNCTURE: CPT | Mod: PO

## 2019-07-01 PROCEDURE — 80053 COMPREHEN METABOLIC PANEL: CPT

## 2019-07-15 ENCOUNTER — PATIENT MESSAGE (OUTPATIENT)
Dept: FAMILY MEDICINE | Facility: CLINIC | Age: 59
End: 2019-07-15

## 2019-07-15 RX ORDER — TIZANIDINE 4 MG/1
4 TABLET ORAL EVERY 6 HOURS PRN
Qty: 30 TABLET | Refills: 0 | Status: SHIPPED | OUTPATIENT
Start: 2019-07-15 | End: 2019-08-14 | Stop reason: SDUPTHER

## 2019-07-17 ENCOUNTER — PATIENT MESSAGE (OUTPATIENT)
Dept: RHEUMATOLOGY | Facility: CLINIC | Age: 59
End: 2019-07-17

## 2019-07-22 ENCOUNTER — PATIENT MESSAGE (OUTPATIENT)
Dept: RHEUMATOLOGY | Facility: CLINIC | Age: 59
End: 2019-07-22

## 2019-07-29 ENCOUNTER — PATIENT MESSAGE (OUTPATIENT)
Dept: OBSTETRICS AND GYNECOLOGY | Facility: CLINIC | Age: 59
End: 2019-07-29

## 2019-07-29 NOTE — TELEPHONE ENCOUNTER
Pt sent second message with the following :    Im sorry I forgot to mention that I am taking two stool softeners a day and it is still difficult to have a bowel movement.

## 2019-07-30 ENCOUNTER — PATIENT MESSAGE (OUTPATIENT)
Dept: RHEUMATOLOGY | Facility: CLINIC | Age: 59
End: 2019-07-30

## 2019-08-05 RX ORDER — METHOTREXATE 2.5 MG/1
TABLET ORAL
Qty: 16 TABLET | Refills: 2 | Status: CANCELLED | OUTPATIENT
Start: 2019-08-05

## 2019-08-09 ENCOUNTER — PATIENT MESSAGE (OUTPATIENT)
Dept: DERMATOLOGY | Facility: CLINIC | Age: 59
End: 2019-08-09

## 2019-08-11 ENCOUNTER — PATIENT MESSAGE (OUTPATIENT)
Dept: DERMATOLOGY | Facility: CLINIC | Age: 59
End: 2019-08-11

## 2019-08-13 ENCOUNTER — OFFICE VISIT (OUTPATIENT)
Dept: DERMATOLOGY | Facility: CLINIC | Age: 59
End: 2019-08-13
Payer: COMMERCIAL

## 2019-08-13 ENCOUNTER — PATIENT MESSAGE (OUTPATIENT)
Dept: DERMATOLOGY | Facility: CLINIC | Age: 59
End: 2019-08-13

## 2019-08-13 ENCOUNTER — LAB VISIT (OUTPATIENT)
Dept: LAB | Facility: HOSPITAL | Age: 59
End: 2019-08-13
Attending: DERMATOLOGY
Payer: COMMERCIAL

## 2019-08-13 VITALS — BODY MASS INDEX: 31.76 KG/M2 | HEIGHT: 64 IN | WEIGHT: 186 LBS | RESPIRATION RATE: 16 BRPM

## 2019-08-13 DIAGNOSIS — Z51.81 ENCOUNTER FOR MEDICATION MONITORING: ICD-10-CM

## 2019-08-13 DIAGNOSIS — D18.00 ANGIOMA: ICD-10-CM

## 2019-08-13 DIAGNOSIS — L40.0 PSORIASIS VULGARIS: Primary | ICD-10-CM

## 2019-08-13 DIAGNOSIS — L82.1 SEBORRHEIC KERATOSES: ICD-10-CM

## 2019-08-13 LAB
ALBUMIN SERPL BCP-MCNC: 3.9 G/DL (ref 3.5–5.2)
ALP SERPL-CCNC: 76 U/L (ref 55–135)
ALT SERPL W/O P-5'-P-CCNC: 37 U/L (ref 10–44)
ANION GAP SERPL CALC-SCNC: 7 MMOL/L (ref 8–16)
AST SERPL-CCNC: 28 U/L (ref 10–40)
BASOPHILS # BLD AUTO: 0.03 K/UL (ref 0–0.2)
BASOPHILS NFR BLD: 0.4 % (ref 0–1.9)
BILIRUB SERPL-MCNC: 0.4 MG/DL (ref 0.1–1)
BUN SERPL-MCNC: 19 MG/DL (ref 6–20)
CALCIUM SERPL-MCNC: 10.3 MG/DL (ref 8.7–10.5)
CHLORIDE SERPL-SCNC: 103 MMOL/L (ref 95–110)
CO2 SERPL-SCNC: 29 MMOL/L (ref 23–29)
CREAT SERPL-MCNC: 0.9 MG/DL (ref 0.5–1.4)
DIFFERENTIAL METHOD: ABNORMAL
EOSINOPHIL # BLD AUTO: 0.3 K/UL (ref 0–0.5)
EOSINOPHIL NFR BLD: 3.7 % (ref 0–8)
ERYTHROCYTE [DISTWIDTH] IN BLOOD BY AUTOMATED COUNT: 14.6 % (ref 11.5–14.5)
EST. GFR  (AFRICAN AMERICAN): >60 ML/MIN/1.73 M^2
EST. GFR  (NON AFRICAN AMERICAN): >60 ML/MIN/1.73 M^2
GLUCOSE SERPL-MCNC: 93 MG/DL (ref 70–110)
HCT VFR BLD AUTO: 41.4 % (ref 37–48.5)
HGB BLD-MCNC: 13.2 G/DL (ref 12–16)
IMM GRANULOCYTES # BLD AUTO: 0.02 K/UL (ref 0–0.04)
IMM GRANULOCYTES NFR BLD AUTO: 0.3 % (ref 0–0.5)
LYMPHOCYTES # BLD AUTO: 2.7 K/UL (ref 1–4.8)
LYMPHOCYTES NFR BLD: 36.2 % (ref 18–48)
MCH RBC QN AUTO: 30.8 PG (ref 27–31)
MCHC RBC AUTO-ENTMCNC: 31.9 G/DL (ref 32–36)
MCV RBC AUTO: 97 FL (ref 82–98)
MONOCYTES # BLD AUTO: 0.6 K/UL (ref 0.3–1)
MONOCYTES NFR BLD: 8.1 % (ref 4–15)
NEUTROPHILS # BLD AUTO: 3.9 K/UL (ref 1.8–7.7)
NEUTROPHILS NFR BLD: 51.3 % (ref 38–73)
NRBC BLD-RTO: 0 /100 WBC
PLATELET # BLD AUTO: 347 K/UL (ref 150–350)
PMV BLD AUTO: 10.1 FL (ref 9.2–12.9)
POTASSIUM SERPL-SCNC: 4.4 MMOL/L (ref 3.5–5.1)
PROT SERPL-MCNC: 7.9 G/DL (ref 6–8.4)
RBC # BLD AUTO: 4.29 M/UL (ref 4–5.4)
SODIUM SERPL-SCNC: 139 MMOL/L (ref 136–145)
WBC # BLD AUTO: 7.51 K/UL (ref 3.9–12.7)

## 2019-08-13 PROCEDURE — 99214 OFFICE O/P EST MOD 30 MIN: CPT | Mod: S$GLB,,, | Performed by: DERMATOLOGY

## 2019-08-13 PROCEDURE — 99214 PR OFFICE/OUTPT VISIT, EST, LEVL IV, 30-39 MIN: ICD-10-PCS | Mod: S$GLB,,, | Performed by: DERMATOLOGY

## 2019-08-13 PROCEDURE — 80053 COMPREHEN METABOLIC PANEL: CPT

## 2019-08-13 PROCEDURE — 85025 COMPLETE CBC W/AUTO DIFF WBC: CPT

## 2019-08-13 PROCEDURE — 3008F BODY MASS INDEX DOCD: CPT | Mod: CPTII,S$GLB,, | Performed by: DERMATOLOGY

## 2019-08-13 PROCEDURE — 36415 COLL VENOUS BLD VENIPUNCTURE: CPT | Mod: PO

## 2019-08-13 PROCEDURE — 99999 PR PBB SHADOW E&M-EST. PATIENT-LVL IV: CPT | Mod: PBBFAC,,, | Performed by: DERMATOLOGY

## 2019-08-13 PROCEDURE — 3008F PR BODY MASS INDEX (BMI) DOCUMENTED: ICD-10-PCS | Mod: CPTII,S$GLB,, | Performed by: DERMATOLOGY

## 2019-08-13 PROCEDURE — 99999 PR PBB SHADOW E&M-EST. PATIENT-LVL IV: ICD-10-PCS | Mod: PBBFAC,,, | Performed by: DERMATOLOGY

## 2019-08-13 NOTE — PROGRESS NOTES
Subjective:       Patient ID:  Clemencia Knight is a 58 y.o. female who presents for   Chief Complaint   Patient presents with    Follow-up    Psoriasis     Last OV 05/15/2019    57 y/o F presents for psoriasis follow up.  Her psoriasis involves her scalp, R ear, buttock, inguinal folds.  The itching, scaling, and pain has improved.     Started MTX 10 mg weekly with me (last dose yesterday), Stelara 45 mg with Rheum on July 2.  She thinks the MTX helped the most.     Prior tx oral fluconazole and Zeasorb AF powder, betamethasone/clotrimazole, clobetasol    No family history of psoriasis    No h/o skin cancer.   Brother had melanoma   .  Past Medical History:   Diagnosis Date    Allergy     General anesthetics causing adverse effect in therapeutic use     pt. somewhat aware of extubation with one of her surgeries    GERD (gastroesophageal reflux disease)     Hearing loss     Restless leg syndrome     Sciatica      Review of Systems   Constitutional: Positive for fatigue. Negative for fever and chills.   Gastrointestinal: Positive for indigestion.   Musculoskeletal: Positive for joint swelling and arthralgias (cervical fusion (Dr White; Neurologist)  ;hands; back; R knee; R ankle)).   Skin: Positive for itching, rash, dry skin, daily sunscreen use, activity-related sunscreen use and wears hat.        Objective:    Physical Exam   Constitutional: She appears well-developed and well-nourished.   HENT:   Mouth/Throat: Lips normal.    Eyes: Lids are normal.    Neurological: She is alert and oriented to person, place, and time.   Psychiatric: She has a normal mood and affect.   Skin:   Areas Examined (abnormalities noted in diagram):   Scalp / Hair Palpated and Inspected  Head / Face Inspection Performed  Neck Inspection Performed  Chest / Axilla Inspection Performed  Genitals / Buttocks / Groin Inspection Performed  RUE Inspected  LUE Inspection Performed  RLE Inspected  LLE Inspection Performed  Nails and Digits  Inspection Performed                               Diagram Legend     Erythematous scaling macule/papule c/w actinic keratosis       Vascular papule c/w angioma      Pigmented verrucoid papule/plaque c/w seborrheic keratosis      Yellow umbilicated papule c/w sebaceous hyperplasia      Irregularly shaped tan macule c/w lentigo     1-2 mm smooth white papules consistent with Milia      Movable subcutaneous cyst with punctum c/w epidermal inclusion cyst      Subcutaneous movable cyst c/w pilar cyst      Firm pink to brown papule c/w dermatofibroma      Pedunculated fleshy papule(s) c/w skin tag(s)      Evenly pigmented macule c/w junctional nevus     Mildly variegated pigmented, slightly irregular-bordered macule c/w mildly atypical nevus      Flesh colored to evenly pigmented papule c/w intradermal nevus       Pink pearly papule/plaque c/w basal cell carcinoma      Erythematous hyperkeratotic cursted plaque c/w SCC      Surgical scar with no sign of skin cancer recurrence      Open and closed comedones      Inflammatory papules and pustules      Verrucoid papule consistent consistent with wart     Erythematous eczematous patches and plaques     Dystrophic onycholytic nail with subungual debris c/w onychomycosis     Umbilicated papule    Erythematous-base heme-crusted tan verrucoid plaque consistent with inflamed seborrheic keratosis     Erythematous Silvery Scaling Plaque c/w Psoriasis     See annotation    LABS 5/15/19  CBC WNL  Hep panel neg  Quant gold neg  CMP WNL   CBC, CMP pending from today  Assessment / Plan:          Psoriasis vulgaris/Inverse Psoriasis-5% TBSA with nail, scalp, and genital involvement-improving  Continue  ketoconazole (NIZORAL) 2 % shampoo; Apply topically once daily.  Dispense: 120 mL; Refill: 1    Continue fluocinonide (LIDEX) 0.05 % external solution; Apply topically 2 (two) times daily.  Dispense: 60 mL; Refill: 1    -Plan for MTX 2 pills Mon then 2 pills the following Mon then stop (so  will not need refill likely)  -Sent msg to Tamiko re: Stelara. Why did she not receive Month 2 dosage? 1st dose July 2.  Should have received 2nd dosage Aug 2  -CBC, CMP today.  Results pending  -Seeing Dr Higgins tomorrow for SE of low BP, fatigue, heartburn  101/52--95/66  Cymbalta, Lyrica?    Angioma  This is a benign vascular lesion. Reassurance given. No treatment required.     Seborrheic Keratosis  These are benign inherited growths without a malignant potential. Reassurance given to patient. No treatment is necessary.            Follow up in about 3 months (around 11/13/2019).

## 2019-08-14 ENCOUNTER — OFFICE VISIT (OUTPATIENT)
Dept: FAMILY MEDICINE | Facility: CLINIC | Age: 59
End: 2019-08-14
Payer: COMMERCIAL

## 2019-08-14 VITALS
HEART RATE: 82 BPM | HEIGHT: 64 IN | SYSTOLIC BLOOD PRESSURE: 120 MMHG | DIASTOLIC BLOOD PRESSURE: 80 MMHG | RESPIRATION RATE: 16 BRPM | WEIGHT: 190.69 LBS | BODY MASS INDEX: 32.56 KG/M2 | OXYGEN SATURATION: 95 %

## 2019-08-14 DIAGNOSIS — Z00.00 ROUTINE HEALTH MAINTENANCE: Primary | ICD-10-CM

## 2019-08-14 DIAGNOSIS — L40.50 PSORIATIC ARTHRITIS: ICD-10-CM

## 2019-08-14 PROCEDURE — 99999 PR PBB SHADOW E&M-EST. PATIENT-LVL III: CPT | Mod: PBBFAC,,, | Performed by: FAMILY MEDICINE

## 2019-08-14 PROCEDURE — 99999 PR PBB SHADOW E&M-EST. PATIENT-LVL III: ICD-10-PCS | Mod: PBBFAC,,, | Performed by: FAMILY MEDICINE

## 2019-08-14 PROCEDURE — 99396 PR PREVENTIVE VISIT,EST,40-64: ICD-10-PCS | Mod: S$GLB,,, | Performed by: FAMILY MEDICINE

## 2019-08-14 PROCEDURE — 3074F PR MOST RECENT SYSTOLIC BLOOD PRESSURE < 130 MM HG: ICD-10-PCS | Mod: CPTII,S$GLB,, | Performed by: FAMILY MEDICINE

## 2019-08-14 PROCEDURE — 99396 PREV VISIT EST AGE 40-64: CPT | Mod: S$GLB,,, | Performed by: FAMILY MEDICINE

## 2019-08-14 PROCEDURE — 3079F DIAST BP 80-89 MM HG: CPT | Mod: CPTII,S$GLB,, | Performed by: FAMILY MEDICINE

## 2019-08-14 PROCEDURE — 3079F PR MOST RECENT DIASTOLIC BLOOD PRESSURE 80-89 MM HG: ICD-10-PCS | Mod: CPTII,S$GLB,, | Performed by: FAMILY MEDICINE

## 2019-08-14 PROCEDURE — 3074F SYST BP LT 130 MM HG: CPT | Mod: CPTII,S$GLB,, | Performed by: FAMILY MEDICINE

## 2019-08-14 RX ORDER — TIZANIDINE 4 MG/1
4 TABLET ORAL EVERY 6 HOURS PRN
Qty: 30 TABLET | Refills: 3 | Status: SHIPPED | OUTPATIENT
Start: 2019-08-14 | End: 2019-12-08 | Stop reason: SDUPTHER

## 2019-08-14 NOTE — PROGRESS NOTES
HPI  Clemencia Knight is a 58 y.o. female with multiple medical diagnoses as listed in the medical history and problem list that presents for Annual Exam and Gastroesophageal Reflux  .      HPI  Here today for routine health maintenance.  She has recently been diagnosed with psoriatic arthritis.  Treatment with Stelara and Methotrexate    PAST MEDICAL HISTORY:  Past Medical History:   Diagnosis Date    Allergy     General anesthetics causing adverse effect in therapeutic use     pt. somewhat aware of extubation with one of her surgeries    GERD (gastroesophageal reflux disease)     Hearing loss     Restless leg syndrome     Sciatica        PAST SURGICAL HISTORY:  Past Surgical History:   Procedure Laterality Date    ABDOMINOPLASTY     Abdominoplasty with Lipo Abdomen Bilateral 5/30/2016    Performed by [facility] at [facility] OR 2ND FLR    AUGMENTATION OF BREAST      BELT ABDOMINOPLASTY      breast implants      CAPSULECTOMY-BREAST Removal of Implants Bilateral 5/30/2016    Performed by [facility] at [facility] OR 2ND FLR    CERVICAL FUSION      COLONOSCOPY  10/2012    repeat in 10    COLONOSCOPY N/A 10/30/2012    [facility] at [facility] ENDO    DEXA      WNL    MAGGI-TRANSFORAMINAL N/A 6/4/2015    Performed by [facility] at [facility] CATH LAB    HYSTERECTOMY  2000    INJECTION,STEROID,EPIDURAL,TRANSFORAMINAL APPROACH Right 8/21/2018    Performed by Denita Richards MD at [facility] PAIN MGT    Injection-steroid-epidural-cervical N/A 11/20/2018    Per[facility] at [facility] OR    Injection-steroid-epidural-cervical N/A 9/24/2018    Performed by [facility]les [facility] at [facility] OR    LIPOSUCTION Hips and Flanks Bilateral 5/30/2016    Performed by [facility] at [facility] OR 2ND FLR    MASTOPEXY Implants Removal with Mastopexy Bilateral 5/30/2016    Performed by [facility]o [facility] at [facility] OR 2ND FLR    OOPHORECTOMY  7/16/2013    laparotomy BSO  for benign 10cm tubal cyst    SALPINGOOPHORECTOMY  2013    with removal of fallopian cyst    SHOULDER SURGERY      right    TLH  2000    ovaries remain, benign    TONSILLECTOMY, ADENOIDECTOMY, BILATERAL MYRINGOTOMY AND TUBES         SOCIAL HISTORY:  Social History     Socioeconomic History    Marital status:      Spouse name: Not on file    Number of children: Not on file    Years of education: Not on file    Highest education level: Not on file   Occupational History     Employer: OTHER   Social Needs    Financial resource strain: Not on file    Food insecurity:     Worry: Not on file     Inability: Not on file    Transportation needs:     Medical: Not on file     Non-medical: Not on file   Tobacco Use    Smoking status: Never Smoker    Smokeless tobacco: Never Used   Substance and Sexual Activity    Alcohol use: Yes     Comment: rarely    Drug use: No    Sexual activity: Yes     Partners: Male     Birth control/protection: Surgical   Lifestyle    Physical activity:     Days per week: Not on file     Minutes per session: Not on file    Stress: Not on file   Relationships    Social connections:     Talks on phone: Not on file     Gets together: Not on file     Attends Hoahaoism service: Not on file     Active member of club or organization: Not on file     Attends meetings of clubs or organizations: Not on file     Relationship status: Not on file   Other Topics Concern    Are you pregnant or think you may be? No    Breast-feeding Not Asked   Social History Narrative    Not on file       FAMILY HISTORY:  Family History   Problem Relation Age of Onset    Cancer Father         bladder    Diabetes Father     Heart disease Father     Diabetes Mother     Melanoma Brother     Charcot-Karla-Tooth disease Brother     Breast cancer Neg Hx     Ovarian cancer Neg Hx     Anesthesia problems Neg Hx        ALLERGIES AND MEDICATIONS: updated and reviewed.  Review of patient's allergies  indicates:   Allergen Reactions    Flexeril [cyclobenzaprine] Itching    Hydrocodone Itching     Current Outpatient Medications   Medication Sig Dispense Refill    acetaminophen (TYLENOL) 325 MG tablet Take 650 mg by mouth every 6 (six) hours as needed for Pain.      cetirizine (ZYRTEC) 10 MG tablet Take 10 mg by mouth once daily.      clobetasol (TEMOVATE) 0.05 % cream Apply topically 2 (two) times daily. 30 g 1    DULoxetine (CYMBALTA) 60 MG capsule Take 60 mg by mouth once daily.       fluocinonide (LIDEX) 0.05 % external solution Apply topically 2 (two) times daily. 60 mL 1    fluticasone (FLONASE) 50 mcg/actuation nasal spray 1 spray by Each Nare route once daily.      folic acid (FOLVITE) 1 MG tablet Take one tablet by mouth every day - do not take on same day as taking methotrexate 30 tablet 5    hydrocortisone 2.5 % cream Apply topically 2 (two) times daily. 30 g 1    ketoconazole (NIZORAL) 2 % shampoo Apply topically once daily. 120 mL 1    methotrexate 2.5 MG Tab Take 4 tablets by mouth every week 16 tablet 2    mineral,lanolin oils/prop glyc (BALNEOL TOP) Apply topically.      MULTIVITAMIN ORAL Take by mouth once daily.      mupirocin (BACTROBAN) 2 % ointment Apply topically 3 (three) times daily. 22 g 1    pregabalin (LYRICA) 75 MG capsule Take 1 capsule (75 mg total) by mouth 2 (two) times daily. 180 capsule 0    terbinafine HCl (LAMISIL) 1 % cream Apply topically 2 (two) times daily.      tiZANidine (ZANAFLEX) 4 MG tablet Take 1 tablet (4 mg total) by mouth every 6 (six) hours as needed. 30 tablet 3    ustekinumab (STELARA) 45 mg/0.5 mL Syrg syringe Inject 0.5 mLs (45 mg total) into the skin every 3 (three) months. 1 Syringe 3     No current facility-administered medications for this visit.        ROS  Review of Systems   Constitutional: Positive for activity change and unexpected weight change.   HENT: Positive for hearing loss. Negative for rhinorrhea and trouble swallowing.   "  Eyes: Negative for discharge and visual disturbance.   Respiratory: Negative for chest tightness and wheezing.    Cardiovascular: Negative for chest pain and palpitations.   Gastrointestinal: Negative for blood in stool, constipation, diarrhea and vomiting.        Acid reflux with belching and recurrent dry cough   Endocrine: Positive for polydipsia. Negative for polyuria.   Genitourinary: Negative for difficulty urinating, dysuria, hematuria and menstrual problem.   Musculoskeletal: Positive for arthralgias, joint swelling and neck pain.   Neurological: Positive for weakness and headaches.   Psychiatric/Behavioral: Positive for confusion. Negative for dysphoric mood.       Physical Exam  Vitals:    08/14/19 0725   BP: 120/80   Pulse: 82   Resp: 16    Body mass index is 32.73 kg/m².  Weight: 86.5 kg (190 lb 11.2 oz)   Height: 5' 4" (162.6 cm)     Physical Exam   Constitutional: She is oriented to person, place, and time. She appears well-developed and well-nourished.   HENT:   Head: Normocephalic and atraumatic.   Right Ear: External ear normal.   Left Ear: External ear normal.   Nose: Nose normal.   Mouth/Throat: Oropharynx is clear and moist.   Eyes: Pupils are equal, round, and reactive to light. Conjunctivae and EOM are normal. No scleral icterus.   Neck: Normal range of motion. Neck supple. No JVD present. No thyromegaly present.   Cardiovascular: Normal rate, regular rhythm and normal heart sounds. Exam reveals no gallop and no friction rub.   No murmur heard.  Pulmonary/Chest: Effort normal and breath sounds normal. She has no wheezes. She has no rales.   Abdominal: Soft. Bowel sounds are normal. She exhibits no distension and no mass. There is no tenderness. There is no rebound and no guarding.   Musculoskeletal: Normal range of motion. She exhibits no edema.   Lymphadenopathy:     She has no cervical adenopathy.   Neurological: She is alert and oriented to person, place, and time. She has normal strength. " No cranial nerve deficit or sensory deficit.   Skin: Skin is warm and dry. No rash noted.   Vitals reviewed.    Results for orders placed or performed in visit on 08/13/19   CBC auto differential   Result Value Ref Range    WBC 7.51 3.90 - 12.70 K/uL    RBC 4.29 4.00 - 5.40 M/uL    Hemoglobin 13.2 12.0 - 16.0 g/dL    Hematocrit 41.4 37.0 - 48.5 %    Mean Corpuscular Volume 97 82 - 98 fL    Mean Corpuscular Hemoglobin 30.8 27.0 - 31.0 pg    Mean Corpuscular Hemoglobin Conc 31.9 (L) 32.0 - 36.0 g/dL    RDW 14.6 (H) 11.5 - 14.5 %    Platelets 347 150 - 350 K/uL    MPV 10.1 9.2 - 12.9 fL    Immature Granulocytes 0.3 0.0 - 0.5 %    Gran # (ANC) 3.9 1.8 - 7.7 K/uL    Immature Grans (Abs) 0.02 0.00 - 0.04 K/uL    Lymph # 2.7 1.0 - 4.8 K/uL    Mono # 0.6 0.3 - 1.0 K/uL    Eos # 0.3 0.0 - 0.5 K/uL    Baso # 0.03 0.00 - 0.20 K/uL    nRBC 0 0 /100 WBC    Gran% 51.3 38.0 - 73.0 %    Lymph% 36.2 18.0 - 48.0 %    Mono% 8.1 4.0 - 15.0 %    Eosinophil% 3.7 0.0 - 8.0 %    Basophil% 0.4 0.0 - 1.9 %    Differential Method Automated    Comprehensive metabolic panel   Result Value Ref Range    Sodium 139 136 - 145 mmol/L    Potassium 4.4 3.5 - 5.1 mmol/L    Chloride 103 95 - 110 mmol/L    CO2 29 23 - 29 mmol/L    Glucose 93 70 - 110 mg/dL    BUN, Bld 19 6 - 20 mg/dL    Creatinine 0.9 0.5 - 1.4 mg/dL    Calcium 10.3 8.7 - 10.5 mg/dL    Total Protein 7.9 6.0 - 8.4 g/dL    Albumin 3.9 3.5 - 5.2 g/dL    Total Bilirubin 0.4 0.1 - 1.0 mg/dL    Alkaline Phosphatase 76 55 - 135 U/L    AST 28 10 - 40 U/L    ALT 37 10 - 44 U/L    Anion Gap 7 (L) 8 - 16 mmol/L    eGFR if African American >60.0 >60 mL/min/1.73 m^2    eGFR if non African American >60.0 >60 mL/min/1.73 m^2         Health Maintenance       Date Due Completion Date    Shingles Vaccine (1 of 2) 08/21/2012 ---    Influenza Vaccine (1) 08/01/2019 10/23/2018    Mammogram 03/22/2021 3/22/2019    Colonoscopy 10/30/2022 10/30/2012    Lipid Panel 08/09/2023 8/9/2018    TETANUS VACCINE  08/14/2028 8/14/2018          Assessment & Plan    Routine health maintenance  - Health maintenance reviewed  - Diet and exercise education.    Psoriatic arthritis  - Continue current therapy  - Continue Rheumatology and Dermatology    Other orders  -     tiZANidine (ZANAFLEX) 4 MG tablet; Take 1 tablet (4 mg total) by mouth every 6 (six) hours as needed.  Dispense: 30 tablet; Refill: 3        Follow up in about 6 months (around 2/14/2020).

## 2019-08-15 ENCOUNTER — TELEPHONE (OUTPATIENT)
Dept: RHEUMATOLOGY | Facility: CLINIC | Age: 59
End: 2019-08-15

## 2019-08-15 NOTE — TELEPHONE ENCOUNTER
----- Message from Linda Tabares MD sent at 8/14/2019  4:26 PM CDT -----  Can we give pt quick call and ask if shes still on stelara and how many doses shes gotten so far? Thanks

## 2019-08-15 NOTE — TELEPHONE ENCOUNTER
Refill request sent.  ----- Message from Maureen Batres PharmD sent at 8/15/2019 11:47 AM CDT -----  Good morning,   I have Ms. Clemencia Knight requesting a refill of her methotrexate 2.5mg tablets sent to [facility] in [place], if approved.   Thank you!

## 2019-08-15 NOTE — TELEPHONE ENCOUNTER
Dr. Tabares   Mrs. Knight stated that she only had one injection of Stelara on July 2 and have not taken another.

## 2019-08-16 RX ORDER — METHOTREXATE 2.5 MG/1
TABLET ORAL
Qty: 16 TABLET | Refills: 2 | Status: SHIPPED | OUTPATIENT
Start: 2019-08-16 | End: 2019-08-29

## 2019-08-26 DIAGNOSIS — M54.12 CERVICAL RADICULAR PAIN: ICD-10-CM

## 2019-08-26 RX ORDER — DULOXETIN HYDROCHLORIDE 60 MG/1
CAPSULE, DELAYED RELEASE ORAL
Qty: 90 CAPSULE | Refills: 3 | Status: SHIPPED | OUTPATIENT
Start: 2019-08-26 | End: 2020-09-08 | Stop reason: ALTCHOICE

## 2019-08-28 ENCOUNTER — PATIENT MESSAGE (OUTPATIENT)
Dept: RHEUMATOLOGY | Facility: CLINIC | Age: 59
End: 2019-08-28

## 2019-08-28 ENCOUNTER — PATIENT MESSAGE (OUTPATIENT)
Dept: DERMATOLOGY | Facility: CLINIC | Age: 59
End: 2019-08-28

## 2019-08-28 NOTE — TELEPHONE ENCOUNTER
Not sure what type of lesions are they. Looks like ulcers. Please advice to stay off methotrexate and see a medical provider at urgent care or at her PCP office.   Thanks.

## 2019-08-29 ENCOUNTER — OFFICE VISIT (OUTPATIENT)
Dept: DERMATOLOGY | Facility: CLINIC | Age: 59
End: 2019-08-29
Payer: COMMERCIAL

## 2019-08-29 VITALS — BODY MASS INDEX: 32.44 KG/M2 | WEIGHT: 190 LBS | HEIGHT: 64 IN

## 2019-08-29 DIAGNOSIS — D18.00 ANGIOMA: ICD-10-CM

## 2019-08-29 DIAGNOSIS — K13.29 TONGUE PLAQUE: Primary | ICD-10-CM

## 2019-08-29 DIAGNOSIS — L40.0 PSORIASIS VULGARIS: ICD-10-CM

## 2019-08-29 DIAGNOSIS — L73.8 SEBACEOUS HYPERPLASIA: ICD-10-CM

## 2019-08-29 PROCEDURE — 3008F PR BODY MASS INDEX (BMI) DOCUMENTED: ICD-10-PCS | Mod: CPTII,S$GLB,, | Performed by: DERMATOLOGY

## 2019-08-29 PROCEDURE — 87070 CULTURE OTHR SPECIMN AEROBIC: CPT

## 2019-08-29 PROCEDURE — 99214 PR OFFICE/OUTPT VISIT, EST, LEVL IV, 30-39 MIN: ICD-10-PCS | Mod: S$GLB,,, | Performed by: DERMATOLOGY

## 2019-08-29 PROCEDURE — 99999 PR PBB SHADOW E&M-EST. PATIENT-LVL III: ICD-10-PCS | Mod: PBBFAC,,, | Performed by: DERMATOLOGY

## 2019-08-29 PROCEDURE — 99999 PR PBB SHADOW E&M-EST. PATIENT-LVL III: CPT | Mod: PBBFAC,,, | Performed by: DERMATOLOGY

## 2019-08-29 PROCEDURE — 3008F BODY MASS INDEX DOCD: CPT | Mod: CPTII,S$GLB,, | Performed by: DERMATOLOGY

## 2019-08-29 PROCEDURE — 99214 OFFICE O/P EST MOD 30 MIN: CPT | Mod: S$GLB,,, | Performed by: DERMATOLOGY

## 2019-08-29 NOTE — PROGRESS NOTES
"  Subjective:       Patient ID:  Clemencia Knight is a 59 y.o. female who presents for   Chief Complaint   Patient presents with    Mouth Lesions     tongue     Last OV 08/13/2019  58 y/o old F presents today for white lesion on R tongue x 1 week.  She is unsure if she is biting her tongue but her  notices that she has been snoring and "sleeping hard" lately. She has been trying to treat with OTC apple cider vinegar and salt water rinse.    Since last OV, her psoriasis continues to improve.  Her psoriasis involves her scalp, buttock. The itching, scaling, and pain has improved.  Started MTX 10 mg weekly (has now tapered off of MTX), Stelara 45 mg with Rheum on July 2.          No family history of psoriasis     No h/o skin cancer.   Brother had melanoma               Past Medical History:   Diagnosis Date    Allergy     General anesthetics causing adverse effect in therapeutic use     pt. somewhat aware of extubation with one of her surgeries    GERD (gastroesophageal reflux disease)     Hearing loss     Restless leg syndrome     Sciatica        Review of Systems   Constitutional: Negative for fever, chills and fatigue.   Gastrointestinal: Positive for indigestion.   Musculoskeletal: Positive for joint swelling and arthralgias (cervical fusion (Dr White; Neurologist)  ;hands; back; R knee; R ankle)).   Skin: Positive for itching, rash, dry skin, daily sunscreen use, activity-related sunscreen use and wears hat.        Objective:    Physical Exam   Constitutional: She appears well-developed and well-nourished.   HENT:   Mouth/Throat: Lips normal.        Eyes: Lids are normal.    Neurological: She is alert and oriented to person, place, and time.   Psychiatric: She has a normal mood and affect.   Skin:   Areas Examined (abnormalities noted in diagram):   Scalp / Hair Palpated and Inspected  Head / Face Inspection Performed  Neck Inspection Performed  Genitals / Buttocks / Groin Inspection Performed  Nails " and Digits Inspection Performed                  Diagram Legend     Erythematous scaling macule/papule c/w actinic keratosis       Vascular papule c/w angioma      Pigmented verrucoid papule/plaque c/w seborrheic keratosis      Yellow umbilicated papule c/w sebaceous hyperplasia      Irregularly shaped tan macule c/w lentigo     1-2 mm smooth white papules consistent with Milia      Movable subcutaneous cyst with punctum c/w epidermal inclusion cyst      Subcutaneous movable cyst c/w pilar cyst      Firm pink to brown papule c/w dermatofibroma      Pedunculated fleshy papule(s) c/w skin tag(s)      Evenly pigmented macule c/w junctional nevus     Mildly variegated pigmented, slightly irregular-bordered macule c/w mildly atypical nevus      Flesh colored to evenly pigmented papule c/w intradermal nevus       Pink pearly papule/plaque c/w basal cell carcinoma      Erythematous hyperkeratotic cursted plaque c/w SCC      Surgical scar with no sign of skin cancer recurrence      Open and closed comedones      Inflammatory papules and pustules      Verrucoid papule consistent consistent with wart     Erythematous eczematous patches and plaques     Dystrophic onycholytic nail with subungual debris c/w onychomycosis     Umbilicated papule    Erythematous-base heme-crusted tan verrucoid plaque consistent with inflamed seborrheic keratosis     Erythematous Silvery Scaling Plaque c/w Psoriasis     See annotation    LABS 5/15/19  CBC WNL  Hep panel neg  Quant gold neg  CMP WNL     CBC, CMP WNL 8/13/19   Assessment / Plan:        Tongue plaque-Candida vs tamica fibroma vs other?  -     Aerobic culture    Psoriasis vulgaris  5% TBSA with nail, scalp, and genital involvement-improving  Continue  ketoconazole (NIZORAL) 2 % shampoo     Continue fluocinonide (LIDEX) 0.05 % external solution    Continue Stelara per Rheum  Now off of MTX    Angioma  This is a benign vascular lesion. Reassurance given. No treatment required.     Sebaceous  hyperplasia  This is a common condition representing benign enlargement of the sebaceous lobule. It typically occurs in adulthood. Reassurance given to patient.         Follow up in about 3 months (around 11/29/2019) for pending culture.

## 2019-09-02 LAB — BACTERIA SPEC AEROBE CULT: NORMAL

## 2019-09-09 RX ORDER — PREGABALIN 75 MG/1
75 CAPSULE ORAL 2 TIMES DAILY
Qty: 180 CAPSULE | Refills: 0 | Status: CANCELLED | OUTPATIENT
Start: 2019-09-09 | End: 2020-03-09

## 2019-09-11 RX ORDER — PREGABALIN 75 MG/1
75 CAPSULE ORAL 2 TIMES DAILY
Qty: 180 CAPSULE | Refills: 0 | Status: SHIPPED | OUTPATIENT
Start: 2019-09-11 | End: 2019-12-08 | Stop reason: SDUPTHER

## 2019-09-13 ENCOUNTER — PATIENT MESSAGE (OUTPATIENT)
Dept: RHEUMATOLOGY | Facility: CLINIC | Age: 59
End: 2019-09-13

## 2019-09-17 ENCOUNTER — PATIENT MESSAGE (OUTPATIENT)
Dept: DERMATOLOGY | Facility: CLINIC | Age: 59
End: 2019-09-17

## 2019-09-20 ENCOUNTER — OFFICE VISIT (OUTPATIENT)
Dept: RHEUMATOLOGY | Facility: CLINIC | Age: 59
End: 2019-09-20
Payer: COMMERCIAL

## 2019-09-20 VITALS
HEART RATE: 62 BPM | WEIGHT: 190.25 LBS | SYSTOLIC BLOOD PRESSURE: 120 MMHG | BODY MASS INDEX: 32.48 KG/M2 | DIASTOLIC BLOOD PRESSURE: 78 MMHG | HEIGHT: 64 IN

## 2019-09-20 DIAGNOSIS — L40.50 PSORIATIC ARTHRITIS: Primary | ICD-10-CM

## 2019-09-20 PROCEDURE — 99214 PR OFFICE/OUTPT VISIT, EST, LEVL IV, 30-39 MIN: ICD-10-PCS | Mod: S$GLB,,, | Performed by: STUDENT IN AN ORGANIZED HEALTH CARE EDUCATION/TRAINING PROGRAM

## 2019-09-20 PROCEDURE — 99999 PR PBB SHADOW E&M-EST. PATIENT-LVL IV: ICD-10-PCS | Mod: PBBFAC,,, | Performed by: STUDENT IN AN ORGANIZED HEALTH CARE EDUCATION/TRAINING PROGRAM

## 2019-09-20 PROCEDURE — 99214 OFFICE O/P EST MOD 30 MIN: CPT | Mod: S$GLB,,, | Performed by: STUDENT IN AN ORGANIZED HEALTH CARE EDUCATION/TRAINING PROGRAM

## 2019-09-20 PROCEDURE — 3008F PR BODY MASS INDEX (BMI) DOCUMENTED: ICD-10-PCS | Mod: CPTII,S$GLB,, | Performed by: STUDENT IN AN ORGANIZED HEALTH CARE EDUCATION/TRAINING PROGRAM

## 2019-09-20 PROCEDURE — 3008F BODY MASS INDEX DOCD: CPT | Mod: CPTII,S$GLB,, | Performed by: STUDENT IN AN ORGANIZED HEALTH CARE EDUCATION/TRAINING PROGRAM

## 2019-09-20 PROCEDURE — 3078F PR MOST RECENT DIASTOLIC BLOOD PRESSURE < 80 MM HG: ICD-10-PCS | Mod: CPTII,S$GLB,, | Performed by: STUDENT IN AN ORGANIZED HEALTH CARE EDUCATION/TRAINING PROGRAM

## 2019-09-20 PROCEDURE — 3074F SYST BP LT 130 MM HG: CPT | Mod: CPTII,S$GLB,, | Performed by: STUDENT IN AN ORGANIZED HEALTH CARE EDUCATION/TRAINING PROGRAM

## 2019-09-20 PROCEDURE — 3074F PR MOST RECENT SYSTOLIC BLOOD PRESSURE < 130 MM HG: ICD-10-PCS | Mod: CPTII,S$GLB,, | Performed by: STUDENT IN AN ORGANIZED HEALTH CARE EDUCATION/TRAINING PROGRAM

## 2019-09-20 PROCEDURE — 3078F DIAST BP <80 MM HG: CPT | Mod: CPTII,S$GLB,, | Performed by: STUDENT IN AN ORGANIZED HEALTH CARE EDUCATION/TRAINING PROGRAM

## 2019-09-20 PROCEDURE — 99999 PR PBB SHADOW E&M-EST. PATIENT-LVL IV: CPT | Mod: PBBFAC,,, | Performed by: STUDENT IN AN ORGANIZED HEALTH CARE EDUCATION/TRAINING PROGRAM

## 2019-09-20 NOTE — PATIENT INSTRUCTIONS
"  Gluten-Free Diet for Celiac Disease     Reading food labels will help you avoid foods with gluten.   You've been told that you have celiac disease. This means that you are sensitive to a protein called gluten. Gluten is found in certain grains. When you ingest gluten, your immune system causes harm to your small intestines. The treatment for celiac disease is to avoid foods and products that contain gluten. You will need to do this for the rest of your life. Avoid the temptation to "cheat," even a small amount of gluten can cause symptoms to return. And it can harm your body. This sheet gives you the basics about a gluten-free diet. If you need help, a registered dietitian (RD) can teach you what foods and other products have gluten and how to avoid them.  Always read labels!  Many foods may contain gluten, even if you think they don't. Get into the habit of reading ingredient labels before you eat.   Choosing foods  The most common source of gluten is wheat flour (this includes "white" flour). Wheat flour is used to make many baked goods, including breads, pastas, cereals, pastries, and pizza dough. But gluten is also found in many foods that you might not think would have it. You will need to read food labels to look for gluten in everything you eat. But your diet does not need to be boring. Many foods are naturally gluten-free. And many foods commonly made with wheat flour now come in gluten-free forms. But keep in mind that if something is labeled "wheat-free" it may not be also gluten-free.  Foods to Avoid Foods You Can Eat   Bread, cereals, pasta, pastries, couscous, or pizza dough made with wheat flour (this includes white flour, farina, farro, emmer, durum, manny, and semolina) Bread, cereals, pasta, pastries, or pizza dough made with rice flour, almond flour, beans, potatoes, and other gluten-free substitutes   Foods containing rye, barley (including malt), spelt, kamut, triticale, minaya's yeast, and " "bulgur Foods containing corn, cassava, rice, amaranth, buckwheat, millet, quinoa, arrowroot, teff, soy, and tapioca   Processed meats Fresh meats and seafood (beef, chicken, turkey, lamb, pork, fish, shellfish), beans, and tofu   Some dairy products with additives Many plain dairy products   Many sauces, gravies, dressings, and condiments, including traditional soy sauce Vinegar, oils, and gluten-free substitutes   Some granola bars and energy bars Gluten-free granola bars and energy bars   Some beers and spirits Wine, and gluten-free beers and distilled spirits   Some soups Gluten-free soups   Fruits and vegetables that are fried or breaded Fresh fruits and vegetables   Many packaged foods Packaged foods labeled "gluten-free"   Oats (check with your healthcare provider) Gluten-free oats   Communion wafers Gluten-free communion wafers   Avoiding accidental exposure to gluten  Staying gluten-free means always being aware. Even if you are very careful, mistakes can happen. The food you eat can't come into contact with gluten. Your meals must be made with utensils that have not touched foods that contain gluten. Shared knives, cutting boards, toasters, and storage containers are risks for gluten exposure. Shared condiments may have crumbs that contain gluten. At restaurants, parties, and other places where you eat food prepared by others, ask how the food was made. Gluten can also be found in some non-food items. Some medications contain gluten. So do some vitamin supplements. Ask your pharmacist before taking a medication or supplement. Also, some shampoos, lotions, toothpastes, makeup, lipstick (lip gloss and lip balm), glues, soaps, and other products contain gluten. It can be possible to ingest some gluten when using these projects. This is called cross-contamination. For example, this can happen if you use a lotion that has gluten and then touch food you eat. Also note that Play-Bao and similar products have " gluten. Any adult or child with celiac disease should wash their hands after handling these.  Coping with gluten-free living  Living gluten-free can be hard. While there are many gluten-free foods now that you can buy, it is still a big change for many people. You may be upset that you can't eat your favorite foods, eat freely at restaurants, parties, or over the holidays. Household members may also be upset by the strict controls over food. If you face problems like these, think about joining a celiac disease support group. Support groups offer tips on how to make a gluten-free lifestyle easier on you and the people you live with. You can find ways to involve the people in your household. There are many ways to make gluten-free group meals. See More Resources below for help in finding a group.  Bring safe foods that you enjoy to parties and school or work events. This can help you avoid the urge to grab something you shouldnt eat.   Following up with your health care provider  You should see your health care provider at least once a year for a celiac checkup. A simple blood test can show if your celiac disease is under control. If you are having symptoms, your health care provider can help you find sources of gluten you may have missed.  More resources  To learn more about managing celiac disease, try these resources:  · Celiac Disease Foundation  www.celiac.org  · Academy of Nutrition and Dietetics  www.eatright.org  · National Digestive Diseases Information Clearinghouse  www.digestive.niddk.nih.gov  Date Last Reviewed: 6/19/2015  © 4897-4484 [employer]. [address] [place] [address], [place] [address]. All rights reserved. This information is not intended as a substitute for professional medical care. Always follow your healthcare professional's instructions.

## 2019-09-20 NOTE — PROGRESS NOTES
RHEUMATOLOGY OUTPATIENT CLINIC NOTE    9/20/2019    Attending Rheumatologist: Linda Tabares  Primary Care Provider: Dallas Higgins MD   Physician Requesting Consultation: No referring provider defined for this encounter.  Chief Complaint/Reason For Consultation:  Psoriatic arthritis      Subjective:       HPI  Clemencia Knight is a 59 y.o. White female presents for initial evaluation of joint pains / psoriatic arthritis. Recently diagnosed w psoriasis. Stelara was not approved by insurance. Currently on methotrexate 4tabs weekly +FA daily, on week 2. Main joint complaints are in right wrist, b/l PIPs, lower back. Does report episode of diffuse right third digit swelling. +Nail pitting. States joint pains acutely worsened over past few months, around onset of skin pso. +Prolonged AM stiffness. Lower back pain improves w activity, +significant AM stiffness. No alternating buttock pain, nightime awakening, or worsening w rest. +Relief w ibuprofen. Has chronic back pain x >5 years at least. Hx DDD w cervical fusion. No prior MVA or trauma. No hx dactylitis. +Family hx psoriasis and rheumatoid arthritis.     Today:  Since last visit, pt off mtx. Has had 2 injections of stelara only. Still w joint pains in mcps w prolonged AM stiffness. Skin pso improved. No new complaints    14pt ros negative except as otherwise stated above      Review of Systems   Constitutional: Negative for fever and malaise/fatigue.   HENT: Negative for congestion, sinus pain and sore throat.    Eyes: Negative for blurred vision, pain and redness.   Respiratory: Negative for cough and shortness of breath.    Cardiovascular: Negative for chest pain, claudication and leg swelling.   Gastrointestinal: Negative for constipation and diarrhea.   Genitourinary: Negative for dysuria.   Musculoskeletal: Positive for joint pain. Negative for back pain, myalgias and neck pain.   Skin: Positive for rash. Negative for itching.   Neurological: Negative  for focal weakness and headaches.   Endo/Heme/Allergies: Does not bruise/bleed easily.   Psychiatric/Behavioral: Negative for depression and substance abuse.       Chronic comorbid conditions affecting medical decision making today:  Past Medical History:   Diagnosis Date    Allergy     General anesthetics causing adverse effect in therapeutic use     pt. somewhat aware of extubation with one of her surgeries    GERD (gastroesophageal reflux disease)     Hearing loss     Restless leg syndrome     Sciatica      Past Surgical History:   Procedure Laterality Date    ABDOMINOPLASTY     Abdominoplasty with Lipo Abdomen Bilateral 5/30/2016    Performed [facility] at [facility] OR 2ND FLR    AUGMENTATION OF BREAST      BELT ABDOMINOPLASTY      breast implants      CAPSULECTOMY-BREAST Removal of Implants Bilateral 5/30/2016    Performed [facility] at [facility] OR 2ND FLR    CERVICAL FUSION      COLONOSCOPY  10/2012    repeat in 10    COLONOSCOPY N/A 10/30/2012    [facility]    DEXA      WNL    MAGGI-TRANSFORAMINAL N/A 6/4/2015    Performed by [facility] CATH LAB    HYSTERECTOMY  2000    INJECTION,STEROID,EPIDURAL,TRANSFORAMINAL APPROACH Right 8/21/2018    Performed by Denita Richards MD at [facility] PAIN MGT    Injection-steroid-epidural-cervical N/A 11/20/2018    [facility]    Injection-steroid-epidural-cervical N/A 9/24/2018    Per[facility] OR    LIPOSUCTION Hips and Flanks Bilateral 5/30/2016    [facility] at [facility] OR 2ND FLR    MASTOPEXY Implants Removal with Mastopexy Bilateral 5/30/2016    Performed by [facility] at [facility] OR 2ND FLR    OOPHORECTOMY  7/16/2013    laparotomy BSO for benign 10cm tubal cyst    SALPINGOOPHORECTOMY  2013    with removal of fallopian cyst    SHOULDER SURGERY      right    TLH  2000    ovaries  remain, benign    TONSILLECTOMY, ADENOIDECTOMY, BILATERAL MYRINGOTOMY AND TUBES       Family History   Problem Relation Age of Onset    Cancer Father         bladder    Diabetes Father     Heart disease Father     Diabetes Mother     Melanoma Brother     Charcot-Karla-Tooth disease Brother     Breast cancer Neg Hx     Ovarian cancer Neg Hx     Anesthesia problems Neg Hx      Social History     Substance and Sexual Activity   Alcohol Use Yes    Comment: rarely     Social History     Tobacco Use   Smoking Status Never Smoker   Smokeless Tobacco Never Used     Social History     Substance and Sexual Activity   Drug Use No       Current Outpatient Medications:     acetaminophen (TYLENOL) 325 MG tablet, Take 650 mg by mouth every 6 (six) hours as needed for Pain., Disp: , Rfl:     cetirizine (ZYRTEC) 10 MG tablet, Take 10 mg by mouth once daily., Disp: , Rfl:     clobetasol (TEMOVATE) 0.05 % cream, Apply topically 2 (two) times daily., Disp: 30 g, Rfl: 1    DULoxetine (CYMBALTA) 60 MG capsule, TAKE 1 CAPSULE DAILY, Disp: 90 capsule, Rfl: 3    fluocinonide (LIDEX) 0.05 % external solution, Apply topically 2 (two) times daily., Disp: 60 mL, Rfl: 1    fluticasone (FLONASE) 50 mcg/actuation nasal spray, 1 spray by Each Nare route once daily., Disp: , Rfl:     hydrocortisone 2.5 % cream, Apply topically 2 (two) times daily., Disp: 30 g, Rfl: 1    ketoconazole (NIZORAL) 2 % shampoo, Apply topically once daily., Disp: 120 mL, Rfl: 1    mineral,lanolin oils/prop glyc (BALNEOL TOP), Apply topically., Disp: , Rfl:     MULTIVITAMIN ORAL, Take by mouth once daily., Disp: , Rfl:     mupirocin (BACTROBAN) 2 % ointment, Apply topically 3 (three) times daily., Disp: 22 g, Rfl: 1    pregabalin (LYRICA) 75 MG capsule, Take 1 capsule (75 mg total) by mouth 2 (two) times daily., Disp: 180 capsule, Rfl: 0    terbinafine HCl (LAMISIL) 1 % cream, Apply topically 2 (two) times daily., Disp: , Rfl:     tiZANidine  (ZANAFLEX) 4 MG tablet, Take 1 tablet (4 mg total) by mouth every 6 (six) hours as needed., Disp: 30 tablet, Rfl: 3    ustekinumab (STELARA) 45 mg/0.5 mL Syrg syringe, Inject 0.5 mLs (45 mg total) into the skin every 3 (three) months., Disp: 1 Syringe, Rfl: 3       Objective:         Vitals:    09/20/19 0949   BP: 120/78   Pulse: 62     Physical Exam   Nursing note and vitals reviewed.  Constitutional: She is oriented to person, place, and time and well-developed, well-nourished, and in no distress.   HENT:   Head: Normocephalic.   Mouth/Throat: Oropharynx is clear and moist.   Eyes: Conjunctivae are normal. Pupils are equal, round, and reactive to light.   Neck: Normal range of motion. Neck supple.   Cardiovascular: Normal rate and normal heart sounds.    Pulmonary/Chest: Effort normal. No respiratory distress.   Abdominal: Soft. There is no tenderness.   Neurological: She is alert and oriented to person, place, and time.   Skin: Skin is warm. Rash noted. No erythema.     Psychiatric: Memory and affect normal.   Musculoskeletal: She exhibits tenderness. She exhibits no edema or deformity.   No synovitis in small joints of hands and feet. No effusions over large joints. Silvio -  +leg length discrepancy L>R  +SI tenderness         Reviewed old and all outside pertinent medical records available.    All lab results personally reviewed and interpreted by me.  Lab Results   Component Value Date    WBC 7.51 08/13/2019    HGB 13.2 08/13/2019    HCT 41.4 08/13/2019    MCV 97 08/13/2019    MCH 30.8 08/13/2019    MCHC 31.9 (L) 08/13/2019    RDW 14.6 (H) 08/13/2019     08/13/2019    MPV 10.1 08/13/2019       Lab Results   Component Value Date     08/13/2019    K 4.4 08/13/2019     08/13/2019    CO2 29 08/13/2019    GLU 93 08/13/2019    BUN 19 08/13/2019    CALCIUM 10.3 08/13/2019    PROT 7.9 08/13/2019    ALBUMIN 3.9 08/13/2019    BILITOT 0.4 08/13/2019    AST 28 08/13/2019    ALKPHOS 76 08/13/2019     ALT 37 08/13/2019       Lab Results   Component Value Date    COLORU YELLOW 11/16/2009    APPEARANCEUA CLEAR 11/16/2009    SPECGRAV 1.032 (H) 11/16/2009    PHUR 7.0 11/16/2009    PROTEINUA 30 (A) 11/16/2009    KETONESU Negative 11/16/2009    LEUKOCYTESUR Negative 11/16/2009    NITRITE Negative 11/16/2009    UROBILINOGEN 1 11/16/2009       Lab Results   Component Value Date    CRP 8.1 06/14/2019       Lab Results   Component Value Date    SEDRATE 16 07/01/2019       Lab Results   Component Value Date    RF <10.0 06/14/2019    SEDRATE 16 07/01/2019       No components found for: 25OHVITDTOT, 43KLYPBU5, 27ANYKAJ4, METHODNOTE    No results found for: URICACID    No components found for: TSPOTTB    Acute hep -  TB gold-    Imaging:  All imaging reviewed and independently  interpreted by me.  MRI 9/6/18  Vertebral column: The lumbar vertebral bodies maintain normal height and alignment.  There is no fracture.  Baseline marrow signal intensity is normal.  There is mild-to-moderate disc space narrowing at the L3-4 level without significant change.  The L5-S1 disc space is preserved.    Spinal canal, conus, epidural space: The spinal canal is developmentally normal.  The conus is normal in location, contour and signal intensity, terminating at the level of L1.  There is no abnormal epidural collection or mass.    Findings by level:    On the sagittal images, the T11-12 level is normal.    T12-L1: Unremarkable. There is no spinal canal or significant foraminal stenosis.  There is no definite change.    L1-2: There is mild facet joint arthropathy.  There is no spinal canal or significant foraminal stenosis.  There is no change.    L2-3: There is minimal bulging of the annulus and mild facet joint arthropathy.  There is no spinal canal or significant foraminal stenosis.  There is no significant change.    L3-4: There is mild-to-moderate disc space narrowing.  There is a diffuse disc bulge.  There is a very subtle dorsal annular  fissure suspected.  There is mild-to-moderate facet joint arthropathy with ligamentum flavum thickening.  There is mild flattening of the ventral dural sac.  There is no spinal canal or significant foraminal stenosis.  There is no significant change.    L4-5: There is a diffuse disc bulge.  There is mild-to-moderate facet joint arthropathy.  There is mild bilateral foraminal stenosis without spinal stenosis.  The changes have slightly progressed.    L5-S1: There is mild facet joint arthropathy.  There is no spinal canal or significant foraminal stenosis.  There is no change.    Soft tissues, other: The prevertebral soft tissues are normal.  The aorta is normal in caliber.      Impression       1. There is mild degenerative change.  There is also some degree of facet joint arthropathy at multiple levels.  There is no fracture or malalignment.  There is no spinal stenosis.  2. At the L3-4 level there is mild-to-moderate facet joint arthropathy with a diffuse disc bulge but without spinal canal or foraminal stenosis.  3. At the L4-5 level there is a diffuse disc bulge with mild-to-moderate facet joint arthropathy contributing to mild bilateral foraminal stenosis without spinal stenosis.  These changes have very slightly progressed.  4. There is no spinal canal or foraminal stenosis at the remainder of the lumbar levels.  Please see above discussion.     Cervical MRI  Postoperative changes status post anterior cervical fusion of C6-C7.    Alignment: Grade 1 retrolisthesis of C5 on C6.    Vertebrae: Normal marrow signal. No fracture.    Discs: Disc desiccation at C5-C6 with mild height loss.    Cord: Normal.    Skull base and craniocervical junction: Normal.    Degenerative findings:    C2-C3: Facet arthropathy, left greater than right.  No significant spinal canal stenosis or neural foraminal narrowing.    C3-C4: Mild posterior disc osteophyte complex resulting in mild right neural foraminal narrowing.  No significant  spinal canal stenosis or left neural foraminal narrowing.    C4-C5: Mild posterior disc osteophyte complex.  No significant spinal canal stenosis or neural foraminal narrowing.    C5-C6: Retrolisthesis as above.  Posterior disc osteophyte complex and uncovertebral spurring resulting in mild spinal canal stenosis and moderate right, mild left neural foraminal narrowing.    C6-C7: Postoperative changes above.  No significant spinal canal stenosis or neural foraminal narrowing.    C7-T1: No significant disease.  No significant spinal canal stenosis or neural foraminal narrowing.    Paraspinal muscles & soft tissues: Unremarkable.      Impression       Status post anterior cervical fusion of C6-C7.    Degenerative changes of the cervical spine as detailed above.     XR Knee 11/16  AP standing and PA flexion views of both knees as well as a lateral and sunrise views of the right knee and a sunrise view of the left knee were obtained.    The medial and lateral compartments appear relatively well preserved. No obvious fracture or dislocation is noted. The patella appear well-positioned within the intercondylar notches bilaterally. No significant right-sided suprapatellar joint effusion is noted.     ASSESSMENT / PLAN:     Clemencia Knight is a 59 y.o. White female with:      1. Psoriatic arthritis  -Presents for initial eval. C/o recent acute onset joint pains pips, wrists. +Reported hx dactylitis. +Current pso. +Family hx pso. +Nail pitting. +Enthesitis  -Does have some features c/w inflammatory back pain, although not entirely. Suspect mostly mechanical.   -Off mtx 2.2 intolerance  -C/w stelara, has had only 2 doses so far     #Cellulitis  -resolved    #High risk med use  -Discussed risks/benefits of all meds including increased risk of infection. No systemic signs at this time.      #Chronic back pain  -w/ DDD s/p cervical fusion  -completed PT, encouraged to continue learned exercises at home    Follow up in about 3  months (around 12/20/2019).    Method of contact with patient concerns: [facility]      Linda Tabares M.D.  Rheumatology Department   [facility] [place] [address], [place] [address]  Phone: [phone]  Fax: [phone]

## 2019-10-03 ENCOUNTER — IMMUNIZATION (OUTPATIENT)
Dept: FAMILY MEDICINE | Facility: CLINIC | Age: 59
End: 2019-10-03
Payer: COMMERCIAL

## 2019-10-03 PROCEDURE — 90686 FLU VACCINE (QUAD) GREATER THAN OR EQUAL TO 3YO PRESERVATIVE FREE IM: ICD-10-PCS | Mod: S$GLB,,, | Performed by: FAMILY MEDICINE

## 2019-10-03 PROCEDURE — 90471 FLU VACCINE (QUAD) GREATER THAN OR EQUAL TO 3YO PRESERVATIVE FREE IM: ICD-10-PCS | Mod: S$GLB,,, | Performed by: FAMILY MEDICINE

## 2019-10-03 PROCEDURE — 90471 IMMUNIZATION ADMIN: CPT | Mod: S$GLB,,, | Performed by: FAMILY MEDICINE

## 2019-10-03 PROCEDURE — 90686 IIV4 VACC NO PRSV 0.5 ML IM: CPT | Mod: S$GLB,,, | Performed by: FAMILY MEDICINE

## 2019-10-09 ENCOUNTER — PATIENT MESSAGE (OUTPATIENT)
Dept: RHEUMATOLOGY | Facility: CLINIC | Age: 59
End: 2019-10-09

## 2019-10-11 ENCOUNTER — TELEPHONE (OUTPATIENT)
Dept: PHARMACY | Facility: CLINIC | Age: 59
End: 2019-10-11

## 2019-10-11 NOTE — TELEPHONE ENCOUNTER
LVM for callback to inform patient that [facility] received prescription for Otezla and prior authorization is required.  [facility] will be back in touch once insurance determination is received.

## 2019-10-17 ENCOUNTER — PATIENT MESSAGE (OUTPATIENT)
Dept: RHEUMATOLOGY | Facility: CLINIC | Age: 59
End: 2019-10-17

## 2019-10-18 NOTE — TELEPHONE ENCOUNTER
Phoned [employer] in regard to prior authorization for OTEZLA submitted verbally to insurance company for review on 10/18/2019 [facility]

## 2019-10-18 NOTE — TELEPHONE ENCOUNTER
"----- Message from Carmelita Zhong sent at 10/18/2019  4:49 PM CDT -----  Hal Tabares and StaffDennys Choi prior authorization has been approved through 2/15/2020. Patient's insurance requires the patient to fill through [facility]. Please send prescription to [facility], which has been added to the patient's EPIC profile. Patient has been notified and provided with the necessary info to call and schedule a delivery.    To complete this in EPIC, the original order MUST be discontinued and re-typed as a new prescription with the updated pharmacy listed. Clicking "reorder" will continue to route the rx to [facility] even if the pharmacy is changed. Please opt the patient out of [facility] when the BPA is fired.    Thank you,  Carmelita Zhong   Patient Care Advocate  [facility]  Ph: [phone]  "

## 2019-10-18 NOTE — TELEPHONE ENCOUNTER
Spoke with patient:    Hal Knight, Your Otezla prescription has been approved by your insurance and must be filled with [facility]. Please call [phone] to enroll in the copay savings program through the . Once this is completed and you have your card, give [facility] a call at [phone] and they can schedule to have your medication delivered to you. Please let me know if you have any questions or concerns.    Thank you,  Carmelita Zhong   Patient Care Advocate   [facility]   Ph: [phone]

## 2019-10-18 NOTE — TELEPHONE ENCOUNTER
DOCUMENTATION ONLY:     Prior authorization for Otezla starter and maintenance  approved from 9/18/2019 to 02/15/2020.     Case ID# [id number], [id number]    Patient Assistance IS NOT required Otezla card on file.   FLC

## 2019-10-21 ENCOUNTER — TELEPHONE (OUTPATIENT)
Dept: RHEUMATOLOGY | Facility: CLINIC | Age: 59
End: 2019-10-21

## 2019-10-21 NOTE — TELEPHONE ENCOUNTER
[facility] for Otezla regarding starter kit and maintenance dose. Mat voiced understanding

## 2019-10-21 NOTE — TELEPHONE ENCOUNTER
----- Message from Sherice Rivera sent at 10/21/2019  2:27 PM CDT -----  Contact: pt   Type:  Pharmacy Calling to Clarify an RX    Name of Caller: Gissell   Pharmacy Name: [facility]   Prescription Name: apremilast (OTEZLA) 30 mg Tab  What do they need to clarify?: have questions regarding med   Best Call Back Number: [phone]  Additional Information:

## 2019-11-04 ENCOUNTER — OFFICE VISIT (OUTPATIENT)
Dept: OTOLARYNGOLOGY | Facility: CLINIC | Age: 59
End: 2019-11-04
Payer: COMMERCIAL

## 2019-11-04 VITALS — WEIGHT: 194 LBS | BODY MASS INDEX: 33.12 KG/M2 | HEIGHT: 64 IN

## 2019-11-04 DIAGNOSIS — H61.21 RIGHT EAR IMPACTED CERUMEN: Primary | ICD-10-CM

## 2019-11-04 DIAGNOSIS — Z97.4 WEARS HEARING AID IN RIGHT EAR: ICD-10-CM

## 2019-11-04 DIAGNOSIS — H60.543 DERMATITIS OF BOTH EAR CANALS: ICD-10-CM

## 2019-11-04 PROCEDURE — 69210 PR REMOVAL IMPACTED CERUMEN REQUIRING INSTRUMENTATION, UNILATERAL: ICD-10-PCS | Mod: S$GLB,,, | Performed by: NURSE PRACTITIONER

## 2019-11-04 PROCEDURE — 3008F PR BODY MASS INDEX (BMI) DOCUMENTED: ICD-10-PCS | Mod: CPTII,S$GLB,, | Performed by: NURSE PRACTITIONER

## 2019-11-04 PROCEDURE — 3008F BODY MASS INDEX DOCD: CPT | Mod: CPTII,S$GLB,, | Performed by: NURSE PRACTITIONER

## 2019-11-04 PROCEDURE — 99999 PR PBB SHADOW E&M-EST. PATIENT-LVL III: CPT | Mod: PBBFAC,,, | Performed by: NURSE PRACTITIONER

## 2019-11-04 PROCEDURE — 99213 OFFICE O/P EST LOW 20 MIN: CPT | Mod: 25,S$GLB,, | Performed by: NURSE PRACTITIONER

## 2019-11-04 PROCEDURE — 69210 REMOVE IMPACTED EAR WAX UNI: CPT | Mod: S$GLB,,, | Performed by: NURSE PRACTITIONER

## 2019-11-04 PROCEDURE — 99999 PR PBB SHADOW E&M-EST. PATIENT-LVL III: ICD-10-PCS | Mod: PBBFAC,,, | Performed by: NURSE PRACTITIONER

## 2019-11-04 PROCEDURE — 99213 PR OFFICE/OUTPT VISIT, EST, LEVL III, 20-29 MIN: ICD-10-PCS | Mod: 25,S$GLB,, | Performed by: NURSE PRACTITIONER

## 2019-11-04 NOTE — PROGRESS NOTES
"Subjective:       Patient ID: Clemencia Knight is a 59 y.o. female.    Chief Complaint: Ear Drainage (right side, also itches)    Ear Drainage    Pertinent negatives include no ear discharge.      Patient had OE (+)strep and candida several months ago, treated with Gentian violet and CSF powder. Still has scaling, dry skin, and feels like right ear "leaks."    Review of Systems   Constitutional: Negative.    HENT: Negative for ear discharge and ear pain.    Eyes: Negative.    Respiratory: Negative.    Cardiovascular: Negative.    Gastrointestinal: Negative.    Musculoskeletal: Negative.    Skin: Negative.    Neurological: Negative.    Hematological: Negative.    Psychiatric/Behavioral: Negative.        Objective:      Physical Exam   Constitutional: She is oriented to person, place, and time. Vital signs are normal. She appears well-developed and well-nourished. She is cooperative. She does not appear ill. No distress.   HENT:   Head: Normocephalic and atraumatic.   Right Ear: Hearing, tympanic membrane, external ear and ear canal normal. No swelling or tenderness. Tympanic membrane is not erythematous. No middle ear effusion.   Left Ear: Hearing, tympanic membrane, external ear and ear canal normal. No swelling or tenderness. Tympanic membrane is not erythematous.  No middle ear effusion.   Ears:    Nose: Nose normal.   Mouth/Throat: Uvula is midline, oropharynx is clear and moist and mucous membranes are normal.     SEPARATE PROCEDURE IN OFFICE:   Procedure: Removal of impacted cerumen, RIGHT   Pre Procedure Diagnosis: Cerumen Impaction   Post Procedure Diagnosis: Cerumen Impaction   Verbal informed consent in regards to risk of trauma to ear canal, ear drum or hearing, discomfort during procedure and/or inability to remove cerumen impaction in one session or unforeseen events or complications.   No anesthesia.     Procedure in detail:   Ear canal visualized bilateral with appropriate size ear speculum utilizing " Operating Head Binocular Otomicroscope   Utilizing the following:  Suction cannula was used AD. The impacted cerumen of the ear canals was removed atraumatically. The TM and EAC were then inspected and found to be clear of wax. See description of TMs/EACs in PE above.   Complications: No   Condition: Improved/Good     Eyes: EOM and lids are normal. Right eye exhibits no discharge. Left eye exhibits no discharge. No scleral icterus.   Neck: Trachea normal and normal range of motion. Neck supple. No tracheal deviation present.   Cardiovascular: Normal rate.   Pulmonary/Chest: Effort normal. No stridor. No respiratory distress. She has no wheezes.   Musculoskeletal: Normal range of motion.   Neurological: She is alert and oriented to person, place, and time. She has normal strength. Coordination and gait normal.   Skin: Skin is warm, dry and intact. She is not diaphoretic. No cyanosis. No pallor.   Psychiatric: She has a normal mood and affect. Her speech is normal and behavior is normal. Judgment and thought content normal. Cognition and memory are normal.   Nursing note and vitals reviewed.      Assessment:     Both EACs without infection. Negative aural exam AU.       Plan:    Reassured no evidence of OE or OM AU  Continue to keep ears dry.     Pt has clotrimazole/betamethasone topical ointment for outer ears  Mix 50/50 solution of alcohol/vinegar for QHS  Recheck as needed for further ENT concerns

## 2019-12-09 RX ORDER — PREGABALIN 75 MG/1
75 CAPSULE ORAL 2 TIMES DAILY
Qty: 180 CAPSULE | Refills: 0 | Status: SHIPPED | OUTPATIENT
Start: 2019-12-09 | End: 2020-03-03 | Stop reason: SDUPTHER

## 2019-12-09 RX ORDER — TIZANIDINE 4 MG/1
4 TABLET ORAL EVERY 6 HOURS PRN
Qty: 30 TABLET | Refills: 3 | Status: SHIPPED | OUTPATIENT
Start: 2019-12-09 | End: 2020-04-19 | Stop reason: SDUPTHER

## 2019-12-20 ENCOUNTER — LAB VISIT (OUTPATIENT)
Dept: LAB | Facility: HOSPITAL | Age: 59
End: 2019-12-20
Attending: STUDENT IN AN ORGANIZED HEALTH CARE EDUCATION/TRAINING PROGRAM
Payer: COMMERCIAL

## 2019-12-20 ENCOUNTER — OFFICE VISIT (OUTPATIENT)
Dept: RHEUMATOLOGY | Facility: CLINIC | Age: 59
End: 2019-12-20
Payer: COMMERCIAL

## 2019-12-20 VITALS
DIASTOLIC BLOOD PRESSURE: 95 MMHG | HEART RATE: 110 BPM | WEIGHT: 193.69 LBS | HEIGHT: 64 IN | SYSTOLIC BLOOD PRESSURE: 140 MMHG | BODY MASS INDEX: 33.07 KG/M2

## 2019-12-20 DIAGNOSIS — L40.50 PSORIATIC ARTHRITIS: ICD-10-CM

## 2019-12-20 DIAGNOSIS — L40.50 PSORIATIC ARTHRITIS: Primary | ICD-10-CM

## 2019-12-20 LAB
ALBUMIN SERPL BCP-MCNC: 4 G/DL (ref 3.5–5.2)
ALP SERPL-CCNC: 86 U/L (ref 55–135)
ALT SERPL W/O P-5'-P-CCNC: 39 U/L (ref 10–44)
ANION GAP SERPL CALC-SCNC: 8 MMOL/L (ref 8–16)
AST SERPL-CCNC: 32 U/L (ref 10–40)
BASOPHILS # BLD AUTO: 0.04 K/UL (ref 0–0.2)
BASOPHILS NFR BLD: 0.5 % (ref 0–1.9)
BILIRUB SERPL-MCNC: 0.3 MG/DL (ref 0.1–1)
BUN SERPL-MCNC: 13 MG/DL (ref 6–20)
CALCIUM SERPL-MCNC: 10 MG/DL (ref 8.7–10.5)
CHLORIDE SERPL-SCNC: 103 MMOL/L (ref 95–110)
CO2 SERPL-SCNC: 30 MMOL/L (ref 23–29)
CREAT SERPL-MCNC: 0.9 MG/DL (ref 0.5–1.4)
CRP SERPL-MCNC: 8.6 MG/L (ref 0–8.2)
DIFFERENTIAL METHOD: ABNORMAL
EOSINOPHIL # BLD AUTO: 0.4 K/UL (ref 0–0.5)
EOSINOPHIL NFR BLD: 4.3 % (ref 0–8)
ERYTHROCYTE [DISTWIDTH] IN BLOOD BY AUTOMATED COUNT: 13.2 % (ref 11.5–14.5)
ERYTHROCYTE [SEDIMENTATION RATE] IN BLOOD BY WESTERGREN METHOD: 23 MM/HR (ref 0–20)
EST. GFR  (AFRICAN AMERICAN): >60 ML/MIN/1.73 M^2
EST. GFR  (NON AFRICAN AMERICAN): >60 ML/MIN/1.73 M^2
GLUCOSE SERPL-MCNC: 95 MG/DL (ref 70–110)
HCT VFR BLD AUTO: 40.1 % (ref 37–48.5)
HGB BLD-MCNC: 12.8 G/DL (ref 12–16)
IMM GRANULOCYTES # BLD AUTO: 0.02 K/UL (ref 0–0.04)
IMM GRANULOCYTES NFR BLD AUTO: 0.2 % (ref 0–0.5)
LYMPHOCYTES # BLD AUTO: 3.2 K/UL (ref 1–4.8)
LYMPHOCYTES NFR BLD: 40 % (ref 18–48)
MCH RBC QN AUTO: 29.8 PG (ref 27–31)
MCHC RBC AUTO-ENTMCNC: 31.9 G/DL (ref 32–36)
MCV RBC AUTO: 94 FL (ref 82–98)
MONOCYTES # BLD AUTO: 0.6 K/UL (ref 0.3–1)
MONOCYTES NFR BLD: 7.2 % (ref 4–15)
NEUTROPHILS # BLD AUTO: 3.9 K/UL (ref 1.8–7.7)
NEUTROPHILS NFR BLD: 47.8 % (ref 38–73)
NRBC BLD-RTO: 0 /100 WBC
PLATELET # BLD AUTO: 339 K/UL (ref 150–350)
PMV BLD AUTO: 9.9 FL (ref 9.2–12.9)
POTASSIUM SERPL-SCNC: 4.2 MMOL/L (ref 3.5–5.1)
PROT SERPL-MCNC: 7.6 G/DL (ref 6–8.4)
RBC # BLD AUTO: 4.29 M/UL (ref 4–5.4)
SODIUM SERPL-SCNC: 141 MMOL/L (ref 136–145)
WBC # BLD AUTO: 8.09 K/UL (ref 3.9–12.7)

## 2019-12-20 PROCEDURE — 85025 COMPLETE CBC W/AUTO DIFF WBC: CPT

## 2019-12-20 PROCEDURE — 99999 PR PBB SHADOW E&M-EST. PATIENT-LVL IV: CPT | Mod: PBBFAC,,, | Performed by: STUDENT IN AN ORGANIZED HEALTH CARE EDUCATION/TRAINING PROGRAM

## 2019-12-20 PROCEDURE — 3077F SYST BP >= 140 MM HG: CPT | Mod: CPTII,S$GLB,, | Performed by: STUDENT IN AN ORGANIZED HEALTH CARE EDUCATION/TRAINING PROGRAM

## 2019-12-20 PROCEDURE — 85651 RBC SED RATE NONAUTOMATED: CPT | Mod: PO

## 2019-12-20 PROCEDURE — 86140 C-REACTIVE PROTEIN: CPT

## 2019-12-20 PROCEDURE — 99214 OFFICE O/P EST MOD 30 MIN: CPT | Mod: S$GLB,,, | Performed by: STUDENT IN AN ORGANIZED HEALTH CARE EDUCATION/TRAINING PROGRAM

## 2019-12-20 PROCEDURE — 3080F DIAST BP >= 90 MM HG: CPT | Mod: CPTII,S$GLB,, | Performed by: STUDENT IN AN ORGANIZED HEALTH CARE EDUCATION/TRAINING PROGRAM

## 2019-12-20 PROCEDURE — 99214 PR OFFICE/OUTPT VISIT, EST, LEVL IV, 30-39 MIN: ICD-10-PCS | Mod: S$GLB,,, | Performed by: STUDENT IN AN ORGANIZED HEALTH CARE EDUCATION/TRAINING PROGRAM

## 2019-12-20 PROCEDURE — 3080F PR MOST RECENT DIASTOLIC BLOOD PRESSURE >= 90 MM HG: ICD-10-PCS | Mod: CPTII,S$GLB,, | Performed by: STUDENT IN AN ORGANIZED HEALTH CARE EDUCATION/TRAINING PROGRAM

## 2019-12-20 PROCEDURE — 3077F PR MOST RECENT SYSTOLIC BLOOD PRESSURE >= 140 MM HG: ICD-10-PCS | Mod: CPTII,S$GLB,, | Performed by: STUDENT IN AN ORGANIZED HEALTH CARE EDUCATION/TRAINING PROGRAM

## 2019-12-20 PROCEDURE — 3008F PR BODY MASS INDEX (BMI) DOCUMENTED: ICD-10-PCS | Mod: CPTII,S$GLB,, | Performed by: STUDENT IN AN ORGANIZED HEALTH CARE EDUCATION/TRAINING PROGRAM

## 2019-12-20 PROCEDURE — 99999 PR PBB SHADOW E&M-EST. PATIENT-LVL IV: ICD-10-PCS | Mod: PBBFAC,,, | Performed by: STUDENT IN AN ORGANIZED HEALTH CARE EDUCATION/TRAINING PROGRAM

## 2019-12-20 PROCEDURE — 3008F BODY MASS INDEX DOCD: CPT | Mod: CPTII,S$GLB,, | Performed by: STUDENT IN AN ORGANIZED HEALTH CARE EDUCATION/TRAINING PROGRAM

## 2019-12-20 PROCEDURE — 80053 COMPREHEN METABOLIC PANEL: CPT

## 2019-12-20 PROCEDURE — 36415 COLL VENOUS BLD VENIPUNCTURE: CPT | Mod: PO

## 2019-12-20 RX ORDER — CYCLOBENZAPRINE HCL 10 MG
10 TABLET ORAL 3 TIMES DAILY PRN
Qty: 30 TABLET | Refills: 4 | Status: SHIPPED | OUTPATIENT
Start: 2019-12-20 | End: 2020-04-17

## 2019-12-20 RX ORDER — ADALIMUMAB 40MG/0.8ML
40 KIT SUBCUTANEOUS
Qty: 2 PEN | Refills: 3 | Status: SHIPPED | OUTPATIENT
Start: 2019-12-20 | End: 2020-02-04

## 2019-12-23 ENCOUNTER — HOSPITAL ENCOUNTER (OUTPATIENT)
Dept: RADIOLOGY | Facility: HOSPITAL | Age: 59
Discharge: HOME | End: 2019-12-23
Attending: STUDENT IN AN ORGANIZED HEALTH CARE EDUCATION/TRAINING PROGRAM
Payer: COMMERCIAL

## 2019-12-23 ENCOUNTER — TELEPHONE (OUTPATIENT)
Dept: PHARMACY | Facility: CLINIC | Age: 59
End: 2019-12-23

## 2019-12-23 ENCOUNTER — PATIENT MESSAGE (OUTPATIENT)
Dept: RHEUMATOLOGY | Facility: CLINIC | Age: 59
End: 2019-12-23

## 2019-12-23 DIAGNOSIS — L40.50 PSORIATIC ARTHRITIS: ICD-10-CM

## 2019-12-23 PROCEDURE — A9585 GADOBUTROL INJECTION: HCPCS | Mod: PO | Performed by: STUDENT IN AN ORGANIZED HEALTH CARE EDUCATION/TRAINING PROGRAM

## 2019-12-23 PROCEDURE — 72197 MRI PELVIS W/O & W/DYE: CPT | Mod: 26,,, | Performed by: RADIOLOGY

## 2019-12-23 PROCEDURE — 72156 MRI CERVICAL SPINE W WO CONTRAST: ICD-10-PCS | Mod: 26,,, | Performed by: RADIOLOGY

## 2019-12-23 PROCEDURE — 72156 MRI NECK SPINE W/O & W/DYE: CPT | Mod: TC,PO

## 2019-12-23 PROCEDURE — 72197 MRI SACROILIAC JOINTS W W/O CONTRAST: ICD-10-PCS | Mod: 26,,, | Performed by: RADIOLOGY

## 2019-12-23 PROCEDURE — 25500020 PHARM REV CODE 255: Mod: PO | Performed by: STUDENT IN AN ORGANIZED HEALTH CARE EDUCATION/TRAINING PROGRAM

## 2019-12-23 PROCEDURE — 72156 MRI NECK SPINE W/O & W/DYE: CPT | Mod: 26,,, | Performed by: RADIOLOGY

## 2019-12-23 PROCEDURE — 72197 MRI PELVIS W/O & W/DYE: CPT | Mod: TC,PO

## 2019-12-23 RX ORDER — GADOBUTROL 604.72 MG/ML
8 INJECTION INTRAVENOUS
Status: COMPLETED | OUTPATIENT
Start: 2019-12-23 | End: 2019-12-23

## 2019-12-23 RX ADMIN — GADOBUTROL 8 ML: 604.72 INJECTION INTRAVENOUS at 10:12

## 2019-12-26 ENCOUNTER — PATIENT MESSAGE (OUTPATIENT)
Dept: RHEUMATOLOGY | Facility: CLINIC | Age: 59
End: 2019-12-26

## 2019-12-27 NOTE — PROGRESS NOTES
RHEUMATOLOGY OUTPATIENT CLINIC NOTE        Attending Rheumatologist: Linda Tabares  Primary Care Provider: Dallas Higgins MD   Physician Requesting Consultation: No referring provider defined for this encounter.  Chief Complaint/Reason For Consultation:  Psoriatic arthritis      Subjective:       HPI  Clemencia Knight is a 59 y.o. White female presents for initial evaluation of joint pains / psoriatic arthritis. Recently diagnosed w psoriasis. Stelara was not approved by insurance. Currently on methotrexate 4tabs weekly +FA daily, on week 2. Main joint complaints are in right wrist, b/l PIPs, lower back. Does report episode of diffuse right third digit swelling. +Nail pitting. States joint pains acutely worsened over past few months, around onset of skin pso. +Prolonged AM stiffness. Lower back pain improves w activity, +significant AM stiffness. No alternating buttock pain, nightime awakening, or worsening w rest. +Relief w ibuprofen. Has chronic back pain x >5 years at least. Hx DDD w cervical fusion. No prior MVA or trauma. No hx dactylitis. +Family hx psoriasis and rheumatoid arthritis.     Today:  Since last visit, Still w joint pains in [facility] w prolonged AM stiffness. ++Back pain. Skin pso improved. No new complaints    14pt ros negative except as otherwise stated above      Review of Systems   Constitutional: Negative for fever and malaise/fatigue.   HENT: Negative for congestion, sinus pain and sore throat.    Eyes: Negative for blurred vision, pain and redness.   Respiratory: Negative for cough and shortness of breath.    Cardiovascular: Negative for chest pain, claudication and leg swelling.   Gastrointestinal: Negative for constipation and diarrhea.   Genitourinary: Negative for dysuria.   Musculoskeletal: Positive for joint pain. Negative for back pain, myalgias and neck pain.   Skin: Positive for rash. Negative for itching.   Neurological: Negative for focal weakness and headaches.    Endo/Heme/Allergies: Does not bruise/bleed easily.   Psychiatric/Behavioral: Negative for depression and substance abuse.       Chronic comorbid conditions affecting medical decision making today:  Past Medical History:   Diagnosis Date    Allergy     General anesthetics causing adverse effect in therapeutic use     pt. somewhat aware of extubation with one of her surgeries    GERD (gastroesophageal reflux disease)     Hearing loss     Restless leg syndrome     Sciatica      Past Surgical History:   Procedure Laterality Date    AUGMENTATION OF BREAST      BELT ABDOMINOPLASTY      breast implants      CERVICAL FUSION      COLONOSCOPY  10/2012    repeat in 10    DEXA      WNL    EPIDURAL STEROID INJECTION INTO CERVICAL SPINE N/A 9/24/2018    Procedure: Injection-steroid-epidural-cervical;  Surgeon: Myles Rocha MD;  Location: [facility] OR;  Service: Pain Management;  Laterality: N/A;  C7-T1    EPIDURAL STEROID INJECTION INTO CERVICAL SPINE N/A 11/20/2018    Procedure: Injection-steroid-epidural-cervical;  Surgeon: Myles Rocha MD;  Location: [facility] OR;  Service: Pain Management;  Laterality: N/A;  C7-T1    HYSTERECTOMY  2000    OOPHORECTOMY  7/16/2013    laparotomy BSO for benign 10cm tubal cyst    SALPINGOOPHORECTOMY  2013    with removal of fallopian cyst    SHOULDER SURGERY      right    TLH  2000    ovaries remain, benign    TONSILLECTOMY, ADENOIDECTOMY, BILATERAL MYRINGOTOMY AND TUBES       Family History   Problem Relation Age of Onset    Cancer Father         bladder    Diabetes Father     Heart disease Father     Diabetes Mother     Melanoma Brother     Charcot-Karla-Tooth disease Brother     Breast cancer Neg Hx     Ovarian cancer Neg Hx     Anesthesia problems Neg Hx      Social History     Substance and Sexual Activity   Alcohol Use Yes    Comment: rarely     Social History     Tobacco Use   Smoking Status Never Smoker   Smokeless Tobacco Never Used     Social History     Substance and  Sexual Activity   Drug Use No       Current Outpatient Medications:     acetaminophen (TYLENOL) 325 MG tablet, Take 650 mg by mouth every 6 (six) hours as needed for Pain., Disp: , Rfl:     apremilast (OTEZLA STARTER) 10 mg (4)-20 mg (4)-30 mg (47) DsPk, Take starter pack as directed on packaging, Disp: 55 tablet, Rfl: 0    apremilast (OTEZLA) 30 mg Tab, Take 1 tablet (30 mg total) by mouth 2 (two) times daily., Disp: 60 tablet, Rfl: 5    cetirizine (ZYRTEC) 10 MG tablet, Take 10 mg by mouth once daily., Disp: , Rfl:     clobetasol (TEMOVATE) 0.05 % cream, Apply topically 2 (two) times daily., Disp: 30 g, Rfl: 1    DULoxetine (CYMBALTA) 60 MG capsule, TAKE 1 CAPSULE DAILY, Disp: 90 capsule, Rfl: 3    fluocinonide (LIDEX) 0.05 % external solution, Apply topically 2 (two) times daily., Disp: 60 mL, Rfl: 1    fluticasone (FLONASE) 50 mcg/actuation nasal spray, 1 spray by Each Nare route once daily., Disp: , Rfl:     hydrocortisone 2.5 % cream, Apply topically 2 (two) times daily., Disp: 30 g, Rfl: 1    ketoconazole (NIZORAL) 2 % shampoo, Apply topically once daily., Disp: 120 mL, Rfl: 1    mineral,lanolin oils/prop glyc (BALNEOL TOP), Apply topically., Disp: , Rfl:     MULTIVITAMIN ORAL, Take by mouth once daily., Disp: , Rfl:     mupirocin (BACTROBAN) 2 % ointment, Apply topically 3 (three) times daily., Disp: 22 g, Rfl: 1    pregabalin (LYRICA) 75 MG capsule, Take 1 capsule (75 mg total) by mouth 2 (two) times daily., Disp: 180 capsule, Rfl: 0    terbinafine HCl (LAMISIL) 1 % cream, Apply topically 2 (two) times daily., Disp: , Rfl:     tiZANidine (ZANAFLEX) 4 MG tablet, Take 1 tablet (4 mg total) by mouth every 6 (six) hours as needed., Disp: 30 tablet, Rfl: 3    ustekinumab (STELARA) 45 mg/0.5 mL Syrg syringe, Inject 0.5 mLs (45 mg total) into the skin every 3 (three) months., Disp: 1 Syringe, Rfl: 3    adalimumab (HUMIRA PEN) 40 mg/0.8 mL PnKt, Inject 1 pen (40 mg total) into the skin every 14  (fourteen) days., Disp: 2 pen, Rfl: 3    cyclobenzaprine (FLEXERIL) 10 MG tablet, Take 1 tablet (10 mg total) by mouth 3 (three) times daily as needed for Muscle spasms., Disp: 30 tablet, Rfl: 4       Objective:         Vitals:    12/20/19 1056   BP: (!) 140/95   Pulse: 110     Physical Exam   Nursing note and vitals reviewed.  Constitutional: She is oriented to person, place, and time and well-developed, well-nourished, and in no distress.   HENT:   Head: Normocephalic.   Mouth/Throat: Oropharynx is clear and moist.   Eyes: Conjunctivae are normal. Pupils are equal, round, and reactive to light.   Neck: Normal range of motion. Neck supple.   Cardiovascular: Normal rate and normal heart sounds.    Pulmonary/Chest: Effort normal. No respiratory distress.   Abdominal: Soft. There is no tenderness.   Neurological: She is alert and oriented to person, place, and time.   Skin: Skin is warm. Rash noted. No erythema.     Psychiatric: Memory and affect normal.   Musculoskeletal: She exhibits tenderness. She exhibits no edema or deformity.   No synovitis in small joints of hands and feet. No effusions over large joints. Silvio -  +leg length discrepancy L>R  +SI tenderness         Reviewed old and all outside pertinent medical records available.    All lab results personally reviewed and interpreted by me.  Lab Results   Component Value Date    WBC 8.09 12/20/2019    HGB 12.8 12/20/2019    HCT 40.1 12/20/2019    MCV 94 12/20/2019    MCH 29.8 12/20/2019    MCHC 31.9 (L) 12/20/2019    RDW 13.2 12/20/2019     12/20/2019    MPV 9.9 12/20/2019       Lab Results   Component Value Date     12/20/2019    K 4.2 12/20/2019     12/20/2019    CO2 30 (H) 12/20/2019    GLU 95 12/20/2019    BUN 13 12/20/2019    CALCIUM 10.0 12/20/2019    PROT 7.6 12/20/2019    ALBUMIN 4.0 12/20/2019    BILITOT 0.3 12/20/2019    AST 32 12/20/2019    ALKPHOS 86 12/20/2019    ALT 39 12/20/2019       Lab Results   Component Value Date     COLORU YELLOW 11/16/2009    APPEARANCEUA CLEAR 11/16/2009    SPECGRAV 1.032 (H) 11/16/2009    PHUR 7.0 11/16/2009    PROTEINUA 30 (A) 11/16/2009    KETONESU Negative 11/16/2009    LEUKOCYTESUR Negative 11/16/2009    NITRITE Negative 11/16/2009    UROBILINOGEN 1 11/16/2009       Lab Results   Component Value Date    CRP 8.6 (H) 12/20/2019       Lab Results   Component Value Date    SEDRATE 23 (H) 12/20/2019       Lab Results   Component Value Date    RF <10.0 06/14/2019    SEDRATE 23 (H) 12/20/2019       No components found for: 25OHVITDTOT, 39SITSBF5, 85XPPKTF8, METHODNOTE    No results found for: URICACID    No components found for: TSPOTTB    Acute hep -  TB gold-    Imaging:  All imaging reviewed and independently  interpreted by me.  MRI 9/6/18  Vertebral column: The lumbar vertebral bodies maintain normal height and alignment.  There is no fracture.  Baseline marrow signal intensity is normal.  There is mild-to-moderate disc space narrowing at the L3-4 level without significant change.  The L5-S1 disc space is preserved.    Spinal canal, conus, epidural space: The spinal canal is developmentally normal.  The conus is normal in location, contour and signal intensity, terminating at the level of L1.  There is no abnormal epidural collection or mass.    Findings by level:    On the sagittal images, the T11-12 level is normal.    T12-L1: Unremarkable. There is no spinal canal or significant foraminal stenosis.  There is no definite change.    L1-2: There is mild facet joint arthropathy.  There is no spinal canal or significant foraminal stenosis.  There is no change.    L2-3: There is minimal bulging of the annulus and mild facet joint arthropathy.  There is no spinal canal or significant foraminal stenosis.  There is no significant change.    L3-4: There is mild-to-moderate disc space narrowing.  There is a diffuse disc bulge.  There is a very subtle dorsal annular fissure suspected.  There is mild-to-moderate  facet joint arthropathy with ligamentum flavum thickening.  There is mild flattening of the ventral dural sac.  There is no spinal canal or significant foraminal stenosis.  There is no significant change.    L4-5: There is a diffuse disc bulge.  There is mild-to-moderate facet joint arthropathy.  There is mild bilateral foraminal stenosis without spinal stenosis.  The changes have slightly progressed.    L5-S1: There is mild facet joint arthropathy.  There is no spinal canal or significant foraminal stenosis.  There is no change.    Soft tissues, other: The prevertebral soft tissues are normal.  The aorta is normal in caliber.      Impression       1. There is mild degenerative change.  There is also some degree of facet joint arthropathy at multiple levels.  There is no fracture or malalignment.  There is no spinal stenosis.  2. At the L3-4 level there is mild-to-moderate facet joint arthropathy with a diffuse disc bulge but without spinal canal or foraminal stenosis.  3. At the L4-5 level there is a diffuse disc bulge with mild-to-moderate facet joint arthropathy contributing to mild bilateral foraminal stenosis without spinal stenosis.  These changes have very slightly progressed.  4. There is no spinal canal or foraminal stenosis at the remainder of the lumbar levels.  Please see above discussion.     Cervical MRI  Postoperative changes status post anterior cervical fusion of C6-C7.    Alignment: Grade 1 retrolisthesis of C5 on C6.    Vertebrae: Normal marrow signal. No fracture.    Discs: Disc desiccation at C5-C6 with mild height loss.    Cord: Normal.    Skull base and craniocervical junction: Normal.    Degenerative findings:    C2-C3: Facet arthropathy, left greater than right.  No significant spinal canal stenosis or neural foraminal narrowing.    C3-C4: Mild posterior disc osteophyte complex resulting in mild right neural foraminal narrowing.  No significant spinal canal stenosis or left neural foraminal  narrowing.    C4-C5: Mild posterior disc osteophyte complex.  No significant spinal canal stenosis or neural foraminal narrowing.    C5-C6: Retrolisthesis as above.  Posterior disc osteophyte complex and uncovertebral spurring resulting in mild spinal canal stenosis and moderate right, mild left neural foraminal narrowing.    C6-C7: Postoperative changes above.  No significant spinal canal stenosis or neural foraminal narrowing.    C7-T1: No significant disease.  No significant spinal canal stenosis or neural foraminal narrowing.    Paraspinal muscles & soft tissues: Unremarkable.      Impression       Status post anterior cervical fusion of C6-C7.    Degenerative changes of the cervical spine as detailed above.     XR Knee 11/16  AP standing and PA flexion views of both knees as well as a lateral and sunrise views of the right knee and a sunrise view of the left knee were obtained.    The medial and lateral compartments appear relatively well preserved. No obvious fracture or dislocation is noted. The patella appear well-positioned within the intercondylar notches bilaterally. No significant right-sided suprapatellar joint effusion is noted.     ASSESSMENT / PLAN:     Clemencia Knight is a 59 y.o. White female with:      1. Psoriatic arthritis  -Presents for initial eval. C/o recent acute onset joint pains pips, wrists. +Reported hx dactylitis. +Current pso. +Family hx pso. +Nail pitting. +Enthesitis  -Does have some features c/w inflammatory back pain, although not entirely. Suspect mostly mechanical.   -Off mtx 2.2 intolerance  -Incomplete response w stelara and otezla. Will stop  -Start PA for humira     #Cellulitis  -resolved    #High risk med use  -Discussed risks/benefits of all meds including increased risk of infection. No systemic signs at this time.      #Chronic back pain  -w/ DDD s/p cervical fusion  -completed PT, encouraged to continue learned exercises at home    Time spent: 45 minutes in face to face  discussion concerning diagnosis, prognosis, review of lab and test results, benefits of treatment as well as management of disease, counseling of patient and coordination of care between various health care providers . Greater than half the time spent was used for coordination of care and counseling of patient.    Method of contact with patient concerns: [facility]      Linda Tabares M.D.  Rheumatology Department   [facility] [place] [address], [place] [address]  Phone: [phone]  Fax: [phone]

## 2020-01-02 ENCOUNTER — PATIENT MESSAGE (OUTPATIENT)
Dept: RHEUMATOLOGY | Facility: CLINIC | Age: 60
End: 2020-01-02

## 2020-01-02 DIAGNOSIS — R51.9 NONINTRACTABLE HEADACHE, UNSPECIFIED CHRONICITY PATTERN, UNSPECIFIED HEADACHE TYPE: Primary | ICD-10-CM

## 2020-01-06 ENCOUNTER — PATIENT MESSAGE (OUTPATIENT)
Dept: RHEUMATOLOGY | Facility: CLINIC | Age: 60
End: 2020-01-06

## 2020-01-06 ENCOUNTER — PATIENT MESSAGE (OUTPATIENT)
Dept: FAMILY MEDICINE | Facility: CLINIC | Age: 60
End: 2020-01-06

## 2020-01-13 ENCOUNTER — PATIENT MESSAGE (OUTPATIENT)
Dept: RHEUMATOLOGY | Facility: CLINIC | Age: 60
End: 2020-01-13

## 2020-01-17 ENCOUNTER — PATIENT MESSAGE (OUTPATIENT)
Dept: RHEUMATOLOGY | Facility: CLINIC | Age: 60
End: 2020-01-17

## 2020-01-22 ENCOUNTER — PATIENT MESSAGE (OUTPATIENT)
Dept: RHEUMATOLOGY | Facility: CLINIC | Age: 60
End: 2020-01-22

## 2020-01-24 ENCOUNTER — PATIENT MESSAGE (OUTPATIENT)
Dept: RHEUMATOLOGY | Facility: CLINIC | Age: 60
End: 2020-01-24

## 2020-02-03 ENCOUNTER — PATIENT MESSAGE (OUTPATIENT)
Dept: RHEUMATOLOGY | Facility: CLINIC | Age: 60
End: 2020-02-03

## 2020-02-03 ENCOUNTER — TELEPHONE (OUTPATIENT)
Dept: RHEUMATOLOGY | Facility: CLINIC | Age: 60
End: 2020-02-03

## 2020-02-03 NOTE — TELEPHONE ENCOUNTER
Spoke with Mrs. Knight informed her that I spoke with Maria Eugenia at [facility] and per Maria Eugenia they are actively working on getting Humira approved through her insurance carrier. Mrs. Triche voiced understanding and stated that she was calling a different pharmacy. I re- supplied Mrs. Knight with the Contact info for [facility].

## 2020-02-03 NOTE — TELEPHONE ENCOUNTER
Spoke with Maria Eugenia at [facility] regarding rx for Humira that was sent on 12/20/2020, Maria Eugenia stated that they are actively working on getting Humira approved through her insurance

## 2020-02-04 RX ORDER — ADALIMUMAB 40MG/0.8ML
40 KIT SUBCUTANEOUS
COMMUNITY
End: 2020-02-04 | Stop reason: SDUPTHER

## 2020-02-04 NOTE — TELEPHONE ENCOUNTER
"----- Message from Cecille Luo sent at 2/4/2020 11:04 AM CST -----  Regarding: Humira-[facility]  YEIMY Eubanks: Humira prior authorization has been approved through 5/4/20. Patients insurance requires the patient to fill through [facility]. Please send prescription to [facility], which has been added to patient's Epic profile. Patient has been notified and provided with the necessary information to call and schedule a delivery.     To complete this in Epic, the original order MUST be discontinued and re-typed as a new prescription with the updated pharmacy listed. Clicking "reorder" will continue to route the prescription to [facility] even if the pharmacy is changed. Please OPT the patient out of [facility] when the BPA is fired.     Thanks,   Cecille Luo   [phone]    "

## 2020-02-04 NOTE — TELEPHONE ENCOUNTER
DOCUMENTATION ONLY:     Prior Authorization for Humira APPROVED from 1/5/20 to 5/4/20.      Case ID# [id number]    Co-Pay: $unknown    Patient Assistance IS NOT required.     [facility] is NOT in network patient MUST fill at [facility].     [facility]:    [phone] (Phone)  [phone] (Fax)     Humira Co-pay Card:   ID:[id number]   BIN:[id number]   PCN: [id number]   GRP: [id number]     NEGRA

## 2020-02-05 RX ORDER — ADALIMUMAB 40MG/0.8ML
40 KIT SUBCUTANEOUS
Qty: 2 SYRINGE | Refills: 5 | Status: SHIPPED | OUTPATIENT
Start: 2020-02-05 | End: 2020-07-20 | Stop reason: SDUPTHER

## 2020-02-10 ENCOUNTER — PATIENT MESSAGE (OUTPATIENT)
Dept: RHEUMATOLOGY | Facility: CLINIC | Age: 60
End: 2020-02-10

## 2020-02-11 ENCOUNTER — OFFICE VISIT (OUTPATIENT)
Dept: FAMILY MEDICINE | Facility: CLINIC | Age: 60
End: 2020-02-11
Payer: COMMERCIAL

## 2020-02-11 ENCOUNTER — TELEPHONE (OUTPATIENT)
Dept: RHEUMATOLOGY | Facility: CLINIC | Age: 60
End: 2020-02-11

## 2020-02-11 ENCOUNTER — HOSPITAL ENCOUNTER (OUTPATIENT)
Dept: RADIOLOGY | Facility: HOSPITAL | Age: 60
Discharge: HOME | End: 2020-02-11
Attending: PHYSICIAN ASSISTANT
Payer: COMMERCIAL

## 2020-02-11 VITALS
WEIGHT: 191.56 LBS | OXYGEN SATURATION: 96 % | SYSTOLIC BLOOD PRESSURE: 132 MMHG | HEART RATE: 87 BPM | BODY MASS INDEX: 32.88 KG/M2 | DIASTOLIC BLOOD PRESSURE: 78 MMHG

## 2020-02-11 DIAGNOSIS — M17.10 ARTHRITIS OF KNEE: ICD-10-CM

## 2020-02-11 DIAGNOSIS — M17.10 ARTHRITIS OF KNEE: Primary | ICD-10-CM

## 2020-02-11 PROCEDURE — 73562 X-RAY EXAM OF KNEE 3: CPT | Mod: 26,LT,, | Performed by: RADIOLOGY

## 2020-02-11 PROCEDURE — 3075F PR MOST RECENT SYSTOLIC BLOOD PRESS GE 130-139MM HG: ICD-10-PCS | Mod: CPTII,S$GLB,, | Performed by: PHYSICIAN ASSISTANT

## 2020-02-11 PROCEDURE — 73560 X-RAY EXAM OF KNEE 1 OR 2: CPT | Mod: 26,RT,, | Performed by: RADIOLOGY

## 2020-02-11 PROCEDURE — 3008F BODY MASS INDEX DOCD: CPT | Mod: CPTII,S$GLB,, | Performed by: PHYSICIAN ASSISTANT

## 2020-02-11 PROCEDURE — 3008F PR BODY MASS INDEX (BMI) DOCUMENTED: ICD-10-PCS | Mod: CPTII,S$GLB,, | Performed by: PHYSICIAN ASSISTANT

## 2020-02-11 PROCEDURE — 3075F SYST BP GE 130 - 139MM HG: CPT | Mod: CPTII,S$GLB,, | Performed by: PHYSICIAN ASSISTANT

## 2020-02-11 PROCEDURE — 3078F PR MOST RECENT DIASTOLIC BLOOD PRESSURE < 80 MM HG: ICD-10-PCS | Mod: CPTII,S$GLB,, | Performed by: PHYSICIAN ASSISTANT

## 2020-02-11 PROCEDURE — 73560 XR KNEE ORTHO LEFT: ICD-10-PCS | Mod: 26,RT,, | Performed by: RADIOLOGY

## 2020-02-11 PROCEDURE — 99214 PR OFFICE/OUTPT VISIT, EST, LEVL IV, 30-39 MIN: ICD-10-PCS | Mod: S$GLB,,, | Performed by: PHYSICIAN ASSISTANT

## 2020-02-11 PROCEDURE — 73562 XR KNEE ORTHO LEFT: ICD-10-PCS | Mod: 26,LT,, | Performed by: RADIOLOGY

## 2020-02-11 PROCEDURE — 99214 OFFICE O/P EST MOD 30 MIN: CPT | Mod: S$GLB,,, | Performed by: PHYSICIAN ASSISTANT

## 2020-02-11 PROCEDURE — 73560 X-RAY EXAM OF KNEE 1 OR 2: CPT | Mod: 59,TC,FY,PO,RT

## 2020-02-11 PROCEDURE — 99999 PR PBB SHADOW E&M-EST. PATIENT-LVL IV: ICD-10-PCS | Mod: PBBFAC,,, | Performed by: PHYSICIAN ASSISTANT

## 2020-02-11 PROCEDURE — 99999 PR PBB SHADOW E&M-EST. PATIENT-LVL IV: CPT | Mod: PBBFAC,,, | Performed by: PHYSICIAN ASSISTANT

## 2020-02-11 PROCEDURE — 3078F DIAST BP <80 MM HG: CPT | Mod: CPTII,S$GLB,, | Performed by: PHYSICIAN ASSISTANT

## 2020-02-11 RX ORDER — MELOXICAM 15 MG/1
15 TABLET ORAL DAILY PRN
Qty: 20 TABLET | Refills: 0 | Status: SHIPPED | OUTPATIENT
Start: 2020-02-11 | End: 2020-04-06 | Stop reason: SDUPTHER

## 2020-02-11 NOTE — TELEPHONE ENCOUNTER
----- Message from Ángela Blackman sent at 2/11/2020  2:35 PM CST -----  Contact: [facility](Anita)-[phone]/ref:[id number]  Type:  Pharmacy Calling to Clarify an RX    Name of Caller:Anita  Pharmacy Name:[facility]  Prescription Name:Humira 40 mg  What do they need to clarify?:direction  Best Call Back Number:[phone]/ref:[id number]  Additional Information:

## 2020-02-12 ENCOUNTER — PATIENT MESSAGE (OUTPATIENT)
Dept: FAMILY MEDICINE | Facility: CLINIC | Age: 60
End: 2020-02-12

## 2020-02-12 ENCOUNTER — TELEPHONE (OUTPATIENT)
Dept: RHEUMATOLOGY | Facility: CLINIC | Age: 60
End: 2020-02-12

## 2020-02-12 NOTE — TELEPHONE ENCOUNTER
----- Message from Farzana Koo sent at 2/12/2020  1:10 PM CST -----  Contact: [facility]  States clarification is needed on the pt Humira prescrition, no additional info given and can be reached at [phone] ref# [id number]///[email or web address]

## 2020-02-12 NOTE — PROGRESS NOTES
Subjective:       Patient ID: Clemencia Knight is a 59 y.o. female    Chief Complaint: Pain (left knee)    HPI  The patient is familiar to me, PCP is Dr. Higgins.  She presents today complaining of left knee pain that has been worse recently.  She has also noticed a lump in her hamstring area over this timeframe.  She denies any tenderness of the lump.  No injury or triggering event.  She has psoriatic arthritis and is wondering if the symptoms are related to her psoriatic arthritis.    Review of Systems   Constitutional: Negative for activity change and unexpected weight change.   HENT: Negative for hearing loss, rhinorrhea and trouble swallowing.    Eyes: Negative for discharge and visual disturbance.   Respiratory: Negative for chest tightness and wheezing.    Cardiovascular: Negative for palpitations.   Gastrointestinal: Negative for blood in stool, constipation and diarrhea.   Endocrine: Negative for polydipsia and polyuria.   Genitourinary: Negative for difficulty urinating, dysuria, hematuria and menstrual problem.   Musculoskeletal: Positive for arthralgias (Left knee pain).   Psychiatric/Behavioral: Negative for confusion and dysphoric mood.        Objective:   Physical Exam   Constitutional: She is oriented to person, place, and time. She appears well-developed and well-nourished. No distress.   HENT:   Head: Normocephalic and atraumatic.   Eyes: Pupils are equal, round, and reactive to light. EOM are normal.   Neck: Normal range of motion. Neck supple.   Cardiovascular: Normal rate, regular rhythm, normal heart sounds and intact distal pulses. Exam reveals no gallop and no friction rub.   No murmur heard.  Pulmonary/Chest: Effort normal and breath sounds normal. No respiratory distress. She has no wheezes. She has no rales. She exhibits no tenderness.   Abdominal: Soft. Bowel sounds are normal. She exhibits no distension and no mass. There is no tenderness. There is no guarding.   Musculoskeletal: She  exhibits tenderness (Tenderness over the posterior left knee). She exhibits no edema or deformity.   Limited left knee flexion and extension due to pain   Neurological: She is alert and oriented to person, place, and time.   Skin: Skin is warm and dry. No rash noted. She is not diaphoretic.   Psychiatric: She has a normal mood and affect. Her behavior is normal. Judgment and thought content normal.        Assessment:       1. Arthritis of knee  meloxicam (MOBIC) 15 MG tablet    CANCELED: X-Ray Knee 1 or 2 View Left        Plan:       Arthritis of knee  -     Cancel: X-Ray Knee 1 or 2 View Left; Future; Expected date: 02/11/2020  -     meloxicam (MOBIC) 15 MG tablet; Take 1 tablet (15 mg total) by mouth daily as needed for Pain.  Dispense: 20 tablet; Refill: 0

## 2020-02-26 ENCOUNTER — PATIENT MESSAGE (OUTPATIENT)
Dept: RHEUMATOLOGY | Facility: CLINIC | Age: 60
End: 2020-02-26

## 2020-03-03 ENCOUNTER — PATIENT MESSAGE (OUTPATIENT)
Dept: RHEUMATOLOGY | Facility: CLINIC | Age: 60
End: 2020-03-03

## 2020-03-03 RX ORDER — PREGABALIN 75 MG/1
75 CAPSULE ORAL 2 TIMES DAILY
Qty: 180 CAPSULE | Refills: 0 | Status: CANCELLED | OUTPATIENT
Start: 2020-03-03 | End: 2020-09-01

## 2020-03-06 RX ORDER — PREGABALIN 75 MG/1
75 CAPSULE ORAL 2 TIMES DAILY
Qty: 180 CAPSULE | Refills: 0 | Status: SHIPPED | OUTPATIENT
Start: 2020-03-06 | End: 2020-06-03 | Stop reason: SDUPTHER

## 2020-03-09 ENCOUNTER — PATIENT MESSAGE (OUTPATIENT)
Dept: DERMATOLOGY | Facility: CLINIC | Age: 60
End: 2020-03-09

## 2020-03-09 ENCOUNTER — OFFICE VISIT (OUTPATIENT)
Dept: RHEUMATOLOGY | Facility: CLINIC | Age: 60
End: 2020-03-09
Payer: COMMERCIAL

## 2020-03-09 VITALS
BODY MASS INDEX: 33.32 KG/M2 | SYSTOLIC BLOOD PRESSURE: 133 MMHG | HEART RATE: 70 BPM | WEIGHT: 195.19 LBS | HEIGHT: 64 IN | DIASTOLIC BLOOD PRESSURE: 82 MMHG

## 2020-03-09 DIAGNOSIS — M19.90 OSTEOARTHRITIS, UNSPECIFIED OSTEOARTHRITIS TYPE, UNSPECIFIED SITE: Primary | ICD-10-CM

## 2020-03-09 PROCEDURE — 3008F BODY MASS INDEX DOCD: CPT | Mod: CPTII,S$GLB,, | Performed by: STUDENT IN AN ORGANIZED HEALTH CARE EDUCATION/TRAINING PROGRAM

## 2020-03-09 PROCEDURE — 99215 OFFICE O/P EST HI 40 MIN: CPT | Mod: 25,S$GLB,, | Performed by: STUDENT IN AN ORGANIZED HEALTH CARE EDUCATION/TRAINING PROGRAM

## 2020-03-09 PROCEDURE — 3075F PR MOST RECENT SYSTOLIC BLOOD PRESS GE 130-139MM HG: ICD-10-PCS | Mod: CPTII,S$GLB,, | Performed by: STUDENT IN AN ORGANIZED HEALTH CARE EDUCATION/TRAINING PROGRAM

## 2020-03-09 PROCEDURE — 3075F SYST BP GE 130 - 139MM HG: CPT | Mod: CPTII,S$GLB,, | Performed by: STUDENT IN AN ORGANIZED HEALTH CARE EDUCATION/TRAINING PROGRAM

## 2020-03-09 PROCEDURE — 3008F PR BODY MASS INDEX (BMI) DOCUMENTED: ICD-10-PCS | Mod: CPTII,S$GLB,, | Performed by: STUDENT IN AN ORGANIZED HEALTH CARE EDUCATION/TRAINING PROGRAM

## 2020-03-09 PROCEDURE — 3079F PR MOST RECENT DIASTOLIC BLOOD PRESSURE 80-89 MM HG: ICD-10-PCS | Mod: CPTII,S$GLB,, | Performed by: STUDENT IN AN ORGANIZED HEALTH CARE EDUCATION/TRAINING PROGRAM

## 2020-03-09 PROCEDURE — 99999 PR PBB SHADOW E&M-EST. PATIENT-LVL IV: ICD-10-PCS | Mod: PBBFAC,,, | Performed by: STUDENT IN AN ORGANIZED HEALTH CARE EDUCATION/TRAINING PROGRAM

## 2020-03-09 PROCEDURE — 99999 PR PBB SHADOW E&M-EST. PATIENT-LVL IV: CPT | Mod: PBBFAC,,, | Performed by: STUDENT IN AN ORGANIZED HEALTH CARE EDUCATION/TRAINING PROGRAM

## 2020-03-09 PROCEDURE — 3079F DIAST BP 80-89 MM HG: CPT | Mod: CPTII,S$GLB,, | Performed by: STUDENT IN AN ORGANIZED HEALTH CARE EDUCATION/TRAINING PROGRAM

## 2020-03-09 PROCEDURE — 20610 DRAIN/INJ JOINT/BURSA W/O US: CPT | Mod: RT,S$GLB,, | Performed by: STUDENT IN AN ORGANIZED HEALTH CARE EDUCATION/TRAINING PROGRAM

## 2020-03-09 PROCEDURE — 20610 LARGE JOINT ASPIRATION/INJECTION: R KNEE: ICD-10-PCS | Mod: RT,S$GLB,, | Performed by: STUDENT IN AN ORGANIZED HEALTH CARE EDUCATION/TRAINING PROGRAM

## 2020-03-09 PROCEDURE — 99215 PR OFFICE/OUTPT VISIT, EST, LEVL V, 40-54 MIN: ICD-10-PCS | Mod: 25,S$GLB,, | Performed by: STUDENT IN AN ORGANIZED HEALTH CARE EDUCATION/TRAINING PROGRAM

## 2020-03-09 RX ADMIN — TRIAMCINOLONE ACETONIDE 40 MG: 40 INJECTION, SUSPENSION INTRA-ARTICULAR; INTRAMUSCULAR at 01:03

## 2020-03-15 RX ORDER — TRIAMCINOLONE ACETONIDE 40 MG/ML
40 INJECTION, SUSPENSION INTRA-ARTICULAR; INTRAMUSCULAR
Status: DISCONTINUED | OUTPATIENT
Start: 2020-03-09 | End: 2020-03-15 | Stop reason: HOSPADM

## 2020-03-16 ENCOUNTER — PATIENT MESSAGE (OUTPATIENT)
Dept: RHEUMATOLOGY | Facility: CLINIC | Age: 60
End: 2020-03-16

## 2020-03-16 NOTE — PROGRESS NOTES
RHEUMATOLOGY OUTPATIENT CLINIC NOTE        Attending Rheumatologist: Linda Tabares  Primary Care Provider: Dallas Higgins MD   Physician Requesting Consultation: No referring provider defined for this encounter.  Chief Complaint/Reason For Consultation:  Psoriatic arthritis      Subjective:       HPI  Clemencia Knight is a 59 y.o. White female presents for initial evaluation of joint pains / psoriatic arthritis. Recently diagnosed w psoriasis. Stelara was not approved by insurance. Currently on methotrexate 4tabs weekly +FA daily, on week 2. Main joint complaints are in right wrist, b/l PIPs, lower back. Does report episode of diffuse right third digit swelling. +Nail pitting. States joint pains acutely worsened over past few months, around onset of skin pso. +Prolonged AM stiffness. Lower back pain improves w activity, +significant AM stiffness. No alternating buttock pain, nightime awakening, or worsening w rest. +Relief w ibuprofen. Has chronic back pain x >5 years at least. Hx DDD w cervical fusion. No prior MVA or trauma. No hx dactylitis. +Family hx psoriasis and rheumatoid arthritis.     Today:  Since last visit, Still w joint pains in mcps w prolonged AM stiffness. ++Back pain. Skin pso improved. No new complaints. Main complaint right knee pain. Would like injection today    14pt ros negative except as otherwise stated above      Review of Systems   Constitutional: Negative for fever and malaise/fatigue.   HENT: Negative for congestion, sinus pain and sore throat.    Eyes: Negative for blurred vision, pain and redness.   Respiratory: Negative for cough and shortness of breath.    Cardiovascular: Negative for chest pain, claudication and leg swelling.   Gastrointestinal: Negative for constipation and diarrhea.   Genitourinary: Negative for dysuria.   Musculoskeletal: Positive for joint pain. Negative for back pain, myalgias and neck pain.   Skin: Positive for rash. Negative for itching.    Neurological: Negative for focal weakness and headaches.   Endo/Heme/Allergies: Does not bruise/bleed easily.   Psychiatric/Behavioral: Negative for depression and substance abuse.       Chronic comorbid conditions affecting medical decision making today:  Past Medical History:   Diagnosis Date    Allergy     General anesthetics causing adverse effect in therapeutic use     pt. somewhat aware of extubation with one of her surgeries    GERD (gastroesophageal reflux disease)     Hearing loss     Restless leg syndrome     Sciatica      Past Surgical History:   Procedure Laterality Date    AUGMENTATION OF BREAST      BELT ABDOMINOPLASTY      breast implants      CERVICAL FUSION      COLONOSCOPY  10/2012    repeat in 10    DEXA      WNL    EPIDURAL STEROID INJECTION INTO CERVICAL SPINE N/A 9/24/2018    Procedure: Injection-steroid-epidural-cervical;  Surgeon: Myles Rocha MD;  Location: [facility] OR;  Service: Pain Management;  Laterality: N/A;  C7-T1    EPIDURAL STEROID INJECTION INTO CERVICAL SPINE N/A 11/20/2018    Procedure: Injection-steroid-epidural-cervical;  Surgeon: Myles Rocha MD;  Location: [facility] OR;  Service: Pain Management;  Laterality: N/A;  C7-T1    HYSTERECTOMY  2000    OOPHORECTOMY  7/16/2013    laparotomy BSO for benign 10cm tubal cyst    SALPINGOOPHORECTOMY  2013    with removal of fallopian cyst    SHOULDER SURGERY      right    TLH  2000    ovaries remain, benign    TONSILLECTOMY, ADENOIDECTOMY, BILATERAL MYRINGOTOMY AND TUBES       Family History   Problem Relation Age of Onset    Cancer Father         bladder    Diabetes Father     Heart disease Father     Diabetes Mother     Melanoma Brother     Charcot-Karla-Tooth disease Brother     Breast cancer Neg Hx     Ovarian cancer Neg Hx     Anesthesia problems Neg Hx      Social History     Substance and Sexual Activity   Alcohol Use Yes    Frequency: Never    Drinks per session: Patient refused    Binge frequency: Never     Comment: rarely     Social History     Tobacco Use   Smoking Status Never Smoker   Smokeless Tobacco Never Used     Social History     Substance and Sexual Activity   Drug Use No       Current Outpatient Medications:     acetaminophen (TYLENOL) 325 MG tablet, Take 650 mg by mouth every 6 (six) hours as needed for Pain., Disp: , Rfl:     adalimumab (HUMIRA) 40 mg/0.8 mL SyKt, Inject 0.8 mLs (40 mg total) into the skin every 14 (fourteen) days., Disp: 2 Syringe, Rfl: 5    cetirizine (ZYRTEC) 10 MG tablet, Take 10 mg by mouth once daily., Disp: , Rfl:     clobetasol (TEMOVATE) 0.05 % cream, Apply topically 2 (two) times daily., Disp: 30 g, Rfl: 1    DULoxetine (CYMBALTA) 60 MG capsule, TAKE 1 CAPSULE DAILY, Disp: 90 capsule, Rfl: 3    fluocinonide (LIDEX) 0.05 % external solution, Apply topically 2 (two) times daily., Disp: 60 mL, Rfl: 1    fluticasone (FLONASE) 50 mcg/actuation nasal spray, 1 spray by Each Nare route once daily., Disp: , Rfl:     hydrocortisone 2.5 % cream, Apply topically 2 (two) times daily., Disp: 30 g, Rfl: 1    ketoconazole (NIZORAL) 2 % shampoo, Apply topically once daily., Disp: 120 mL, Rfl: 1    meloxicam (MOBIC) 15 MG tablet, Take 1 tablet (15 mg total) by mouth daily as needed for Pain., Disp: 20 tablet, Rfl: 0    mineral,lanolin oils/prop glyc (BALNEOL TOP), Apply topically., Disp: , Rfl:     MULTIVITAMIN ORAL, Take by mouth once daily., Disp: , Rfl:     mupirocin (BACTROBAN) 2 % ointment, Apply topically 3 (three) times daily., Disp: 22 g, Rfl: 1    pregabalin (LYRICA) 75 MG capsule, Take 1 capsule (75 mg total) by mouth 2 (two) times daily., Disp: 180 capsule, Rfl: 0    terbinafine HCl (LAMISIL) 1 % cream, Apply topically 2 (two) times daily., Disp: , Rfl:     tiZANidine (ZANAFLEX) 4 MG tablet, Take 1 tablet (4 mg total) by mouth every 6 (six) hours as needed., Disp: 30 tablet, Rfl: 3    apremilast (OTEZLA STARTER) 10 mg (4)-20 mg (4)-30 mg (47) DsPk, Take starter pack as  directed on packaging, Disp: 55 tablet, Rfl: 0    apremilast (OTEZLA) 30 mg Tab, Take 1 tablet (30 mg total) by mouth 2 (two) times daily., Disp: 60 tablet, Rfl: 5    ustekinumab (STELARA) 45 mg/0.5 mL Syrg syringe, Inject 0.5 mLs (45 mg total) into the skin every 3 (three) months., Disp: 1 Syringe, Rfl: 3       Objective:         Vitals:    03/09/20 1225   BP: 133/82   Pulse: 70     Physical Exam   Nursing note and vitals reviewed.  Constitutional: She is oriented to person, place, and time and well-developed, well-nourished, and in no distress.   HENT:   Head: Normocephalic.   Mouth/Throat: Oropharynx is clear and moist.   Eyes: Conjunctivae are normal. Pupils are equal, round, and reactive to light.   Neck: Normal range of motion. Neck supple.   Cardiovascular: Normal rate and normal heart sounds.    Pulmonary/Chest: Effort normal. No respiratory distress.   Abdominal: Soft. There is no tenderness.   Neurological: She is alert and oriented to person, place, and time.   Skin: Skin is warm. Rash noted. No erythema.     Psychiatric: Memory and affect normal.   Musculoskeletal: She exhibits tenderness. She exhibits no edema or deformity.   No synovitis in small joints of hands and feet. No effusions over large joints. Silvio -  +leg length discrepancy L>R  +SI tenderness           Reviewed old and all outside pertinent medical records available.    All lab results personally reviewed and interpreted by me.  Lab Results   Component Value Date    WBC 8.09 12/20/2019    HGB 12.8 12/20/2019    HCT 40.1 12/20/2019    MCV 94 12/20/2019    MCH 29.8 12/20/2019    MCHC 31.9 (L) 12/20/2019    RDW 13.2 12/20/2019     12/20/2019    MPV 9.9 12/20/2019       Lab Results   Component Value Date     12/20/2019    K 4.2 12/20/2019     12/20/2019    CO2 30 (H) 12/20/2019    GLU 95 12/20/2019    BUN 13 12/20/2019    CALCIUM 10.0 12/20/2019    PROT 7.6 12/20/2019    ALBUMIN 4.0 12/20/2019    BILITOT 0.3 12/20/2019     AST 32 12/20/2019    ALKPHOS 86 12/20/2019    ALT 39 12/20/2019       Lab Results   Component Value Date    COLORU YELLOW 11/16/2009    APPEARANCEUA CLEAR 11/16/2009    SPECGRAV 1.032 (H) 11/16/2009    PHUR 7.0 11/16/2009    PROTEINUA 30 (A) 11/16/2009    KETONESU Negative 11/16/2009    LEUKOCYTESUR Negative 11/16/2009    NITRITE Negative 11/16/2009    UROBILINOGEN 1 11/16/2009       Lab Results   Component Value Date    CRP 8.6 (H) 12/20/2019       Lab Results   Component Value Date    SEDRATE 23 (H) 12/20/2019       Lab Results   Component Value Date    RF <10.0 06/14/2019    SEDRATE 23 (H) 12/20/2019       No components found for: 25OHVITDTOT, 99JWKFGU3, 81XBVZYI7, METHODNOTE    No results found for: URICACID    No components found for: TSPOTTB    Acute hep -  TB gold-    Imaging:  All imaging reviewed and independently  interpreted by me.  MRI 9/6/18  Vertebral column: The lumbar vertebral bodies maintain normal height and alignment.  There is no fracture.  Baseline marrow signal intensity is normal.  There is mild-to-moderate disc space narrowing at the L3-4 level without significant change.  The L5-S1 disc space is preserved.    Spinal canal, conus, epidural space: The spinal canal is developmentally normal.  The conus is normal in location, contour and signal intensity, terminating at the level of L1.  There is no abnormal epidural collection or mass.    Findings by level:    On the sagittal images, the T11-12 level is normal.    T12-L1: Unremarkable. There is no spinal canal or significant foraminal stenosis.  There is no definite change.    L1-2: There is mild facet joint arthropathy.  There is no spinal canal or significant foraminal stenosis.  There is no change.    L2-3: There is minimal bulging of the annulus and mild facet joint arthropathy.  There is no spinal canal or significant foraminal stenosis.  There is no significant change.    L3-4: There is mild-to-moderate disc space narrowing.  There is  a diffuse disc bulge.  There is a very subtle dorsal annular fissure suspected.  There is mild-to-moderate facet joint arthropathy with ligamentum flavum thickening.  There is mild flattening of the ventral dural sac.  There is no spinal canal or significant foraminal stenosis.  There is no significant change.    L4-5: There is a diffuse disc bulge.  There is mild-to-moderate facet joint arthropathy.  There is mild bilateral foraminal stenosis without spinal stenosis.  The changes have slightly progressed.    L5-S1: There is mild facet joint arthropathy.  There is no spinal canal or significant foraminal stenosis.  There is no change.    Soft tissues, other: The prevertebral soft tissues are normal.  The aorta is normal in caliber.      Impression       1. There is mild degenerative change.  There is also some degree of facet joint arthropathy at multiple levels.  There is no fracture or malalignment.  There is no spinal stenosis.  2. At the L3-4 level there is mild-to-moderate facet joint arthropathy with a diffuse disc bulge but without spinal canal or foraminal stenosis.  3. At the L4-5 level there is a diffuse disc bulge with mild-to-moderate facet joint arthropathy contributing to mild bilateral foraminal stenosis without spinal stenosis.  These changes have very slightly progressed.  4. There is no spinal canal or foraminal stenosis at the remainder of the lumbar levels.  Please see above discussion.     Cervical MRI  Postoperative changes status post anterior cervical fusion of C6-C7.    Alignment: Grade 1 retrolisthesis of C5 on C6.    Vertebrae: Normal marrow signal. No fracture.    Discs: Disc desiccation at C5-C6 with mild height loss.    Cord: Normal.    Skull base and craniocervical junction: Normal.    Degenerative findings:    C2-C3: Facet arthropathy, left greater than right.  No significant spinal canal stenosis or neural foraminal narrowing.    C3-C4: Mild posterior disc osteophyte complex  resulting in mild right neural foraminal narrowing.  No significant spinal canal stenosis or left neural foraminal narrowing.    C4-C5: Mild posterior disc osteophyte complex.  No significant spinal canal stenosis or neural foraminal narrowing.    C5-C6: Retrolisthesis as above.  Posterior disc osteophyte complex and uncovertebral spurring resulting in mild spinal canal stenosis and moderate right, mild left neural foraminal narrowing.    C6-C7: Postoperative changes above.  No significant spinal canal stenosis or neural foraminal narrowing.    C7-T1: No significant disease.  No significant spinal canal stenosis or neural foraminal narrowing.    Paraspinal muscles & soft tissues: Unremarkable.      Impression       Status post anterior cervical fusion of C6-C7.    Degenerative changes of the cervical spine as detailed above.     XR Knee 11/16  AP standing and PA flexion views of both knees as well as a lateral and sunrise views of the right knee and a sunrise view of the left knee were obtained.    The medial and lateral compartments appear relatively well preserved. No obvious fracture or dislocation is noted. The patella appear well-positioned within the intercondylar notches bilaterally. No significant right-sided suprapatellar joint effusion is noted.    Large Joint Aspiration/Injection: R knee  Performed by: Linda Tabares MD  Authorized by: Linda Tabares MD  Date/Time: 3/9/2020 1:00 PM    Consent Done?:  Yes (Verbal)  Indications:  pain and diagnostic evaluation  Timeout: Immediately prior to procedure a time out was called to verify the correct patient, procedure, equipment, support staff and site/side marked as required.  Prep:Patient was prepped and draped in the usual sterile fashion.  Procedure site marked: Yes     Anesthesia  Local anesthesia used  Anesthetic: lidocaine 2% without epinephrine  Anesthetic total: 2mL    Details:   Needle size: 25 G    Ultrasonic Guidance for needle placement: No    Approach: lateral  Location:  Knee  Site:  R knee    Medications: 40 mg triamcinolone acetonide 40 mg/mL  Patient tolerance:  patient tolerated the procedure well with no immediate complications         ASSESSMENT / PLAN:     Clemencia Knight is a 59 y.o. White female with:      1. Psoriatic arthritis  -Presents for initial eval. C/o recent acute onset joint pains pips, wrists. +Reported hx dactylitis. +Current pso. +Family hx pso. +Nail pitting. +Enthesitis  -Does have some features c/w inflammatory back pain, although not entirely. Suspect mostly mechanical.   -Off mtx 2.2 intolerance  -Incomplete response w stelara and otezla.   -Just started humira, can received only few injections so far. Too early to assess clinical response     #OA  -Right knee injected today    #High risk med use  -Discussed risks/benefits of all meds including increased risk of infection. No systemic signs at this time.      #Chronic back pain  -w/ DDD s/p cervical fusion  -completed PT, encouraged to continue learned exercises at home    Time spent: 45 minutes in face to face discussion concerning diagnosis, prognosis, review of lab and test results, benefits of treatment as well as management of disease, counseling of patient and coordination of care between various health care providers . Greater than half the time spent was used for coordination of care and counseling of patient.    Method of contact with patient concerns: [facility]      Linda Tabares M.D.  Rheumatology Department   [facility] [place] [address], [place] [address]  Phone: [phone]  Fax: [phone]

## 2020-03-18 RX ORDER — FLUTICASONE PROPIONATE 0.5 MG/G
CREAM TOPICAL 2 TIMES DAILY
Qty: 30 G | Refills: 1 | Status: SHIPPED | OUTPATIENT
Start: 2020-03-18 | End: 2020-10-15 | Stop reason: SDUPTHER

## 2020-04-06 DIAGNOSIS — M17.10 ARTHRITIS OF KNEE: ICD-10-CM

## 2020-04-07 RX ORDER — MELOXICAM 15 MG/1
15 TABLET ORAL DAILY PRN
Qty: 20 TABLET | Refills: 0 | Status: SHIPPED | OUTPATIENT
Start: 2020-04-07 | End: 2020-09-28

## 2020-04-12 ENCOUNTER — PATIENT MESSAGE (OUTPATIENT)
Dept: RHEUMATOLOGY | Facility: CLINIC | Age: 60
End: 2020-04-12

## 2020-04-19 RX ORDER — TIZANIDINE 4 MG/1
4 TABLET ORAL EVERY 6 HOURS PRN
Qty: 30 TABLET | Refills: 3 | Status: CANCELLED | OUTPATIENT
Start: 2020-04-19

## 2020-04-21 RX ORDER — TIZANIDINE 4 MG/1
4 TABLET ORAL EVERY 6 HOURS PRN
Qty: 30 TABLET | Refills: 3 | Status: SHIPPED | OUTPATIENT
Start: 2020-04-21 | End: 2020-11-02 | Stop reason: SDUPTHER

## 2020-05-18 ENCOUNTER — OFFICE VISIT (OUTPATIENT)
Dept: FAMILY MEDICINE | Facility: CLINIC | Age: 60
End: 2020-05-18
Payer: COMMERCIAL

## 2020-05-18 VITALS
DIASTOLIC BLOOD PRESSURE: 72 MMHG | HEART RATE: 76 BPM | TEMPERATURE: 99 F | HEIGHT: 64 IN | BODY MASS INDEX: 33.88 KG/M2 | SYSTOLIC BLOOD PRESSURE: 122 MMHG | WEIGHT: 198.44 LBS

## 2020-05-18 DIAGNOSIS — L40.50 PSORIATIC ARTHRITIS: Primary | ICD-10-CM

## 2020-05-18 PROCEDURE — 3008F BODY MASS INDEX DOCD: CPT | Mod: CPTII,S$GLB,, | Performed by: FAMILY MEDICINE

## 2020-05-18 PROCEDURE — 99213 OFFICE O/P EST LOW 20 MIN: CPT | Mod: S$GLB,,, | Performed by: FAMILY MEDICINE

## 2020-05-18 PROCEDURE — 99999 PR PBB SHADOW E&M-EST. PATIENT-LVL III: ICD-10-PCS | Mod: PBBFAC,,, | Performed by: FAMILY MEDICINE

## 2020-05-18 PROCEDURE — 3074F PR MOST RECENT SYSTOLIC BLOOD PRESSURE < 130 MM HG: ICD-10-PCS | Mod: CPTII,S$GLB,, | Performed by: FAMILY MEDICINE

## 2020-05-18 PROCEDURE — 3078F DIAST BP <80 MM HG: CPT | Mod: CPTII,S$GLB,, | Performed by: FAMILY MEDICINE

## 2020-05-18 PROCEDURE — 99999 PR PBB SHADOW E&M-EST. PATIENT-LVL III: CPT | Mod: PBBFAC,,, | Performed by: FAMILY MEDICINE

## 2020-05-18 PROCEDURE — 3074F SYST BP LT 130 MM HG: CPT | Mod: CPTII,S$GLB,, | Performed by: FAMILY MEDICINE

## 2020-05-18 PROCEDURE — 99213 PR OFFICE/OUTPT VISIT, EST, LEVL III, 20-29 MIN: ICD-10-PCS | Mod: S$GLB,,, | Performed by: FAMILY MEDICINE

## 2020-05-18 PROCEDURE — 3078F PR MOST RECENT DIASTOLIC BLOOD PRESSURE < 80 MM HG: ICD-10-PCS | Mod: CPTII,S$GLB,, | Performed by: FAMILY MEDICINE

## 2020-05-18 PROCEDURE — 3008F PR BODY MASS INDEX (BMI) DOCUMENTED: ICD-10-PCS | Mod: CPTII,S$GLB,, | Performed by: FAMILY MEDICINE

## 2020-05-18 RX ORDER — DEXTROMETHORPHAN HYDROBROMIDE, GUAIFENESIN 5; 100 MG/5ML; MG/5ML
LIQUID ORAL
COMMUNITY
Start: 2020-04-01 | End: 2020-11-02

## 2020-05-18 RX ORDER — CHOLECALCIFEROL (VITAMIN D3) 125 MCG
CAPSULE ORAL
COMMUNITY
Start: 2020-04-01 | End: 2020-09-28

## 2020-05-18 NOTE — PROGRESS NOTES
Subjective:       Patient ID: Clemencia Knight is a 59 y.o. female    Chief Complaint: Follow-up (no issues)    HPI  Here today for interval evaluation    Review of Systems   Constitutional: Positive for activity change and unexpected weight change.   HENT: Positive for hearing loss. Negative for rhinorrhea and trouble swallowing.    Eyes: Positive for visual disturbance. Negative for discharge.   Respiratory: Negative for chest tightness and wheezing.    Cardiovascular: Negative for chest pain and palpitations.   Gastrointestinal: Negative for blood in stool, constipation, diarrhea and vomiting.   Endocrine: Negative for polydipsia and polyuria.   Genitourinary: Negative for difficulty urinating, dysuria, hematuria and menstrual problem.   Musculoskeletal: Positive for arthralgias (seeing Rheumatology, worse in knee.  recent steroid injection 3/20.), joint swelling and neck pain.   Neurological: Positive for weakness and headaches.   Psychiatric/Behavioral: Positive for confusion. Negative for dysphoric mood.        Objective:   Physical Exam   Constitutional: She is oriented to person, place, and time. She appears well-developed and well-nourished.   Neurological: She is alert and oriented to person, place, and time.   Vitals reviewed.       Assessment:       1. Psoriatic arthritis          Plan:       Psoriatic arthritis  - Continue current therapy  - Continue Rheumatology, will discuss Synvisc for persistent knee pain not improved with steroid injection.  - Follow up in about 6 months (around 11/18/2020).

## 2020-06-03 RX ORDER — PREGABALIN 75 MG/1
75 CAPSULE ORAL 2 TIMES DAILY
Qty: 180 CAPSULE | Refills: 0 | Status: CANCELLED | OUTPATIENT
Start: 2020-06-03 | End: 2020-12-02

## 2020-06-04 ENCOUNTER — PATIENT MESSAGE (OUTPATIENT)
Dept: FAMILY MEDICINE | Facility: CLINIC | Age: 60
End: 2020-06-04

## 2020-06-04 RX ORDER — PREGABALIN 75 MG/1
75 CAPSULE ORAL 2 TIMES DAILY
Qty: 180 CAPSULE | Refills: 0 | Status: SHIPPED | OUTPATIENT
Start: 2020-06-04 | End: 2020-08-13

## 2020-06-08 ENCOUNTER — PATIENT MESSAGE (OUTPATIENT)
Dept: FAMILY MEDICINE | Facility: CLINIC | Age: 60
End: 2020-06-08

## 2020-06-09 ENCOUNTER — PATIENT MESSAGE (OUTPATIENT)
Dept: FAMILY MEDICINE | Facility: CLINIC | Age: 60
End: 2020-06-09

## 2020-06-09 DIAGNOSIS — S03.00XA DISLOCATION OF TEMPOROMANDIBULAR JOINT, INITIAL ENCOUNTER: Primary | ICD-10-CM

## 2020-06-10 ENCOUNTER — PATIENT MESSAGE (OUTPATIENT)
Dept: FAMILY MEDICINE | Facility: CLINIC | Age: 60
End: 2020-06-10

## 2020-06-19 ENCOUNTER — PATIENT MESSAGE (OUTPATIENT)
Dept: RHEUMATOLOGY | Facility: CLINIC | Age: 60
End: 2020-06-19

## 2020-06-19 NOTE — TELEPHONE ENCOUNTER
MD Raysa Harmon Staff 15 minutes ago (12:01 PM)     Can we get pt appt for next wk thanks    Message text

## 2020-06-24 ENCOUNTER — OFFICE VISIT (OUTPATIENT)
Dept: RHEUMATOLOGY | Facility: CLINIC | Age: 60
End: 2020-06-24
Payer: COMMERCIAL

## 2020-06-24 VITALS
DIASTOLIC BLOOD PRESSURE: 80 MMHG | SYSTOLIC BLOOD PRESSURE: 127 MMHG | HEART RATE: 78 BPM | BODY MASS INDEX: 32.67 KG/M2 | HEIGHT: 64 IN | WEIGHT: 191.38 LBS

## 2020-06-24 DIAGNOSIS — L40.50 PSORIATIC ARTHRITIS: ICD-10-CM

## 2020-06-24 DIAGNOSIS — Z79.899 HIGH RISK MEDICATION USE: Primary | ICD-10-CM

## 2020-06-24 DIAGNOSIS — M19.90 OSTEOARTHRITIS, UNSPECIFIED OSTEOARTHRITIS TYPE, UNSPECIFIED SITE: ICD-10-CM

## 2020-06-24 PROCEDURE — 3079F DIAST BP 80-89 MM HG: CPT | Mod: CPTII,S$GLB,, | Performed by: STUDENT IN AN ORGANIZED HEALTH CARE EDUCATION/TRAINING PROGRAM

## 2020-06-24 PROCEDURE — 20610 LARGE JOINT ASPIRATION/INJECTION: R KNEE: ICD-10-PCS | Mod: RT,S$GLB,, | Performed by: STUDENT IN AN ORGANIZED HEALTH CARE EDUCATION/TRAINING PROGRAM

## 2020-06-24 PROCEDURE — 99999 PR PBB SHADOW E&M-EST. PATIENT-LVL IV: CPT | Mod: PBBFAC,,, | Performed by: STUDENT IN AN ORGANIZED HEALTH CARE EDUCATION/TRAINING PROGRAM

## 2020-06-24 PROCEDURE — 3008F BODY MASS INDEX DOCD: CPT | Mod: CPTII,S$GLB,, | Performed by: STUDENT IN AN ORGANIZED HEALTH CARE EDUCATION/TRAINING PROGRAM

## 2020-06-24 PROCEDURE — 20610 DRAIN/INJ JOINT/BURSA W/O US: CPT | Mod: RT,S$GLB,, | Performed by: STUDENT IN AN ORGANIZED HEALTH CARE EDUCATION/TRAINING PROGRAM

## 2020-06-24 PROCEDURE — 99214 OFFICE O/P EST MOD 30 MIN: CPT | Mod: 25,S$GLB,, | Performed by: STUDENT IN AN ORGANIZED HEALTH CARE EDUCATION/TRAINING PROGRAM

## 2020-06-24 PROCEDURE — 99999 PR PBB SHADOW E&M-EST. PATIENT-LVL IV: ICD-10-PCS | Mod: PBBFAC,,, | Performed by: STUDENT IN AN ORGANIZED HEALTH CARE EDUCATION/TRAINING PROGRAM

## 2020-06-24 PROCEDURE — 3079F PR MOST RECENT DIASTOLIC BLOOD PRESSURE 80-89 MM HG: ICD-10-PCS | Mod: CPTII,S$GLB,, | Performed by: STUDENT IN AN ORGANIZED HEALTH CARE EDUCATION/TRAINING PROGRAM

## 2020-06-24 PROCEDURE — 3074F PR MOST RECENT SYSTOLIC BLOOD PRESSURE < 130 MM HG: ICD-10-PCS | Mod: CPTII,S$GLB,, | Performed by: STUDENT IN AN ORGANIZED HEALTH CARE EDUCATION/TRAINING PROGRAM

## 2020-06-24 PROCEDURE — 3074F SYST BP LT 130 MM HG: CPT | Mod: CPTII,S$GLB,, | Performed by: STUDENT IN AN ORGANIZED HEALTH CARE EDUCATION/TRAINING PROGRAM

## 2020-06-24 PROCEDURE — 3008F PR BODY MASS INDEX (BMI) DOCUMENTED: ICD-10-PCS | Mod: CPTII,S$GLB,, | Performed by: STUDENT IN AN ORGANIZED HEALTH CARE EDUCATION/TRAINING PROGRAM

## 2020-06-24 PROCEDURE — 99214 PR OFFICE/OUTPT VISIT, EST, LEVL IV, 30-39 MIN: ICD-10-PCS | Mod: 25,S$GLB,, | Performed by: STUDENT IN AN ORGANIZED HEALTH CARE EDUCATION/TRAINING PROGRAM

## 2020-06-24 RX ADMIN — TRIAMCINOLONE ACETONIDE 40 MG: 40 INJECTION, SUSPENSION INTRA-ARTICULAR; INTRAMUSCULAR at 04:06

## 2020-06-25 ENCOUNTER — LAB VISIT (OUTPATIENT)
Dept: LAB | Facility: HOSPITAL | Age: 60
End: 2020-06-25
Attending: STUDENT IN AN ORGANIZED HEALTH CARE EDUCATION/TRAINING PROGRAM
Payer: COMMERCIAL

## 2020-06-25 DIAGNOSIS — Z79.899 HIGH RISK MEDICATION USE: ICD-10-CM

## 2020-06-25 LAB
ALBUMIN SERPL BCP-MCNC: 4.4 G/DL (ref 3.5–5.2)
ALP SERPL-CCNC: 63 U/L (ref 55–135)
ALT SERPL W/O P-5'-P-CCNC: 63 U/L (ref 10–44)
ANION GAP SERPL CALC-SCNC: 8 MMOL/L (ref 8–16)
AST SERPL-CCNC: 43 U/L (ref 10–40)
BASOPHILS # BLD AUTO: 0.02 K/UL (ref 0–0.2)
BASOPHILS NFR BLD: 0.3 % (ref 0–1.9)
BILIRUB SERPL-MCNC: 0.5 MG/DL (ref 0.1–1)
BUN SERPL-MCNC: 20 MG/DL (ref 6–20)
CALCIUM SERPL-MCNC: 9.7 MG/DL (ref 8.7–10.5)
CHLORIDE SERPL-SCNC: 106 MMOL/L (ref 95–110)
CO2 SERPL-SCNC: 25 MMOL/L (ref 23–29)
CREAT SERPL-MCNC: 0.8 MG/DL (ref 0.5–1.4)
CRP SERPL-MCNC: 3.5 MG/L (ref 0–8.2)
DIFFERENTIAL METHOD: NORMAL
EOSINOPHIL # BLD AUTO: 0.4 K/UL (ref 0–0.5)
EOSINOPHIL NFR BLD: 5.9 % (ref 0–8)
ERYTHROCYTE [DISTWIDTH] IN BLOOD BY AUTOMATED COUNT: 13.3 % (ref 11.5–14.5)
ERYTHROCYTE [SEDIMENTATION RATE] IN BLOOD BY WESTERGREN METHOD: 13 MM/HR (ref 0–20)
EST. GFR  (AFRICAN AMERICAN): >60 ML/MIN/1.73 M^2
EST. GFR  (NON AFRICAN AMERICAN): >60 ML/MIN/1.73 M^2
GLUCOSE SERPL-MCNC: 94 MG/DL (ref 70–110)
HCT VFR BLD AUTO: 44.6 % (ref 37–48.5)
HGB BLD-MCNC: 14.7 G/DL (ref 12–16)
IMM GRANULOCYTES # BLD AUTO: 0.01 K/UL (ref 0–0.04)
IMM GRANULOCYTES NFR BLD AUTO: 0.2 % (ref 0–0.5)
LYMPHOCYTES # BLD AUTO: 2.1 K/UL (ref 1–4.8)
LYMPHOCYTES NFR BLD: 31.9 % (ref 18–48)
MCH RBC QN AUTO: 30.9 PG (ref 27–31)
MCHC RBC AUTO-ENTMCNC: 33 G/DL (ref 32–36)
MCV RBC AUTO: 94 FL (ref 82–98)
MONOCYTES # BLD AUTO: 0.6 K/UL (ref 0.3–1)
MONOCYTES NFR BLD: 8.4 % (ref 4–15)
NEUTROPHILS # BLD AUTO: 3.5 K/UL (ref 1.8–7.7)
NEUTROPHILS NFR BLD: 53.3 % (ref 38–73)
NRBC BLD-RTO: 0 /100 WBC
PLATELET # BLD AUTO: 315 K/UL (ref 150–350)
PMV BLD AUTO: 10.8 FL (ref 9.2–12.9)
POTASSIUM SERPL-SCNC: 4.7 MMOL/L (ref 3.5–5.1)
PROT SERPL-MCNC: 8 G/DL (ref 6–8.4)
RBC # BLD AUTO: 4.75 M/UL (ref 4–5.4)
SODIUM SERPL-SCNC: 139 MMOL/L (ref 136–145)
WBC # BLD AUTO: 6.58 K/UL (ref 3.9–12.7)

## 2020-06-25 PROCEDURE — 86140 C-REACTIVE PROTEIN: CPT

## 2020-06-25 PROCEDURE — 85025 COMPLETE CBC W/AUTO DIFF WBC: CPT

## 2020-06-25 PROCEDURE — 36415 COLL VENOUS BLD VENIPUNCTURE: CPT | Mod: PO

## 2020-06-25 PROCEDURE — 80053 COMPREHEN METABOLIC PANEL: CPT

## 2020-06-25 PROCEDURE — 86480 TB TEST CELL IMMUN MEASURE: CPT

## 2020-06-25 PROCEDURE — 80074 ACUTE HEPATITIS PANEL: CPT

## 2020-06-25 PROCEDURE — 85651 RBC SED RATE NONAUTOMATED: CPT | Mod: PO

## 2020-06-26 LAB
HAV IGM SERPL QL IA: NEGATIVE
HBV CORE IGM SERPL QL IA: NEGATIVE
HBV SURFACE AG SERPL QL IA: NEGATIVE
HCV AB SERPL QL IA: NEGATIVE

## 2020-06-29 LAB
GAMMA INTERFERON BACKGROUND BLD IA-ACNC: 0.04 IU/ML
M TB IFN-G CD4+ BCKGRND COR BLD-ACNC: 0 IU/ML
MITOGEN IGNF BCKGRD COR BLD-ACNC: >10 IU/ML
TB GOLD PLUS: NEGATIVE
TB2 - NIL: 0.01 IU/ML

## 2020-06-29 RX ORDER — TRIAMCINOLONE ACETONIDE 40 MG/ML
40 INJECTION, SUSPENSION INTRA-ARTICULAR; INTRAMUSCULAR
Status: DISCONTINUED | OUTPATIENT
Start: 2020-06-24 | End: 2020-06-29 | Stop reason: HOSPADM

## 2020-06-29 NOTE — PROGRESS NOTES
RHEUMATOLOGY OUTPATIENT CLINIC NOTE        Attending Rheumatologist: Linda Tabares  Primary Care Provider: Dallas Higgins MD   Physician Requesting Consultation: No referring provider defined for this encounter.  Chief Complaint/Reason For Consultation:  Psoriatic arthritis      Subjective:       HPI  Clemencia Knight is a 59 y.o. White female presents for initial evaluation of joint pains / psoriatic arthritis. Recently diagnosed w psoriasis. Stelara was not approved by insurance. Currently on methotrexate 4tabs weekly +FA daily, on week 2. Main joint complaints are in right wrist, b/l PIPs, lower back. Does report episode of diffuse right third digit swelling. +Nail pitting. States joint pains acutely worsened over past few months, around onset of skin pso. +Prolonged AM stiffness. Lower back pain improves w activity, +significant AM stiffness. No alternating buttock pain, nightime awakening, or worsening w rest. +Relief w ibuprofen. Has chronic back pain x >5 years at least. Hx DDD w cervical fusion. No prior MVA or trauma. No hx dactylitis. +Family hx psoriasis and rheumatoid arthritis.     Today:  Since last visit, Still w joint pains in mcps w prolonged AM stiffness. ++Back pain. Skin pso improved. No new complaints. Main complaint right knee pain. Would like injection today    14pt ros negative except as otherwise stated above      Review of Systems   Constitutional: Negative for fever and malaise/fatigue.   HENT: Negative for congestion, sinus pain and sore throat.    Eyes: Negative for blurred vision, pain and redness.   Respiratory: Negative for cough and shortness of breath.    Cardiovascular: Negative for chest pain, claudication and leg swelling.   Gastrointestinal: Negative for constipation and diarrhea.   Genitourinary: Negative for dysuria.   Musculoskeletal: Positive for joint pain. Negative for back pain, myalgias and neck pain.   Skin: Positive for rash. Negative for itching.    Neurological: Negative for focal weakness and headaches.   Endo/Heme/Allergies: Does not bruise/bleed easily.   Psychiatric/Behavioral: Negative for depression and substance abuse.       Chronic comorbid conditions affecting medical decision making today:  Past Medical History:   Diagnosis Date    Allergy     General anesthetics causing adverse effect in therapeutic use     pt. somewhat aware of extubation with one of her surgeries    GERD (gastroesophageal reflux disease)     Hearing loss     Restless leg syndrome     Sciatica      Past Surgical History:   Procedure Laterality Date    AUGMENTATION OF BREAST      BELT ABDOMINOPLASTY      breast implants      CERVICAL FUSION      COLONOSCOPY  10/2012    repeat in 10    DEXA      WNL    EPIDURAL STEROID INJECTION INTO CERVICAL SPINE N/A 9/24/2018    Procedure: Injection-steroid-epidural-cervical;  Surgeon: Myles Rocha MD;  Location: [facility] OR;  Service: Pain Management;  Laterality: N/A;  C7-T1    EPIDURAL STEROID INJECTION INTO CERVICAL SPINE N/A 11/20/2018    Procedure: Injection-steroid-epidural-cervical;  Surgeon: Myles Rocha MD;  Location: [facility] OR;  Service: Pain Management;  Laterality: N/A;  C7-T1    HYSTERECTOMY  2000    OOPHORECTOMY  7/16/2013    laparotomy BSO for benign 10cm tubal cyst    SALPINGOOPHORECTOMY  2013    with removal of fallopian cyst    SHOULDER SURGERY      right    TLH  2000    ovaries remain, benign    TONSILLECTOMY, ADENOIDECTOMY, BILATERAL MYRINGOTOMY AND TUBES       Family History   Problem Relation Age of Onset    Cancer Father         bladder    Diabetes Father     Heart disease Father     Diabetes Mother     Melanoma Brother     Charcot-Karla-Tooth disease Brother     Breast cancer Neg Hx     Ovarian cancer Neg Hx     Anesthesia problems Neg Hx      Social History     Substance and Sexual Activity   Alcohol Use Yes    Frequency: Never    Drinks per session: Patient refused    Binge frequency: Never     Comment: rarely     Social History     Tobacco Use   Smoking Status Never Smoker   Smokeless Tobacco Never Used     Social History     Substance and Sexual Activity   Drug Use No       Current Outpatient Medications:     acetaminophen (TYLENOL) 650 MG TbSR, , Disp: , Rfl:     adalimumab (HUMIRA) 40 mg/0.8 mL SyKt, Inject 0.8 mLs (40 mg total) into the skin every 14 (fourteen) days., Disp: 2 Syringe, Rfl: 5    aspirin-acetaminophen-caffeine 250-250-65 mg (EXCEDRIN MIGRAINE) 250-250-65 mg per tablet, , Disp: , Rfl:     cetirizine (ZYRTEC) 10 MG tablet, Take 10 mg by mouth once daily., Disp: , Rfl:     clobetasol (TEMOVATE) 0.05 % cream, Apply topically 2 (two) times daily., Disp: 30 g, Rfl: 1    DULoxetine (CYMBALTA) 60 MG capsule, TAKE 1 CAPSULE DAILY, Disp: 90 capsule, Rfl: 3    fluocinonide (LIDEX) 0.05 % external solution, Apply topically 2 (two) times daily., Disp: 60 mL, Rfl: 1    fluticasone (FLONASE) 50 mcg/actuation nasal spray, 1 spray by Each Nare route once daily., Disp: , Rfl:     fluticasone propionate (CUTIVATE) 0.05 % cream, Apply topically 2 (two) times daily., Disp: 30 g, Rfl: 1    hydrocortisone 2.5 % cream, Apply topically 2 (two) times daily., Disp: 30 g, Rfl: 1    ketoconazole (NIZORAL) 2 % shampoo, Apply topically once daily., Disp: 120 mL, Rfl: 1    mineral,lanolin oils/prop glyc (BALNEOL TOP), Apply topically., Disp: , Rfl:     MULTIVITAMIN ORAL, Take by mouth once daily., Disp: , Rfl:     mupirocin (BACTROBAN) 2 % ointment, Apply topically 3 (three) times daily., Disp: 22 g, Rfl: 1    naproxen sodium (ALEVE) 220 mg Cap, , Disp: , Rfl:     pregabalin (LYRICA) 75 MG capsule, Take 1 capsule (75 mg total) by mouth 2 (two) times daily., Disp: 180 capsule, Rfl: 0    terbinafine HCl (LAMISIL) 1 % cream, Apply topically 2 (two) times daily., Disp: , Rfl:     tiZANidine (ZANAFLEX) 4 MG tablet, Take 1 tablet (4 mg total) by mouth every 6 (six) hours as needed., Disp: 30 tablet, Rfl:  3    acetaminophen (TYLENOL) 325 MG tablet, Take 650 mg by mouth every 6 (six) hours as needed for Pain., Disp: , Rfl:     apremilast (OTEZLA STARTER) 10 mg (4)-20 mg (4)-30 mg (47) DsPk, Take starter pack as directed on packaging, Disp: 55 tablet, Rfl: 0    apremilast (OTEZLA) 30 mg Tab, Take 1 tablet (30 mg total) by mouth 2 (two) times daily., Disp: 60 tablet, Rfl: 5    meloxicam (MOBIC) 15 MG tablet, Take 1 tablet (15 mg total) by mouth daily as needed for Pain., Disp: 20 tablet, Rfl: 0    ustekinumab (STELARA) 45 mg/0.5 mL Syrg syringe, Inject 0.5 mLs (45 mg total) into the skin every 3 (three) months., Disp: 1 Syringe, Rfl: 3       Objective:         Vitals:    06/24/20 1545   BP: 127/80   Pulse: 78     Physical Exam   Nursing note and vitals reviewed.  Constitutional: She is oriented to person, place, and time and well-developed, well-nourished, and in no distress.   HENT:   Head: Normocephalic.   Mouth/Throat: Oropharynx is clear and moist.   Eyes: Conjunctivae are normal. Pupils are equal, round, and reactive to light.   Neck: Normal range of motion. Neck supple.   Cardiovascular: Normal rate and normal heart sounds.    Pulmonary/Chest: Effort normal. No respiratory distress.   Abdominal: Soft. There is no abdominal tenderness.   Neurological: She is alert and oriented to person, place, and time.   Skin: Skin is warm. Rash noted. No erythema.     Psychiatric: Memory and affect normal.   Musculoskeletal: Tenderness present. No deformity or edema.      Comments: No synovitis in small joints of hands and feet. No effusions over large joints. Silvio -  +leg length discrepancy L>R  +SI tenderness           Reviewed old and all outside pertinent medical records available.    All lab results personally reviewed and interpreted by me.  Lab Results   Component Value Date    WBC 6.58 06/25/2020    HGB 14.7 06/25/2020    HCT 44.6 06/25/2020    MCV 94 06/25/2020    MCH 30.9 06/25/2020    MCHC 33.0 06/25/2020     RDW 13.3 06/25/2020     06/25/2020    MPV 10.8 06/25/2020       Lab Results   Component Value Date     06/25/2020    K 4.7 06/25/2020     06/25/2020    CO2 25 06/25/2020    GLU 94 06/25/2020    BUN 20 06/25/2020    CALCIUM 9.7 06/25/2020    PROT 8.0 06/25/2020    ALBUMIN 4.4 06/25/2020    BILITOT 0.5 06/25/2020    AST 43 (H) 06/25/2020    ALKPHOS 63 06/25/2020    ALT 63 (H) 06/25/2020       Lab Results   Component Value Date    COLORU YELLOW 11/16/2009    APPEARANCEUA CLEAR 11/16/2009    SPECGRAV 1.032 (H) 11/16/2009    PHUR 7.0 11/16/2009    PROTEINUA 30 (A) 11/16/2009    KETONESU Negative 11/16/2009    LEUKOCYTESUR Negative 11/16/2009    NITRITE Negative 11/16/2009    UROBILINOGEN 1 11/16/2009       Lab Results   Component Value Date    CRP 3.5 06/25/2020       Lab Results   Component Value Date    SEDRATE 13 06/25/2020       Lab Results   Component Value Date    RF <10.0 06/14/2019    SEDRATE 13 06/25/2020       No components found for: 25OHVITDTOT, 26LXPKIH6, 94EITMUC8, METHODNOTE    No results found for: URICACID    No components found for: TSPOTTB    Acute hep -  TB gold-    Imaging:  All imaging reviewed and independently  interpreted by me.  MRI 9/6/18  Vertebral column: The lumbar vertebral bodies maintain normal height and alignment.  There is no fracture.  Baseline marrow signal intensity is normal.  There is mild-to-moderate disc space narrowing at the L3-4 level without significant change.  The L5-S1 disc space is preserved.    Spinal canal, conus, epidural space: The spinal canal is developmentally normal.  The conus is normal in location, contour and signal intensity, terminating at the level of L1.  There is no abnormal epidural collection or mass.    Findings by level:    On the sagittal images, the T11-12 level is normal.    T12-L1: Unremarkable. There is no spinal canal or significant foraminal stenosis.  There is no definite change.    L1-2: There is mild facet joint  arthropathy.  There is no spinal canal or significant foraminal stenosis.  There is no change.    L2-3: There is minimal bulging of the annulus and mild facet joint arthropathy.  There is no spinal canal or significant foraminal stenosis.  There is no significant change.    L3-4: There is mild-to-moderate disc space narrowing.  There is a diffuse disc bulge.  There is a very subtle dorsal annular fissure suspected.  There is mild-to-moderate facet joint arthropathy with ligamentum flavum thickening.  There is mild flattening of the ventral dural sac.  There is no spinal canal or significant foraminal stenosis.  There is no significant change.    L4-5: There is a diffuse disc bulge.  There is mild-to-moderate facet joint arthropathy.  There is mild bilateral foraminal stenosis without spinal stenosis.  The changes have slightly progressed.    L5-S1: There is mild facet joint arthropathy.  There is no spinal canal or significant foraminal stenosis.  There is no change.    Soft tissues, other: The prevertebral soft tissues are normal.  The aorta is normal in caliber.      Impression       1. There is mild degenerative change.  There is also some degree of facet joint arthropathy at multiple levels.  There is no fracture or malalignment.  There is no spinal stenosis.  2. At the L3-4 level there is mild-to-moderate facet joint arthropathy with a diffuse disc bulge but without spinal canal or foraminal stenosis.  3. At the L4-5 level there is a diffuse disc bulge with mild-to-moderate facet joint arthropathy contributing to mild bilateral foraminal stenosis without spinal stenosis.  These changes have very slightly progressed.  4. There is no spinal canal or foraminal stenosis at the remainder of the lumbar levels.  Please see above discussion.     Cervical MRI  Postoperative changes status post anterior cervical fusion of C6-C7.    Alignment: Grade 1 retrolisthesis of C5 on C6.    Vertebrae: Normal marrow signal. No  fracture.    Discs: Disc desiccation at C5-C6 with mild height loss.    Cord: Normal.    Skull base and craniocervical junction: Normal.    Degenerative findings:    C2-C3: Facet arthropathy, left greater than right.  No significant spinal canal stenosis or neural foraminal narrowing.    C3-C4: Mild posterior disc osteophyte complex resulting in mild right neural foraminal narrowing.  No significant spinal canal stenosis or left neural foraminal narrowing.    C4-C5: Mild posterior disc osteophyte complex.  No significant spinal canal stenosis or neural foraminal narrowing.    C5-C6: Retrolisthesis as above.  Posterior disc osteophyte complex and uncovertebral spurring resulting in mild spinal canal stenosis and moderate right, mild left neural foraminal narrowing.    C6-C7: Postoperative changes above.  No significant spinal canal stenosis or neural foraminal narrowing.    C7-T1: No significant disease.  No significant spinal canal stenosis or neural foraminal narrowing.    Paraspinal muscles & soft tissues: Unremarkable.      Impression       Status post anterior cervical fusion of C6-C7.    Degenerative changes of the cervical spine as detailed above.     XR Knee 11/16  AP standing and PA flexion views of both knees as well as a lateral and sunrise views of the right knee and a sunrise view of the left knee were obtained.    The medial and lateral compartments appear relatively well preserved. No obvious fracture or dislocation is noted. The patella appear well-positioned within the intercondylar notches bilaterally. No significant right-sided suprapatellar joint effusion is noted.    Large Joint Aspiration/Injection: R knee    Date/Time: 6/24/2020 4:00 PM  Performed by: Linda Tabares MD  Authorized by: Linda Tabares MD     Consent Done?:  Yes (Verbal)  Indications:  Pain and diagnostic evaluation  Site marked: the procedure site was marked    Timeout: prior to procedure the correct patient, procedure, and  site was verified    Prep: patient was prepped and draped in usual sterile fashion      Local anesthesia used?: Yes    Local anesthetic:  Lidocaine 2% without epinephrine  Anesthetic total (ml):  2      Details:  Needle Size:  25 G  Ultrasonic Guidance for needle placement?: No    Approach:  Lateral  Location:  Knee  Site:  R knee  Medications:  40 mg triamcinolone acetonide 40 mg/mL  Patient tolerance:  Patient tolerated the procedure well with no immediate complications       ASSESSMENT / PLAN:     Clemencia Knight is a 59 y.o. White female with:      1. Psoriatic arthritis  -Presents for initial eval. C/o recent acute onset joint pains pips, wrists. +Reported hx dactylitis. +Current pso. +Family hx pso. +Nail pitting. +Enthesitis  -Does have some features c/w inflammatory back pain, although not entirely. Suspect mostly mechanical.   -Off mtx 2.2 intolerance  -Incomplete response w stelara and otezla.   -On humira. Still w persistent symptoms. Discussed switching to enbrel. Labs today.      #OA  -Right knee injected today    #High risk med use  -Discussed risks/benefits of all meds including increased risk of infection. No systemic signs at this time.      #Chronic back pain  -w/ DDD s/p cervical fusion  -completed PT, encouraged to continue learned exercises at home    Time spent: 45 minutes in face to face discussion concerning diagnosis, prognosis, review of lab and test results, benefits of treatment as well as management of disease, counseling of patient and coordination of care between various health care providers . Greater than half the time spent was used for coordination of care and counseling of patient.    Method of contact with patient concerns: [facility]      Linda Tabares M.D.  Rheumatology Department   [facility] [place] [address], [place] [address]  Phone: [phone]  Fax: [phone]

## 2020-07-10 ENCOUNTER — PATIENT MESSAGE (OUTPATIENT)
Dept: RHEUMATOLOGY | Facility: CLINIC | Age: 60
End: 2020-07-10

## 2020-07-10 NOTE — TELEPHONE ENCOUNTER
Ms. Knight would like to know if she needs to come to her appointment on 07/13/2020. Her last visit was on 06/24/2020. Thanks, Michael

## 2020-07-20 ENCOUNTER — OFFICE VISIT (OUTPATIENT)
Dept: DERMATOLOGY | Facility: CLINIC | Age: 60
End: 2020-07-20
Payer: COMMERCIAL

## 2020-07-20 DIAGNOSIS — D18.01 CHERRY ANGIOMA: ICD-10-CM

## 2020-07-20 DIAGNOSIS — L40.0 PSORIASIS VULGARIS: Primary | ICD-10-CM

## 2020-07-20 DIAGNOSIS — L81.4 LENTIGINES: ICD-10-CM

## 2020-07-20 PROCEDURE — 99213 PR OFFICE/OUTPT VISIT, EST, LEVL III, 20-29 MIN: ICD-10-PCS | Mod: S$GLB,,, | Performed by: DERMATOLOGY

## 2020-07-20 PROCEDURE — 99999 PR PBB SHADOW E&M-EST. PATIENT-LVL III: ICD-10-PCS | Mod: PBBFAC,,, | Performed by: DERMATOLOGY

## 2020-07-20 PROCEDURE — 99999 PR PBB SHADOW E&M-EST. PATIENT-LVL III: CPT | Mod: PBBFAC,,, | Performed by: DERMATOLOGY

## 2020-07-20 PROCEDURE — 99213 OFFICE O/P EST LOW 20 MIN: CPT | Mod: S$GLB,,, | Performed by: DERMATOLOGY

## 2020-07-20 RX ORDER — ADALIMUMAB 40MG/0.8ML
40 KIT SUBCUTANEOUS
Qty: 2 SYRINGE | Refills: 5 | OUTPATIENT
Start: 2020-07-20

## 2020-07-20 NOTE — TELEPHONE ENCOUNTER
Returned call to pt after review of chart & saw she is scheduled with Dr. Becerril's office. Pt states she prefers that office since closer to her, but wanted to see if she can get her Humira refilled through [facility] sice Dr. MCKEON no longer with [facility] & she's not scheduled with Dr. Becerril until Sept. Saw that Asael already sent refill request to Dr. Alfredo

## 2020-07-20 NOTE — PROGRESS NOTES
Subjective:       Patient ID:  Clemencia Knight is a 59 y.o. female who presents for   Chief Complaint   Patient presents with    Spot     60 yo F presents for evaluation of brown spots on cheeks. Last OV 8/29/2019.     She noticed lesions to bilateral cheeks x 6-8 months. Lesions get flakey and bigger. She has been applying Cerave vitamin c serum.    On Humira for psoriasis (managed by Rheum)    no Phx of NMSC.  yes Fhx of melanoma.  + Brother    Past Medical History:  Allergy  General anesthetics causing adverse effect in therapeutic use      Comment:  pt. somewhat aware of extubation with one of her                surgeries  GERD (gastroesophageal reflux disease)  Hearing loss  Restless leg syndrome  Sciatica        Review of Systems   Constitutional: Negative for fever, chills and fatigue.   Gastrointestinal: Positive for indigestion.   Musculoskeletal: Positive for joint swelling and arthralgias (cervical fusion (Dr White; Neurologist)  ;hands; back; R knee; R ankle)).   Skin: Positive for itching, rash, dry skin, daily sunscreen use, activity-related sunscreen use and wears hat.        Objective:    Physical Exam   Constitutional: She appears well-developed and well-nourished.   Eyes: Lids are normal.    Neurological: She is alert and oriented to person, place, and time.   Psychiatric: She has a normal mood and affect.   Skin:   Areas Examined (abnormalities noted in diagram):   Head / Face Inspection Performed  Neck Inspection Performed              Diagram Legend     Erythematous scaling macule/papule c/w actinic keratosis       Vascular papule c/w angioma      Pigmented verrucoid papule/plaque c/w seborrheic keratosis      Yellow umbilicated papule c/w sebaceous hyperplasia      Irregularly shaped tan macule c/w lentigo     1-2 mm smooth white papules consistent with Milia      Movable subcutaneous cyst with punctum c/w epidermal inclusion cyst      Subcutaneous movable cyst c/w pilar cyst      Firm pink to  brown papule c/w dermatofibroma      Pedunculated fleshy papule(s) c/w skin tag(s)      Evenly pigmented macule c/w junctional nevus     Mildly variegated pigmented, slightly irregular-bordered macule c/w mildly atypical nevus      Flesh colored to evenly pigmented papule c/w intradermal nevus       Pink pearly papule/plaque c/w basal cell carcinoma      Erythematous hyperkeratotic cursted plaque c/w SCC      Surgical scar with no sign of skin cancer recurrence      Open and closed comedones      Inflammatory papules and pustules      Verrucoid papule consistent consistent with wart     Erythematous eczematous patches and plaques     Dystrophic onycholytic nail with subungual debris c/w onychomycosis     Umbilicated papule    Erythematous-base heme-crusted tan verrucoid plaque consistent with inflamed seborrheic keratosis     Erythematous Silvery Scaling Plaque c/w Psoriasis     See annotation      Assessment / Plan:        Psoriasis vulgaris    Lentigines    Cherry angioma         Psoriasis vulgaris  Switching from Dr MCKEON-->Dr Becerril  5% TBSA with nail, scalp, and genital involvement-improving  Continue  ketoconazole (NIZORAL) 2 % shampoo  Continue fluocinonide (LIDEX) 0.05 % external solution  On Humira per Rheum  Now off of MTX    Lentigines   These are benign hyperpigmented sun induced lesions. Daily sun protection will reduce the number of new lesions    Angioma  This is a benign vascular lesion. Reassurance given. No treatment required.     Follow up in about 3 months (around 10/20/2020).

## 2020-07-20 NOTE — TELEPHONE ENCOUNTER
----- Message from Brijesh Mclean sent at 7/20/2020 10:07 AM CDT -----  Type:  RX Refill Request    Who Called:  Patient  Refill or New Rx:  Refill  RX Name and Strength:  adalimumab (HUMIRA) 40 mg/0.8 mL SyKt  How is the patient currently taking it? (ex. 1XDay):  As ordered  Is this a 30 day or 90 day RX:  As ordered  Preferred Pharmacy with phone number:    [address] [address]  Phone: [phone] Fax: [phone]  Local or Mail Order:  Local  Ordering Provider:  Dr. Tabares  Best Call Back Number:  [phone]  Additional Information:  Patient would like a call back before refill is placed. Please call to advise. Thanks!

## 2020-07-24 ENCOUNTER — PATIENT MESSAGE (OUTPATIENT)
Dept: RHEUMATOLOGY | Facility: CLINIC | Age: 60
End: 2020-07-24

## 2020-07-27 ENCOUNTER — TELEPHONE (OUTPATIENT)
Dept: RHEUMATOLOGY | Facility: CLINIC | Age: 60
End: 2020-07-27

## 2020-07-27 NOTE — TELEPHONE ENCOUNTER
Returned call to pt, states she spk to [facility] & they are need of additional info. Advised that we did not recv anything as of yet & that I would give them a call. Called [facility], spk to Rosy whom tranferred to Barbara states they had verbal order on file from Daniel Turcios LPN for humira 0.4ml pens & want to clarify change. Placed the rep on hold & called pt, pt states she would rather the 0.8 ml prefilled syringes as ordered by Dr. Alfredo to see if that works until she can get in with Dr. Becerril in Sept. Advised Barbara @ [facility] of pt's req to have filled as ordered. Barbara verbalized understanding & stated she will process refill today.

## 2020-07-27 NOTE — TELEPHONE ENCOUNTER
----- Message from Pretty Tello sent at 7/27/2020 10:47 AM CDT -----  Regarding: Medication Update  Contact: PT  PT called requesting to speak with nurse regarding medication    Callback: [phone]

## 2020-08-03 ENCOUNTER — PATIENT MESSAGE (OUTPATIENT)
Dept: RHEUMATOLOGY | Facility: CLINIC | Age: 60
End: 2020-08-03

## 2020-08-10 ENCOUNTER — PATIENT MESSAGE (OUTPATIENT)
Dept: FAMILY MEDICINE | Facility: CLINIC | Age: 60
End: 2020-08-10

## 2020-08-13 RX ORDER — PREGABALIN 50 MG/1
CAPSULE ORAL
Qty: 21 CAPSULE | Refills: 0 | Status: SHIPPED | OUTPATIENT
Start: 2020-08-13 | End: 2020-09-28

## 2020-08-24 ENCOUNTER — PATIENT MESSAGE (OUTPATIENT)
Dept: FAMILY MEDICINE | Facility: CLINIC | Age: 60
End: 2020-08-24

## 2020-09-03 ENCOUNTER — PATIENT MESSAGE (OUTPATIENT)
Dept: FAMILY MEDICINE | Facility: CLINIC | Age: 60
End: 2020-09-03

## 2020-09-08 RX ORDER — DULOXETIN HYDROCHLORIDE 30 MG/1
30 CAPSULE, DELAYED RELEASE ORAL DAILY
Qty: 30 CAPSULE | Refills: 11 | Status: SHIPPED | OUTPATIENT
Start: 2020-09-08 | End: 2020-11-02 | Stop reason: ALTCHOICE

## 2020-09-28 ENCOUNTER — PATIENT OUTREACH (OUTPATIENT)
Dept: ADMINISTRATIVE | Facility: OTHER | Age: 60
End: 2020-09-28

## 2020-09-28 ENCOUNTER — OFFICE VISIT (OUTPATIENT)
Dept: RHEUMATOLOGY | Facility: CLINIC | Age: 60
End: 2020-09-28
Payer: COMMERCIAL

## 2020-09-28 ENCOUNTER — LAB VISIT (OUTPATIENT)
Dept: LAB | Facility: HOSPITAL | Age: 60
End: 2020-09-28
Attending: INTERNAL MEDICINE
Payer: COMMERCIAL

## 2020-09-28 VITALS
HEART RATE: 69 BPM | SYSTOLIC BLOOD PRESSURE: 129 MMHG | WEIGHT: 181.56 LBS | BODY MASS INDEX: 30.99 KG/M2 | DIASTOLIC BLOOD PRESSURE: 77 MMHG | HEIGHT: 64 IN

## 2020-09-28 DIAGNOSIS — L40.50 PSORIATIC ARTHRITIS: ICD-10-CM

## 2020-09-28 DIAGNOSIS — M17.0 PRIMARY OSTEOARTHRITIS OF BOTH KNEES: ICD-10-CM

## 2020-09-28 DIAGNOSIS — L40.50 PSORIATIC ARTHRITIS: Primary | ICD-10-CM

## 2020-09-28 DIAGNOSIS — Z79.899 HIGH RISK MEDICATION USE: ICD-10-CM

## 2020-09-28 DIAGNOSIS — M19.90 OSTEOARTHRITIS, UNSPECIFIED OSTEOARTHRITIS TYPE, UNSPECIFIED SITE: ICD-10-CM

## 2020-09-28 LAB
ALBUMIN SERPL BCP-MCNC: 4.3 G/DL (ref 3.5–5.2)
ALP SERPL-CCNC: 74 U/L (ref 55–135)
ALT SERPL W/O P-5'-P-CCNC: 23 U/L (ref 10–44)
ANION GAP SERPL CALC-SCNC: 10 MMOL/L (ref 8–16)
AST SERPL-CCNC: 26 U/L (ref 10–40)
BASOPHILS # BLD AUTO: 0.03 K/UL (ref 0–0.2)
BASOPHILS NFR BLD: 0.4 % (ref 0–1.9)
BILIRUB SERPL-MCNC: 0.6 MG/DL (ref 0.1–1)
BUN SERPL-MCNC: 19 MG/DL (ref 6–20)
CALCIUM SERPL-MCNC: 9.1 MG/DL (ref 8.7–10.5)
CHLORIDE SERPL-SCNC: 101 MMOL/L (ref 95–110)
CO2 SERPL-SCNC: 28 MMOL/L (ref 23–29)
CREAT SERPL-MCNC: 0.9 MG/DL (ref 0.5–1.4)
CRP SERPL-MCNC: 8.4 MG/L (ref 0–8.2)
DIFFERENTIAL METHOD: NORMAL
EOSINOPHIL # BLD AUTO: 0.3 K/UL (ref 0–0.5)
EOSINOPHIL NFR BLD: 4.1 % (ref 0–8)
ERYTHROCYTE [DISTWIDTH] IN BLOOD BY AUTOMATED COUNT: 13.2 % (ref 11.5–14.5)
ERYTHROCYTE [SEDIMENTATION RATE] IN BLOOD BY WESTERGREN METHOD: 15 MM/HR (ref 0–20)
EST. GFR  (AFRICAN AMERICAN): >60 ML/MIN/1.73 M^2
EST. GFR  (NON AFRICAN AMERICAN): >60 ML/MIN/1.73 M^2
GLUCOSE SERPL-MCNC: 92 MG/DL (ref 70–110)
HCT VFR BLD AUTO: 44.1 % (ref 37–48.5)
HGB BLD-MCNC: 14.1 G/DL (ref 12–16)
IGA SERPL-MCNC: 120 MG/DL (ref 40–350)
IGE SERPL-ACNC: <35 IU/ML (ref 0–100)
IGG SERPL-MCNC: 1258 MG/DL (ref 650–1600)
IGM SERPL-MCNC: 160 MG/DL (ref 50–300)
IMM GRANULOCYTES # BLD AUTO: 0.01 K/UL (ref 0–0.04)
IMM GRANULOCYTES NFR BLD AUTO: 0.1 % (ref 0–0.5)
LYMPHOCYTES # BLD AUTO: 2.4 K/UL (ref 1–4.8)
LYMPHOCYTES NFR BLD: 31.1 % (ref 18–48)
MCH RBC QN AUTO: 30.7 PG (ref 27–31)
MCHC RBC AUTO-ENTMCNC: 32 G/DL (ref 32–36)
MCV RBC AUTO: 96 FL (ref 82–98)
MONOCYTES # BLD AUTO: 0.6 K/UL (ref 0.3–1)
MONOCYTES NFR BLD: 7.2 % (ref 4–15)
NEUTROPHILS # BLD AUTO: 4.3 K/UL (ref 1.8–7.7)
NEUTROPHILS NFR BLD: 57.1 % (ref 38–73)
NRBC BLD-RTO: 0 /100 WBC
PLATELET # BLD AUTO: 331 K/UL (ref 150–350)
PMV BLD AUTO: 11.3 FL (ref 9.2–12.9)
POTASSIUM SERPL-SCNC: 4.4 MMOL/L (ref 3.5–5.1)
PROT SERPL-MCNC: 8.1 G/DL (ref 6–8.4)
RBC # BLD AUTO: 4.6 M/UL (ref 4–5.4)
RHEUMATOID FACT SERPL-ACNC: 11 IU/ML (ref 0–15)
SODIUM SERPL-SCNC: 139 MMOL/L (ref 136–145)
T3FREE SERPL-MCNC: 2.6 PG/ML (ref 2.3–4.2)
T4 FREE SERPL-MCNC: 0.94 NG/DL (ref 0.71–1.51)
THYROGLOB AB SERPL IA-ACNC: <4 IU/ML (ref 0–3.9)
THYROPEROXIDASE IGG SERPL-ACNC: <6 IU/ML
TSH SERPL DL<=0.005 MIU/L-ACNC: 1.31 UIU/ML (ref 0.4–4)
WBC # BLD AUTO: 7.61 K/UL (ref 3.9–12.7)

## 2020-09-28 PROCEDURE — 86235 NUCLEAR ANTIGEN ANTIBODY: CPT | Mod: 59

## 2020-09-28 PROCEDURE — 3074F SYST BP LT 130 MM HG: CPT | Mod: CPTII,S$GLB,, | Performed by: INTERNAL MEDICINE

## 2020-09-28 PROCEDURE — 82787 IGG 1 2 3 OR 4 EACH: CPT

## 2020-09-28 PROCEDURE — 86038 ANTINUCLEAR ANTIBODIES: CPT

## 2020-09-28 PROCEDURE — 86376 MICROSOMAL ANTIBODY EACH: CPT

## 2020-09-28 PROCEDURE — 83516 IMMUNOASSAY NONANTIBODY: CPT

## 2020-09-28 PROCEDURE — 99215 PR OFFICE/OUTPT VISIT, EST, LEVL V, 40-54 MIN: ICD-10-PCS | Mod: S$GLB,,, | Performed by: INTERNAL MEDICINE

## 2020-09-28 PROCEDURE — 3008F PR BODY MASS INDEX (BMI) DOCUMENTED: ICD-10-PCS | Mod: CPTII,S$GLB,, | Performed by: INTERNAL MEDICINE

## 2020-09-28 PROCEDURE — 86140 C-REACTIVE PROTEIN: CPT

## 2020-09-28 PROCEDURE — 84443 ASSAY THYROID STIM HORMONE: CPT

## 2020-09-28 PROCEDURE — 81375 HLA II TYPING AG EQUIV LR: CPT | Mod: PO

## 2020-09-28 PROCEDURE — 86256 FLUORESCENT ANTIBODY TITER: CPT | Mod: 91

## 2020-09-28 PROCEDURE — 99999 PR PBB SHADOW E&M-EST. PATIENT-LVL V: ICD-10-PCS | Mod: PBBFAC,,, | Performed by: INTERNAL MEDICINE

## 2020-09-28 PROCEDURE — 3078F PR MOST RECENT DIASTOLIC BLOOD PRESSURE < 80 MM HG: ICD-10-PCS | Mod: CPTII,S$GLB,, | Performed by: INTERNAL MEDICINE

## 2020-09-28 PROCEDURE — 86800 THYROGLOBULIN ANTIBODY: CPT

## 2020-09-28 PROCEDURE — 82784 ASSAY IGA/IGD/IGG/IGM EACH: CPT

## 2020-09-28 PROCEDURE — 84439 ASSAY OF FREE THYROXINE: CPT

## 2020-09-28 PROCEDURE — 82784 ASSAY IGA/IGD/IGG/IGM EACH: CPT | Mod: 59

## 2020-09-28 PROCEDURE — 3078F DIAST BP <80 MM HG: CPT | Mod: CPTII,S$GLB,, | Performed by: INTERNAL MEDICINE

## 2020-09-28 PROCEDURE — 85651 RBC SED RATE NONAUTOMATED: CPT | Mod: PO

## 2020-09-28 PROCEDURE — 3008F BODY MASS INDEX DOCD: CPT | Mod: CPTII,S$GLB,, | Performed by: INTERNAL MEDICINE

## 2020-09-28 PROCEDURE — 86431 RHEUMATOID FACTOR QUANT: CPT

## 2020-09-28 PROCEDURE — 99999 PR PBB SHADOW E&M-EST. PATIENT-LVL V: CPT | Mod: PBBFAC,,, | Performed by: INTERNAL MEDICINE

## 2020-09-28 PROCEDURE — 82785 ASSAY OF IGE: CPT

## 2020-09-28 PROCEDURE — 86225 DNA ANTIBODY NATIVE: CPT

## 2020-09-28 PROCEDURE — 84481 FREE ASSAY (FT-3): CPT

## 2020-09-28 PROCEDURE — 3074F PR MOST RECENT SYSTOLIC BLOOD PRESSURE < 130 MM HG: ICD-10-PCS | Mod: CPTII,S$GLB,, | Performed by: INTERNAL MEDICINE

## 2020-09-28 PROCEDURE — 99215 OFFICE O/P EST HI 40 MIN: CPT | Mod: S$GLB,,, | Performed by: INTERNAL MEDICINE

## 2020-09-28 PROCEDURE — 80053 COMPREHEN METABOLIC PANEL: CPT

## 2020-09-28 PROCEDURE — 86235 NUCLEAR ANTIGEN ANTIBODY: CPT

## 2020-09-28 PROCEDURE — 85025 COMPLETE CBC W/AUTO DIFF WBC: CPT

## 2020-09-28 PROCEDURE — 36415 COLL VENOUS BLD VENIPUNCTURE: CPT | Mod: PO

## 2020-09-28 RX ORDER — ASCORBIC ACID 1000 MG
175 TABLET ORAL DAILY
COMMUNITY
End: 2022-03-10 | Stop reason: CLARIF

## 2020-09-28 RX ORDER — DICLOFENAC SODIUM 10 MG/G
2 GEL TOPICAL 4 TIMES DAILY
Qty: 100 G | Refills: 3 | Status: SHIPPED | OUTPATIENT
Start: 2020-09-28 | End: 2022-03-14

## 2020-09-28 RX ORDER — ZINC GLUCONATE 50 MG
50 TABLET ORAL DAILY
COMMUNITY
End: 2022-03-10 | Stop reason: CLARIF

## 2020-09-28 RX ORDER — MAGNESIUM 200 MG
1 TABLET ORAL DAILY
COMMUNITY
End: 2022-09-08

## 2020-09-28 RX ORDER — FOLIC ACID 1 MG/1
1 TABLET ORAL DAILY
COMMUNITY
End: 2022-03-10 | Stop reason: CLARIF

## 2020-09-28 RX ORDER — VITAMIN A 3000 MCG
10000 CAPSULE ORAL DAILY
COMMUNITY
End: 2022-03-10 | Stop reason: CLARIF

## 2020-09-28 RX ORDER — CALCIUM CARBONATE 600 MG
1200 TABLET ORAL DAILY
COMMUNITY
End: 2020-11-24

## 2020-09-28 RX ORDER — MAGNESIUM 30 MG
TABLET ORAL ONCE
COMMUNITY
End: 2022-03-10 | Stop reason: CLARIF

## 2020-09-28 ASSESSMENT — ROUTINE ASSESSMENT OF PATIENT INDEX DATA (RAPID3)
MDHAQ FUNCTION SCORE: 0.3
PAIN SCORE: 3
TOTAL RAPID3 SCORE: 1.67
PATIENT GLOBAL ASSESSMENT SCORE: 1
PSYCHOLOGICAL DISTRESS SCORE: 0

## 2020-09-28 NOTE — PROGRESS NOTES
Health Maintenance Due   Topic Date Due    Shingles Vaccine (1 of 2) 08/21/2012    Influenza Vaccine (1) 08/01/2020     Updates were requested from care everywhere.  Chart was reviewed for overdue Proactive [facility] En[facility]ters ([facility]) topics (CRS, Breast Cancer Screening, Eye exam)  Health Maintenance has been updated.  LINKS immunization registry triggered.  Immunizations were reconciled.

## 2020-09-28 NOTE — PROGRESS NOTES
Subjective:       Patient ID: Clemencia Knight is a 60 y.o. female.    Chief Complaint: Disease Management, Pain, Swelling, and Follow-up    HPI: pt is a 61 yo female with a h/o inverse psoriasis,  Patient complains of arthralgias and myalgias for which has been present for a few years. Pain is located in multiple joints, both shoulder(s), both elbow(s), both wrist(s), both MCP(s): 1st, 2nd, 3rd, 4th and 5th, both PIP(s): 1st, 2nd, 3rd, 4th and 5th, both DIP(s): 1st and 2nd, both hip(s), both knee(s) and both MTP(s): 1st, 2nd, 3rd, 4th and 5th, is described as aching, pulsating, shooting and throbbing, and is constant, moderate .  Associated symptoms include: crepitation, decreased range of motion, edema, effusion, tenderness and warmth.   She saw Derm and was started with Methotrexate( cleared psoriasis) but made her ill.  She  Is off Humira and is on a gluten diet and dairy free and refined sugar.    Past treatment: 1. Methotrexate, 2. Stelera, 3. Otezla, 4. Humira.    Review of Systems   Constitutional: Positive for chills and fatigue. Negative for activity change, appetite change, diaphoresis, fever and unexpected weight change.   HENT: Negative for congestion, dental problem, ear discharge, ear pain, facial swelling, mouth sores, nosebleeds, postnasal drip, rhinorrhea, sinus pressure, sneezing, sore throat, tinnitus, trouble swallowing and voice change.    Eyes: Negative for photophobia, pain, discharge, redness and itching.   Respiratory: Negative for apnea, cough, chest tightness, shortness of breath and wheezing.    Cardiovascular: Positive for leg swelling. Negative for chest pain and palpitations.   Gastrointestinal: Positive for abdominal pain. Negative for abdominal distention, constipation, diarrhea, nausea and vomiting.   Endocrine: Negative for cold intolerance, heat intolerance, polydipsia and polyuria.   Genitourinary: Negative for decreased urine volume, difficulty urinating, dysuria, flank pain,  "frequency, hematuria and urgency.   Musculoskeletal: Positive for arthralgias, back pain, gait problem, joint swelling, myalgias, neck pain and neck stiffness.   Skin: Negative for pallor, rash and wound.   Allergic/Immunologic: Negative for immunocompromised state.   Neurological: Positive for weakness. Negative for dizziness, tremors, numbness and headaches.   Hematological: Negative for adenopathy. Does not bruise/bleed easily.   Psychiatric/Behavioral: Negative for sleep disturbance. The patient is not nervous/anxious.          Objective:   /77 (BP Location: Left arm, Patient Position: Sitting, BP Method: Medium (Automatic))   Pulse 69   Ht 5' 4" (1.626 m)   Wt 82.3 kg (181 lb 8.8 oz)   BMI 31.16 kg/m²      Physical Exam   Vitals reviewed.  Constitutional: She is oriented to person, place, and time and well-developed, well-nourished, and in no distress.   HENT:   Head: Normocephalic and atraumatic.   Mouth/Throat: Oropharynx is clear and moist.   Eyes: EOM are normal. Pupils are equal, round, and reactive to light.   Neck: Neck supple. No thyromegaly present.   Cardiovascular: Normal rate, regular rhythm and normal heart sounds.  Exam reveals no gallop and no friction rub.    No murmur heard.  Pulmonary/Chest: Breath sounds normal. She has no wheezes. She has no rales. She exhibits no tenderness.   Abdominal: There is no abdominal tenderness. There is no rebound and no guarding.       Right Side Rheumatological Exam     Examination finds the elbow, 1st MCP, 2nd MCP, 3rd MCP, 4th MCP and 5th MCP normal.    The patient is tender to palpation of the shoulder and knee    She has swelling of the knee    The patient has an enlarged wrist, 1st PIP, 2nd PIP, 3rd PIP, 4th PIP and 5th PIP    Shoulder Exam   Tenderness Location: acromion    Range of Motion   Active abduction: abnormal   Adduction: abnormal  Sensation: normal    Knee Exam   Tenderness Location: medial joint line  Patellofemoral Crepitus: " positive  Effusion: positive  Sensation: normal    Hip Exam   Tenderness Location: no tenderness  Sensation: normal    Elbow/Wrist Exam   Tenderness Location: no tenderness  Sensation: normal    Left Side Rheumatological Exam     Examination finds the elbow, knee, 1st MCP, 2nd MCP, 3rd MCP, 4th MCP and 5th MCP normal.    The patient is tender to palpation of the shoulder.    The patient has an enlarged wrist, 1st PIP, 2nd PIP, 3rd PIP, 4th PIP and 5th PIP.    Shoulder Exam   Tenderness Location: acromion    Range of Motion   Active abduction: abnormal   Sensation: normal    Knee Exam   Tenderness Location: lateral joint line and medial joint line    Patellofemoral Crepitus: positive  Effusion: positive  Sensation: normal    Hip Exam   Tenderness Location: no tenderness  Sensation: normal    Elbow/Wrist Exam   Sensation: normal      Back/Neck Exam   General Inspection   Gait: normal       Tenderness Right paramedian tenderness of the Lower C-Spine and Lower L-Spine.Left paramedian tenderness of the Upper C-Spine and Lower L-Spine.      Lymphadenopathy:     She has no cervical adenopathy.   Neurological: She is alert and oriented to person, place, and time. She has normal sensation and normal strength. Gait normal.   Reflex Scores:       Patellar reflexes are 3+ on the right side and 3+ on the left side.  Skin: No rash noted. No erythema. No pallor.     Psychiatric: Mood and affect normal.   Musculoskeletal: Tenderness and deformity present. No edema.          Results for orders placed or performed in visit on 06/25/20   Comprehensive metabolic panel   Result Value Ref Range    Sodium 139 136 - 145 mmol/L    Potassium 4.7 3.5 - 5.1 mmol/L    Chloride 106 95 - 110 mmol/L    CO2 25 23 - 29 mmol/L    Glucose 94 70 - 110 mg/dL    BUN, Bld 20 6 - 20 mg/dL    Creatinine 0.8 0.5 - 1.4 mg/dL    Calcium 9.7 8.7 - 10.5 mg/dL    Total Protein 8.0 6.0 - 8.4 g/dL    Albumin 4.4 3.5 - 5.2 g/dL    Total Bilirubin 0.5 0.1 - 1.0 mg/dL     Alkaline Phosphatase 63 55 - 135 U/L    AST 43 (H) 10 - 40 U/L    ALT 63 (H) 10 - 44 U/L    Anion Gap 8 8 - 16 mmol/L    eGFR if African American >60.0 >60 mL/min/1.73 m^2    eGFR if non African American >60.0 >60 mL/min/1.73 m^2   CBC auto differential   Result Value Ref Range    WBC 6.58 3.90 - 12.70 K/uL    RBC 4.75 4.00 - 5.40 M/uL    Hemoglobin 14.7 12.0 - 16.0 g/dL    Hematocrit 44.6 37.0 - 48.5 %    Mean Corpuscular Volume 94 82 - 98 fL    Mean Corpuscular Hemoglobin 30.9 27.0 - 31.0 pg    Mean Corpuscular Hemoglobin Conc 33.0 32.0 - 36.0 g/dL    RDW 13.3 11.5 - 14.5 %    Platelets 315 150 - 350 K/uL    MPV 10.8 9.2 - 12.9 fL    Immature Granulocytes 0.2 0.0 - 0.5 %    Gran # (ANC) 3.5 1.8 - 7.7 K/uL    Immature Grans (Abs) 0.01 0.00 - 0.04 K/uL    Lymph # 2.1 1.0 - 4.8 K/uL    Mono # 0.6 0.3 - 1.0 K/uL    Eos # 0.4 0.0 - 0.5 K/uL    Baso # 0.02 0.00 - 0.20 K/uL    nRBC 0 0 /100 WBC    Gran% 53.3 38.0 - 73.0 %    Lymph% 31.9 18.0 - 48.0 %    Mono% 8.4 4.0 - 15.0 %    Eosinophil% 5.9 0.0 - 8.0 %    Basophil% 0.3 0.0 - 1.9 %    Differential Method Automated    Sedimentation rate   Result Value Ref Range    Sed Rate 13 0 - 20 mm/Hr   C-Reactive Protein   Result Value Ref Range    CRP 3.5 0.0 - 8.2 mg/L   Quantiferon Gold TB   Result Value Ref Range    NIL 0.040 See text IU/mL    TB1 - Nil 0.000 See text IU/mL    TB2 - Nil 0.010 See text IU/mL    Mitogen - Nil >10.000 See text IU/mL    TB Gold Plus Negative    HEPATITIS PANEL, ACUTE   Result Value Ref Range    Hepatitis B Surface Ag Negative Negative    Hep B C IgM Negative Negative    Hep A IgM Negative Negative    Hepatitis C Ab Negative Negative        Assessment:       1. Psoriatic arthritis    2. Osteoarthritis, unspecified osteoarthritis type, unspecified site    3. High risk medication use    4. Primary osteoarthritis of both knees            Plan:       Clemencia was seen today for disease management, pain, swelling and follow-up.    Diagnoses and all  orders for this visit:    Psoriatic arthritis  -     Comprehensive metabolic panel; Future  -     CBC auto differential; Future  -     GENE Screen w/Reflex; Future  -     Anti Sm/RNP Antibody; Future  -     Anti-DNA antibody, double-stranded; Future  -     Anti-Histone Antibody; Future  -     Antimitochondrial Antibody; Future  -     Anti-Smooth Muscle Antibody; Future  -     Anti-Smith antibody; Future  -     Rheumatoid factor; Future  -     Sedimentation rate; Future  -     Sjogrens syndrome-A extractable nuclear antibody; Future  -     Sjogrens syndrome-B extractable nuclear antibody; Future  -     TSH; Future  -     Thyroid Peroxidase Antibody; Future  -     T4, free; Future  -     T3, free; Future  -     Anti-thyroglobulin antibody; Future  -     Anti-scleroderma antibody; Future  -     IgA; Future  -     IgE; Future  -     IgG; Future  -     IgG 1, 2, 3, and 4; Future  -     IgM; Future  -     C-Reactive Protein; Future  -     HLA CELIAC CL2; Future    Osteoarthritis, unspecified osteoarthritis type, unspecified site  -     Comprehensive metabolic panel; Future  -     CBC auto differential; Future  -     GENE Screen w/Reflex; Future  -     Anti Sm/RNP Antibody; Future  -     Anti-DNA antibody, double-stranded; Future  -     Anti-Histone Antibody; Future  -     Antimitochondrial Antibody; Future  -     Anti-Smooth Muscle Antibody; Future  -     Anti-Smith antibody; Future  -     Rheumatoid factor; Future  -     Sedimentation rate; Future  -     Sjogrens syndrome-A extractable nuclear antibody; Future  -     Sjogrens syndrome-B extractable nuclear antibody; Future  -     TSH; Future  -     Thyroid Peroxidase Antibody; Future  -     T4, free; Future  -     T3, free; Future  -     Anti-thyroglobulin antibody; Future  -     Anti-scleroderma antibody; Future  -     IgA; Future  -     IgE; Future  -     IgG; Future  -     IgG 1, 2, 3, and 4; Future  -     IgM; Future  -     C-Reactive Protein; Future  -     HLA CELIAC  CL2; Future    High risk medication use  -     Comprehensive metabolic panel; Future  -     CBC auto differential; Future  -     GENE Screen w/Reflex; Future  -     Anti Sm/RNP Antibody; Future  -     Anti-DNA antibody, double-stranded; Future  -     Anti-Histone Antibody; Future  -     Antimitochondrial Antibody; Future  -     Anti-Smooth Muscle Antibody; Future  -     Anti-Smith antibody; Future  -     Rheumatoid factor; Future  -     Sedimentation rate; Future  -     Sjogrens syndrome-A extractable nuclear antibody; Future  -     Sjogrens syndrome-B extractable nuclear antibody; Future  -     TSH; Future  -     Thyroid Peroxidase Antibody; Future  -     T4, free; Future  -     T3, free; Future  -     Anti-thyroglobulin antibody; Future  -     Anti-scleroderma antibody; Future  -     IgA; Future  -     IgE; Future  -     IgG; Future  -     IgG 1, 2, 3, and 4; Future  -     IgM; Future  -     C-Reactive Protein; Future  -     HLA CELIAC CL2; Future    Primary osteoarthritis of both knees  -     diclofenac sodium (VOLTAREN) 1 % Gel; Apply 2 g topically 4 (four) times daily.      More than 50% of the 60 minute encounter was spent face to face counseling the patient regarding current status and future plan of care as well as side of the medications. All questions were answered to patient's satisfaction

## 2020-09-29 LAB
ANA SER QL IF: NORMAL
DSDNA AB SER-ACNC: NORMAL [IU]/ML
MITOCHONDRIA AB TITR SER IF: NORMAL {TITER}
SMOOTH MUSCLE AB TITR SER IF: NORMAL {TITER}

## 2020-09-30 LAB
ANTI SM ANTIBODY: 0.15 RATIO (ref 0–0.99)
ANTI SM/RNP ANTIBODY: 1.48 RATIO (ref 0–0.99)
ANTI-SM INTERPRETATION: NEGATIVE
ANTI-SM/RNP INTERPRETATION: POSITIVE
ANTI-SSA ANTIBODY: 0.12 RATIO (ref 0–0.99)
ANTI-SSA INTERPRETATION: NEGATIVE
ANTI-SSB ANTIBODY: 0.11 RATIO (ref 0–0.99)
ANTI-SSB INTERPRETATION: NEGATIVE

## 2020-10-01 ENCOUNTER — PATIENT MESSAGE (OUTPATIENT)
Dept: RHEUMATOLOGY | Facility: CLINIC | Age: 60
End: 2020-10-01

## 2020-10-01 LAB
ENA SCL70 AB SER-ACNC: 10 UNITS
IGG1 SER-MCNC: 907 MG/DL (ref 382–929)
IGG2 SER-MCNC: 172 MG/DL (ref 242–700)
IGG3 SER-MCNC: 42 MG/DL (ref 22–176)
IGG4 SER-MCNC: 5 MG/DL (ref 4–86)

## 2020-10-02 LAB — HISTONE IGG SER IA-ACNC: 3.4 UNITS (ref 0–0.9)

## 2020-10-05 ENCOUNTER — PATIENT MESSAGE (OUTPATIENT)
Dept: RHEUMATOLOGY | Facility: CLINIC | Age: 60
End: 2020-10-05

## 2020-10-07 LAB
CELIA INTERPRETATION: NORMAL
CELIA TESTING DATE: NORMAL
HLA DQA1 1: NORMAL
HLA DQA1 2: NORMAL
HLA DRB4 1: NORMAL
HLA-DP 1 SERO. EQUIV: NORMAL
HLA-DP 2 SERO. EQUIV: NORMAL
HLA-DPA1 1: NORMAL
HLA-DPA1 2: NORMAL
HLA-DPB1 1: NORMAL
HLA-DPB1 2: NORMAL
HLA-DQ 1 SERO. EQUIV: 9
HLA-DQ 2 SERO. EQUIV: 4
HLA-DQB1 1: NORMAL
HLA-DQB1 2: NORMAL
HLA-DRB1 1 SERO. EQUIV: NORMAL
HLA-DRB1 1: NORMAL
HLA-DRB1 2 SERO. EQUIV: NORMAL
HLA-DRB1 2: NORMAL
HLA-DRB3 1: NORMAL
HLA-DRB3 2: NORMAL
HLA-DRB345 1 SERO. EQUIV: NORMAL
HLA-DRB345 2 SERO. EQUIV: NORMAL
HLA-DRB4 2: NORMAL
HLA-DRB5 1: NORMAL
HLA-DRB5 2: NORMAL

## 2020-10-15 ENCOUNTER — PATIENT MESSAGE (OUTPATIENT)
Dept: DERMATOLOGY | Facility: CLINIC | Age: 60
End: 2020-10-15

## 2020-10-15 RX ORDER — FLUTICASONE PROPIONATE 0.5 MG/G
CREAM TOPICAL 2 TIMES DAILY
Qty: 30 G | Refills: 1 | Status: SHIPPED | OUTPATIENT
Start: 2020-10-15 | End: 2022-09-08

## 2020-10-20 ENCOUNTER — PATIENT MESSAGE (OUTPATIENT)
Dept: RHEUMATOLOGY | Facility: CLINIC | Age: 60
End: 2020-10-20

## 2020-10-20 ENCOUNTER — PATIENT MESSAGE (OUTPATIENT)
Dept: FAMILY MEDICINE | Facility: CLINIC | Age: 60
End: 2020-10-20

## 2020-10-20 NOTE — TELEPHONE ENCOUNTER
Her symptoms are vague and it would be difficult to pin point a cause without full evaluation. Primary care would be an appropriate place to start so they can rule out infections, electrolyte abnormalities, blood sugar issues, GI issues.

## 2020-10-21 ENCOUNTER — LAB VISIT (OUTPATIENT)
Dept: LAB | Facility: HOSPITAL | Age: 60
End: 2020-10-21
Attending: PHYSICIAN ASSISTANT
Payer: COMMERCIAL

## 2020-10-21 ENCOUNTER — OFFICE VISIT (OUTPATIENT)
Dept: FAMILY MEDICINE | Facility: CLINIC | Age: 60
End: 2020-10-21
Payer: COMMERCIAL

## 2020-10-21 VITALS
SYSTOLIC BLOOD PRESSURE: 122 MMHG | BODY MASS INDEX: 30.65 KG/M2 | WEIGHT: 178.56 LBS | DIASTOLIC BLOOD PRESSURE: 84 MMHG | HEART RATE: 69 BPM | OXYGEN SATURATION: 98 %

## 2020-10-21 DIAGNOSIS — R11.0 NAUSEA: Primary | ICD-10-CM

## 2020-10-21 DIAGNOSIS — R11.0 NAUSEA: ICD-10-CM

## 2020-10-21 LAB
ALBUMIN SERPL BCP-MCNC: 4 G/DL (ref 3.5–5.2)
ALP SERPL-CCNC: 83 U/L (ref 55–135)
ALT SERPL W/O P-5'-P-CCNC: 17 U/L (ref 10–44)
ANION GAP SERPL CALC-SCNC: 9 MMOL/L (ref 8–16)
AST SERPL-CCNC: 21 U/L (ref 10–40)
BASOPHILS # BLD AUTO: 0.04 K/UL (ref 0–0.2)
BASOPHILS NFR BLD: 0.4 % (ref 0–1.9)
BILIRUB SERPL-MCNC: 0.5 MG/DL (ref 0.1–1)
BUN SERPL-MCNC: 15 MG/DL (ref 6–20)
CALCIUM SERPL-MCNC: 9.5 MG/DL (ref 8.7–10.5)
CHLORIDE SERPL-SCNC: 102 MMOL/L (ref 95–110)
CO2 SERPL-SCNC: 28 MMOL/L (ref 23–29)
CREAT SERPL-MCNC: 0.9 MG/DL (ref 0.5–1.4)
DIFFERENTIAL METHOD: NORMAL
EOSINOPHIL # BLD AUTO: 0.4 K/UL (ref 0–0.5)
EOSINOPHIL NFR BLD: 4 % (ref 0–8)
ERYTHROCYTE [DISTWIDTH] IN BLOOD BY AUTOMATED COUNT: 13.3 % (ref 11.5–14.5)
EST. GFR  (AFRICAN AMERICAN): >60 ML/MIN/1.73 M^2
EST. GFR  (NON AFRICAN AMERICAN): >60 ML/MIN/1.73 M^2
GLUCOSE SERPL-MCNC: 103 MG/DL (ref 70–110)
HCT VFR BLD AUTO: 41.5 % (ref 37–48.5)
HGB BLD-MCNC: 13.6 G/DL (ref 12–16)
IMM GRANULOCYTES # BLD AUTO: 0.02 K/UL (ref 0–0.04)
IMM GRANULOCYTES NFR BLD AUTO: 0.2 % (ref 0–0.5)
INFLUENZA A, MOLECULAR: NEGATIVE
INFLUENZA B, MOLECULAR: NEGATIVE
LYMPHOCYTES # BLD AUTO: 2.6 K/UL (ref 1–4.8)
LYMPHOCYTES NFR BLD: 26.5 % (ref 18–48)
MCH RBC QN AUTO: 30.8 PG (ref 27–31)
MCHC RBC AUTO-ENTMCNC: 32.8 G/DL (ref 32–36)
MCV RBC AUTO: 94 FL (ref 82–98)
MONOCYTES # BLD AUTO: 0.8 K/UL (ref 0.3–1)
MONOCYTES NFR BLD: 8.4 % (ref 4–15)
NEUTROPHILS # BLD AUTO: 5.9 K/UL (ref 1.8–7.7)
NEUTROPHILS NFR BLD: 60.5 % (ref 38–73)
NRBC BLD-RTO: 0 /100 WBC
PLATELET # BLD AUTO: 329 K/UL (ref 150–350)
PMV BLD AUTO: 11 FL (ref 9.2–12.9)
POTASSIUM SERPL-SCNC: 4.4 MMOL/L (ref 3.5–5.1)
PROT SERPL-MCNC: 7.5 G/DL (ref 6–8.4)
RBC # BLD AUTO: 4.42 M/UL (ref 4–5.4)
SODIUM SERPL-SCNC: 139 MMOL/L (ref 136–145)
SPECIMEN SOURCE: NORMAL
WBC # BLD AUTO: 9.74 K/UL (ref 3.9–12.7)

## 2020-10-21 PROCEDURE — 36415 COLL VENOUS BLD VENIPUNCTURE: CPT | Mod: PO

## 2020-10-21 PROCEDURE — 3079F PR MOST RECENT DIASTOLIC BLOOD PRESSURE 80-89 MM HG: ICD-10-PCS | Mod: CPTII,S$GLB,, | Performed by: PHYSICIAN ASSISTANT

## 2020-10-21 PROCEDURE — 99214 OFFICE O/P EST MOD 30 MIN: CPT | Mod: S$GLB,,, | Performed by: PHYSICIAN ASSISTANT

## 2020-10-21 PROCEDURE — 3008F BODY MASS INDEX DOCD: CPT | Mod: CPTII,S$GLB,, | Performed by: PHYSICIAN ASSISTANT

## 2020-10-21 PROCEDURE — 99999 PR PBB SHADOW E&M-EST. PATIENT-LVL IV: CPT | Mod: PBBFAC,,, | Performed by: PHYSICIAN ASSISTANT

## 2020-10-21 PROCEDURE — 3074F PR MOST RECENT SYSTOLIC BLOOD PRESSURE < 130 MM HG: ICD-10-PCS | Mod: CPTII,S$GLB,, | Performed by: PHYSICIAN ASSISTANT

## 2020-10-21 PROCEDURE — 3074F SYST BP LT 130 MM HG: CPT | Mod: CPTII,S$GLB,, | Performed by: PHYSICIAN ASSISTANT

## 2020-10-21 PROCEDURE — 3079F DIAST BP 80-89 MM HG: CPT | Mod: CPTII,S$GLB,, | Performed by: PHYSICIAN ASSISTANT

## 2020-10-21 PROCEDURE — 99999 PR PBB SHADOW E&M-EST. PATIENT-LVL IV: ICD-10-PCS | Mod: PBBFAC,,, | Performed by: PHYSICIAN ASSISTANT

## 2020-10-21 PROCEDURE — 99214 PR OFFICE/OUTPT VISIT, EST, LEVL IV, 30-39 MIN: ICD-10-PCS | Mod: S$GLB,,, | Performed by: PHYSICIAN ASSISTANT

## 2020-10-21 PROCEDURE — 87502 INFLUENZA DNA AMP PROBE: CPT | Mod: PO

## 2020-10-21 PROCEDURE — U0003 INFECTIOUS AGENT DETECTION BY NUCLEIC ACID (DNA OR RNA); SEVERE ACUTE RESPIRATORY SYNDROME CORONAVIRUS 2 (SARS-COV-2) (CORONAVIRUS DISEASE [COVID-19]), AMPLIFIED PROBE TECHNIQUE, MAKING USE OF HIGH THROUGHPUT TECHNOLOGIES AS DESCRIBED BY CMS-2020-01-R: HCPCS

## 2020-10-21 PROCEDURE — 3008F PR BODY MASS INDEX (BMI) DOCUMENTED: ICD-10-PCS | Mod: CPTII,S$GLB,, | Performed by: PHYSICIAN ASSISTANT

## 2020-10-21 PROCEDURE — 80053 COMPREHEN METABOLIC PANEL: CPT

## 2020-10-21 PROCEDURE — 85025 COMPLETE CBC W/AUTO DIFF WBC: CPT

## 2020-10-21 RX ORDER — ONDANSETRON 8 MG/1
8 TABLET, ORALLY DISINTEGRATING ORAL EVERY 6 HOURS PRN
Qty: 20 TABLET | Refills: 2 | Status: SHIPPED | OUTPATIENT
Start: 2020-10-21 | End: 2021-02-21

## 2020-10-21 NOTE — PROGRESS NOTES
Subjective:       Patient ID: Clemencia Knight is a 60 y.o. female    Chief Complaint: Nausea, Diarrhea, Fatigue, Headache, and Shortness of Breath    HPI  The patient is familiar to me, PCP is Dr Higgins.  She presents today CO nausea and weakness for the past few weeks.  She has a history of psoriatic arthritis and sees rheumatology, Dr Becerril.  Dr Becerril did labs rescently and they showed psoriasis and some lupus like syndrome.  She was feeling well when she saw Dr Becerril, symptoms began one week after she saw Dr Becerril which was about 2 weeks ago.  Today she is CO headache, nausea, diarrhea, overwhelming fatigue. Also for the past 3 nights she has felt a heat sensation all across her body. She has been having some mild left knee pain for the past few weeks and is is using diclofenac gel with some relief.     Review of Systems   Constitutional: Positive for activity change and fatigue.   HENT: Negative for rhinorrhea and trouble swallowing.    Eyes: Negative for discharge and visual disturbance.   Respiratory: Positive for cough and shortness of breath. Negative for chest tightness and wheezing.    Cardiovascular: Negative for chest pain.   Gastrointestinal: Positive for diarrhea. Negative for blood in stool, constipation and vomiting.   Endocrine: Negative for polydipsia and polyuria.   Genitourinary: Negative for difficulty urinating, dysuria, hematuria and menstrual problem.   Musculoskeletal: Positive for arthralgias.   Neurological: Positive for weakness and headaches.   Psychiatric/Behavioral: Negative for confusion and dysphoric mood.        Objective:   Physical Exam  Constitutional:       General: She is not in acute distress.     Appearance: She is well-developed. She is not diaphoretic.   HENT:      Head: Normocephalic and atraumatic.   Eyes:      Pupils: Pupils are equal, round, and reactive to light.   Neck:      Musculoskeletal: Normal range of motion and neck supple.   Cardiovascular:      Rate and  Rhythm: Normal rate and regular rhythm.      Heart sounds: Normal heart sounds. No murmur. No friction rub. No gallop.    Pulmonary:      Effort: Pulmonary effort is normal. No respiratory distress.      Breath sounds: Normal breath sounds. No wheezing or rales.   Chest:      Chest wall: No tenderness.   Abdominal:      General: Bowel sounds are normal. There is no distension.      Palpations: Abdomen is soft. There is no mass.      Tenderness: There is no abdominal tenderness. There is no guarding.   Musculoskeletal: Normal range of motion.   Skin:     General: Skin is warm and dry.      Findings: No rash.   Neurological:      Mental Status: She is alert and oriented to person, place, and time.   Psychiatric:         Behavior: Behavior normal.         Thought Content: Thought content normal.         Judgment: Judgment normal.          Assessment:       1. Nausea  CBC auto differential    Comprehensive Metabolic Panel    COVID-19 Routine Screening    Influenza A & B by Molecular    ondansetron (ZOFRAN-ODT) 8 MG TbDL        Plan:       Nausea  -     CBC auto differential; Future; Expected date: 10/21/2020  -     Comprehensive Metabolic Panel; Future; Expected date: 10/21/2020  -     COVID-19 Routine Screening; Future; Expected date: 10/21/2020  -     Influenza A & B by Molecular  -     ondansetron (ZOFRAN-ODT) 8 MG TbDL; Take 1 tablet (8 mg total) by mouth every 6 (six) hours as needed.  Dispense: 20 tablet; Refill: 2

## 2020-10-22 ENCOUNTER — PATIENT MESSAGE (OUTPATIENT)
Dept: FAMILY MEDICINE | Facility: CLINIC | Age: 60
End: 2020-10-22

## 2020-10-22 LAB — SARS-COV-2 RNA RESP QL NAA+PROBE: NOT DETECTED

## 2020-10-31 ENCOUNTER — PATIENT MESSAGE (OUTPATIENT)
Dept: FAMILY MEDICINE | Facility: CLINIC | Age: 60
End: 2020-10-31

## 2020-10-31 RX ORDER — TIZANIDINE 4 MG/1
4 TABLET ORAL EVERY 6 HOURS PRN
Qty: 30 TABLET | Refills: 3 | Status: CANCELLED | OUTPATIENT
Start: 2020-10-31

## 2020-11-01 ENCOUNTER — PATIENT MESSAGE (OUTPATIENT)
Dept: FAMILY MEDICINE | Facility: CLINIC | Age: 60
End: 2020-11-01

## 2020-11-02 ENCOUNTER — PATIENT MESSAGE (OUTPATIENT)
Dept: RHEUMATOLOGY | Facility: CLINIC | Age: 60
End: 2020-11-02

## 2020-11-02 ENCOUNTER — OFFICE VISIT (OUTPATIENT)
Dept: FAMILY MEDICINE | Facility: CLINIC | Age: 60
End: 2020-11-02
Payer: COMMERCIAL

## 2020-11-02 VITALS
BODY MASS INDEX: 30.78 KG/M2 | HEIGHT: 64 IN | HEART RATE: 69 BPM | SYSTOLIC BLOOD PRESSURE: 136 MMHG | DIASTOLIC BLOOD PRESSURE: 84 MMHG | RESPIRATION RATE: 18 BRPM | TEMPERATURE: 98 F | OXYGEN SATURATION: 98 % | WEIGHT: 180.31 LBS

## 2020-11-02 DIAGNOSIS — G89.29 CHRONIC NONINTRACTABLE HEADACHE, UNSPECIFIED HEADACHE TYPE: ICD-10-CM

## 2020-11-02 DIAGNOSIS — F33.1 MODERATE EPISODE OF RECURRENT MAJOR DEPRESSIVE DISORDER: Primary | ICD-10-CM

## 2020-11-02 DIAGNOSIS — R51.9 CHRONIC NONINTRACTABLE HEADACHE, UNSPECIFIED HEADACHE TYPE: ICD-10-CM

## 2020-11-02 DIAGNOSIS — M79.604 PAIN OF RIGHT LOWER EXTREMITY: ICD-10-CM

## 2020-11-02 PROCEDURE — 3079F PR MOST RECENT DIASTOLIC BLOOD PRESSURE 80-89 MM HG: ICD-10-PCS | Mod: CPTII,S$GLB,, | Performed by: PHYSICIAN ASSISTANT

## 2020-11-02 PROCEDURE — 99215 OFFICE O/P EST HI 40 MIN: CPT | Mod: S$GLB,,, | Performed by: PHYSICIAN ASSISTANT

## 2020-11-02 PROCEDURE — 3079F DIAST BP 80-89 MM HG: CPT | Mod: CPTII,S$GLB,, | Performed by: PHYSICIAN ASSISTANT

## 2020-11-02 PROCEDURE — 99999 PR PBB SHADOW E&M-EST. PATIENT-LVL III: CPT | Mod: PBBFAC,,, | Performed by: PHYSICIAN ASSISTANT

## 2020-11-02 PROCEDURE — 99215 PR OFFICE/OUTPT VISIT, EST, LEVL V, 40-54 MIN: ICD-10-PCS | Mod: S$GLB,,, | Performed by: PHYSICIAN ASSISTANT

## 2020-11-02 PROCEDURE — 99999 PR PBB SHADOW E&M-EST. PATIENT-LVL III: ICD-10-PCS | Mod: PBBFAC,,, | Performed by: PHYSICIAN ASSISTANT

## 2020-11-02 PROCEDURE — 3008F BODY MASS INDEX DOCD: CPT | Mod: CPTII,S$GLB,, | Performed by: PHYSICIAN ASSISTANT

## 2020-11-02 PROCEDURE — 3075F PR MOST RECENT SYSTOLIC BLOOD PRESS GE 130-139MM HG: ICD-10-PCS | Mod: CPTII,S$GLB,, | Performed by: PHYSICIAN ASSISTANT

## 2020-11-02 PROCEDURE — 3075F SYST BP GE 130 - 139MM HG: CPT | Mod: CPTII,S$GLB,, | Performed by: PHYSICIAN ASSISTANT

## 2020-11-02 PROCEDURE — 3008F PR BODY MASS INDEX (BMI) DOCUMENTED: ICD-10-PCS | Mod: CPTII,S$GLB,, | Performed by: PHYSICIAN ASSISTANT

## 2020-11-02 RX ORDER — TIZANIDINE 4 MG/1
4 TABLET ORAL EVERY 6 HOURS PRN
Qty: 30 TABLET | Refills: 3 | Status: CANCELLED | OUTPATIENT
Start: 2020-10-31

## 2020-11-02 RX ORDER — BUTALBITAL, ACETAMINOPHEN AND CAFFEINE 50; 325; 40 MG/1; MG/1; MG/1
1 TABLET ORAL EVERY 6 HOURS PRN
Qty: 20 TABLET | Refills: 0 | Status: SHIPPED | OUTPATIENT
Start: 2020-11-02 | End: 2020-12-02

## 2020-11-02 RX ORDER — TIZANIDINE 4 MG/1
4 TABLET ORAL NIGHTLY
Qty: 90 TABLET | Refills: 3 | Status: SHIPPED | OUTPATIENT
Start: 2020-11-02 | End: 2020-12-31

## 2020-11-02 RX ORDER — DULOXETIN HYDROCHLORIDE 30 MG/1
CAPSULE, DELAYED RELEASE ORAL
Qty: 60 CAPSULE | Refills: 11 | Status: SHIPPED | OUTPATIENT
Start: 2020-11-02 | End: 2020-11-04 | Stop reason: SDUPTHER

## 2020-11-02 RX ORDER — PEDI MULTIVIT NO.12 W-FLUORIDE 0.25 MG
TABLET,CHEWABLE ORAL DAILY
COMMUNITY
Start: 2020-10-09 | End: 2022-03-10 | Stop reason: CLARIF

## 2020-11-02 NOTE — PROGRESS NOTES
Subjective:       Patient ID: Clemencia Knight is a 60 y.o. female    Chief Complaint: Migraine and Anxiety    HPI  The patient is familiar to me, PCP is Dr Higgins.  She presents today CO depression and anxiety.  She weaned off of all of her medication a few months ago and now she is having a lot of problems.  She is CO fatigue, abdominal pains, cold finger tips, headaches and feeling depressed and tearful. She was treated for her depression in the past with Cymbalta and did well on the medication.      Headaches:  She gets sharp pain in her teeth on the right.  She saw the dentist and nothing was found.  She has tried taking Tylenol, ibuprofen and Excedrin migraine without any relief.     She has an appointment with rheumatology tomorrow to follow up for her rheumatologic issues.  She is requesting her Zanaflex back for her chronic leg pain.      Review of Systems   Constitutional: Positive for fatigue.   Cardiovascular: Negative for chest pain and palpitations.   Gastrointestinal: Positive for diarrhea and nausea. Negative for abdominal pain.   Musculoskeletal: Positive for arthralgias.   Neurological: Positive for weakness, numbness and headaches.   Psychiatric/Behavioral: Negative for agitation, dysphoric mood and self-injury. The patient is not nervous/anxious.         Objective:   Physical Exam  Constitutional:       General: She is not in acute distress.     Appearance: She is well-developed.   HENT:      Head: Normocephalic and atraumatic.   Eyes:      Pupils: Pupils are equal, round, and reactive to light.   Neck:      Musculoskeletal: Normal range of motion and neck supple.      Thyroid: No thyromegaly.   Cardiovascular:      Rate and Rhythm: Normal rate and regular rhythm.      Heart sounds: Normal heart sounds.   Pulmonary:      Effort: Pulmonary effort is normal.      Breath sounds: Normal breath sounds.   Musculoskeletal: Normal range of motion.         General: Tenderness (fingers) present. No swelling  or deformity.   Skin:     General: Skin is warm and dry.      Comments: Finger tips are cold, cap refill is < 2 seconds    Neurological:      Mental Status: She is alert and oriented to person, place, and time.   Psychiatric:         Behavior: Behavior normal.         Thought Content: Thought content normal.         Judgment: Judgment normal.          Assessment:       1. Moderate episode of recurrent major depressive disorder  DULoxetine (CYMBALTA) 30 MG capsule   2. Pain of right lower extremity  tiZANidine (ZANAFLEX) 4 MG tablet   3. Chronic nonintractable headache, unspecified headache type  butalbital-acetaminophen-caffeine -40 mg (FIORICET, ESGIC) -40 mg per tablet        Plan:       Moderate episode of recurrent major depressive disorder  -     DULoxetine (CYMBALTA) 30 MG capsule; Take 1 capsule (30 mg) once daily for 1 week, then increase 2 capsules (60 mg) once daily  Dispense: 60 capsule; Refill: 11    Pain of right lower extremity  -     tiZANidine (ZANAFLEX) 4 MG tablet; Take 1 tablet (4 mg total) by mouth every evening.  Dispense: 90 tablet; Refill: 3    Chronic nonintractable headache, unspecified headache type  -     butalbital-acetaminophen-caffeine -40 mg (FIORICET, ESGIC) -40 mg per tablet; Take 1 tablet by mouth every 6 (six) hours as needed for Pain.  Dispense: 20 tablet; Refill: 0

## 2020-11-03 ENCOUNTER — LAB VISIT (OUTPATIENT)
Dept: LAB | Facility: HOSPITAL | Age: 60
End: 2020-11-03
Attending: PHYSICIAN ASSISTANT
Payer: COMMERCIAL

## 2020-11-03 ENCOUNTER — OFFICE VISIT (OUTPATIENT)
Dept: RHEUMATOLOGY | Facility: CLINIC | Age: 60
End: 2020-11-03
Payer: COMMERCIAL

## 2020-11-03 VITALS
SYSTOLIC BLOOD PRESSURE: 133 MMHG | DIASTOLIC BLOOD PRESSURE: 80 MMHG | HEIGHT: 64 IN | HEART RATE: 74 BPM | BODY MASS INDEX: 30.73 KG/M2 | WEIGHT: 180 LBS

## 2020-11-03 DIAGNOSIS — R79.82 ELEVATED C-REACTIVE PROTEIN (CRP): ICD-10-CM

## 2020-11-03 DIAGNOSIS — R76.8 HISTONE ANTIBODY POSITIVE: ICD-10-CM

## 2020-11-03 DIAGNOSIS — R19.8 IRREGULAR BOWEL HABITS: ICD-10-CM

## 2020-11-03 DIAGNOSIS — R10.9 ABDOMINAL PAIN, UNSPECIFIED ABDOMINAL LOCATION: ICD-10-CM

## 2020-11-03 DIAGNOSIS — L40.50 PSORIATIC ARTHRITIS: Primary | ICD-10-CM

## 2020-11-03 DIAGNOSIS — R76.8 ANTI-SMITH (ANTI-SM) ANTIBODY AND ANTIRIBONUCLEAR PROTEIN (ANTI-RNP) ANTIBODY POSITIVE: ICD-10-CM

## 2020-11-03 LAB
BACTERIA #/AREA URNS HPF: NORMAL /HPF
BILIRUB UR QL STRIP: NEGATIVE
CLARITY UR: CLEAR
COLOR UR: YELLOW
GLUCOSE UR QL STRIP: NEGATIVE
HGB UR QL STRIP: ABNORMAL
KETONES UR QL STRIP: NEGATIVE
LEUKOCYTE ESTERASE UR QL STRIP: NEGATIVE
MICROSCOPIC COMMENT: NORMAL
NITRITE UR QL STRIP: NEGATIVE
PH UR STRIP: 6 [PH] (ref 5–8)
PROT UR QL STRIP: NEGATIVE
RBC #/AREA URNS HPF: 3 /HPF (ref 0–4)
SP GR UR STRIP: 1.01 (ref 1–1.03)
URN SPEC COLLECT METH UR: ABNORMAL
WBC #/AREA URNS HPF: 2 /HPF (ref 0–5)

## 2020-11-03 PROCEDURE — 99999 PR PBB SHADOW E&M-EST. PATIENT-LVL V: ICD-10-PCS | Mod: PBBFAC,,, | Performed by: PHYSICIAN ASSISTANT

## 2020-11-03 PROCEDURE — 87147 CULTURE TYPE IMMUNOLOGIC: CPT

## 2020-11-03 PROCEDURE — 3079F DIAST BP 80-89 MM HG: CPT | Mod: CPTII,S$GLB,, | Performed by: PHYSICIAN ASSISTANT

## 2020-11-03 PROCEDURE — 3075F SYST BP GE 130 - 139MM HG: CPT | Mod: CPTII,S$GLB,, | Performed by: PHYSICIAN ASSISTANT

## 2020-11-03 PROCEDURE — 3075F PR MOST RECENT SYSTOLIC BLOOD PRESS GE 130-139MM HG: ICD-10-PCS | Mod: CPTII,S$GLB,, | Performed by: PHYSICIAN ASSISTANT

## 2020-11-03 PROCEDURE — 81000 URINALYSIS NONAUTO W/SCOPE: CPT | Mod: PO

## 2020-11-03 PROCEDURE — 99214 OFFICE O/P EST MOD 30 MIN: CPT | Mod: S$GLB,,, | Performed by: PHYSICIAN ASSISTANT

## 2020-11-03 PROCEDURE — 3008F PR BODY MASS INDEX (BMI) DOCUMENTED: ICD-10-PCS | Mod: CPTII,S$GLB,, | Performed by: PHYSICIAN ASSISTANT

## 2020-11-03 PROCEDURE — 99214 PR OFFICE/OUTPT VISIT, EST, LEVL IV, 30-39 MIN: ICD-10-PCS | Mod: S$GLB,,, | Performed by: PHYSICIAN ASSISTANT

## 2020-11-03 PROCEDURE — 99999 PR PBB SHADOW E&M-EST. PATIENT-LVL V: CPT | Mod: PBBFAC,,, | Performed by: PHYSICIAN ASSISTANT

## 2020-11-03 PROCEDURE — 87086 URINE CULTURE/COLONY COUNT: CPT

## 2020-11-03 PROCEDURE — 87088 URINE BACTERIA CULTURE: CPT

## 2020-11-03 PROCEDURE — 3008F BODY MASS INDEX DOCD: CPT | Mod: CPTII,S$GLB,, | Performed by: PHYSICIAN ASSISTANT

## 2020-11-03 PROCEDURE — 3079F PR MOST RECENT DIASTOLIC BLOOD PRESSURE 80-89 MM HG: ICD-10-PCS | Mod: CPTII,S$GLB,, | Performed by: PHYSICIAN ASSISTANT

## 2020-11-03 RX ORDER — PANTOPRAZOLE SODIUM 20 MG/1
20 TABLET, DELAYED RELEASE ORAL DAILY
Qty: 30 TABLET | Refills: 3 | Status: SHIPPED | OUTPATIENT
Start: 2020-11-03 | End: 2021-03-24 | Stop reason: SDUPTHER

## 2020-11-03 ASSESSMENT — ROUTINE ASSESSMENT OF PATIENT INDEX DATA (RAPID3)
TOTAL RAPID3 SCORE: 5.78
FATIGUE SCORE: 2.2
PAIN SCORE: 7
PSYCHOLOGICAL DISTRESS SCORE: 2.2
PATIENT GLOBAL ASSESSMENT SCORE: 7
MDHAQ FUNCTION SCORE: 1

## 2020-11-03 NOTE — PROGRESS NOTES
Subjective:       Patient ID: Clemencia Knight is a 60 y.o. female.    Chief Complaint: Psoriatic Arthritis    Mrs. Knight is a 60 year old female who presents to clinic for follow up on psoriatic arthritis. She is a new patient to me and recently established care with Dr. Becerril in September. She has been doing poorly since her last visit. She complains of fatigue, nausea, belching, lower abdominal pain after eating, irregular bowel movements, and body aches. She reports sx have been ongoing x 3 weeks. She was evaluated by PCP cbc/cmp were normal and covid test was negative. Cymbalta was started for depression/mood and zofran for nausea. She also complains of sharp lancinating pain in her R maxillary region followed by HA. She reports sx worse after flossing her teeth. Dentist cleared her. She complains of joint pain involving her PIPs, elbows, L knee, neck, shoulders, and toes. Pain is moderate intensity. Finger tips have also been cold, but she denies change in coloration. Psoriasis flared in the R axilla and remains in the buttock crease--even with topical steroids.   We reviewed her recent labs in detail. At her last visit, she wanted to stay off immunosuppressive tx; however, her sx have progressed and now she has new GI sx.    Current tx:  1. None    Prior tx:  1. Otezla  2. Stelara  3. MTX  4. Humira    Review of Systems   Constitutional: Positive for activity change and fatigue. Negative for appetite change, chills and fever.   HENT:        Facial pain   Eyes: Negative for visual disturbance.   Respiratory: Negative for cough and shortness of breath.    Cardiovascular: Negative for chest pain, palpitations and leg swelling.   Gastrointestinal: Positive for abdominal pain, constipation, diarrhea and nausea. Negative for vomiting.   Genitourinary: Positive for pelvic pain.   Musculoskeletal: Positive for arthralgias, joint swelling, myalgias and neck pain.   Neurological: Positive for headaches. Negative for  dizziness, weakness and light-headedness.         Objective:     Vitals:    11/03/20 1026   BP: 133/80   Pulse: 74       Past Medical History:   Diagnosis Date    Allergy     General anesthetics causing adverse effect in therapeutic use     pt. somewhat aware of extubation with one of her surgeries    GERD (gastroesophageal reflux disease)     Hearing loss     Restless leg syndrome     Sciatica      Past Surgical History:   Procedure Laterality Date    AUGMENTATION OF BREAST      BELT ABDOMINOPLASTY      breast implants      CERVICAL FUSION      COLONOSCOPY  10/2012    repeat in 10    DEXA      WNL    EPIDURAL STEROID INJECTION INTO CERVICAL SPINE N/A 9/24/2018    Procedure: Injection-steroid-epidural-cervical;  Surgeon: Myles Rocha MD;  Location: [facility] OR;  Service: Pain Management;  Laterality: N/A;  C7-T1    EPIDURAL STEROID INJECTION INTO CERVICAL SPINE N/A 11/20/2018    Procedure: Injection-steroid-epidural-cervical;  Surgeon: Myles Rocha MD;  Location: [facility] OR;  Service: Pain Management;  Laterality: N/A;  C7-T1    HYSTERECTOMY  2000    OOPHORECTOMY  7/16/2013    laparotomy BSO for benign 10cm tubal cyst    SALPINGOOPHORECTOMY  2013    with removal of fallopian cyst    SHOULDER SURGERY      right    TLH  2000    ovaries remain, benign    TONSILLECTOMY, ADENOIDECTOMY, BILATERAL MYRINGOTOMY AND TUBES            Physical Exam   Constitutional: She is well-developed, well-nourished, and in no distress.   Eyes: Right conjunctiva is not injected. Left conjunctiva is not injected. Right eye exhibits normal extraocular motion. Left eye exhibits normal extraocular motion.   Neck: No JVD present. No thyromegaly present.   Cardiovascular: Normal rate and regular rhythm.  Exam reveals no decreased pulses.    Pulmonary/Chest: Effort normal.   Abdominal: Soft. There is no hepatosplenomegaly. There is abdominal tenderness in the right upper quadrant.       Right Side Rheumatological Exam     Examination  finds the shoulder, elbow, wrist, knee, 1st MCP, 2nd MCP, 3rd MCP, 4th MCP and 5th MCP normal.    The patient is tender to palpation of the 1st PIP, 2nd PIP, 3rd PIP, 4th PIP and 5th PIP    Left Side Rheumatological Exam     Examination finds the shoulder, elbow, wrist, knee, 1st MCP, 2nd MCP, 3rd MCP, 4th MCP and 5th MCP normal.    The patient is tender to palpation of the 1st PIP, 2nd PIP, 3rd PIP, 4th PIP and 5th PIP.      Lymphadenopathy:     She has no cervical adenopathy.   Neurological: Gait normal.   Skin: Rash (r axilla , psoriasis) noted.          Psychiatric: Mood and affect normal.       Labs reviewed:  Component      Latest Ref Rng & Units 11/3/2020 10/21/2020   KAT Interpretation           KAT Testing Date           HLA-DRB1 1           HLA-DRB1 1 Sero. Equiv           HLA-DRB1 2           HLA-DRB1 2 Sero. Equiv           HLA-DQB1 1           HLA-DQ 1 Sero. Equiv           HLA-DQB1 2           HLA-DQ 2 Sero. Equiv           HLA-DRB3 1 Hi Res           HLA-DRB3 2           HLA DRB4 1           HLA-DRB4 2           HLA-DRB5 1           HLA-DRB5 2           HLA-HXY421 1 Sero. Equiv           HLA-GBO802 2 Sero. Equiv           HLA DQA1 1           HLA DQA1 2           HLA-DPA1 1           HLA-DPA1 2           HLA-DPB1 1           HLA-DPB1 2           HLA-DP 1 Sero. Equiv           HLA-DP 2 Sero. Equiv           WBC      3.90 - 12.70 K/uL  9.74   RBC      4.00 - 5.40 M/uL  4.42   Hemoglobin      12.0 - 16.0 g/dL  13.6   Hematocrit      37.0 - 48.5 %  41.5   MCV      82 - 98 fL  94   MCH      27.0 - 31.0 pg  30.8   MCHC      32.0 - 36.0 g/dL  32.8   RDW      11.5 - 14.5 %  13.3   Platelets      150 - 350 K/uL  329   MPV      9.2 - 12.9 fL  11.0   Immature Granulocytes      0.0 - 0.5 %  0.2   Gran # (ANC)      1.8 - 7.7 K/uL  5.9   Immature Grans (Abs)      0.00 - 0.04 K/uL  0.02   Lymph #      1.0 - 4.8 K/uL  2.6   Mono #      0.3 - 1.0 K/uL  0.8   Eos #      0.0 - 0.5 K/uL  0.4   Baso #      0.00 - 0.20  K/uL  0.04   nRBC      0 /100 WBC  0   Gran %      38.0 - 73.0 %  60.5   Lymph %      18.0 - 48.0 %  26.5   Mono %      4.0 - 15.0 %  8.4   Eosinophil %      0.0 - 8.0 %  4.0   Basophil %      0.0 - 1.9 %  0.4   Differential Method        Automated   Sodium      136 - 145 mmol/L  139   Potassium      3.5 - 5.1 mmol/L  4.4   Chloride      95 - 110 mmol/L  102   CO2      23 - 29 mmol/L  28   Glucose      70 - 110 mg/dL  103   BUN      6 - 20 mg/dL  15   Creatinine      0.5 - 1.4 mg/dL  0.9   Calcium      8.7 - 10.5 mg/dL  9.5   PROTEIN TOTAL      6.0 - 8.4 g/dL  7.5   Albumin      3.5 - 5.2 g/dL  4.0   BILIRUBIN TOTAL      0.1 - 1.0 mg/dL  0.5   Alkaline Phosphatase      55 - 135 U/L  83   AST      10 - 40 U/L  21   ALT      10 - 44 U/L  17   Anion Gap      8 - 16 mmol/L  9   eGFR if African American      >60 mL/min/1.73 m:2  >60.0   eGFR if non African American      >60 mL/min/1.73 m:2  >60.0   Specimen UA       Urine, Clean Catch    Color, UA      Yellow, Straw, Gudelia Yellow    Appearance, UA      Clear Clear    pH, UA      5.0 - 8.0 6.0    Specific Gravity, UA      1.005 - 1.030 1.015    Protein, UA      Negative Negative    Glucose, UA      Negative Negative    Ketones, UA      Negative Negative    Bilirubin (UA)      Negative Negative    Occult Blood UA      Negative 2+ (A)    NITRITE UA      Negative Negative    Leukocytes, UA      Negative Negative    IgG 1      382 - 929 mg/dL     IgG 2      242 - 700 mg/dL     IgG 3      22 - 176 mg/dL     IgG 4      4 - 86 mg/dL     Anti Sm/RNP Antibody      0.00 - 0.99 Ratio     Anti-Sm/RNP Interpretation      Negative     Anti Sm Antibody      0.00 - 0.99 Ratio     Anti-Sm Interpretation      Negative     Anti-SSA Antibody      0.00 - 0.99 Ratio     Anti-SSA Interpretation      Negative     Anti-SSB Antibody      0.00 - 0.99 Ratio     Anti-SSB Interpretation      Negative     GENE Screen      Negative <1:80     ds DNA Ab      Negative 1:10     Anti-Histone Antibody      0.0  - 0.9 Units     Anti-Mitochon Ab IFA      Negative     Smooth Muscle Ab      Negative     Rheumatoid Factor      0.0 - 15.0 IU/mL     Sed Rate      0 - 20 mm/Hr     TSH      0.400 - 4.000 uIU/mL     Thyroperoxidase Antibodies      <6.0 IU/mL     Free T4      0.71 - 1.51 ng/dL     T3, Free      2.3 - 4.2 pg/mL     Thyroglobulin Ab Screen      0.0 - 3.9 IU/mL     Scleroderma SCL-      <20 UNITS     IgA      40 - 350 mg/dL     IgE      0 - 100 IU/mL     IgG      650 - 1600 mg/dL     IgM      50 - 300 mg/dL     CRP      0.0 - 8.2 mg/L     SARS-CoV2 (COVID-19) Qualitative PCR      Not Detected  Not Detected     Component      Latest Ref Rng & Units 9/28/2020   KAT Interpretation       Patient is Negative for both HLA DQ2 and DQ8. . . .   KAT Testing Date       09/30/2020 09:06 AM   HLA-DRB1 1       NT   HLA-DRB1 1 Sero. Equiv       NT   HLA-DRB1 2       NT   HLA-DRB1 2 Sero. Equiv       NT   HLA-DQB1 1       *03   HLA-DQ 1 Sero. Equiv       9   HLA-DQB1 2       *04   HLA-DQ 2 Sero. Equiv       4   HLA-DRB3 1 Hi Res       NT   HLA-DRB3 2       NT   HLA DRB4 1       NT   HLA-DRB4 2       NT   HLA-DRB5 1       NT   HLA-DRB5 2       NT   HLA-YOG541 1 Sero. Equiv       NT   HLA-SHQ085 2 Sero. Equiv       NT   HLA DQA1 1       *02   HLA DQA1 2       *04   HLA-DPA1 1       NT   HLA-DPA1 2       NT   HLA-DPB1 1       NT   HLA-DPB1 2       NT   HLA-DP 1 Sero. Equiv       NT   HLA-DP 2 Sero. Equiv       NT   WBC      3.90 - 12.70 K/uL 7.61   RBC      4.00 - 5.40 M/uL 4.60   Hemoglobin      12.0 - 16.0 g/dL 14.1   Hematocrit      37.0 - 48.5 % 44.1   MCV      82 - 98 fL 96   MCH      27.0 - 31.0 pg 30.7   MCHC      32.0 - 36.0 g/dL 32.0   RDW      11.5 - 14.5 % 13.2   Platelets      150 - 350 K/uL 331   MPV      9.2 - 12.9 fL 11.3   Immature Granulocytes      0.0 - 0.5 % 0.1   Gran # (ANC)      1.8 - 7.7 K/uL 4.3   Immature Grans (Abs)      0.00 - 0.04 K/uL 0.01   Lymph #      1.0 - 4.8 K/uL 2.4   Mono #      0.3 - 1.0 K/uL 0.6    Eos #      0.0 - 0.5 K/uL 0.3   Baso #      0.00 - 0.20 K/uL 0.03   nRBC      0 /100 WBC 0   Gran %      38.0 - 73.0 % 57.1   Lymph %      18.0 - 48.0 % 31.1   Mono %      4.0 - 15.0 % 7.2   Eosinophil %      0.0 - 8.0 % 4.1   Basophil %      0.0 - 1.9 % 0.4   Differential Method       Automated   Sodium      136 - 145 mmol/L 139   Potassium      3.5 - 5.1 mmol/L 4.4   Chloride      95 - 110 mmol/L 101   CO2      23 - 29 mmol/L 28   Glucose      70 - 110 mg/dL 92   BUN      6 - 20 mg/dL 19   Creatinine      0.5 - 1.4 mg/dL 0.9   Calcium      8.7 - 10.5 mg/dL 9.1   PROTEIN TOTAL      6.0 - 8.4 g/dL 8.1   Albumin      3.5 - 5.2 g/dL 4.3   BILIRUBIN TOTAL      0.1 - 1.0 mg/dL 0.6   Alkaline Phosphatase      55 - 135 U/L 74   AST      10 - 40 U/L 26   ALT      10 - 44 U/L 23   Anion Gap      8 - 16 mmol/L 10   eGFR if African American      >60 mL/min/1.73 m:2 >60.0   eGFR if non African American      >60 mL/min/1.73 m:2 >60.0   Specimen UA          Color, UA      Yellow, Straw, Gudelia    Appearance, UA      Clear    pH, UA      5.0 - 8.0    Specific Gravity, UA      1.005 - 1.030    Protein, UA      Negative    Glucose, UA      Negative    Ketones, UA      Negative    Bilirubin (UA)      Negative    Occult Blood UA      Negative    NITRITE UA      Negative    Leukocytes, UA      Negative    IgG 1      382 - 929 mg/dL 907   IgG 2      242 - 700 mg/dL 172 (L)   IgG 3      22 - 176 mg/dL 42   IgG 4      4 - 86 mg/dL 5   Anti Sm/RNP Antibody      0.00 - 0.99 Ratio 1.48 (H)   Anti-Sm/RNP Interpretation      Negative Positive (A)   Anti Sm Antibody      0.00 - 0.99 Ratio 0.15   Anti-Sm Interpretation      Negative Negative   Anti-SSA Antibody      0.00 - 0.99 Ratio 0.12   Anti-SSA Interpretation      Negative Negative   Anti-SSB Antibody      0.00 - 0.99 Ratio 0.11   Anti-SSB Interpretation      Negative Negative   GENE Screen      Negative <1:80 Negative <1:80   ds DNA Ab      Negative 1:10 Negative 1:10   Anti-Histone  Antibody      0.0 - 0.9 Units 3.4 (H)   Anti-Mitochon Ab IFA      Negative Negative 1:40   Smooth Muscle Ab      Negative Negative 1:40   Rheumatoid Factor      0.0 - 15.0 IU/mL 11.0   Sed Rate      0 - 20 mm/Hr 15   TSH      0.400 - 4.000 uIU/mL 1.314   Thyroperoxidase Antibodies      <6.0 IU/mL <6.0   Free T4      0.71 - 1.51 ng/dL 0.94   T3, Free      2.3 - 4.2 pg/mL 2.6   Thyroglobulin Ab Screen      0.0 - 3.9 IU/mL <4.0   Scleroderma SCL-      <20 UNITS 10   IgA      40 - 350 mg/dL 120   IgE      0 - 100 IU/mL <35   IgG      650 - 1600 mg/dL 1258   IgM      50 - 300 mg/dL 160   CRP      0.0 - 8.2 mg/L 8.4 (H)   SARS-CoV2 (COVID-19) Qualitative PCR      Not Detected      Assessment:       1. Psoriatic arthritis    2. Abdominal pain, unspecified abdominal location    3. Irregular bowel habits    4. Elevated C-reactive protein (CRP)    5. Histone antibody positive    6. Anti-Smith (anti-Sm) antibody and antiribonuclear protein (anti-RNP) antibody positive            Plan:       Psoriatic arthritis    Abdominal pain, unspecified abdominal location  -     Urinalysis; Future; Expected date: 11/03/2020  -     Urine culture; Future; Expected date: 11/03/2020  -     Ambulatory referral/consult to Gastroenterology; Future; Expected date: 11/10/2020  -     pantoprazole (PROTONIX) 20 MG tablet; Take 1 tablet (20 mg total) by mouth once daily.  Dispense: 30 tablet; Refill: 3    Irregular bowel habits  -     Urinalysis; Future; Expected date: 11/03/2020  -     Urine culture; Future; Expected date: 11/03/2020  -     Ambulatory referral/consult to Gastroenterology; Future; Expected date: 11/10/2020    Elevated C-reactive protein (CRP)    Histone antibody positive    Anti-Smith (anti-Sm) antibody and antiribonuclear protein (anti-RNP) antibody positive        Assessment:  60 year old female with  Psoriatic arthritis, +histone antibody, +Sm/RNP antibody, negative GENE, mildly elevated CRP    Plan:  1. Check urine today to rule  out infection  2. Add protonix 20 mg daily. Refer to GI for abdominal pain workup  3. Consider neuralgia as cause for facial pain?  4. Consider alternative tx for psoriatic arthritis once gi work up is completed, possibly cosentyx if no evidence of inflammatory bowel disease or xeljanz. Will avoid adding NSAIDs or steroids due to GI upset.     Follow up:  2 mo w/ Dr. Becerril w/labs prior

## 2020-11-04 DIAGNOSIS — F33.1 MODERATE EPISODE OF RECURRENT MAJOR DEPRESSIVE DISORDER: ICD-10-CM

## 2020-11-04 RX ORDER — DULOXETIN HYDROCHLORIDE 60 MG/1
60 CAPSULE, DELAYED RELEASE ORAL DAILY
Qty: 30 CAPSULE | Refills: 11 | Status: SHIPPED | OUTPATIENT
Start: 2020-11-04 | End: 2021-12-22 | Stop reason: SDUPTHER

## 2020-11-05 LAB — BACTERIA UR CULT: ABNORMAL

## 2020-11-06 ENCOUNTER — OFFICE VISIT (OUTPATIENT)
Dept: GASTROENTEROLOGY | Facility: CLINIC | Age: 60
End: 2020-11-06
Payer: COMMERCIAL

## 2020-11-06 ENCOUNTER — LAB VISIT (OUTPATIENT)
Dept: LAB | Facility: HOSPITAL | Age: 60
End: 2020-11-06
Attending: NURSE PRACTITIONER
Payer: COMMERCIAL

## 2020-11-06 ENCOUNTER — PATIENT MESSAGE (OUTPATIENT)
Dept: RHEUMATOLOGY | Facility: CLINIC | Age: 60
End: 2020-11-06

## 2020-11-06 VITALS — BODY MASS INDEX: 30.83 KG/M2 | WEIGHT: 180.56 LBS | HEIGHT: 64 IN

## 2020-11-06 DIAGNOSIS — Z01.818 PREOP TESTING: ICD-10-CM

## 2020-11-06 DIAGNOSIS — R19.8 ALTERNATING CONSTIPATION AND DIARRHEA: ICD-10-CM

## 2020-11-06 DIAGNOSIS — R11.0 NAUSEA: ICD-10-CM

## 2020-11-06 DIAGNOSIS — R10.30 LOWER ABDOMINAL PAIN: ICD-10-CM

## 2020-11-06 DIAGNOSIS — K21.9 GASTROESOPHAGEAL REFLUX DISEASE, UNSPECIFIED WHETHER ESOPHAGITIS PRESENT: ICD-10-CM

## 2020-11-06 DIAGNOSIS — R19.4 CHANGE IN BOWEL HABITS: Primary | ICD-10-CM

## 2020-11-06 LAB — IGA SERPL-MCNC: 118 MG/DL (ref 40–350)

## 2020-11-06 PROCEDURE — 82784 ASSAY IGA/IGD/IGG/IGM EACH: CPT

## 2020-11-06 PROCEDURE — 99999 PR PBB SHADOW E&M-EST. PATIENT-LVL V: CPT | Mod: PBBFAC,,, | Performed by: NURSE PRACTITIONER

## 2020-11-06 PROCEDURE — 99244 OFF/OP CNSLTJ NEW/EST MOD 40: CPT | Mod: S$GLB,,, | Performed by: NURSE PRACTITIONER

## 2020-11-06 PROCEDURE — 99999 PR PBB SHADOW E&M-EST. PATIENT-LVL V: ICD-10-PCS | Mod: PBBFAC,,, | Performed by: NURSE PRACTITIONER

## 2020-11-06 PROCEDURE — 83516 IMMUNOASSAY NONANTIBODY: CPT

## 2020-11-06 PROCEDURE — 99244 PR OFFICE CONSULTATION,LEVEL IV: ICD-10-PCS | Mod: S$GLB,,, | Performed by: NURSE PRACTITIONER

## 2020-11-06 RX ORDER — POLYETHYLENE GLYCOL 3350 17 G/17G
17 POWDER, FOR SOLUTION ORAL DAILY
Qty: 510 G | Refills: 1 | Status: SHIPPED | OUTPATIENT
Start: 2020-11-06 | End: 2021-01-14

## 2020-11-06 NOTE — LETTER
November 6, 2020      Margoth Miranda PA-C  [address] [facility] [address] [place] [address]           [facility]  [address] [place] [address]  Phone: [phone]          Patient: Clemencia Knight   MR Number: [id number]   YOB: 1960   Date of Visit: 11/6/2020       Dear Margoth Miranda:    Thank you for referring Clemencia Knight to me for evaluation. Attached you will find relevant portions of my assessment and plan of care.    If you have questions, please do not hesitate to call me. I look forward to following Clemencia Knight along with you.    Sincerely,    Brigid Burch, NP    Enclosure  CC:  No Recipients    If you would like to receive this communication electronically, please contact [email or web address] or [phone] to request more information on [employer] Link access.    For providers and/or their staff who would like to refer a patient to [facility], please contact us through our one-stop-shop provider referral line, [facility], at [phone].    If you feel you have received this communication in error or would no longer like to receive these types of communications, please e-mail [email or web address]

## 2020-11-06 NOTE — PROGRESS NOTES
Subjective:       Patient ID: Clemencia Knight is a 60 y.o. female, Body mass index is 30.99 kg/m².    Chief Complaint: Abdominal Pain      Patient is new to me. Established patient of Dr. Paul.  Referred by Margoth Miranda PA-C for abdominal pain and irregular bowel habits.    Abdominal Pain  This is a new problem. The current episode started 1 to 4 weeks ago (Started ~ one month ago). The onset quality is sudden. The problem occurs intermittently. The problem has been unchanged. The pain is located in the LLQ, RLQ and suprapubic region. The quality of the pain is aching. The abdominal pain does not radiate. Associated symptoms include constipation (started ~ one month ago; goes several days without a bowel movement then has a bout of diarrhea; tried Probiotic, Metamucil, and smooth tea with little relief), diarrhea (denies recent antibiotics, hospitalization, or foreign travel) and nausea (Started ~ one week ago; has improved since starting Protonix and Zofran). Pertinent negatives include no anorexia, belching, dysuria, fever, flatus, frequency, hematochezia, melena, vomiting or weight loss. The pain is aggravated by eating. Treatments tried: has been following a gluten free diet- no improvement. Prior diagnostic workup includes GI consult. Her past medical history is significant for abdominal surgery (hx of hysterectomy) and GERD (frequent heartburn; recently started on Protonix). There is no history of colon cancer, Crohn's disease, gallstones, irritable bowel syndrome, pancreatitis, PUD or ulcerative colitis.     Review of Systems   Constitutional: Negative for appetite change, fever, unexpected weight change and weight loss.   HENT: Negative for trouble swallowing.    Respiratory: Negative for cough and shortness of breath.    Cardiovascular: Negative for chest pain.   Gastrointestinal: Positive for abdominal pain, constipation (started ~ one month ago; goes several days without a bowel movement then has a bout  of diarrhea; tried Probiotic, Metamucil, and smooth tea with little relief), diarrhea (denies recent antibiotics, hospitalization, or foreign travel) and nausea (Started ~ one week ago; has improved since starting Protonix and Zofran). Negative for abdominal distention, anal bleeding, anorexia, blood in stool, flatus, hematochezia, melena, rectal pain and vomiting.   Genitourinary: Negative for difficulty urinating, dysuria and frequency.   Musculoskeletal: Negative for gait problem.   Skin: Negative for rash.   Neurological: Negative for speech difficulty.   Psychiatric/Behavioral: Negative for confusion.       Past Medical History:   Diagnosis Date    Allergy     General anesthetics causing adverse effect in therapeutic use     pt. somewhat aware of extubation with one of her surgeries    GERD (gastroesophageal reflux disease)     Hearing loss     Restless leg syndrome     Sciatica       Past Surgical History:   Procedure Laterality Date    AUGMENTATION OF BREAST      BELT ABDOMINOPLASTY      breast implants      CERVICAL FUSION      COLONOSCOPY  10/2012    Dr. Paul; internal hemorrhoid; repeat in 10 years    DEXA      WNL    EPIDURAL STEROID INJECTION INTO CERVICAL SPINE N/A 9/24/2018    Procedure: Injection-steroid-epidural-cervical;  Surgeon: Myles Rocha MD;  Location: [facility] OR;  Service: Pain Management;  Laterality: N/A;  C7-T1    EPIDURAL STEROID INJECTION INTO CERVICAL SPINE N/A 11/20/2018    Procedure: Injection-steroid-epidural-cervical;  Surgeon: Myles Rocha MD;  Location: [facility] OR;  Service: Pain Management;  Laterality: N/A;  C7-T1    HYSTERECTOMY  2000    OOPHORECTOMY  7/16/2013    laparotomy BSO for benign 10cm tubal cyst    SALPINGOOPHORECTOMY  2013    with removal of fallopian cyst    SHOULDER SURGERY      right    TLH  2000    ovaries remain, benign    TONSILLECTOMY, ADENOIDECTOMY, BILATERAL MYRINGOTOMY AND TUBES        Family History   Problem Relation Age of Onset     Cancer Father         bladder    Diabetes Father     Heart disease Father     Diabetes Mother     Melanoma Brother     Charcot-Karla-Tooth disease Brother     Breast cancer Neg Hx     Ovarian cancer Neg Hx     Anesthesia problems Neg Hx       Wt Readings from Last 10 Encounters:   11/06/20 81.9 kg (180 lb 8.9 oz)   11/03/20 81.6 kg (180 lb)   11/02/20 81.8 kg (180 lb 5.4 oz)   10/21/20 81 kg (178 lb 9.2 oz)   09/28/20 82.3 kg (181 lb 8.8 oz)   06/24/20 86.8 kg (191 lb 5.8 oz)   05/18/20 90 kg (198 lb 6.6 oz)   03/09/20 88.6 kg (195 lb 3.5 oz)   02/11/20 86.9 kg (191 lb 9.3 oz)   12/20/19 87.9 kg (193 lb 11.2 oz)     Lab Results   Component Value Date    WBC 9.74 10/21/2020    HGB 13.6 10/21/2020    HCT 41.5 10/21/2020    MCV 94 10/21/2020     10/21/2020     CMP  Sodium   Date Value Ref Range Status   10/21/2020 139 136 - 145 mmol/L Final     Potassium   Date Value Ref Range Status   10/21/2020 4.4 3.5 - 5.1 mmol/L Final     Chloride   Date Value Ref Range Status   10/21/2020 102 95 - 110 mmol/L Final     CO2   Date Value Ref Range Status   10/21/2020 28 23 - 29 mmol/L Final     Glucose   Date Value Ref Range Status   10/21/2020 103 70 - 110 mg/dL Final     BUN   Date Value Ref Range Status   10/21/2020 15 6 - 20 mg/dL Final     Creatinine   Date Value Ref Range Status   10/21/2020 0.9 0.5 - 1.4 mg/dL Final     Calcium   Date Value Ref Range Status   10/21/2020 9.5 8.7 - 10.5 mg/dL Final     Total Protein   Date Value Ref Range Status   10/21/2020 7.5 6.0 - 8.4 g/dL Final     Albumin   Date Value Ref Range Status   10/21/2020 4.0 3.5 - 5.2 g/dL Final     Total Bilirubin   Date Value Ref Range Status   10/21/2020 0.5 0.1 - 1.0 mg/dL Final     Comment:     For infants and newborns, interpretation of results should be based  on gestational age, weight and in agreement with clinical  observations.  Premature Infant recommended reference ranges:  Up to 24 hours.............<8.0 mg/dL  Up to 48  hours............<12.0 mg/dL  3-5 days..................<15.0 mg/dL  6-29 days.................<15.0 mg/dL       Alkaline Phosphatase   Date Value Ref Range Status   10/21/2020 83 55 - 135 U/L Final     AST   Date Value Ref Range Status   10/21/2020 21 10 - 40 U/L Final     ALT   Date Value Ref Range Status   10/21/2020 17 10 - 44 U/L Final     Anion Gap   Date Value Ref Range Status   10/21/2020 9 8 - 16 mmol/L Final     eGFR if    Date Value Ref Range Status   10/21/2020 >60.0 >60 mL/min/1.73 m^2 Final     eGFR if non    Date Value Ref Range Status   10/21/2020 >60.0 >60 mL/min/1.73 m^2 Final     Comment:     Calculation used to obtain the estimated glomerular filtration  rate (eGFR) is the CKD-EPI equation.          Lab Results   Component Value Date    TSH 1.314 09/28/2020          Reviewed prior medical records including endoscopy history (see surgical history).     Objective:      Physical Exam  Constitutional:       General: She is not in acute distress.     Appearance: She is well-developed.   HENT:      Head: Normocephalic.      Right Ear: Hearing normal.      Left Ear: Hearing normal.      Nose: Nose normal.      Mouth/Throat:      Comments: Pt wearing mask due to COVID concerns  Eyes:      General: Lids are normal.      Conjunctiva/sclera: Conjunctivae normal.      Pupils: Pupils are equal, round, and reactive to light.   Neck:      Musculoskeletal: Normal range of motion.      Trachea: Trachea normal.   Cardiovascular:      Rate and Rhythm: Normal rate and regular rhythm.      Heart sounds: Normal heart sounds. No murmur.   Pulmonary:      Effort: Pulmonary effort is normal. No respiratory distress.      Breath sounds: Normal breath sounds. No stridor. No wheezing.   Abdominal:      General: Bowel sounds are normal. There is no distension.      Palpations: Abdomen is soft. There is no mass.      Tenderness: There is no abdominal tenderness. There is no guarding or rebound.    Musculoskeletal: Normal range of motion.   Skin:     General: Skin is warm and dry.      Findings: No rash.      Comments: Non jaundiced   Neurological:      Mental Status: She is alert and oriented to person, place, and time.   Psychiatric:         Speech: Speech normal.         Behavior: Behavior normal. Behavior is cooperative.           Assessment:       1. Change in bowel habits    2. Alternating constipation and diarrhea    3. Lower abdominal pain    4. Gastroesophageal reflux disease, unspecified whether esophagitis present    5. Nausea           Plan:   All diagnoses and orders for this visit:    Change in bowel habits & Alternating constipation and diarrhea  - Start: polyethylene glycol (GLYCOLAX) 17 gram/dose powder; Take 17 grams dissolved in liquid by mouth once daily.  Dispense: 510 g; Refill: 1  - Stool Exam-Ova,Cysts,Parasites; Future; Expected date: 11/06/2020  - Stool culture; Future; Expected date: 11/06/2020  - Giardia / Cryptosporidum, EIA; Future; Expected date: 11/06/2020  - Occult blood x 1, stool; Future; Expected date: 11/06/2020  - WBC, Stool; Future; Expected date: 11/06/2020  - pH, stool; Future; Expected date: 11/06/2020  - Clostridium difficile EIA; Future; Expected date: 11/06/2020  - TISSUE TRANSGLUTAMINASE (TTG), IGA; Future; Expected date: 11/06/2020  -  IGA; Future; Expected date: 11/06/2020  - Continue Probiotic daily  - Continue Metamucil daily  - Schedule Colonoscopy    Lower abdominal pain  - CT Abdomen Pelvis With Contrast; Future; Expected date: 11/06/2020  - Consider trial of Bentyl pending CT scan results and/or if symptoms persist    Gastroesophageal reflux disease, unspecified whether esophagitis present & Nausea   - Schedule EGD   - Continue Protonix 20 mg once daily   - Continue Zofran 8 mg PRN   - Take PPI in the morning 30 minutes before breakfast   - Recommend to avoid large meals, avoid eating within 3 hours of bedtime, elevate head of bed if nocturnal symptoms  are present, smoking cessation (if current smoker), & weight loss (if overweight).    - Recommend minimize/avoid high-fat foods, chocolate, caffeine, citrus, alcohol, & tomato products.   - Advised to avoid/limit use of NSAID's, since they can cause GI upset, bleeding, and/or ulcers. If needed, take with food.     If no improvement in symptoms or symptoms worsen, call/follow-up at clinic or go to ER

## 2020-11-06 NOTE — PATIENT INSTRUCTIONS
Constipation (Adult)  Constipation means that you have bowel movements that are less frequent than usual. Stools often become very hard and difficult to pass.  Constipation is very common. At some point in life it affects almost everyone. Since everyone's bowel habits are different, what is constipation to one person may not be to another. Your healthcare provider may do tests to diagnose constipation. It depends on what he or she finds when evaluating you.    Symptoms of constipation include:  · Abdominal pain  · Bloating  · Vomiting  · Painful bowel movements  · Itching, swelling, bleeding, or pain around the anus  Causes  Constipation can have many causes. These include:  · Diet low in fiber  · Too much dairy  · Not drinking enough liquids  · Lack of exercise or physical activity. This is especially true for older adults.  · Changes in lifestyle or daily routine, including pregnancy, aging, work, and travel  · Frequent use or misuse of laxatives  · Ignoring the urge to have a bowel movement or delaying it until later  · Medicines, such as certain prescription pain medicines, iron supplements, antacids, certain antidepressants, and calcium supplements  · Diseases like irritable bowel syndrome, bowel obstructions, stroke, diabetes, thyroid disease, Parkinson disease, hemorrhoids, and colon cancer  Complications  Potential complications of constipation can include:  · Hemorrhoids  · Rectal bleeding from hemorrhoids or anal fissures (skin tears)  · Hernias  · Dependency on laxatives  · Chronic constipation  · Fecal impaction  · Bowel obstruction or perforation  Home care  All treatment should be done after talking with your healthcare provider. This is especially true if you have another medical problems, are taking prescription medicines, or are an older adult. Treatment most often involves lifestyle changes. You may also need medicines. Your healthcare provider will tell you which will work best for you. Follow  the advice below to help avoid this problem in the future.  Lifestyle changes  These lifestyle changes can help prevent constipation:  · Diet. Eat a high-fiber diet, with fresh fruit and vegetables, and reduce dairy intake, meats, and processed foods  · Fluids. It's important to get enough fluids each day. Drink plenty of water when you eat more fiber. If you are on diet that limits the amount of fluid you can have, talk about this with your healthcare provider.  · Regular exercise. Check with your healthcare provider first.  Medications  Take any medicines as directed. Some laxatives are safe to use only every now and then. Others can be taken on a regular basis. Talk with your doctor or pharmacist if you have questions.  Prescription pain medicines can cause constipation. If you are taking this kind of medicine, ask your healthcare provider if you should also take a stool softener.  Medicines you may take to treat constipation include:  · Fiber supplements  · Stool softeners  · Laxatives  · Enemas  · Rectal suppositories  Follow-up care  Follow up with your healthcare provider if symptoms don't get better in the next few days. You may need to have more tests or see a specialist.  Call 911  Call 911 if any of these occur:  · Trouble breathing  · Stiff, rigid abdomen that is severely painful to touch  · Confusion  · Fainting or loss of consciousness  · Rapid heart rate  · Chest pain  When to seek medical advice  Call your healthcare provider right away if any of these occur:  · Fever over 100.4°F (38°C)  · Failure to resume normal bowel movements  · Pain in your abdomen or back gets worse  · Nausea or vomiting  · Swelling in your abdomen  · Blood in the stool  · Black, tarry stool  · Involuntary weight loss  · Weakness  Date Last Reviewed: 12/30/2015  © 2351-1987 [employer]. [address] [place] [address], [place] [address]. All rights reserved. This information is not intended as a substitute for  professional medical care. Always follow your healthcare professional's instructions.

## 2020-11-09 ENCOUNTER — TELEPHONE (OUTPATIENT)
Dept: GASTROENTEROLOGY | Facility: CLINIC | Age: 60
End: 2020-11-09

## 2020-11-09 LAB — TTG IGA SER-ACNC: 4 UNITS

## 2020-11-11 ENCOUNTER — TELEPHONE (OUTPATIENT)
Dept: GASTROENTEROLOGY | Facility: CLINIC | Age: 60
End: 2020-11-11

## 2020-11-11 ENCOUNTER — HOSPITAL ENCOUNTER (OUTPATIENT)
Dept: RADIOLOGY | Facility: HOSPITAL | Age: 60
Discharge: HOME | End: 2020-11-11
Attending: NURSE PRACTITIONER
Payer: COMMERCIAL

## 2020-11-11 DIAGNOSIS — R10.30 LOWER ABDOMINAL PAIN: ICD-10-CM

## 2020-11-11 PROCEDURE — 74177 CT ABDOMEN PELVIS WITH CONTRAST: ICD-10-PCS | Mod: 26,,, | Performed by: RADIOLOGY

## 2020-11-11 PROCEDURE — 25500020 PHARM REV CODE 255: Mod: PO | Performed by: NURSE PRACTITIONER

## 2020-11-11 PROCEDURE — 74177 CT ABD & PELVIS W/CONTRAST: CPT | Mod: TC,PO

## 2020-11-11 PROCEDURE — A9698 NON-RAD CONTRAST MATERIALNOC: HCPCS | Mod: PO | Performed by: NURSE PRACTITIONER

## 2020-11-11 PROCEDURE — 74177 CT ABD & PELVIS W/CONTRAST: CPT | Mod: 26,,, | Performed by: RADIOLOGY

## 2020-11-11 RX ADMIN — IOHEXOL 75 ML: 350 INJECTION, SOLUTION INTRAVENOUS at 10:11

## 2020-11-11 RX ADMIN — IOHEXOL 1000 ML: 9 SOLUTION ORAL at 10:11

## 2020-11-11 NOTE — TELEPHONE ENCOUNTER
Talked to patient , gave test results, patient verbally understood, will get on raphael fiber diet and continue with scheduled procedure.

## 2020-11-15 ENCOUNTER — PATIENT MESSAGE (OUTPATIENT)
Dept: GASTROENTEROLOGY | Facility: CLINIC | Age: 60
End: 2020-11-15

## 2020-11-16 ENCOUNTER — TELEPHONE (OUTPATIENT)
Dept: GASTROENTEROLOGY | Facility: CLINIC | Age: 60
End: 2020-11-16

## 2020-11-16 ENCOUNTER — PATIENT MESSAGE (OUTPATIENT)
Dept: GASTROENTEROLOGY | Facility: CLINIC | Age: 60
End: 2020-11-16

## 2020-11-16 NOTE — TELEPHONE ENCOUNTER
Sorry to hear you're not well. I can list the IV contrast as a possible allergy but I would recommend following up with PCP or urgent care for further evaluation.

## 2020-11-16 NOTE — TELEPHONE ENCOUNTER
----- Message from Brigid Burch NP sent at 11/16/2020  2:47 PM CST -----  Please let patient know her stool studies showed negative/normal results. The stool sample turned in was formed stool so we were not able to test for C Diff. If diarrhea persists, recommend rechecking stool for C Diff.

## 2020-11-19 ENCOUNTER — OFFICE VISIT (OUTPATIENT)
Dept: FAMILY MEDICINE | Facility: CLINIC | Age: 60
End: 2020-11-19
Payer: COMMERCIAL

## 2020-11-19 VITALS
BODY MASS INDEX: 30.22 KG/M2 | OXYGEN SATURATION: 97 % | SYSTOLIC BLOOD PRESSURE: 122 MMHG | WEIGHT: 177 LBS | HEART RATE: 79 BPM | HEIGHT: 64 IN | DIASTOLIC BLOOD PRESSURE: 66 MMHG

## 2020-11-19 DIAGNOSIS — Z00.00 ROUTINE HEALTH MAINTENANCE: Primary | ICD-10-CM

## 2020-11-19 DIAGNOSIS — K59.09 CHRONIC CONSTIPATION: ICD-10-CM

## 2020-11-19 DIAGNOSIS — G50.9 TRIGEMINAL NEUROPATHY: ICD-10-CM

## 2020-11-19 DIAGNOSIS — L40.50 PSORIATIC ARTHRITIS: ICD-10-CM

## 2020-11-19 PROCEDURE — 99396 PREV VISIT EST AGE 40-64: CPT | Mod: S$GLB,,, | Performed by: FAMILY MEDICINE

## 2020-11-19 PROCEDURE — 1125F AMNT PAIN NOTED PAIN PRSNT: CPT | Mod: S$GLB,,, | Performed by: FAMILY MEDICINE

## 2020-11-19 PROCEDURE — 3078F PR MOST RECENT DIASTOLIC BLOOD PRESSURE < 80 MM HG: ICD-10-PCS | Mod: CPTII,S$GLB,, | Performed by: FAMILY MEDICINE

## 2020-11-19 PROCEDURE — 1125F PR PAIN SEVERITY QUANTIFIED, PAIN PRESENT: ICD-10-PCS | Mod: S$GLB,,, | Performed by: FAMILY MEDICINE

## 2020-11-19 PROCEDURE — 3074F PR MOST RECENT SYSTOLIC BLOOD PRESSURE < 130 MM HG: ICD-10-PCS | Mod: CPTII,S$GLB,, | Performed by: FAMILY MEDICINE

## 2020-11-19 PROCEDURE — 99396 PR PREVENTIVE VISIT,EST,40-64: ICD-10-PCS | Mod: S$GLB,,, | Performed by: FAMILY MEDICINE

## 2020-11-19 PROCEDURE — 99999 PR PBB SHADOW E&M-EST. PATIENT-LVL V: ICD-10-PCS | Mod: PBBFAC,,, | Performed by: FAMILY MEDICINE

## 2020-11-19 PROCEDURE — 99999 PR PBB SHADOW E&M-EST. PATIENT-LVL V: CPT | Mod: PBBFAC,,, | Performed by: FAMILY MEDICINE

## 2020-11-19 PROCEDURE — 3074F SYST BP LT 130 MM HG: CPT | Mod: CPTII,S$GLB,, | Performed by: FAMILY MEDICINE

## 2020-11-19 PROCEDURE — 3078F DIAST BP <80 MM HG: CPT | Mod: CPTII,S$GLB,, | Performed by: FAMILY MEDICINE

## 2020-11-19 PROCEDURE — 3008F PR BODY MASS INDEX (BMI) DOCUMENTED: ICD-10-PCS | Mod: CPTII,S$GLB,, | Performed by: FAMILY MEDICINE

## 2020-11-19 PROCEDURE — 3008F BODY MASS INDEX DOCD: CPT | Mod: CPTII,S$GLB,, | Performed by: FAMILY MEDICINE

## 2020-11-19 NOTE — PROGRESS NOTES
HPI  Clemencia Knight is a 60 y.o. female with multiple medical diagnoses as listed in the medical history and problem list that presents for Follow-up (6mth fu )  .      HPI  Here today for routine health maintenance.  Had eye swelling and difficulty swallowing for a few days after contrast administration    PAST MEDICAL HISTORY:  Past Medical History:   Diagnosis Date    Allergy     General anesthetics causing adverse effect in therapeutic use     pt. somewhat aware of extubation with one of her surgeries    GERD (gastroesophageal reflux disease)     Hearing loss     Restless leg syndrome     Sciatica        PAST SURGICAL HISTORY:  Past Surgical History:   Procedure Laterality Date    AUGMENTATION OF BREAST      BELT ABDOMINOPLASTY      breast implants      CERVICAL FUSION      COLONOSCOPY  10/2012    Dr. Paul; internal hemorrhoid; repeat in 10 years    DEXA      WNL    EPIDURAL STEROID INJECTION INTO CERVICAL SPINE N/A 9/24/2018    Procedure: Injection-steroid-epidural-cervical;  Surgeon: Myles Rocha MD;  Location: [facility] OR;  Service: Pain Management;  Laterality: N/A;  C7-T1    EPIDURAL STEROID INJECTION INTO CERVICAL SPINE N/A 11/20/2018    Procedure: Injection-steroid-epidural-cervical;  Surgeon: Myles Rocha MD;  Location: [facility] OR;  Service: Pain Management;  Laterality: N/A;  C7-T1    HYSTERECTOMY  2000    OOPHORECTOMY  7/16/2013    laparotomy BSO for benign 10cm tubal cyst    SALPINGOOPHORECTOMY  2013    with removal of fallopian cyst    SHOULDER SURGERY      right    TLH  2000    ovaries remain, benign    TONSILLECTOMY, ADENOIDECTOMY, BILATERAL MYRINGOTOMY AND TUBES         SOCIAL HISTORY:  Social History     Socioeconomic History    Marital status:      Spouse name: Not on file    Number of children: Not on file    Years of education: Not on file    Highest education level: Not on file   Occupational History     Employer: OTHER   Social Needs    Financial resource strain:  Not hard at all    Food insecurity     Worry: Never true     Inability: Never true    Transportation needs     Medical: No     Non-medical: No   Tobacco Use    Smoking status: Never Smoker    Smokeless tobacco: Never Used   Substance and Sexual Activity    Alcohol use: Yes     Frequency: Never     Drinks per session: Patient refused     Binge frequency: Never     Comment: rarely    Drug use: No    Sexual activity: Yes     Partners: Male     Birth control/protection: Surgical   Lifestyle    Physical activity     Days per week: 0 days     Minutes per session: 0 min    Stress: Only a little   Relationships    Social connections     Talks on phone: More than three times a week     Gets together: More than three times a week     Attends [employer] service: Not on file     Active member of club or organization: Yes     Attends meetings of clubs or organizations: More than 4 times per year     Relationship status:    Other Topics Concern    Are you pregnant or think you may be? No    Breast-feeding Not Asked   Social History Narrative    Not on file       FAMILY HISTORY:  Family History   Problem Relation Age of Onset    Cancer Father         bladder    Diabetes Father     Heart disease Father     Diabetes Mother     Melanoma Brother     Charcot-Karla-Tooth disease Brother     Breast cancer Neg Hx     Ovarian cancer Neg Hx     Anesthesia problems Neg Hx        ALLERGIES AND MEDICATIONS: updated and reviewed.  Review of patient's allergies indicates:   Allergen Reactions    Contrast media Swelling     Swollen eyes and face    Hydrocodone Itching     Current Outpatient Medications   Medication Sig Dispense Refill    butalbital-acetaminophen-caffeine -40 mg (FIORICET, ESGIC) -40 mg per tablet Take 1 tablet by mouth every 6 (six) hours as needed for Pain. 20 tablet 0    cholecalciferol, vitamin D3, (VITAMIN D3) 100 mcg (4,000 unit) Cap Take by mouth.      COPPER ORAL Take by mouth.       cyanocobalamin, vitamin B-12, 1,000 mcg Subl Place under the tongue.      diclofenac sodium (VOLTAREN) 1 % Gel Apply 2 grams topically 4 (four) times daily. 100 g 3    DULoxetine (CYMBALTA) 60 MG capsule Take 1 capsule (60 mg total) by mouth once daily. 30 capsule 11    evening primrose oil (EVENING PRIMROSE ORAL) Take by mouth.      fluocinonide (LIDEX) 0.05 % external solution Apply topically 2 (two) times daily. 60 mL 1    fluticasone (FLONASE) 50 mcg/actuation nasal spray 1 spray by Each Nare route once daily.      fluticasone propionate (CUTIVATE) 0.05 % cream Apply topically 2 (two) times daily. 30 g 1    folic acid (FOLVITE) 1 MG tablet Take 1 mg by mouth once daily.      glucosam/gluc krishna/br-oql-s-gluc (GLUCOSAMINE COMPLEX ORAL) Take by mouth.      hydrocortisone 2.5 % cream Apply topically 2 (two) times daily. 30 g 1    iron bis-gly/FA/C/B12/Ca/succ (IRON-150 ORAL) once daily.      ketoconazole (NIZORAL) 2 % shampoo Apply topically once daily. 120 mL 1    Lactobac no.41/Bifidobact no.7 (PROBIOTIC-10 ORAL) Take by mouth.      magnesium 30 mg Tab Take by mouth once.      milk thistle 175 mg tablet Take 175 mg by mouth once daily.      mineral,lanolin oils/prop glyc (BALNEOL TOP) Apply topically.      multivitamin/iron/folic acid (CENTRUM COMPLETE ORAL) Take by mouth.      omega-3 fatty acids fish oil (OMEGA-3 2100) 1,050-1,200 mg Cap capsule once daily.      ondansetron (ZOFRAN-ODT) 8 MG TbDL Take 1 tablet (8 mg total) by mouth every 6 (six) hours as needed. 20 tablet 2    pantoprazole (PROTONIX) 20 MG tablet Take 1 tablet (20 mg total) by mouth once daily. 30 tablet 3    polyethylene glycol (GLYCOLAX) 17 gram/dose powder Take 17 grams dissolved in liquid by mouth once daily. 510 g 1    tiZANidine (ZANAFLEX) 4 MG tablet Take 1 tablet (4 mg total) by mouth every evening. 90 tablet 3    TURMERIC ORAL Take by mouth.      UNABLE TO FIND [place] grape      UNABLE TO FIND thera eyes       "UNABLE TO FIND Palmitoylethanolamide 400 mg once daily      vitamin A 53717 UNIT capsule Take 10,000 Units by mouth once daily.      VITAMIN K2 ORAL Take by mouth.      zinc gluconate 50 mg tablet Take 50 mg by mouth once daily.      calcium carbonate (OS-ARON) 600 mg calcium (1,500 mg) Tab Take 1,200 mg by mouth once daily.       No current facility-administered medications for this visit.        ROS  Review of Systems   Constitutional: Negative for fatigue, fever and unexpected weight change.   HENT: Negative for congestion, hearing loss, rhinorrhea and sore throat.         Pain in right face that is in the distrubution of her trigeminal nerve   Eyes: Negative for visual disturbance.   Respiratory: Negative for cough, chest tightness, shortness of breath and wheezing.    Cardiovascular: Negative for chest pain, palpitations and leg swelling.   Gastrointestinal: Positive for abdominal pain and constipation (seeing GI, colonoscopy and EGD pending.  confirmed on CT abdomen.  improved with Glycolax). Negative for abdominal distention, blood in stool, diarrhea, nausea and vomiting.   Genitourinary: Negative for difficulty urinating, dysuria, frequency, hematuria, menstrual problem, pelvic pain, urgency and vaginal bleeding.   Musculoskeletal: Positive for arthralgias (recent diagnosis of drug induced lupus, seeing Rheumatology). Negative for back pain, joint swelling and neck pain.   Skin: Negative for rash.   Neurological: Negative for dizziness, tremors, weakness, light-headedness, numbness and headaches.   Psychiatric/Behavioral: Negative for confusion, dysphoric mood and sleep disturbance. The patient is not nervous/anxious.        Physical Exam  Vitals:    11/19/20 0725   BP: 122/66   Pulse: 79    Body mass index is 30.39 kg/m².  Weight: 80.3 kg (177 lb 0.5 oz)   Height: 5' 4" (162.6 cm)     Physical Exam  Vitals signs reviewed.   Constitutional:       Appearance: She is well-developed.   HENT:      Head: " Normocephalic and atraumatic.      Right Ear: External ear normal.      Left Ear: External ear normal.      Nose: Nose normal.   Eyes:      General: No scleral icterus.     Conjunctiva/sclera: Conjunctivae normal.      Pupils: Pupils are equal, round, and reactive to light.   Neck:      Musculoskeletal: Normal range of motion and neck supple.      Thyroid: No thyromegaly.      Vascular: No JVD.   Cardiovascular:      Rate and Rhythm: Normal rate and regular rhythm.      Heart sounds: Normal heart sounds. No murmur. No friction rub. No gallop.    Pulmonary:      Effort: Pulmonary effort is normal.      Breath sounds: Normal breath sounds. No wheezing or rales.   Abdominal:      General: Bowel sounds are normal. There is no distension.      Palpations: Abdomen is soft. There is no mass.      Tenderness: There is no abdominal tenderness. There is no guarding or rebound.   Musculoskeletal: Normal range of motion.   Lymphadenopathy:      Cervical: No cervical adenopathy.   Skin:     General: Skin is warm and dry.      Findings: No rash.   Neurological:      Mental Status: She is alert and oriented to person, place, and time.      Cranial Nerves: No cranial nerve deficit.      Sensory: No sensory deficit.         Health Maintenance       Date Due Completion Date    HIV Screening 08/21/1977 ---    Shingles Vaccine (1 of 2) 08/21/2012 ---    Mammogram 03/22/2021 3/22/2019    Colorectal Cancer Screening 10/30/2022 11/11/2020    Lipid Panel 08/09/2023 8/9/2018    TETANUS VACCINE 08/14/2028 8/14/2018          Assessment & Plan    Routine health maintenance  - Health maintenance reviewed  - Diet and exercise education.    Psoriatic arthritis  - Continue current therapy  - Continue Rheumatology    Chronic constipation  - Continue current therapy  - Continue GI, await colonoscopy    Trigeminal neuropathy  -     Ambulatory referral/consult to Neurology; Future; Expected date: 11/26/2020        Follow up in about 6 months (around  5/19/2021).

## 2020-11-20 ENCOUNTER — TELEPHONE (OUTPATIENT)
Dept: NEUROLOGY | Facility: CLINIC | Age: 60
End: 2020-11-20

## 2020-11-20 NOTE — TELEPHONE ENCOUNTER
Called patient and scheduled new patient appointment. Date/Time/Location confirmed. Verbalized understanding.

## 2020-11-20 NOTE — TELEPHONE ENCOUNTER
----- Message from Sasha Viveros MD sent at 11/20/2020  9:27 AM CST -----  Contact: pt  Yes, thanks  ----- Message -----  From: Yanira Watson LPN  Sent: 11/20/2020   9:21 AM CST  To: Sasha Viveros MD    Should I schedule this patient with DR Kelly?    G  ----- Message -----  From: Leila Luke  Sent: 11/19/2020   9:13 AM CST  To: Felice Baez Staff (Neuro)    Type:  Sooner Apoointment Request    Caller is requesting a sooner appointment.  Caller declined first available appointment listed below.  Caller will not accept being placed on the waitlist and is requesting a message be sent to doctor.    Name of Caller:  Pt  When is the first available appointment?  03/01/2021  Symptoms:  trilemble neuropathy   Best Call Back Number:  [phone]    Additional Information:  Please Advise ---Thank you

## 2020-11-22 ENCOUNTER — LAB VISIT (OUTPATIENT)
Dept: FAMILY MEDICINE | Facility: CLINIC | Age: 60
End: 2020-11-22
Payer: COMMERCIAL

## 2020-11-22 DIAGNOSIS — Z01.818 PREOP TESTING: ICD-10-CM

## 2020-11-22 PROCEDURE — U0003 INFECTIOUS AGENT DETECTION BY NUCLEIC ACID (DNA OR RNA); SEVERE ACUTE RESPIRATORY SYNDROME CORONAVIRUS 2 (SARS-COV-2) (CORONAVIRUS DISEASE [COVID-19]), AMPLIFIED PROBE TECHNIQUE, MAKING USE OF HIGH THROUGHPUT TECHNOLOGIES AS DESCRIBED BY CMS-2020-01-R: HCPCS

## 2020-11-23 LAB — SARS-COV-2 RNA RESP QL NAA+PROBE: NOT DETECTED

## 2020-11-25 ENCOUNTER — HOSPITAL ENCOUNTER (OUTPATIENT)
Facility: HOSPITAL | Age: 60
Discharge: HOME | End: 2020-11-25
Attending: INTERNAL MEDICINE | Admitting: INTERNAL MEDICINE
Payer: COMMERCIAL

## 2020-11-25 ENCOUNTER — ANESTHESIA (OUTPATIENT)
Dept: ENDOSCOPY | Facility: HOSPITAL | Age: 60
End: 2020-11-25
Payer: COMMERCIAL

## 2020-11-25 ENCOUNTER — ANESTHESIA EVENT (OUTPATIENT)
Dept: ENDOSCOPY | Facility: HOSPITAL | Age: 60
End: 2020-11-25
Payer: COMMERCIAL

## 2020-11-25 VITALS
HEIGHT: 64 IN | BODY MASS INDEX: 30.22 KG/M2 | RESPIRATION RATE: 16 BRPM | WEIGHT: 177 LBS | TEMPERATURE: 98 F | DIASTOLIC BLOOD PRESSURE: 81 MMHG | OXYGEN SATURATION: 98 % | SYSTOLIC BLOOD PRESSURE: 124 MMHG | HEART RATE: 70 BPM

## 2020-11-25 DIAGNOSIS — R19.4 CHANGE IN BOWEL HABITS: ICD-10-CM

## 2020-11-25 PROCEDURE — 88305 TISSUE EXAM BY PATHOLOGIST: CPT | Mod: 26,,, | Performed by: STUDENT IN AN ORGANIZED HEALTH CARE EDUCATION/TRAINING PROGRAM

## 2020-11-25 PROCEDURE — D9220A PRA ANESTHESIA: Mod: ,,, | Performed by: ANESTHESIOLOGY

## 2020-11-25 PROCEDURE — D9220A PRA ANESTHESIA: ICD-10-PCS | Mod: ,,, | Performed by: ANESTHESIOLOGY

## 2020-11-25 PROCEDURE — 88305 TISSUE EXAM BY PATHOLOGIST: ICD-10-PCS | Mod: 26,,, | Performed by: STUDENT IN AN ORGANIZED HEALTH CARE EDUCATION/TRAINING PROGRAM

## 2020-11-25 PROCEDURE — 27201012 HC FORCEPS, HOT/COLD, DISP: Mod: PO | Performed by: INTERNAL MEDICINE

## 2020-11-25 PROCEDURE — 63600175 PHARM REV CODE 636 W HCPCS: Mod: PO | Performed by: INTERNAL MEDICINE

## 2020-11-25 PROCEDURE — 37000008 HC ANESTHESIA 1ST 15 MINUTES: Mod: PO | Performed by: INTERNAL MEDICINE

## 2020-11-25 PROCEDURE — D9220A PRA ANESTHESIA: Mod: ,,, | Performed by: NURSE ANESTHETIST, CERTIFIED REGISTERED

## 2020-11-25 PROCEDURE — 37000009 HC ANESTHESIA EA ADD 15 MINS: Mod: PO | Performed by: INTERNAL MEDICINE

## 2020-11-25 PROCEDURE — D9220A PRA ANESTHESIA: ICD-10-PCS | Mod: ,,, | Performed by: NURSE ANESTHETIST, CERTIFIED REGISTERED

## 2020-11-25 PROCEDURE — 25000003 PHARM REV CODE 250: Mod: PO | Performed by: NURSE ANESTHETIST, CERTIFIED REGISTERED

## 2020-11-25 PROCEDURE — 45380 PR COLONOSCOPY,BIOPSY: ICD-10-PCS | Mod: ,,, | Performed by: INTERNAL MEDICINE

## 2020-11-25 PROCEDURE — 88305 TISSUE EXAM BY PATHOLOGIST: CPT | Performed by: STUDENT IN AN ORGANIZED HEALTH CARE EDUCATION/TRAINING PROGRAM

## 2020-11-25 PROCEDURE — 45380 COLONOSCOPY AND BIOPSY: CPT | Mod: PO | Performed by: INTERNAL MEDICINE

## 2020-11-25 PROCEDURE — 63600175 PHARM REV CODE 636 W HCPCS: Mod: PO | Performed by: NURSE ANESTHETIST, CERTIFIED REGISTERED

## 2020-11-25 PROCEDURE — 45380 COLONOSCOPY AND BIOPSY: CPT | Mod: ,,, | Performed by: INTERNAL MEDICINE

## 2020-11-25 RX ORDER — SODIUM CHLORIDE, SODIUM LACTATE, POTASSIUM CHLORIDE, CALCIUM CHLORIDE 600; 310; 30; 20 MG/100ML; MG/100ML; MG/100ML; MG/100ML
INJECTION, SOLUTION INTRAVENOUS CONTINUOUS
Status: DISCONTINUED | OUTPATIENT
Start: 2020-11-25 | End: 2020-11-25 | Stop reason: HOSPADM

## 2020-11-25 RX ORDER — LIDOCAINE HCL/PF 100 MG/5ML
SYRINGE (ML) INTRAVENOUS
Status: DISCONTINUED | OUTPATIENT
Start: 2020-11-25 | End: 2020-11-25

## 2020-11-25 RX ORDER — PROPOFOL 10 MG/ML
VIAL (ML) INTRAVENOUS
Status: DISCONTINUED | OUTPATIENT
Start: 2020-11-25 | End: 2020-11-25

## 2020-11-25 RX ORDER — SODIUM CHLORIDE 0.9 % (FLUSH) 0.9 %
10 SYRINGE (ML) INJECTION
Status: DISCONTINUED | OUTPATIENT
Start: 2020-11-25 | End: 2020-11-25 | Stop reason: HOSPADM

## 2020-11-25 RX ADMIN — PROPOFOL 50 MG: 10 INJECTION, EMULSION INTRAVENOUS at 11:11

## 2020-11-25 RX ADMIN — SODIUM CHLORIDE, SODIUM LACTATE, POTASSIUM CHLORIDE, AND CALCIUM CHLORIDE: .6; .31; .03; .02 INJECTION, SOLUTION INTRAVENOUS at 11:11

## 2020-11-25 RX ADMIN — LIDOCAINE HYDROCHLORIDE 100 MG: 20 INJECTION, SOLUTION INTRAVENOUS at 11:11

## 2020-11-25 RX ADMIN — PROPOFOL 25 MG: 10 INJECTION, EMULSION INTRAVENOUS at 11:11

## 2020-11-25 RX ADMIN — PROPOFOL 150 MG: 10 INJECTION, EMULSION INTRAVENOUS at 11:11

## 2020-11-25 NOTE — TRANSFER OF CARE
"Anesthesia Transfer of Care Note    Patient: Clemencia Knight    Procedure(s) Performed: Procedure(s) (LRB):  COLONOSCOPY (N/A)    Patient location: PACU    Anesthesia Type: general    Transport from OR: Transported from OR on room air with adequate spontaneous ventilation    Post pain: adequate analgesia    Post assessment: no apparent anesthetic complications and tolerated procedure well    Post vital signs: stable    Level of consciousness: awake    Nausea/Vomiting: no nausea/vomiting    Complications: none    Transfer of care protocol was followed      Last vitals:   Visit Vitals  /68 (BP Location: Right arm, Patient Position: Lying)   Pulse 69   Temp 36.5 °C (97.7 °F) (Skin)   Resp 16   Ht 5' 4" (1.626 m)   Wt 80.3 kg (177 lb)   SpO2 98%   Breastfeeding No   BMI 30.38 kg/m²     "

## 2020-11-25 NOTE — H&P
History & Physical - Short Stay  Gastroenterology      SUBJECTIVE:     Procedure: Colonoscopy    Chief Complaint/Indication for Procedure: Change in Bowel Habits    PTA Medications   Medication Sig    butalbital-acetaminophen-caffeine -40 mg (FIORICET, ESGIC) -40 mg per tablet Take 1 tablet by mouth every 6 (six) hours as needed for Pain.    cholecalciferol, vitamin D3, (VITAMIN D3) 100 mcg (4,000 unit) Cap Take by mouth.    COPPER ORAL Take by mouth.    cyanocobalamin, vitamin B-12, 1,000 mcg Subl Place under the tongue.    diclofenac sodium (VOLTAREN) 1 % Gel Apply 2 grams topically 4 (four) times daily.    DULoxetine (CYMBALTA) 60 MG capsule Take 1 capsule (60 mg total) by mouth once daily.    evening primrose oil (EVENING PRIMROSE ORAL) Take by mouth.    fluocinonide (LIDEX) 0.05 % external solution Apply topically 2 (two) times daily.    folic acid (FOLVITE) 1 MG tablet Take 1 mg by mouth once daily.    glucosam/gluc krishna/oo-pbg-f-gluc (GLUCOSAMINE COMPLEX ORAL) Take by mouth.    hydrocortisone 2.5 % cream Apply topically 2 (two) times daily.    iron bis-gly/FA/C/B12/Ca/succ (IRON-150 ORAL) once daily.    ketoconazole (NIZORAL) 2 % shampoo Apply topically once daily.    Lactobac no.41/Bifidobact no.7 (PROBIOTIC-10 ORAL) Take by mouth.    magnesium 30 mg Tab Take by mouth once.    milk thistle 175 mg tablet Take 175 mg by mouth once daily.    mineral,lanolin oils/prop glyc (BALNEOL TOP) Apply topically.    multivitamin/iron/folic acid (CENTRUM COMPLETE ORAL) Take by mouth.    omega-3 fatty acids fish oil (OMEGA-3 2100) 1,050-1,200 mg Cap capsule once daily.    pantoprazole (PROTONIX) 20 MG tablet Take 1 tablet (20 mg total) by mouth once daily.    polyethylene glycol (GLYCOLAX) 17 gram/dose powder Take 17 grams dissolved in liquid by mouth once daily.    tiZANidine (ZANAFLEX) 4 MG tablet Take 1 tablet (4 mg total) by mouth every evening.    TURMERIC ORAL Take by mouth.    UNABLE  TO FIND [place] grape    UNABLE TO FIND thera eyes    UNABLE TO FIND Palmitoylethanolamide 400 mg once daily    vitamin A 04051 UNIT capsule Take 10,000 Units by mouth once daily.    VITAMIN K2 ORAL Take by mouth.    zinc gluconate 50 mg tablet Take 50 mg by mouth once daily.    fluticasone (FLONASE) 50 mcg/actuation nasal spray 1 spray by Each Nare route once daily.    fluticasone propionate (CUTIVATE) 0.05 % cream Apply topically 2 (two) times daily.    ondansetron (ZOFRAN-ODT) 8 MG TbDL Take 1 tablet (8 mg total) by mouth every 6 (six) hours as needed.       Review of patient's allergies indicates:   Allergen Reactions    Contrast media Swelling     Swollen eyes and face    Hydrocodone Itching        Past Medical History:   Diagnosis Date    Allergy     Drug-induced lupus erythematosus     General anesthetics causing adverse effect in therapeutic use     pt. somewhat aware of extubation with one of her surgeries    GERD (gastroesophageal reflux disease)     Hearing loss     Migraines     Psoriatic arthritis     Restless leg syndrome     Sciatica     Trigeminal neuralgia      Past Surgical History:   Procedure Laterality Date    AUGMENTATION OF BREAST      BELT ABDOMINOPLASTY      breast implants      CERVICAL FUSION      COLONOSCOPY  10/2012    Dr. Paul; internal hemorrhoid; repeat in 10 years    DEXA      WNL    EPIDURAL STEROID INJECTION INTO CERVICAL SPINE N/A 9/24/2018    Procedure: Injection-steroid-epidural-cervical;  Surgeon: Myles Rocha MD;  Location: [facility] OR;  Service: Pain Management;  Laterality: N/A;  C7-T1    EPIDURAL STEROID INJECTION INTO CERVICAL SPINE N/A 11/20/2018    Procedure: Injection-steroid-epidural-cervical;  Surgeon: Myles Rocha MD;  Location: [facility] OR;  Service: Pain Management;  Laterality: N/A;  C7-T1    HYSTERECTOMY  2000    OOPHORECTOMY  7/16/2013    laparotomy BSO for benign 10cm tubal cyst    SALPINGOOPHORECTOMY  2013    with removal of fallopian  cyst    SHOULDER SURGERY      right    TLH  2000    ovaries remain, benign    TONSILLECTOMY, ADENOIDECTOMY, BILATERAL MYRINGOTOMY AND TUBES       Family History   Problem Relation Age of Onset    Cancer Father         bladder    Diabetes Father     Heart disease Father     Diabetes Mother     Melanoma Brother     Charcot-Karla-Tooth disease Brother     Breast cancer Neg Hx     Ovarian cancer Neg Hx     Anesthesia problems Neg Hx      Social History     Tobacco Use    Smoking status: Never Smoker    Smokeless tobacco: Never Used   Substance Use Topics    Alcohol use: Yes     Frequency: Never     Drinks per session: Patient refused     Binge frequency: Never     Comment: rarely    Drug use: No         OBJECTIVE:     Vital Signs (Most Recent)  Temp: 97.7 °F (36.5 °C) (11/25/20 1107)  Pulse: 69 (11/25/20 1107)  Resp: 16 (11/25/20 1107)  BP: 130/68 (11/25/20 1107)  SpO2: 98 % (11/25/20 1107)    Physical Exam:                                                       GENERAL:  Comfortable, in no acute distress.                                 HEENT EXAM:  Nonicteric.  No adenopathy.  Oropharynx is clear.               NECK:  Supple.                                                               LUNGS:  Clear.                                                               CARDIAC:  Regular rate and rhythm.  S1, S2.  No murmur.                      ABDOMEN:  Soft, positive bowel sounds, nontender.  No hepatosplenomegaly or masses.  No rebound or guarding.                                             EXTREMITIES:  No edema.     MENTAL STATUS:  Normal, alert and oriented.      ASSESSMENT/PLAN:     Assessment: Change in Bowel Habits    Plan: Colonoscopy    Anesthesia Plan: General    ASA Grade: ASA 2 - Patient with mild systemic disease with no functional limitations    MALLAMPATI SCORE:  I (soft palate, uvula, fauces, and tonsillar pillars visible)

## 2020-11-25 NOTE — [employer] PATIENT INSTRUCTIONS
Discharge Summary/Instructions after an Endoscopic Procedure  Patient Name: Clemencia Knight  Patient MRN: [id number]  Patient YOB: 1960 Wednesday, November 25, 2020  Kiran Paul MD  RESTRICTIONS:  During your procedure today, you received medications for sedation.  These   medications may affect your judgment, balance and coordination.  Therefore,   for 24 hours, you have the following restrictions:   - DO NOT drive a car, operate machinery, make legal/financial decisions,   sign important papers or drink alcohol.    ACTIVITY:  Today: no heavy lifting, straining or running due to procedural   sedation/anesthesia.  The following day: return to full activity including work.  DIET:  Eat and drink normally unless instructed otherwise.     TREATMENT FOR COMMON SIDE EFFECTS:  - Mild abdominal pain, nausea, belching, bloating or excessive gas:  rest,   eat lightly and use a heating pad.  - Sore Throat: treat with throat lozenges and/or gargle with warm salt   water.  - Because air was used during the procedure, expelling large amounts of air   from your rectum or belching is normal.  - If a bowel prep was taken, you may not have a bowel movement for 1-3 days.    This is normal.  SYMPTOMS TO WATCH FOR AND REPORT TO YOUR PHYSICIAN:  1. Abdominal pain or bloating, other than gas cramps.  2. Chest pain.  3. Back pain.  4. Signs of infection such as: chills or fever occurring within 24 hours   after the procedure.  5. Rectal bleeding, which would show as bright red, maroon, or black stools.   (A tablespoon of blood from the rectum is not serious, especially if   hemorrhoids are present.)  6. Vomiting.  7. Weakness or dizziness.  GO DIRECTLY TO THE NEAREST EMERGENCY ROOM IF YOU HAVE ANY OF THE FOLLOWING:      Difficulty breathing              Chills and/or fever over 101 F   Persistent vomiting and/or vomiting blood   Severe abdominal pain   Severe chest pain   Black, tarry stools   Bleeding- more than one  tablespoon   Any other symptom or condition that you feel may need urgent attention  Your doctor recommends these additional instructions:  If any biopsies were taken, your doctors clinic will contact you in 1 to 2   weeks with any results.  We are waiting for your pathology results.   Your physician has recommended a repeat colonoscopy in 10 years for   screening purposes.   You are being discharged to home.  For questions, problems or results please call your physician - Kiran Paul MD at Work:  [phone].  EMERGENCY PHONE NUMBER: [phone], LAB RESULTS: [phone]  IF A COMPLICATION OR EMERGENCY SITUATION ARISES AND YOU ARE UNABLE TO REACH   YOUR PHYSICIAN - GO DIRECTLY TO THE EMERGENCY ROOM.  ___________________________________________  Nurse Signature  ___________________________________________  Patient/Designated Responsible Party Signature  Kiran Paul MD  11/25/2020 11:50:07 AM  This report has been verified and signed electronically.  [employer]

## 2020-11-25 NOTE — ANESTHESIA PREPROCEDURE EVALUATION
11/25/2020  Clemencia Knight is a 60 y.o., female.     Procedure(s):  COLONOSCOPY   Surgeon(s):  Kiran Paul MD    Vitals:  There were no vitals filed for this visit.    Allergies: Contrast media and Hydrocodone    Home Medications:   Prior to Admission medications    Medication Sig Start Date End Date Taking? Authorizing Provider   estradiol (VIVELLE-DOT) 0.1 mg/24 hr Apply 1 patch to skin twice weekly as directed. 4/13/12 4/13/13  Mary Franklin MD                      Pre-op Assessment    I have reviewed the Patient Summary Reports.     I have reviewed the Nursing Notes. I have reviewed the NPO Status.   I have reviewed the Medications.     Review of Systems  Anesthesia Hx:  No problems with previous Anesthesia Hx of Anesthetic complications    Social:  Non-Smoker, No Alcohol Use    Hematology/Oncology:  Hematology Normal        Cardiovascular:  Cardiovascular Normal     Pulmonary:  Pulmonary Normal    Renal/:  Renal/ Normal     Hepatic/GI:  Hepatic/GI Normal GERD, poorly controlled    Musculoskeletal:  Cervical Spine Disorder Cervical radicular pain  Cervical radiculopathy  Cervical radiculitis       Neurological:   Neuromuscular Disease, Headaches    Endocrine:  Endocrine Normal                                                                                                                    11/25/2020  Clemencia Knight is a 60 y.o., female.     Procedure(s):  COLONOSCOPY   Surgeon(s):  Kiran Paul MD    Vitals:  There were no vitals filed for this visit.    Allergies: Contrast media and Hydrocodone    Home Medications:   Prior to Admission medications    Medication Sig Start Date End Date Taking? Authorizing Provider   estradiol (VIVELLE-DOT) 0.1 mg/24 hr Apply 1 patch to skin twice weekly as directed. 4/13/12 4/13/13  Mary Franklin MD                      [facility]  Anesthesia Evaluation                                                              Review of Systems      Anesthesia Hx:      No problems with previous Anesthesia                  Social:      Non-Smoker and No Alcohol Use            Hematology/Oncology:      Hematology Normal              EENT/Dental:              Cardiovascular:      Cardiovascular Normal                                  Pulmonary:      Pulmonary Normal                           Renal/:      Renal/ Normal                      Hepatic/GI:      Hepatic/GI Normal    GERD                   Endocrine:      Endocrine Normal                        Psych:              Neurological:      Neurology Normal                              Musculoskeletal:              Dermatological:              OB/PEDS:              Anesthesia Physical Exam    General:  Well nourished      Airway/Jaw/Neck:  Airway Findings:  General Airway Assessment: Adult and Average  Mallampati: III    Jaw/Neck Findings:  Neck ROM: Normal ROM    Eyes/Ears/Nose:      Dental:      Chest/Lungs:  Chest/Lungs Findings:  Clear to auscultation and Normal Respiratory Rate    Heart/Vascular:  Heart Findings:  Rate: Normal  Rhythm: Regular Rhythm  Sounds: Normal  Other Heart Findings: No murmur.    Abdomen:      Musculoskeletal:      Skin:      Mental Status:  Mental Status Findings:  Cooperative and Alert and Oriented                      Anesthesia Plan    Type of Anesthesia, risks & benefits discussed:    Anesthesia Type:  MAC  Patient's Preference:    Post Op Pain Control Plan:    Induction:    Informed Consent:  Patient understands risks and agrees with Anesthesia plan.  Questions answered.    Anesthesia consent signed with patient.    ASA Score: 1       Day of Surgery Review of History & Physical:   and have interviewed and examined the patient.                 H&P update referred to the surgeon.    Ready For Surgery From Anesthesia Perspective.                                                                                                                                                 11/25/2020  Clemencia Knight is a 60 y.o., female.     Procedure(s):  COLONOSCOPY   Surgeon(s):  Kiran Paul MD    Vitals:  There were no vitals filed for this visit.    Allergies: Contrast media and Hydrocodone    Home Medications:   Prior to Admission medications    Medication Sig Start Date End Date Taking? Authorizing Provider   estradiol (VIVELLE-DOT) 0.1 mg/24 hr Apply 1 patch to skin twice weekly as directed. 4/13/12 4/13/13  Mary Franklin MD                      [facility] Anesthesia Evaluation                                                              Review of Systems      Anesthesia Hx:      No problems with previous Anesthesia                  Social:      Non-Smoker and No Alcohol Use            Hematology/Oncology:      Hematology Normal              EENT/Dental:              Cardiovascular:      Cardiovascular Normal                                  Pulmonary:      Pulmonary Normal                           Renal/:      Renal/ Normal                      Hepatic/GI:      Hepatic/GI Normal    GERD                   Endocrine:      Endocrine Normal                        Psych:              Neurological:      Neurology Normal                              Musculoskeletal:              Dermatological:              OB/PEDS:              Anesthesia Physical Exam    General:  Well nourished      Airway/Jaw/Neck:  Airway Findings:  General Airway Assessment: Adult and Average  Mallampati: III    Jaw/Neck Findings:  Neck ROM: Normal ROM    Eyes/Ears/Nose:      Dental:      Chest/Lungs:  Chest/Lungs Findings:  Clear to auscultation and Normal Respiratory Rate    Heart/Vascular:  Heart Findings:  Rate: Normal  Rhythm: Regular Rhythm  Sounds: Normal  Other Heart Findings: No murmur.    Abdomen:      Musculoskeletal:      Skin:      Mental Status:  Mental Status Findings:  Cooperative and Alert and  Oriented                      Anesthesia Plan    Type of Anesthesia, risks & benefits discussed:    Anesthesia Type:  MAC  Patient's Preference:    Post Op Pain Control Plan:    Induction:    Informed Consent:  Patient understands risks and agrees with Anesthesia plan.  Questions answered.    Anesthesia consent signed with patient.    ASA Score: 1       Day of Surgery Review of History & Physical:   and have interviewed and examined the patient.                 H&P update referred to the surgeon.    Ready For Surgery From Anesthesia Perspective.                                                                                                                                                11/25/2020  Clemencia Knight is a 60 y.o., female.     Procedure(s):  COLONOSCOPY   Surgeon(s):  Kiran Paul MD    Vitals:  There were no vitals filed for this visit.    Allergies: Contrast media and Hydrocodone    Home Medications:   Prior to Admission medications    Medication Sig Start Date End Date Taking? Authorizing Provider   estradiol (VIVELLE-DOT) 0.1 mg/24 hr Apply 1 patch to skin twice weekly as directed. 4/13/12 4/13/13  Mary Franklin MD                      [facility] Anesthesia Evaluation                                                              Review of Systems      Anesthesia Hx:      No problems with previous Anesthesia                  Social:      Non-Smoker and No Alcohol Use            Hematology/Oncology:      Hematology Normal              EENT/Dental:              Cardiovascular:      Cardiovascular Normal                                  Pulmonary:      Pulmonary Normal                           Renal/:      Renal/ Normal                      Hepatic/GI:      Hepatic/GI Normal    GERD                   Endocrine:      Endocrine Normal                        Psych:              Neurological:      Neurology Normal                              Musculoskeletal:              Dermatological:               OB/PEDS:              Anesthesia Physical Exam    General:  Well nourished      Airway/Jaw/Neck:  Airway Findings:  General Airway Assessment: Adult and Average  Mallampati: III    Jaw/Neck Findings:  Neck ROM: Normal ROM    Eyes/Ears/Nose:      Dental:      Chest/Lungs:  Chest/Lungs Findings:  Clear to auscultation and Normal Respiratory Rate    Heart/Vascular:  Heart Findings:  Rate: Normal  Rhythm: Regular Rhythm  Sounds: Normal  Other Heart Findings: No murmur.    Abdomen:      Musculoskeletal:      Skin:      Mental Status:  Mental Status Findings:  Cooperative and Alert and Oriented                      Anesthesia Plan    Type of Anesthesia, risks & benefits discussed:    Anesthesia Type:  MAC  Patient's Preference:    Post Op Pain Control Plan:    Induction:    Informed Consent:  Patient understands risks and agrees with Anesthesia plan.  Questions answered.    Anesthesia consent signed with patient.    ASA Score: 1       Day of Surgery Review of History & Physical:   and have interviewed and examined the patient.                 H&P update referred to the surgeon.    Ready For Surgery From Anesthesia Perspective.                                    Physical Exam  General:  Obesity    Airway/Jaw/Neck:  Airway Findings: Mouth Opening: Normal Tongue: Normal  General Airway Assessment: Adult, Average  Mallampati: III  Improves to II with phonation.  TM Distance: 4-6 cm  Jaw/Neck Findings:  Neck ROM: Normal ROM      Dental:  Dental Findings: In tact   Chest/Lungs:  Chest/Lungs Findings: Clear to auscultation, Normal Respiratory Rate     Heart/Vascular:  Heart Findings: Rate: Normal  Rhythm: Regular Rhythm  Sounds: Normal  Other Heart Findings: No murmur.        Mental Status:  Mental Status Findings:  Cooperative, Alert and Oriented         Anesthesia Plan  Type of Anesthesia, risks & benefits discussed:  Anesthesia Type:  general  Patient's Preference:   Intra-op Monitoring Plan: standard ASA  monitors  Intra-op Monitoring Plan Comments:   Post Op Pain Control Plan:   Post Op Pain Control Plan Comments:   Induction:   IV  Beta Blocker:  Patient is not currently on a Beta-Blocker (No further documentation required).       Informed Consent: Patient understands risks and agrees with Anesthesia plan.  Questions answered. Anesthesia consent signed with patient.  ASA Score: 2     Day of Surgery Review of History & Physical: I have interviewed and examined the patient. I have reviewed the patient's H&P dated:  There are no significant changes.  H&P update referred to the surgeon.  H&P completed by Anesthesiologist.       Ready For Surgery From Anesthesia Perspective.

## 2020-11-25 NOTE — DISCHARGE SUMMARY
Discharge Note  Short Stay      SUMMARY     Admit Date: 11/25/2020    Attending Physician: Kiran Paul MD     Discharge Physician: Kiran Paul MD    Discharge Date: 11/25/2020 11:51 AM    Final Diagnosis: * No pre-op diagnosis entered *    Disposition: HOME OR SELF CARE    Patient Instructions:   Current Discharge Medication List      CONTINUE these medications which have NOT CHANGED    Details   butalbital-acetaminophen-caffeine -40 mg (FIORICET, ESGIC) -40 mg per tablet Take 1 tablet by mouth every 6 (six) hours as needed for Pain.  Qty: 20 tablet, Refills: 0    Associated Diagnoses: Chronic nonintractable headache, unspecified headache type      cholecalciferol, vitamin D3, (VITAMIN D3) 100 mcg (4,000 unit) Cap Take by mouth.      COPPER ORAL Take by mouth.      cyanocobalamin, vitamin B-12, 1,000 mcg Subl Place under the tongue.      diclofenac sodium (VOLTAREN) 1 % Gel Apply 2 grams topically 4 (four) times daily.  Qty: 100 g, Refills: 3    Associated Diagnoses: Primary osteoarthritis of both knees      DULoxetine (CYMBALTA) 60 MG capsule Take 1 capsule (60 mg total) by mouth once daily.  Qty: 30 capsule, Refills: 11    Comments: per kierra duloxetine 60mg po qd [facility] 11/4/20 2:46pm  Associated Diagnoses: Moderate episode of recurrent major depressive disorder      evening primrose oil (EVENING PRIMROSE ORAL) Take by mouth.      fluocinonide (LIDEX) 0.05 % external solution Apply topically 2 (two) times daily.  Qty: 60 mL, Refills: 1    Associated Diagnoses: Psoriasis vulgaris      folic acid (FOLVITE) 1 MG tablet Take 1 mg by mouth once daily.      glucosam/gluc krishna/vv-cgt-a-gluc (GLUCOSAMINE COMPLEX ORAL) Take by mouth.      hydrocortisone 2.5 % cream Apply topically 2 (two) times daily.  Qty: 30 g, Refills: 1    Comments: 30 G OKAY PER YUDITH DIAMOND [facility] 3/22/19 @ 12:06PM  Associated Diagnoses: Hemorrhoids, unspecified hemorrhoid type      iron bis-gly/FA/C/B12/Ca/succ (IRON-150 ORAL)  once daily.      ketoconazole (NIZORAL) 2 % shampoo Apply topically once daily.  Qty: 120 mL, Refills: 1    Associated Diagnoses: Psoriasis vulgaris      Lactobac no.41/Bifidobact no.7 (PROBIOTIC-10 ORAL) Take by mouth.      magnesium 30 mg Tab Take by mouth once.      milk thistle 175 mg tablet Take 175 mg by mouth once daily.      mineral,lanolin oils/prop glyc (BALNEOL TOP) Apply topically.      multivitamin/iron/folic acid (CENTRUM COMPLETE ORAL) Take by mouth.      omega-3 fatty acids fish oil (OMEGA-3 2100) 1,050-1,200 mg Cap capsule once daily.      pantoprazole (PROTONIX) 20 MG tablet Take 1 tablet (20 mg total) by mouth once daily.  Qty: 30 tablet, Refills: 3    Associated Diagnoses: Abdominal pain, unspecified abdominal location      polyethylene glycol (GLYCOLAX) 17 gram/dose powder Take 17 grams dissolved in liquid by mouth once daily.  Qty: 510 g, Refills: 1    Associated Diagnoses: Alternating constipation and diarrhea      tiZANidine (ZANAFLEX) 4 MG tablet Take 1 tablet (4 mg total) by mouth every evening.  Qty: 90 tablet, Refills: 3    Associated Diagnoses: Pain of right lower extremity      TURMERIC ORAL Take by mouth.      !! UNABLE TO FIND [place] grape      !! UNABLE TO FIND thera eyes      !! UNABLE TO FIND Palmitoylethanolamide 400 mg once daily      vitamin A 75778 UNIT capsule Take 10,000 Units by mouth once daily.      VITAMIN K2 ORAL Take by mouth.      zinc gluconate 50 mg tablet Take 50 mg by mouth once daily.      fluticasone (FLONASE) 50 mcg/actuation nasal spray 1 spray by Each Nare route once daily.      fluticasone propionate (CUTIVATE) 0.05 % cream Apply topically 2 (two) times daily.  Qty: 30 g, Refills: 1      ondansetron (ZOFRAN-ODT) 8 MG TbDL Take 1 tablet (8 mg total) by mouth every 6 (six) hours as needed.  Qty: 20 tablet, Refills: 2    Associated Diagnoses: Nausea       !! - Potential duplicate medications found. Please discuss with provider.          Discharge Procedure  Orders (must include Diet, Follow-up, Activity)    Follow Up:  Follow up with PCP as previously scheduled  Resume routine diet.  Activity as tolerated.    No driving day of procedure.

## 2020-11-25 NOTE — ANESTHESIA POSTPROCEDURE EVALUATION
Anesthesia Post Evaluation    Patient: Clemencia Knight    Procedure(s) Performed: Procedure(s) (LRB):  COLONOSCOPY (N/A)    Final Anesthesia Type: general    Patient location during evaluation: PACU  Patient participation: Yes- Able to Participate  Level of consciousness: awake and alert and oriented  Post-procedure vital signs: reviewed and stable  Pain management: adequate  Airway patency: patent    PONV status at discharge: No PONV  Anesthetic complications: no      Cardiovascular status: blood pressure returned to baseline  Respiratory status: unassisted, spontaneous ventilation and room air  Hydration status: euvolemic  Follow-up not needed.          Vitals Value Taken Time   BP 92/52 11/25/20 1150   Temp 36.4 °C (97.5 °F) 11/25/20 1150   Pulse 76 11/25/20 1150   Resp 16 11/25/20 1150   SpO2 98 % 11/25/20 1150         No case tracking events are documented in the log.      Pain/Ruddy Score: Ruddy Score: 9 (11/25/2020 11:50 AM)

## 2020-12-04 LAB
FINAL PATHOLOGIC DIAGNOSIS: NORMAL
GROSS: NORMAL
Lab: NORMAL

## 2020-12-07 ENCOUNTER — LAB VISIT (OUTPATIENT)
Dept: FAMILY MEDICINE | Facility: CLINIC | Age: 60
End: 2020-12-07
Payer: COMMERCIAL

## 2020-12-07 DIAGNOSIS — Z01.818 PREOP TESTING: ICD-10-CM

## 2020-12-07 PROCEDURE — U0003 INFECTIOUS AGENT DETECTION BY NUCLEIC ACID (DNA OR RNA); SEVERE ACUTE RESPIRATORY SYNDROME CORONAVIRUS 2 (SARS-COV-2) (CORONAVIRUS DISEASE [COVID-19]), AMPLIFIED PROBE TECHNIQUE, MAKING USE OF HIGH THROUGHPUT TECHNOLOGIES AS DESCRIBED BY CMS-2020-01-R: HCPCS

## 2020-12-08 LAB — SARS-COV-2 RNA RESP QL NAA+PROBE: NOT DETECTED

## 2020-12-09 ENCOUNTER — OFFICE VISIT (OUTPATIENT)
Dept: OBSTETRICS AND GYNECOLOGY | Facility: CLINIC | Age: 60
End: 2020-12-09
Payer: COMMERCIAL

## 2020-12-09 ENCOUNTER — HOSPITAL ENCOUNTER (OUTPATIENT)
Dept: RADIOLOGY | Facility: HOSPITAL | Age: 60
Discharge: HOME | End: 2020-12-09
Attending: OBSTETRICS & GYNECOLOGY
Payer: COMMERCIAL

## 2020-12-09 ENCOUNTER — TELEPHONE (OUTPATIENT)
Dept: NEUROLOGY | Facility: CLINIC | Age: 60
End: 2020-12-09

## 2020-12-09 ENCOUNTER — PATIENT MESSAGE (OUTPATIENT)
Dept: FAMILY MEDICINE | Facility: CLINIC | Age: 60
End: 2020-12-09

## 2020-12-09 VITALS — BODY MASS INDEX: 30.39 KG/M2 | DIASTOLIC BLOOD PRESSURE: 72 MMHG | WEIGHT: 177 LBS | SYSTOLIC BLOOD PRESSURE: 128 MMHG

## 2020-12-09 DIAGNOSIS — Z12.31 VISIT FOR SCREENING MAMMOGRAM: ICD-10-CM

## 2020-12-09 DIAGNOSIS — Z01.411 ENCOUNTER FOR GYNECOLOGICAL EXAMINATION WITH ABNORMAL FINDING: Primary | ICD-10-CM

## 2020-12-09 DIAGNOSIS — N94.10 DYSPAREUNIA IN FEMALE: ICD-10-CM

## 2020-12-09 PROCEDURE — 1125F PR PAIN SEVERITY QUANTIFIED, PAIN PRESENT: ICD-10-PCS | Mod: S$GLB,,, | Performed by: OBSTETRICS & GYNECOLOGY

## 2020-12-09 PROCEDURE — 3008F BODY MASS INDEX DOCD: CPT | Mod: CPTII,S$GLB,, | Performed by: OBSTETRICS & GYNECOLOGY

## 2020-12-09 PROCEDURE — 3078F DIAST BP <80 MM HG: CPT | Mod: CPTII,S$GLB,, | Performed by: OBSTETRICS & GYNECOLOGY

## 2020-12-09 PROCEDURE — 99396 PREV VISIT EST AGE 40-64: CPT | Mod: S$GLB,,, | Performed by: OBSTETRICS & GYNECOLOGY

## 2020-12-09 PROCEDURE — 99999 PR PBB SHADOW E&M-EST. PATIENT-LVL V: CPT | Mod: PBBFAC,,, | Performed by: OBSTETRICS & GYNECOLOGY

## 2020-12-09 PROCEDURE — 77067 MAMMO DIGITAL SCREENING BILAT WITH TOMO: ICD-10-PCS | Mod: 26,,, | Performed by: RADIOLOGY

## 2020-12-09 PROCEDURE — 99999 PR PBB SHADOW E&M-EST. PATIENT-LVL V: ICD-10-PCS | Mod: PBBFAC,,, | Performed by: OBSTETRICS & GYNECOLOGY

## 2020-12-09 PROCEDURE — 77067 SCR MAMMO BI INCL CAD: CPT | Mod: TC,PN

## 2020-12-09 PROCEDURE — 77063 BREAST TOMOSYNTHESIS BI: CPT | Mod: 26,,, | Performed by: RADIOLOGY

## 2020-12-09 PROCEDURE — 77067 SCR MAMMO BI INCL CAD: CPT | Mod: 26,,, | Performed by: RADIOLOGY

## 2020-12-09 PROCEDURE — 3078F PR MOST RECENT DIASTOLIC BLOOD PRESSURE < 80 MM HG: ICD-10-PCS | Mod: CPTII,S$GLB,, | Performed by: OBSTETRICS & GYNECOLOGY

## 2020-12-09 PROCEDURE — 3074F PR MOST RECENT SYSTOLIC BLOOD PRESSURE < 130 MM HG: ICD-10-PCS | Mod: CPTII,S$GLB,, | Performed by: OBSTETRICS & GYNECOLOGY

## 2020-12-09 PROCEDURE — 3074F SYST BP LT 130 MM HG: CPT | Mod: CPTII,S$GLB,, | Performed by: OBSTETRICS & GYNECOLOGY

## 2020-12-09 PROCEDURE — 3008F PR BODY MASS INDEX (BMI) DOCUMENTED: ICD-10-PCS | Mod: CPTII,S$GLB,, | Performed by: OBSTETRICS & GYNECOLOGY

## 2020-12-09 PROCEDURE — 1125F AMNT PAIN NOTED PAIN PRSNT: CPT | Mod: S$GLB,,, | Performed by: OBSTETRICS & GYNECOLOGY

## 2020-12-09 PROCEDURE — 99396 PR PREVENTIVE VISIT,EST,40-64: ICD-10-PCS | Mod: S$GLB,,, | Performed by: OBSTETRICS & GYNECOLOGY

## 2020-12-09 PROCEDURE — 77063 MAMMO DIGITAL SCREENING BILAT WITH TOMO: ICD-10-PCS | Mod: 26,,, | Performed by: RADIOLOGY

## 2020-12-09 RX ORDER — ESTRADIOL 0.1 MG/G
1 CREAM VAGINAL
Qty: 42.5 G | Refills: 1 | Status: SHIPPED | OUTPATIENT
Start: 2020-12-10 | End: 2021-03-09

## 2020-12-09 NOTE — TELEPHONE ENCOUNTER
Attempted to contact patient to schedule new patient appointment with DR Kelly for the trigeminal neuralgia, no answer, left message to call the office.

## 2020-12-09 NOTE — TELEPHONE ENCOUNTER
I called the pt to inform her that her appointment as a new pt to see Pascale Alejandra NP was scheduled incorrectly. Pascale Alejandra NP doesn't see pts for trigeminal neuropathy. I spoke with the [facility] department nurse and they will call her as soon as they can to get her scheduled to see one of the providers in [facility] for the trigeminal neuropathy. Pt verbalized understanding.

## 2020-12-09 NOTE — TELEPHONE ENCOUNTER
----- Message from Mery Patterson sent at 12/9/2020 11:45 AM CST -----  Type: Needs Medical Advice  Who Called:  Hu Knight (Spouse)  Best Call Back Number:   Additional Information: Calling to schedule an appointment with the provider as the provider she is scheduled with is not in network.

## 2020-12-09 NOTE — PROGRESS NOTES
Chief Complaint   Patient presents with    Well Woman     History of Present Illness: Clemencia Knight is a 60 y.o. female that presents today 12/9/2020 for well gyn visit.    Past Medical History:   Diagnosis Date    Allergy     Drug-induced lupus erythematosus     General anesthetics causing adverse effect in therapeutic use     pt. somewhat aware of extubation with one of her surgeries    GERD (gastroesophageal reflux disease)     Hearing loss     Migraines     Psoriatic arthritis     Restless leg syndrome     Sciatica     Trigeminal neuralgia        Past Surgical History:   Procedure Laterality Date    AUGMENTATION OF BREAST      BELT ABDOMINOPLASTY      breast implants      CERVICAL FUSION      COLONOSCOPY  10/2012    Dr. Paul; internal hemorrhoid; repeat in 10 years    COLONOSCOPY N/A 11/25/2020    Procedure: COLONOSCOPY;  Surgeon: Kiran Paul MD;  Location: [facility] ENDO;  Service: Endoscopy;  Laterality: N/A;    DEXA      WNL    EPIDURAL STEROID INJECTION INTO CERVICAL SPINE N/A 9/24/2018    Procedure: Injection-steroid-epidural-cervical;  Surgeon: Myles Rocha MD;  Location: [facility] OR;  Service: Pain Management;  Laterality: N/A;  C7-T1    EPIDURAL STEROID INJECTION INTO CERVICAL SPINE N/A 11/20/2018    Procedure: Injection-steroid-epidural-cervical;  Surgeon: Myles Rocha MD;  Location: [facility] OR;  Service: Pain Management;  Laterality: N/A;  C7-T1    HYSTERECTOMY  2000    OOPHORECTOMY  7/16/2013    laparotomy BSO for benign 10cm tubal cyst    SALPINGOOPHORECTOMY  2013    with removal of fallopian cyst    SHOULDER SURGERY      right    TLH  2000    ovaries remain, benign    TONSILLECTOMY, ADENOIDECTOMY, BILATERAL MYRINGOTOMY AND TUBES         Current Outpatient Medications   Medication Sig Dispense Refill    cholecalciferol, vitamin D3, (VITAMIN D3) 100 mcg (4,000 unit) Cap Take by mouth.      COPPER ORAL Take by mouth.      cyanocobalamin, vitamin B-12, 1,000 mcg Subl Place  under the tongue.      diclofenac sodium (VOLTAREN) 1 % Gel Apply 2 grams topically 4 (four) times daily. 100 g 3    DULoxetine (CYMBALTA) 60 MG capsule Take 1 capsule (60 mg total) by mouth once daily. 30 capsule 11    [START ON 12/10/2020] estradioL (ESTRACE) 0.01 % (0.1 mg/gram) vaginal cream Place 1 gram vaginally every Monday and Thursday. 42.5 g 1    evening primrose oil (EVENING PRIMROSE ORAL) Take by mouth.      fluocinonide (LIDEX) 0.05 % external solution Apply topically 2 (two) times daily. 60 mL 1    fluticasone (FLONASE) 50 mcg/actuation nasal spray 1 spray by Each Nare route once daily.      fluticasone propionate (CUTIVATE) 0.05 % cream Apply topically 2 (two) times daily. 30 g 1    folic acid (FOLVITE) 1 MG tablet Take 1 mg by mouth once daily.      glucosam/gluc krishna/td-fci-b-gluc (GLUCOSAMINE COMPLEX ORAL) Take by mouth.      hydrocortisone 2.5 % cream Apply topically 2 (two) times daily. 30 g 1    iron bis-gly/FA/C/B12/Ca/succ (IRON-150 ORAL) once daily.      ketoconazole (NIZORAL) 2 % shampoo Apply topically once daily. 120 mL 1    Lactobac no.41/Bifidobact no.7 (PROBIOTIC-10 ORAL) Take by mouth.      magnesium 30 mg Tab Take by mouth once.      milk thistle 175 mg tablet Take 175 mg by mouth once daily.      mineral,lanolin oils/prop glyc (BALNEOL TOP) Apply topically.      multivitamin/iron/folic acid (CENTRUM COMPLETE ORAL) Take by mouth.      omega-3 fatty acids fish oil (OMEGA-3 2100) 1,050-1,200 mg Cap capsule once daily.      ondansetron (ZOFRAN-ODT) 8 MG TbDL Take 1 tablet (8 mg total) by mouth every 6 (six) hours as needed. 20 tablet 2    pantoprazole (PROTONIX) 20 MG tablet Take 1 tablet (20 mg total) by mouth once daily. 30 tablet 3    polyethylene glycol (GLYCOLAX) 17 gram/dose powder Take 17 grams dissolved in liquid by mouth once daily. 510 g 1    tiZANidine (ZANAFLEX) 4 MG tablet Take 1 tablet (4 mg total) by mouth every evening. 90 tablet 3    TURMERIC ORAL  Take by mouth.      UNABLE TO FIND [place] grape      UNABLE TO FIND thera eyes      UNABLE TO FIND Palmitoylethanolamide 400 mg once daily      vitamin A 38108 UNIT capsule Take 10,000 Units by mouth once daily.      VITAMIN K2 ORAL Take by mouth.      zinc gluconate 50 mg tablet Take 50 mg by mouth once daily.       No current facility-administered medications for this visit.        Review of patient's allergies indicates:   Allergen Reactions    Contrast media Swelling     Swollen eyes and face    Hydrocodone Itching       Family History   Problem Relation Age of Onset    Cancer Father         bladder    Diabetes Father     Heart disease Father     Diabetes Mother     Melanoma Brother     Charcot-Karla-Tooth disease Brother     Breast cancer Neg Hx     Ovarian cancer Neg Hx     Anesthesia problems Neg Hx        Social History     Socioeconomic History    Marital status:      Spouse name: Not on file    Number of children: Not on file    Years of education: Not on file    Highest education level: Not on file   Occupational History     Employer: OTHER   Social Needs    Financial resource strain: Not hard at all    Food insecurity     Worry: Never true     Inability: Never true    Transportation needs     Medical: No     Non-medical: No   Tobacco Use    Smoking status: Never Smoker    Smokeless tobacco: Never Used   Substance and Sexual Activity    Alcohol use: Yes     Frequency: Never     Drinks per session: Patient refused     Binge frequency: Never     Comment: rarely    Drug use: No    Sexual activity: Yes     Partners: Male     Birth control/protection: Surgical   Lifestyle    Physical activity     Days per week: 0 days     Minutes per session: 0 min    Stress: Only a little   Relationships    Social connections     Talks on phone: More than three times a week     Gets together: More than three times a week     Attends Orthodox service: Not on file     Active member of  club or organization: Yes     Attends meetings of clubs or organizations: More than 4 times per year     Relationship status:    Other Topics Concern    Are you pregnant or think you may be? No    Breast-feeding Not Asked   Social History Narrative    Not on file       OB History    Para Term  AB Living   2 2 2     2   SAB TAB Ectopic Multiple Live Births           2      # Outcome Date GA Lbr Marc/2nd Weight Sex Delivery Anes PTL Lv   2 Term 86   3.544 kg (7 lb 13 oz) F Vag-Spont EPI N VARSHA   1 Term 85   3.572 kg (7 lb 14 oz) M Vag-Spont EPI N VARSHA       Review of Symptoms:  GENERAL: Denies weight gain or weight loss. Feeling well overall.   SKIN: Denies rash or lesions.   HEAD: Denies head injury or headache.   NODES: Denies enlarged lymph nodes.   CHEST: Denies chest pain or shortness of breath.   CARDIOVASCULAR: Denies palpitations or left sided chest pain.   ABDOMEN: No abdominal pain, constipation, diarrhea, nausea, vomiting or rectal bleeding.   URINARY: No frequency, dysuria, hematuria, or burning on urination.  HEMATOLOGIC: No easy bruisability or excessive bleeding.   MUSCULOSKELETAL: Denies joint pain or swelling.     /72   Wt 80.3 kg (177 lb 0.5 oz)   Physical Exam:  APPEARANCE: Well nourished, well developed, in no acute distress.  SKIN: Normal skin turgor, no lesions.  NECK: Neck symmetric without masses   RESPIRATORY: Normal respiratory effort with no retractions or use of accessory muscles  CARDIOVASCULAR: Peripheral vascular system with no swelling no varicosities and palpation of pulses normal  LYMPHATIC: No enlargements of the lymph nodes noted in the neck, axillae, or groin  ABDOMEN: Soft. No tenderness or masses. No hepatosplenomegaly. No hernias.  BREASTS: Symmetrical, no skin changes or visible lesions. No palpable masses, nipple discharge or adenopathy bilaterally.  PELVIC: Normal external female genitalia without lesions. Normal hair distribution.  Adequate perineal body, normal urethral meatus. Urethra with no masses.  Bladder nontender. Vagina moist and well rugated without lesions or discharge. No significant cystocele or rectocele.  Adnexa without masses or tenderness. Urethra and bladder normal.   EXTREMITIES: No clubbing cyanosis or edema.    ASSESSMENT/PLAN:  Encounter for gynecological examination with abnormal finding    Visit for screening mammogram  -     Mammo Digital Screening Bilat w/ Carlo; Future; Expected date: 12/09/2020    Dyspareunia in female  -     estradioL (ESTRACE) 0.01 % (0.1 mg/gram) vaginal cream; Place 1 gram vaginally every Monday and Thursday.  Dispense: 42.5 g; Refill: 1          Patient was counseled today on Pap guidelines. We discussed the discontinuing the pap smear after hysterectomy except in certain high risk cases.  We discussed the need for pelvic exams.   We discussed STD screening if at high risk for an STD.  We discussed breast cancer screening with mammograms every other year after the age of 40 and annually after the age of 50.    We discussed colon cancer screening.   Osteoporosis screening with the Dexa Bone Scan discussed when indicated.   She will see her PCP for other health maintenance.       FOLLOW-UP:prn

## 2020-12-10 ENCOUNTER — PATIENT MESSAGE (OUTPATIENT)
Dept: RHEUMATOLOGY | Facility: CLINIC | Age: 60
End: 2020-12-10

## 2020-12-10 ENCOUNTER — HOSPITAL ENCOUNTER (OUTPATIENT)
Facility: HOSPITAL | Age: 60
Discharge: HOME | End: 2020-12-10
Attending: INTERNAL MEDICINE | Admitting: INTERNAL MEDICINE
Payer: COMMERCIAL

## 2020-12-10 ENCOUNTER — ANESTHESIA EVENT (OUTPATIENT)
Dept: ENDOSCOPY | Facility: HOSPITAL | Age: 60
End: 2020-12-10
Payer: COMMERCIAL

## 2020-12-10 ENCOUNTER — ANESTHESIA (OUTPATIENT)
Dept: ENDOSCOPY | Facility: HOSPITAL | Age: 60
End: 2020-12-10
Payer: COMMERCIAL

## 2020-12-10 DIAGNOSIS — L40.50 PSORIATIC ARTHRITIS: Primary | ICD-10-CM

## 2020-12-10 DIAGNOSIS — R76.8 HISTONE ANTIBODY POSITIVE: ICD-10-CM

## 2020-12-10 DIAGNOSIS — R76.8 ANTI-SMITH (ANTI-SM) ANTIBODY AND ANTIRIBONUCLEAR PROTEIN (ANTI-RNP) ANTIBODY POSITIVE: ICD-10-CM

## 2020-12-10 DIAGNOSIS — K21.9 GERD (GASTROESOPHAGEAL REFLUX DISEASE): ICD-10-CM

## 2020-12-10 PROCEDURE — 43239 EGD BIOPSY SINGLE/MULTIPLE: CPT | Mod: ,,, | Performed by: INTERNAL MEDICINE

## 2020-12-10 PROCEDURE — 37000009 HC ANESTHESIA EA ADD 15 MINS: Mod: PO | Performed by: INTERNAL MEDICINE

## 2020-12-10 PROCEDURE — D9220A PRA ANESTHESIA: ICD-10-PCS | Mod: ,,, | Performed by: NURSE ANESTHETIST, CERTIFIED REGISTERED

## 2020-12-10 PROCEDURE — 43239 PR EGD, FLEX, W/BIOPSY, SGL/MULTI: ICD-10-PCS | Mod: ,,, | Performed by: INTERNAL MEDICINE

## 2020-12-10 PROCEDURE — D9220A PRA ANESTHESIA: ICD-10-PCS | Mod: ,,, | Performed by: ANESTHESIOLOGY

## 2020-12-10 PROCEDURE — 88305 TISSUE EXAM BY PATHOLOGIST: CPT | Mod: 59 | Performed by: PATHOLOGY

## 2020-12-10 PROCEDURE — 88305 TISSUE EXAM BY PATHOLOGIST: CPT | Mod: 26,,, | Performed by: PATHOLOGY

## 2020-12-10 PROCEDURE — 27201012 HC FORCEPS, HOT/COLD, DISP: Mod: PO | Performed by: INTERNAL MEDICINE

## 2020-12-10 PROCEDURE — 63600175 PHARM REV CODE 636 W HCPCS: Mod: PO | Performed by: NURSE ANESTHETIST, CERTIFIED REGISTERED

## 2020-12-10 PROCEDURE — 37000008 HC ANESTHESIA 1ST 15 MINUTES: Mod: PO | Performed by: INTERNAL MEDICINE

## 2020-12-10 PROCEDURE — 25000003 PHARM REV CODE 250: Mod: PO | Performed by: NURSE ANESTHETIST, CERTIFIED REGISTERED

## 2020-12-10 PROCEDURE — 88305 TISSUE EXAM BY PATHOLOGIST: ICD-10-PCS | Mod: 26,,, | Performed by: PATHOLOGY

## 2020-12-10 PROCEDURE — 63600175 PHARM REV CODE 636 W HCPCS: Mod: PO | Performed by: INTERNAL MEDICINE

## 2020-12-10 PROCEDURE — D9220A PRA ANESTHESIA: Mod: ,,, | Performed by: NURSE ANESTHETIST, CERTIFIED REGISTERED

## 2020-12-10 PROCEDURE — D9220A PRA ANESTHESIA: Mod: ,,, | Performed by: ANESTHESIOLOGY

## 2020-12-10 PROCEDURE — 43239 EGD BIOPSY SINGLE/MULTIPLE: CPT | Mod: PO | Performed by: INTERNAL MEDICINE

## 2020-12-10 RX ORDER — SODIUM CHLORIDE, SODIUM LACTATE, POTASSIUM CHLORIDE, CALCIUM CHLORIDE 600; 310; 30; 20 MG/100ML; MG/100ML; MG/100ML; MG/100ML
INJECTION, SOLUTION INTRAVENOUS CONTINUOUS
Status: DISCONTINUED | OUTPATIENT
Start: 2020-12-10 | End: 2020-12-10 | Stop reason: HOSPADM

## 2020-12-10 RX ORDER — FENTANYL CITRATE 50 UG/ML
INJECTION, SOLUTION INTRAMUSCULAR; INTRAVENOUS
Status: DISCONTINUED | OUTPATIENT
Start: 2020-12-10 | End: 2020-12-10

## 2020-12-10 RX ORDER — SODIUM CHLORIDE 0.9 % (FLUSH) 0.9 %
10 SYRINGE (ML) INJECTION
Status: DISCONTINUED | OUTPATIENT
Start: 2020-12-10 | End: 2020-12-10 | Stop reason: HOSPADM

## 2020-12-10 RX ORDER — LIDOCAINE HCL/PF 100 MG/5ML
SYRINGE (ML) INTRAVENOUS
Status: DISCONTINUED | OUTPATIENT
Start: 2020-12-10 | End: 2020-12-10

## 2020-12-10 RX ORDER — PROPOFOL 10 MG/ML
VIAL (ML) INTRAVENOUS
Status: DISCONTINUED | OUTPATIENT
Start: 2020-12-10 | End: 2020-12-10

## 2020-12-10 RX ADMIN — LIDOCAINE HYDROCHLORIDE 100 MG: 20 INJECTION, SOLUTION INTRAVENOUS at 09:12

## 2020-12-10 RX ADMIN — PROPOFOL 50 MG: 10 INJECTION, EMULSION INTRAVENOUS at 09:12

## 2020-12-10 RX ADMIN — FENTANYL CITRATE 50 MCG: 50 INJECTION, SOLUTION INTRAMUSCULAR; INTRAVENOUS at 09:12

## 2020-12-10 RX ADMIN — SODIUM CHLORIDE, SODIUM LACTATE, POTASSIUM CHLORIDE, AND CALCIUM CHLORIDE: .6; .31; .03; .02 INJECTION, SOLUTION INTRAVENOUS at 08:12

## 2020-12-10 RX ADMIN — PROPOFOL 125 MG: 10 INJECTION, EMULSION INTRAVENOUS at 09:12

## 2020-12-10 NOTE — DISCHARGE SUMMARY
Discharge Note  Short Stay      SUMMARY     Admit Date: 12/10/2020    Attending Physician: Kiran Paul MD     Discharge Physician: Kiran Paul MD    Discharge Date: 12/10/2020 9:23 AM    Final Diagnosis: * No pre-op diagnosis entered *    Disposition: HOME OR SELF CARE    Patient Instructions:   Current Discharge Medication List      CONTINUE these medications which have NOT CHANGED    Details   cholecalciferol, vitamin D3, (VITAMIN D3) 100 mcg (4,000 unit) Cap Take by mouth.      COPPER ORAL Take by mouth.      cyanocobalamin, vitamin B-12, 1,000 mcg Subl Place under the tongue.      DULoxetine (CYMBALTA) 60 MG capsule Take 1 capsule (60 mg total) by mouth once daily.  Qty: 30 capsule, Refills: 11    Comments: rosalind lozada duloxetine 60mg po qd [facility] 11/4/20 2:46pm  Associated Diagnoses: Moderate episode of recurrent major depressive disorder      evening primrose oil (EVENING PRIMROSE ORAL) Take by mouth.      fluocinonide (LIDEX) 0.05 % external solution Apply topically 2 (two) times daily.  Qty: 60 mL, Refills: 1    Associated Diagnoses: Psoriasis vulgaris      fluticasone (FLONASE) 50 mcg/actuation nasal spray 1 spray by Each Nare route once daily.      fluticasone propionate (CUTIVATE) 0.05 % cream Apply topically 2 (two) times daily.  Qty: 30 g, Refills: 1      folic acid (FOLVITE) 1 MG tablet Take 1 mg by mouth once daily.      glucosam/gluc krishna/lv-twu-u-gluc (GLUCOSAMINE COMPLEX ORAL) Take by mouth.      hydrocortisone 2.5 % cream Apply topically 2 (two) times daily.  Qty: 30 g, Refills: 1    Comments: 30 G OKAY ROSALIND FIELDS [facility] 3/22/19 @ 12:06PM  Associated Diagnoses: Hemorrhoids, unspecified hemorrhoid type      iron bis-gly/FA/C/B12/Ca/succ (IRON-150 ORAL) once daily.      Lactobac no.41/Bifidobact no.7 (PROBIOTIC-10 ORAL) Take by mouth.      magnesium 30 mg Tab Take by mouth once.      milk thistle 175 mg tablet Take 175 mg by mouth once daily.      mineral,lanolin oils/prop glyc (BALNEOL  TOP) Apply topically.      multivitamin/iron/folic acid (CENTRUM COMPLETE ORAL) Take by mouth.      omega-3 fatty acids fish oil (OMEGA-3 2100) 1,050-1,200 mg Cap capsule once daily.      pantoprazole (PROTONIX) 20 MG tablet Take 1 tablet (20 mg total) by mouth once daily.  Qty: 30 tablet, Refills: 3    Associated Diagnoses: Abdominal pain, unspecified abdominal location      polyethylene glycol (GLYCOLAX) 17 gram/dose powder Take 17 grams dissolved in liquid by mouth once daily.  Qty: 510 g, Refills: 1    Associated Diagnoses: Alternating constipation and diarrhea      tiZANidine (ZANAFLEX) 4 MG tablet Take 1 tablet (4 mg total) by mouth every evening.  Qty: 90 tablet, Refills: 3    Associated Diagnoses: Pain of right lower extremity      TURMERIC ORAL Take by mouth.      !! UNABLE TO FIND [place] grape      !! UNABLE TO FIND thera eyes      !! UNABLE TO FIND Palmitoylethanolamide 400 mg once daily      vitamin A 20002 UNIT capsule Take 10,000 Units by mouth once daily.      VITAMIN K2 ORAL Take by mouth.      zinc gluconate 50 mg tablet Take 50 mg by mouth once daily.      diclofenac sodium (VOLTAREN) 1 % Gel Apply 2 grams topically 4 (four) times daily.  Qty: 100 g, Refills: 3    Associated Diagnoses: Primary osteoarthritis of both knees      estradioL (ESTRACE) 0.01 % (0.1 mg/gram) vaginal cream Place 1 gram vaginally every Monday and Thursday.  Qty: 42.5 g, Refills: 1    Associated Diagnoses: Dyspareunia in female      ketoconazole (NIZORAL) 2 % shampoo Apply topically once daily.  Qty: 120 mL, Refills: 1    Associated Diagnoses: Psoriasis vulgaris      ondansetron (ZOFRAN-ODT) 8 MG TbDL Take 1 tablet (8 mg total) by mouth every 6 (six) hours as needed.  Qty: 20 tablet, Refills: 2    Associated Diagnoses: Nausea       !! - Potential duplicate medications found. Please discuss with provider.          Discharge Procedure Orders (must include Diet, Follow-up, Activity)    Follow Up:  Follow up with PCP as  previously scheduled  Resume routine diet.  Activity as tolerated.    No driving day of procedure.

## 2020-12-10 NOTE — H&P
History & Physical - Short Stay  Gastroenterology      SUBJECTIVE:     Procedure: EGD    Chief Complaint/Indication for Procedure: Epigastric Pain and Reflux    PTA Medications   Medication Sig    cholecalciferol, vitamin D3, (VITAMIN D3) 100 mcg (4,000 unit) Cap Take by mouth.    COPPER ORAL Take by mouth.    cyanocobalamin, vitamin B-12, 1,000 mcg Subl Place under the tongue.    DULoxetine (CYMBALTA) 60 MG capsule Take 1 capsule (60 mg total) by mouth once daily.    evening primrose oil (EVENING PRIMROSE ORAL) Take by mouth.    fluocinonide (LIDEX) 0.05 % external solution Apply topically 2 (two) times daily.    fluticasone (FLONASE) 50 mcg/actuation nasal spray 1 spray by Each Nare route once daily.    fluticasone propionate (CUTIVATE) 0.05 % cream Apply topically 2 (two) times daily.    folic acid (FOLVITE) 1 MG tablet Take 1 mg by mouth once daily.    glucosam/gluc krishna/ge-npb-y-gluc (GLUCOSAMINE COMPLEX ORAL) Take by mouth.    hydrocortisone 2.5 % cream Apply topically 2 (two) times daily.    iron bis-gly/FA/C/B12/Ca/succ (IRON-150 ORAL) once daily.    Lactobac no.41/Bifidobact no.7 (PROBIOTIC-10 ORAL) Take by mouth.    magnesium 30 mg Tab Take by mouth once.    milk thistle 175 mg tablet Take 175 mg by mouth once daily.    mineral,lanolin oils/prop glyc (BALNEOL TOP) Apply topically.    multivitamin/iron/folic acid (CENTRUM COMPLETE ORAL) Take by mouth.    omega-3 fatty acids fish oil (OMEGA-3 2100) 1,050-1,200 mg Cap capsule once daily.    pantoprazole (PROTONIX) 20 MG tablet Take 1 tablet (20 mg total) by mouth once daily.    polyethylene glycol (GLYCOLAX) 17 gram/dose powder Take 17 grams dissolved in liquid by mouth once daily.    tiZANidine (ZANAFLEX) 4 MG tablet Take 1 tablet (4 mg total) by mouth every evening.    TURMERIC ORAL Take by mouth.    UNABLE TO FIND [place] grape    UNABLE TO FIND thera eyes    UNABLE TO FIND Palmitoylethanolamide 400 mg once daily    vitamin A 65868  UNIT capsule Take 10,000 Units by mouth once daily.    VITAMIN K2 ORAL Take by mouth.    zinc gluconate 50 mg tablet Take 50 mg by mouth once daily.    diclofenac sodium (VOLTAREN) 1 % Gel Apply 2 grams topically 4 (four) times daily.    estradioL (ESTRACE) 0.01 % (0.1 mg/gram) vaginal cream Place 1 gram vaginally every Monday and Thursday.    ketoconazole (NIZORAL) 2 % shampoo Apply topically once daily.    ondansetron (ZOFRAN-ODT) 8 MG TbDL Take 1 tablet (8 mg total) by mouth every 6 (six) hours as needed.       Review of patient's allergies indicates:   Allergen Reactions    Contrast media Swelling     Swollen eyes and face    Hydrocodone Itching        Past Medical History:   Diagnosis Date    Allergy     Drug-induced lupus erythematosus     General anesthetics causing adverse effect in therapeutic use     pt. somewhat aware of extubation with one of her surgeries    GERD (gastroesophageal reflux disease)     Hearing loss     Migraines     Psoriatic arthritis     Restless leg syndrome     Sciatica     Trigeminal neuralgia      Past Surgical History:   Procedure Laterality Date    AUGMENTATION OF BREAST      BELT ABDOMINOPLASTY      breast implants      CERVICAL FUSION      COLONOSCOPY  10/2012    Dr. Paul; internal hemorrhoid; repeat in 10 years    COLONOSCOPY N/A 11/25/2020    Procedure: COLONOSCOPY;  Surgeon: Kiran Paul MD;  Location: [facility];  Service: Endoscopy;  Laterality: N/A;    DEXA      WNL    EPIDURAL STEROID INJECTION INTO CERVICAL SPINE N/A 9/24/2018    Procedure: Injection-steroid-epidural-cervical;  Surgeon: Myles Rocha MD;  Location: [facility] OR;  Service: Pain Management;  Laterality: N/A;  C7-T1    EPIDURAL STEROID INJECTION INTO CERVICAL SPINE N/A 11/20/2018    Procedure: Injection-steroid-epidural-cervical;  Surgeon: Myles Rocha MD;  Location: [facility] OR;  Service: Pain Management;  Laterality: N/A;  C7-T1    HYSTERECTOMY  2000    OOPHORECTOMY  7/16/2013     laparotomy BSO for benign 10cm tubal cyst    SALPINGOOPHORECTOMY  2013    with removal of fallopian cyst    SHOULDER SURGERY      right    TLH  2000    ovaries remain, benign    TONSILLECTOMY, ADENOIDECTOMY, BILATERAL MYRINGOTOMY AND TUBES       Family History   Problem Relation Age of Onset    Cancer Father         bladder    Diabetes Father     Heart disease Father     Diabetes Mother     Melanoma Brother     Charcot-Karla-Tooth disease Brother     Breast cancer Neg Hx     Ovarian cancer Neg Hx     Anesthesia problems Neg Hx      Social History     Tobacco Use    Smoking status: Never Smoker    Smokeless tobacco: Never Used   Substance Use Topics    Alcohol use: Yes     Frequency: Never     Drinks per session: Patient refused     Binge frequency: Never     Comment: rarely    Drug use: No         OBJECTIVE:     Vital Signs (Most Recent)  Temp: 97.7 °F (36.5 °C) (12/10/20 0819)  Pulse: 67 (12/10/20 0819)  Resp: 18 (12/10/20 0819)  BP: 133/74 (12/10/20 0822)  SpO2: 97 % (12/10/20 0819)    Physical Exam:                                                       GENERAL:  Comfortable, in no acute distress.                                 HEENT EXAM:  Nonicteric.  No adenopathy.  Oropharynx is clear.               NECK:  Supple.                                                               LUNGS:  Clear.                                                               CARDIAC:  Regular rate and rhythm.  S1, S2.  No murmur.                      ABDOMEN:  Soft, positive bowel sounds, nontender.  No hepatosplenomegaly or masses.  No rebound or guarding.                                             EXTREMITIES:  No edema.     MENTAL STATUS:  Normal, alert and oriented.      ASSESSMENT/PLAN:     Assessment: Epigastric Pain and Reflux    Plan: EGD    Anesthesia Plan: General    ASA Grade: ASA 2 - Patient with mild systemic disease with no functional limitations    MALLAMPATI SCORE:  I (soft palate, uvula, fauces,  and tonsillar pillars visible)

## 2020-12-10 NOTE — DISCHARGE INSTRUCTIONS

## 2020-12-10 NOTE — TRANSFER OF CARE
"Anesthesia Transfer of Care Note    Patient: Clemencia Knight    Procedure(s) Performed: Procedure(s) (LRB):  EGD (ESOPHAGOGASTRODUODENOSCOPY) (N/A)    Patient location: PACU    Anesthesia Type: general    Transport from OR: Transported from OR on room air with adequate spontaneous ventilation    Post pain: adequate analgesia    Post assessment: no apparent anesthetic complications and tolerated procedure well    Post vital signs: stable    Level of consciousness: awake    Nausea/Vomiting: no nausea/vomiting    Complications: none    Transfer of care protocol was followed      Last vitals:   Visit Vitals  /74   Pulse (P) 78   Temp 36.5 °C (97.7 °F) (Temporal)   Resp (P) 12   Ht 5' 4" (1.626 m)   Wt 80.3 kg (177 lb)   SpO2 (!) (P) 92%   Breastfeeding No   BMI 30.38 kg/m²     "

## 2020-12-10 NOTE — ANESTHESIA PREPROCEDURE EVALUATION
12/10/2020  Clemencia Knight is a 60 y.o., female.     Procedure(s):  COLONOSCOPY   Surgeon(s):  Kiran Paul MD    Vitals:  There were no vitals filed for this visit.    Allergies: Contrast media and Hydrocodone    Home Medications:   Prior to Admission medications    Medication Sig Start Date End Date Taking? Authorizing Provider   estradiol (VIVELLE-DOT) 0.1 mg/24 hr Apply 1 patch to skin twice weekly as directed. 4/13/12 4/13/13  Mary Franklin MD                      Pre-op Assessment    I have reviewed the Patient Summary Reports.     I have reviewed the Nursing Notes. I have reviewed the NPO Status.   I have reviewed the Medications.     Review of Systems  Anesthesia Hx:  No problems with previous Anesthesia Hx of Anesthetic complications    Social:  Non-Smoker, No Alcohol Use    Hematology/Oncology:  Hematology Normal        Cardiovascular:  Cardiovascular Normal     Pulmonary:  Pulmonary Normal    Renal/:  Renal/ Normal     Hepatic/GI:   GERD, poorly controlled    Musculoskeletal:  Cervical Spine Disorder Cervical radicular pain  Cervical radiculopathy  Cervical radiculitis       Neurological:   Neuromuscular Disease, Headaches migraines   Endocrine:  Endocrine Normal    Psych:  Psychiatric Normal                                                                                                                       12/10/2020  Clemencia Knight is a 60 y.o., female.     Procedure(s):  COLONOSCOPY   Surgeon(s):  Kiran Paul MD    Vitals:  There were no vitals filed for this visit.    Allergies: Contrast media and Hydrocodone    Home Medications:   Prior to Admission medications    Medication Sig Start Date End Date Taking? Authorizing Provider   estradiol (VIVELLE-DOT) 0.1 mg/24 hr Apply 1 patch to skin twice weekly as directed. 4/13/12 4/13/13  Mary Franklin MD                       [facility] Anesthesia Evaluation                                                              Review of Systems      Anesthesia Hx:      No problems with previous Anesthesia                  Social:      Non-Smoker and No Alcohol Use            Hematology/Oncology:      Hematology Normal              EENT/Dental:              Cardiovascular:      Cardiovascular Normal                                  Pulmonary:      Pulmonary Normal                           Renal/:      Renal/ Normal                      Hepatic/GI:      Hepatic/GI Normal    GERD                   Endocrine:      Endocrine Normal                        Psych:              Neurological:      Neurology Normal                              Musculoskeletal:              Dermatological:              OB/PEDS:              Anesthesia Physical Exam    General:  Well nourished      Airway/Jaw/Neck:  Airway Findings:  General Airway Assessment: Adult and Average  Mallampati: III    Jaw/Neck Findings:  Neck ROM: Normal ROM    Eyes/Ears/Nose:      Dental:      Chest/Lungs:  Chest/Lungs Findings:  Clear to auscultation and Normal Respiratory Rate    Heart/Vascular:  Heart Findings:  Rate: Normal  Rhythm: Regular Rhythm  Sounds: Normal  Other Heart Findings: No murmur.    Abdomen:      Musculoskeletal:      Skin:      Mental Status:  Mental Status Findings:  Cooperative and Alert and Oriented                      Anesthesia Plan    Type of Anesthesia, risks & benefits discussed:    Anesthesia Type:  MAC  Patient's Preference:    Post Op Pain Control Plan:    Induction:    Informed Consent:  Patient understands risks and agrees with Anesthesia plan.  Questions answered.    Anesthesia consent signed with patient.    ASA Score: 1       Day of Surgery Review of History & Physical:   and have interviewed and examined the patient.                 H&P update referred to the surgeon.    Ready For Surgery From Anesthesia  Perspective.                                                                                                                                                12/10/2020  Clemencia Knight is a 60 y.o., female.     Procedure(s):  COLONOSCOPY   Surgeon(s):  Kiran Paul MD    Vitals:  There were no vitals filed for this visit.    Allergies: Contrast media and Hydrocodone    Home Medications:   Prior to Admission medications    Medication Sig Start Date End Date Taking? Authorizing Provider   estradiol (VIVELLE-DOT) 0.1 mg/24 hr Apply 1 patch to skin twice weekly as directed. 4/13/12 4/13/13  Mary Franklin MD                      [facility] Anesthesia Evaluation                                                              Review of Systems      Anesthesia Hx:      No problems with previous Anesthesia                  Social:      Non-Smoker and No Alcohol Use            Hematology/Oncology:      Hematology Normal              EENT/Dental:              Cardiovascular:      Cardiovascular Normal                                  Pulmonary:      Pulmonary Normal                           Renal/:      Renal/ Normal                      Hepatic/GI:      Hepatic/GI Normal    GERD                   Endocrine:      Endocrine Normal                        Psych:              Neurological:      Neurology Normal                              Musculoskeletal:              Dermatological:              OB/PEDS:              Anesthesia Physical Exam    General:  Well nourished      Airway/Jaw/Neck:  Airway Findings:  General Airway Assessment: Adult and Average  Mallampati: III    Jaw/Neck Findings:  Neck ROM: Normal ROM    Eyes/Ears/Nose:      Dental:      Chest/Lungs:  Chest/Lungs Findings:  Clear to auscultation and Normal Respiratory Rate    Heart/Vascular:  Heart Findings:  Rate: Normal  Rhythm: Regular Rhythm  Sounds: Normal  Other Heart Findings: No murmur.    Abdomen:      Musculoskeletal:      Skin:      Mental  Status:  Mental Status Findings:  Cooperative and Alert and Oriented                      Anesthesia Plan    Type of Anesthesia, risks & benefits discussed:    Anesthesia Type:  MAC  Patient's Preference:    Post Op Pain Control Plan:    Induction:    Informed Consent:  Patient understands risks and agrees with Anesthesia plan.  Questions answered.    Anesthesia consent signed with patient.    ASA Score: 1       Day of Surgery Review of History & Physical:   and have interviewed and examined the patient.                 H&P update referred to the surgeon.    Ready For Surgery From Anesthesia Perspective.                                                                                                                                                12/10/2020  Clemencia Knight is a 60 y.o., female.     Procedure(s):  COLONOSCOPY   Surgeon(s):  Kiran Paul MD    Vitals:  There were no vitals filed for this visit.    Allergies: Contrast media and Hydrocodone    Home Medications:   Prior to Admission medications    Medication Sig Start Date End Date Taking? Authorizing Provider   estradiol (VIVELLE-DOT) 0.1 mg/24 hr Apply 1 patch to skin twice weekly as directed. 4/13/12 4/13/13  Mary Franklin MD                      [facility] Anesthesia Evaluation                                                              Review of Systems      Anesthesia Hx:      No problems with previous Anesthesia                  Social:      Non-Smoker and No Alcohol Use            Hematology/Oncology:      Hematology Normal              EENT/Dental:              Cardiovascular:      Cardiovascular Normal                                  Pulmonary:      Pulmonary Normal                           Renal/:      Renal/ Normal                      Hepatic/GI:      Hepatic/GI Normal    GERD                   Endocrine:      Endocrine Normal                        Psych:              Neurological:      Neurology  Normal                              Musculoskeletal:              Dermatological:              OB/PEDS:              Anesthesia Physical Exam    General:  Well nourished      Airway/Jaw/Neck:  Airway Findings:  General Airway Assessment: Adult and Average  Mallampati: III    Jaw/Neck Findings:  Neck ROM: Normal ROM    Eyes/Ears/Nose:      Dental:      Chest/Lungs:  Chest/Lungs Findings:  Clear to auscultation and Normal Respiratory Rate    Heart/Vascular:  Heart Findings:  Rate: Normal  Rhythm: Regular Rhythm  Sounds: Normal  Other Heart Findings: No murmur.    Abdomen:      Musculoskeletal:      Skin:      Mental Status:  Mental Status Findings:  Cooperative and Alert and Oriented                      Anesthesia Plan    Type of Anesthesia, risks & benefits discussed:    Anesthesia Type:  MAC  Patient's Preference:    Post Op Pain Control Plan:    Induction:    Informed Consent:  Patient understands risks and agrees with Anesthesia plan.  Questions answered.    Anesthesia consent signed with patient.    ASA Score: 1       Day of Surgery Review of History & Physical:   and have interviewed and examined the patient.                 H&P update referred to the surgeon.    Ready For Surgery From Anesthesia Perspective.                                    Physical Exam  General:  Obesity    Airway/Jaw/Neck:  Airway Findings: Mouth Opening: Normal Tongue: Normal  General Airway Assessment: Adult, Average  Mallampati: III  Improves to II with phonation.  TM Distance: 4-6 cm  Jaw/Neck Findings:  Neck ROM: Normal ROM      Dental:  Dental Findings: In tact   Chest/Lungs:  Chest/Lungs Findings: Clear to auscultation, Normal Respiratory Rate     Heart/Vascular:  Heart Findings: Rate: Normal  Rhythm: Regular Rhythm  Sounds: Normal  Other Heart Findings: No murmur.        Mental Status:  Mental Status Findings:  Cooperative, Alert and Oriented         Anesthesia Plan  Type of Anesthesia, risks & benefits discussed:  Anesthesia  Type:  general  Patient's Preference:   Intra-op Monitoring Plan: standard ASA monitors  Intra-op Monitoring Plan Comments:   Post Op Pain Control Plan:   Post Op Pain Control Plan Comments:   Induction:   IV  Beta Blocker:  Patient is not currently on a Beta-Blocker (No further documentation required).       Informed Consent: Patient understands risks and agrees with Anesthesia plan.  Questions answered. Anesthesia consent signed with patient.  ASA Score: 2     Day of Surgery Review of History & Physical: I have interviewed and examined the patient. I have reviewed the patient's H&P dated:  There are no significant changes.  H&P update referred to the surgeon.  H&P completed by Anesthesiologist.       Ready For Surgery From Anesthesia Perspective.

## 2020-12-10 NOTE — [employer] PATIENT INSTRUCTIONS
Discharge Summary/Instructions after an Endoscopic Procedure  Patient Name: Clemencia Knight  Patient MRN: [id number]  Patient YOB: 1960  Thursday, December 10, 2020  Kiran Paul MD  RESTRICTIONS:  During your procedure today, you received medications for sedation.  These   medications may affect your judgment, balance and coordination.  Therefore,   for 24 hours, you have the following restrictions:   - DO NOT drive a car, operate machinery, make legal/financial decisions,   sign important papers or drink alcohol.    ACTIVITY:  Today: no heavy lifting, straining or running due to procedural   sedation/anesthesia.  The following day: return to full activity including work.  DIET:  Eat and drink normally unless instructed otherwise.     TREATMENT FOR COMMON SIDE EFFECTS:  - Mild abdominal pain, nausea, belching, bloating or excessive gas:  rest,   eat lightly and use a heating pad.  - Sore Throat: treat with throat lozenges and/or gargle with warm salt   water.  - Because air was used during the procedure, expelling large amounts of air   from your rectum or belching is normal.  - If a bowel prep was taken, you may not have a bowel movement for 1-3 days.    This is normal.  SYMPTOMS TO WATCH FOR AND REPORT TO YOUR PHYSICIAN:  1. Abdominal pain or bloating, other than gas cramps.  2. Chest pain.  3. Back pain.  4. Signs of infection such as: chills or fever occurring within 24 hours   after the procedure.  5. Rectal bleeding, which would show as bright red, maroon, or black stools.   (A tablespoon of blood from the rectum is not serious, especially if   hemorrhoids are present.)  6. Vomiting.  7. Weakness or dizziness.  GO DIRECTLY TO THE NEAREST EMERGENCY ROOM IF YOU HAVE ANY OF THE FOLLOWING:      Difficulty breathing              Chills and/or fever over 101 F   Persistent vomiting and/or vomiting blood   Severe abdominal pain   Severe chest pain   Black, tarry stools   Bleeding- more than one  tablespoon   Any other symptom or condition that you feel may need urgent attention  Your doctor recommends these additional instructions:  If any biopsies were taken, your doctors clinic will contact you in 1 to 2   weeks with any results.  We are waiting for your pathology results.   Continue your present medications.   You are being discharged to home.  For questions, problems or results please call your physician - Kiran Paul MD at Work:  [phone].  EMERGENCY PHONE NUMBER: [phone], LAB RESULTS: [phone]  IF A COMPLICATION OR EMERGENCY SITUATION ARISES AND YOU ARE UNABLE TO REACH   YOUR PHYSICIAN - GO DIRECTLY TO THE EMERGENCY ROOM.  ___________________________________________  Nurse Signature  ___________________________________________  Patient/Designated Responsible Party Signature  Kiran Paul MD  12/10/2020 9:22:45 AM  This report has been verified and signed electronically.  [employer]

## 2020-12-10 NOTE — TELEPHONE ENCOUNTER
[facility]    Physical Therapy Daily Treatment Note  Date:  2018    Patient Name:  Suzy Barrett  :  1960  MRN: [id number]  Restrictions/Precautions:    Medical/Treatment Diagnosis Information:  · Diagnosis: PT: R foot Plantar fasciitis and Tenosynovitis  · Treatment Diagnosis: R foot pain T63.3  Insurance/Certification information:  PT Insurance Information: [employer]  Physician Information:  Referring Practitioner: Rochelle Arnold [facility] signed (Y/N):     Date of Patient follow up with Physician:     G-Code (if applicable):      Date G-Code Applied:         Progress Note: [x]  Yes  []  No  Next due by: Visit #10       Latex Allergy:  [x]NO      []YES  Preferred Language for Healthcare:   [x]English       []other:    Visit # Insurance Allowable        Pain level:  5/10     SUBJECTIVE:  Got cortisone injection Tuesday, Patient sent a message stating that she wants to start taking Plaquenil for her Arthritis. Forwarding message for advisement.    activities related to improving balance, coordination, kinesthetic sense, posture, motor skill, proprioception  to assist with LE, proximal hip, and core control in self care, mobility, lifting, ambulation and eccentric single leg control. NMR and Therapeutic Activities:    [] (44688 or 00215) Provided verbal/tactile cueing for activities related to improving balance, coordination, kinesthetic sense, posture, motor skill, proprioception and motor activation to allow for proper function of core, proximal hip and LE with self care and ADLs  [] (78847) Gait Re-education- Provided training and instruction to the patient for proper LE, core and proximal hip recruitment and positioning and eccentric body weight control with ambulation re-education including up and down stairs     Home Exercise Program:    [x] (95175) Reviewed/Progressed HEP activities related to strengthening, flexibility, endurance, ROM of core, proximal hip and LE for functional self-care, mobility, lifting and ambulation/stair navigation   [] (02752)Reviewed/Progressed HEP activities related to improving balance, coordination, kinesthetic sense, posture, motor skill, proprioception of core, proximal hip and LE for self care, mobility, lifting, and ambulation/stair navigation      Manual Treatments:  PROM / STM / Oscillations-Mobs:  G-I, II, III, IV (PA's, Inf., Post.)  [x] (21272) Provided manual therapy to mobilize LE, proximal hip and/or LS spine soft tissue/joints for the purpose of modulating pain, promoting relaxation,  increasing ROM, reducing/eliminating soft tissue swelling/inflammation/restriction, improving soft tissue extensibility and allowing for proper ROM for normal function with self care, mobility, lifting and ambulation.      Modalities:  Declined    Charges:  Timed Code Treatment Minutes: 38   Total Treatment Minutes: 38     [] EVAL LOW 44529  [x] XC(39346) x  2   [] IONTO  [] NMR (81802) x      [] VASO  [x] Manual (74780) x  1 [] Other:  [] TA x       [] [facility] T[facility] (94051)  [] ES(attended) (00123)      [] ES (un) (40579):     

## 2020-12-10 NOTE — ANESTHESIA POSTPROCEDURE EVALUATION
Anesthesia Post Evaluation    Patient: Clemencia Knight    Procedure(s) Performed: Procedure(s) (LRB):  EGD (ESOPHAGOGASTRODUODENOSCOPY) (N/A)    Final Anesthesia Type: general    Patient location during evaluation: PACU  Patient participation: Yes- Able to Participate  Level of consciousness: sedated and awake  Post-procedure vital signs: reviewed and stable  Pain management: adequate  Airway patency: patent    PONV status at discharge: No PONV  Anesthetic complications: no      Cardiovascular status: hypertensive and blood pressure returned to baseline  Respiratory status: spontaneous ventilation  Hydration status: euvolemic  Follow-up not needed.          Vitals Value Taken Time   /67 12/10/20 0928   Temp  12/10/20 0952   Pulse 65 12/10/20 0928   Resp 12 12/10/20 0928   SpO2 97 % 12/10/20 0928         No case tracking events are documented in the log.      Pain/Ruddy Score: Ruddy Score: 9 (12/10/2020  9:28 AM)

## 2020-12-11 VITALS
DIASTOLIC BLOOD PRESSURE: 70 MMHG | OXYGEN SATURATION: 97 % | RESPIRATION RATE: 16 BRPM | HEIGHT: 64 IN | HEART RATE: 65 BPM | SYSTOLIC BLOOD PRESSURE: 133 MMHG | TEMPERATURE: 98 F | WEIGHT: 177 LBS | BODY MASS INDEX: 30.22 KG/M2

## 2020-12-11 RX ORDER — HYDROXYCHLOROQUINE SULFATE 200 MG/1
200 TABLET, FILM COATED ORAL 2 TIMES DAILY
Qty: 60 TABLET | Refills: 6 | Status: SHIPPED | OUTPATIENT
Start: 2020-12-11 | End: 2021-01-22

## 2020-12-15 LAB
FINAL PATHOLOGIC DIAGNOSIS: NORMAL
GROSS: NORMAL
Lab: NORMAL

## 2020-12-17 ENCOUNTER — TELEPHONE (OUTPATIENT)
Dept: NEUROLOGY | Facility: CLINIC | Age: 60
End: 2020-12-17

## 2020-12-17 NOTE — TELEPHONE ENCOUNTER
----- Message from Krisetn Abebe MA sent at 12/17/2020  1:37 PM CST -----  Contact: Hu/Hali/[phone]    ----- Message -----  From: Francisca Pinto  Sent: 12/10/2020   8:11 AM CST  To: [facility] Clinical Support    Patient is returning a phone call.  Who left a message for the patient: Yanira Watson LPN  Does patient know what this is regarding:  yes  Comments:

## 2020-12-17 NOTE — TELEPHONE ENCOUNTER
----- Message from Yanira Watson LPN sent at 12/17/2020  2:35 PM CST -----  Contact: Hu/Hali/[phone]    ----- Message -----  From: Kristen Abebe MA  Sent: 12/17/2020   1:37 PM CST  To: Yanira Watson LPN      ----- Message -----  From: Francisca Pinto  Sent: 12/10/2020   8:11 AM CST  To: [facility] Clinical Support    Patient is returning a phone call.  Who left a message for the patient: Yanira Watson LPN  Does patient know what this is regarding:  yes  Comments:

## 2020-12-29 ENCOUNTER — TELEPHONE (OUTPATIENT)
Dept: PHARMACY | Facility: CLINIC | Age: 60
End: 2020-12-29

## 2020-12-29 NOTE — TELEPHONE ENCOUNTER
Good Morning,     The prior authorization for Clemencia Knight's Hydroxychloroquine 200mg prescription has been APPROVED FROM 11/28/2020 TO 12/28/2021 with copayment of $0.00.       Patient has been notified of the decision on 12/29/2020 and patient states she will  medication at [facility].    If there are any additional questions or concerns, please contact me.    Thank You!   Parul Siddiqi CPhT, B.A  Patient Care Advocate   [facility]  Phone: [phone] Ext [phone]  Fax: [phone]

## 2020-12-31 ENCOUNTER — OFFICE VISIT (OUTPATIENT)
Dept: FAMILY MEDICINE | Facility: CLINIC | Age: 60
End: 2020-12-31
Payer: COMMERCIAL

## 2020-12-31 VITALS
SYSTOLIC BLOOD PRESSURE: 120 MMHG | DIASTOLIC BLOOD PRESSURE: 68 MMHG | OXYGEN SATURATION: 98 % | HEART RATE: 71 BPM | BODY MASS INDEX: 30.69 KG/M2 | TEMPERATURE: 98 F | WEIGHT: 178.81 LBS

## 2020-12-31 DIAGNOSIS — M79.604 PAIN OF RIGHT LOWER EXTREMITY: ICD-10-CM

## 2020-12-31 DIAGNOSIS — M17.10 UNILATERAL PRIMARY OSTEOARTHRITIS, UNSPECIFIED KNEE: ICD-10-CM

## 2020-12-31 DIAGNOSIS — R22.42 MASS OF LEFT THIGH: Primary | ICD-10-CM

## 2020-12-31 DIAGNOSIS — L40.50 PSORIATIC ARTHRITIS: ICD-10-CM

## 2020-12-31 DIAGNOSIS — M25.562 ACUTE PAIN OF LEFT KNEE: ICD-10-CM

## 2020-12-31 PROCEDURE — 3074F PR MOST RECENT SYSTOLIC BLOOD PRESSURE < 130 MM HG: ICD-10-PCS | Mod: CPTII,S$GLB,, | Performed by: PHYSICIAN ASSISTANT

## 2020-12-31 PROCEDURE — 3078F DIAST BP <80 MM HG: CPT | Mod: CPTII,S$GLB,, | Performed by: PHYSICIAN ASSISTANT

## 2020-12-31 PROCEDURE — 99999 PR PBB SHADOW E&M-EST. PATIENT-LVL V: CPT | Mod: PBBFAC,,, | Performed by: PHYSICIAN ASSISTANT

## 2020-12-31 PROCEDURE — 99214 PR OFFICE/OUTPT VISIT, EST, LEVL IV, 30-39 MIN: ICD-10-PCS | Mod: S$GLB,,, | Performed by: PHYSICIAN ASSISTANT

## 2020-12-31 PROCEDURE — 99214 OFFICE O/P EST MOD 30 MIN: CPT | Mod: S$GLB,,, | Performed by: PHYSICIAN ASSISTANT

## 2020-12-31 PROCEDURE — 3008F PR BODY MASS INDEX (BMI) DOCUMENTED: ICD-10-PCS | Mod: CPTII,S$GLB,, | Performed by: PHYSICIAN ASSISTANT

## 2020-12-31 PROCEDURE — 3074F SYST BP LT 130 MM HG: CPT | Mod: CPTII,S$GLB,, | Performed by: PHYSICIAN ASSISTANT

## 2020-12-31 PROCEDURE — 1125F PR PAIN SEVERITY QUANTIFIED, PAIN PRESENT: ICD-10-PCS | Mod: S$GLB,,, | Performed by: PHYSICIAN ASSISTANT

## 2020-12-31 PROCEDURE — 1125F AMNT PAIN NOTED PAIN PRSNT: CPT | Mod: S$GLB,,, | Performed by: PHYSICIAN ASSISTANT

## 2020-12-31 PROCEDURE — 99999 PR PBB SHADOW E&M-EST. PATIENT-LVL V: ICD-10-PCS | Mod: PBBFAC,,, | Performed by: PHYSICIAN ASSISTANT

## 2020-12-31 PROCEDURE — 3078F PR MOST RECENT DIASTOLIC BLOOD PRESSURE < 80 MM HG: ICD-10-PCS | Mod: CPTII,S$GLB,, | Performed by: PHYSICIAN ASSISTANT

## 2020-12-31 PROCEDURE — 3008F BODY MASS INDEX DOCD: CPT | Mod: CPTII,S$GLB,, | Performed by: PHYSICIAN ASSISTANT

## 2020-12-31 RX ORDER — METHOCARBAMOL 500 MG/1
1000 TABLET, FILM COATED ORAL NIGHTLY
Qty: 180 TABLET | Refills: 3 | Status: SHIPPED | OUTPATIENT
Start: 2020-12-31 | End: 2022-03-14

## 2020-12-31 NOTE — PROGRESS NOTES
Subjective:       Patient ID: Clemencia Knight is a 60 y.o. female    Chief Complaint: Mass, Knee Pain, and Medication Problem    HPI  The patient is familiar to me, PCP is Dr. Higgins.  She presents today complaining of left knee pain that began over 6 months ago.  She is feeling much better and her psoriatic arthritis is well controlled but she has been unable to resume her exercise with walking because of her left knee pain.  Dr. Becerril told her that she has a a Baker cyst of the left knee.  She also feels a lump in the back of her left thigh that she is concerned about.  She denies any injury or triggering event and thinks that the lump has been there for about 6 months.    Also, she would like refill for tizanidine that she is taking for her leg pain at night.    Review of Systems   Constitutional: Positive for activity change. Negative for unexpected weight change.   HENT: Positive for hearing loss. Negative for rhinorrhea and trouble swallowing.    Eyes: Negative for discharge and visual disturbance.   Respiratory: Negative for chest tightness and wheezing.    Cardiovascular: Negative for chest pain and palpitations.   Gastrointestinal: Negative for blood in stool, constipation, diarrhea and vomiting.   Endocrine: Negative for polydipsia and polyuria.   Genitourinary: Negative for difficulty urinating, dysuria, hematuria and menstrual problem.   Musculoskeletal: Positive for arthralgias, joint swelling and neck pain.   Neurological: Negative for weakness and headaches.   Psychiatric/Behavioral: Negative for confusion and dysphoric mood.        Objective:   Physical Exam  Constitutional:       General: She is not in acute distress.     Appearance: She is well-developed. She is not diaphoretic.   HENT:      Head: Normocephalic and atraumatic.   Eyes:      Pupils: Pupils are equal, round, and reactive to light.   Neck:      Musculoskeletal: Normal range of motion and neck supple.   Cardiovascular:      Rate and  Rhythm: Normal rate and regular rhythm.      Heart sounds: Normal heart sounds. No murmur. No friction rub. No gallop.    Pulmonary:      Effort: Pulmonary effort is normal. No respiratory distress.      Breath sounds: Normal breath sounds. No wheezing or rales.   Chest:      Chest wall: No tenderness.   Abdominal:      General: Bowel sounds are normal. There is no distension.      Palpations: Abdomen is soft. There is no mass.      Tenderness: There is no abdominal tenderness. There is no guarding.   Musculoskeletal: Normal range of motion.      Comments: Full left knee range of motion intact without pain, no pain with maneuvers placing tension on the medial or lateral aspects of the knee, no crepitus, no effusion or edema, no tenderness to palpation, there is a Baker cyst felt at the posterior knee.   Skin:     General: Skin is warm and dry.      Findings: No rash.   Neurological:      Mental Status: She is alert and oriented to person, place, and time.   Psychiatric:         Behavior: Behavior normal.         Thought Content: Thought content normal.         Judgment: Judgment normal.          Assessment:       1. Mass of left thigh  US Extremity Non Vascular Limited Left   2. Pain of right lower extremity  methocarbamoL (ROBAXIN) 500 MG Tab   3. Unilateral primary osteoarthritis, unspecified knee  MRI Knee Without Contrast Left   4. Acute pain of left knee  Ambulatory referral/consult to Orthopedics   5. Psoriatic arthritis          Plan:       Mass of left thigh  -     US Extremity Non Vascular Limited Left; Future; Expected date: 12/31/2020    Pain of right lower extremity  -     methocarbamoL (ROBAXIN) 500 MG Tab; Take 2 tablets (1,000 mg total) by mouth every evening.  Dispense: 180 tablet; Refill: 3    Unilateral primary osteoarthritis, unspecified knee  -     MRI Knee Without Contrast Left; Future; Expected date: 12/31/2020    Acute pain of left knee  -     Ambulatory referral/consult to Orthopedics;  Future; Expected date: 01/07/2021  - she has had 2 injections of the left knee which did not provide her any sustained relief    Psoriatic arthritis  - continue rheumatology

## 2021-01-07 ENCOUNTER — HOSPITAL ENCOUNTER (OUTPATIENT)
Dept: RADIOLOGY | Facility: HOSPITAL | Age: 61
Discharge: HOME | End: 2021-01-07
Attending: PHYSICIAN ASSISTANT
Payer: COMMERCIAL

## 2021-01-07 DIAGNOSIS — R22.42 MASS OF LEFT THIGH: ICD-10-CM

## 2021-01-07 PROCEDURE — 76882 US LMTD JT/FCL EVL NVASC XTR: CPT | Mod: 26,LT,, | Performed by: RADIOLOGY

## 2021-01-07 PROCEDURE — 76882 US LMTD JT/FCL EVL NVASC XTR: CPT | Mod: TC,PO,LT

## 2021-01-07 PROCEDURE — 76882 US EXTREMITY NON VASCULAR LIMITED LEFT: ICD-10-PCS | Mod: 26,LT,, | Performed by: RADIOLOGY

## 2021-01-08 ENCOUNTER — PATIENT MESSAGE (OUTPATIENT)
Dept: FAMILY MEDICINE | Facility: CLINIC | Age: 61
End: 2021-01-08

## 2021-01-08 DIAGNOSIS — D17.24 BENIGN LIPOMATOUS NEOPLASM OF SKIN AND SUBCUTANEOUS TISSUE OF LEFT LEG: Primary | ICD-10-CM

## 2021-01-08 DIAGNOSIS — M25.562 LEFT KNEE PAIN, UNSPECIFIED CHRONICITY: Primary | ICD-10-CM

## 2021-01-13 ENCOUNTER — HOSPITAL ENCOUNTER (OUTPATIENT)
Dept: RADIOLOGY | Facility: HOSPITAL | Age: 61
Discharge: HOME | End: 2021-01-13
Attending: ORTHOPAEDIC SURGERY
Payer: COMMERCIAL

## 2021-01-13 ENCOUNTER — OFFICE VISIT (OUTPATIENT)
Dept: ORTHOPEDICS | Facility: CLINIC | Age: 61
End: 2021-01-13
Payer: COMMERCIAL

## 2021-01-13 VITALS — BODY MASS INDEX: 30.39 KG/M2 | WEIGHT: 178 LBS | HEIGHT: 64 IN | RESPIRATION RATE: 16 BRPM

## 2021-01-13 DIAGNOSIS — M25.562 LEFT KNEE PAIN, UNSPECIFIED CHRONICITY: Primary | ICD-10-CM

## 2021-01-13 DIAGNOSIS — M25.562 LEFT KNEE PAIN, UNSPECIFIED CHRONICITY: ICD-10-CM

## 2021-01-13 DIAGNOSIS — M25.562 ACUTE PAIN OF LEFT KNEE: ICD-10-CM

## 2021-01-13 DIAGNOSIS — D17.24 LIPOMA OF LEFT THIGH: ICD-10-CM

## 2021-01-13 PROCEDURE — 73562 X-RAY EXAM OF KNEE 3: CPT | Mod: 26,RT,, | Performed by: RADIOLOGY

## 2021-01-13 PROCEDURE — 73564 X-RAY EXAM KNEE 4 OR MORE: CPT | Mod: TC,PO,LT

## 2021-01-13 PROCEDURE — 99243 OFF/OP CNSLTJ NEW/EST LOW 30: CPT | Mod: S$GLB,,, | Performed by: ORTHOPAEDIC SURGERY

## 2021-01-13 PROCEDURE — 73564 XR KNEE ORTHO LEFT WITH FLEXION: ICD-10-PCS | Mod: 26,LT,, | Performed by: RADIOLOGY

## 2021-01-13 PROCEDURE — 1125F AMNT PAIN NOTED PAIN PRSNT: CPT | Mod: S$GLB,,, | Performed by: ORTHOPAEDIC SURGERY

## 2021-01-13 PROCEDURE — 3008F PR BODY MASS INDEX (BMI) DOCUMENTED: ICD-10-PCS | Mod: CPTII,S$GLB,, | Performed by: ORTHOPAEDIC SURGERY

## 2021-01-13 PROCEDURE — 99999 PR PBB SHADOW E&M-EST. PATIENT-LVL V: CPT | Mod: PBBFAC,,, | Performed by: ORTHOPAEDIC SURGERY

## 2021-01-13 PROCEDURE — 73562 XR KNEE ORTHO LEFT WITH FLEXION: ICD-10-PCS | Mod: 26,RT,, | Performed by: RADIOLOGY

## 2021-01-13 PROCEDURE — 1125F PR PAIN SEVERITY QUANTIFIED, PAIN PRESENT: ICD-10-PCS | Mod: S$GLB,,, | Performed by: ORTHOPAEDIC SURGERY

## 2021-01-13 PROCEDURE — 99999 PR PBB SHADOW E&M-EST. PATIENT-LVL V: ICD-10-PCS | Mod: PBBFAC,,, | Performed by: ORTHOPAEDIC SURGERY

## 2021-01-13 PROCEDURE — 99243 PR OFFICE CONSULTATION,LEVEL III: ICD-10-PCS | Mod: S$GLB,,, | Performed by: ORTHOPAEDIC SURGERY

## 2021-01-13 PROCEDURE — 73564 X-RAY EXAM KNEE 4 OR MORE: CPT | Mod: 26,LT,, | Performed by: RADIOLOGY

## 2021-01-13 PROCEDURE — 3008F BODY MASS INDEX DOCD: CPT | Mod: CPTII,S$GLB,, | Performed by: ORTHOPAEDIC SURGERY

## 2021-01-14 ENCOUNTER — OFFICE VISIT (OUTPATIENT)
Dept: SURGERY | Facility: CLINIC | Age: 61
End: 2021-01-14
Payer: COMMERCIAL

## 2021-01-14 VITALS
BODY MASS INDEX: 30.6 KG/M2 | WEIGHT: 179.25 LBS | HEART RATE: 80 BPM | HEIGHT: 64 IN | DIASTOLIC BLOOD PRESSURE: 78 MMHG | SYSTOLIC BLOOD PRESSURE: 129 MMHG | TEMPERATURE: 98 F

## 2021-01-14 DIAGNOSIS — Z01.818 PREOP EXAMINATION: ICD-10-CM

## 2021-01-14 DIAGNOSIS — D17.24 BENIGN LIPOMATOUS NEOPLASM OF SKIN AND SUBCUTANEOUS TISSUE OF LEFT LEG: Primary | ICD-10-CM

## 2021-01-14 PROCEDURE — 99213 OFFICE O/P EST LOW 20 MIN: CPT | Mod: S$GLB,,, | Performed by: SURGERY

## 2021-01-14 PROCEDURE — 3078F DIAST BP <80 MM HG: CPT | Mod: CPTII,S$GLB,, | Performed by: SURGERY

## 2021-01-14 PROCEDURE — 99999 PR PBB SHADOW E&M-EST. PATIENT-LVL V: ICD-10-PCS | Mod: PBBFAC,,, | Performed by: SURGERY

## 2021-01-14 PROCEDURE — 3074F PR MOST RECENT SYSTOLIC BLOOD PRESSURE < 130 MM HG: ICD-10-PCS | Mod: CPTII,S$GLB,, | Performed by: SURGERY

## 2021-01-14 PROCEDURE — 3008F PR BODY MASS INDEX (BMI) DOCUMENTED: ICD-10-PCS | Mod: CPTII,S$GLB,, | Performed by: SURGERY

## 2021-01-14 PROCEDURE — 1125F PR PAIN SEVERITY QUANTIFIED, PAIN PRESENT: ICD-10-PCS | Mod: S$GLB,,, | Performed by: SURGERY

## 2021-01-14 PROCEDURE — 3078F PR MOST RECENT DIASTOLIC BLOOD PRESSURE < 80 MM HG: ICD-10-PCS | Mod: CPTII,S$GLB,, | Performed by: SURGERY

## 2021-01-14 PROCEDURE — 99213 PR OFFICE/OUTPT VISIT, EST, LEVL III, 20-29 MIN: ICD-10-PCS | Mod: S$GLB,,, | Performed by: SURGERY

## 2021-01-14 PROCEDURE — 3074F SYST BP LT 130 MM HG: CPT | Mod: CPTII,S$GLB,, | Performed by: SURGERY

## 2021-01-14 PROCEDURE — 3008F BODY MASS INDEX DOCD: CPT | Mod: CPTII,S$GLB,, | Performed by: SURGERY

## 2021-01-14 PROCEDURE — 99999 PR PBB SHADOW E&M-EST. PATIENT-LVL V: CPT | Mod: PBBFAC,,, | Performed by: SURGERY

## 2021-01-14 PROCEDURE — 1125F AMNT PAIN NOTED PAIN PRSNT: CPT | Mod: S$GLB,,, | Performed by: SURGERY

## 2021-01-15 ENCOUNTER — PATIENT MESSAGE (OUTPATIENT)
Dept: SURGERY | Facility: CLINIC | Age: 61
End: 2021-01-15

## 2021-01-15 RX ORDER — SODIUM CHLORIDE 9 MG/ML
INJECTION, SOLUTION INTRAVENOUS CONTINUOUS
Status: CANCELLED | OUTPATIENT
Start: 2021-01-15

## 2021-01-16 ENCOUNTER — PATIENT MESSAGE (OUTPATIENT)
Dept: RHEUMATOLOGY | Facility: CLINIC | Age: 61
End: 2021-01-16

## 2021-01-17 ENCOUNTER — LAB VISIT (OUTPATIENT)
Dept: FAMILY MEDICINE | Facility: CLINIC | Age: 61
End: 2021-01-17
Payer: COMMERCIAL

## 2021-01-17 DIAGNOSIS — Z01.818 PREOP EXAMINATION: ICD-10-CM

## 2021-01-17 LAB — SARS-COV-2 RNA RESP QL NAA+PROBE: NOT DETECTED

## 2021-01-17 PROCEDURE — U0003 INFECTIOUS AGENT DETECTION BY NUCLEIC ACID (DNA OR RNA); SEVERE ACUTE RESPIRATORY SYNDROME CORONAVIRUS 2 (SARS-COV-2) (CORONAVIRUS DISEASE [COVID-19]), AMPLIFIED PROBE TECHNIQUE, MAKING USE OF HIGH THROUGHPUT TECHNOLOGIES AS DESCRIBED BY CMS-2020-01-R: HCPCS

## 2021-01-19 ENCOUNTER — ANESTHESIA EVENT (OUTPATIENT)
Dept: SURGERY | Facility: HOSPITAL | Age: 61
End: 2021-01-19
Payer: COMMERCIAL

## 2021-01-20 ENCOUNTER — ANESTHESIA (OUTPATIENT)
Dept: SURGERY | Facility: HOSPITAL | Age: 61
End: 2021-01-20
Payer: COMMERCIAL

## 2021-01-20 ENCOUNTER — HOSPITAL ENCOUNTER (OUTPATIENT)
Facility: HOSPITAL | Age: 61
Discharge: HOME | End: 2021-01-20
Attending: SURGERY | Admitting: SURGERY
Payer: COMMERCIAL

## 2021-01-20 DIAGNOSIS — D17.24 BENIGN LIPOMATOUS NEOPLASM OF SKIN AND SUBCUTANEOUS TISSUE OF LEFT LEG: ICD-10-CM

## 2021-01-20 PROCEDURE — 63600175 PHARM REV CODE 636 W HCPCS: Mod: PO | Performed by: NURSE ANESTHETIST, CERTIFIED REGISTERED

## 2021-01-20 PROCEDURE — 71000015 HC POSTOP RECOV 1ST HR: Mod: PO | Performed by: SURGERY

## 2021-01-20 PROCEDURE — 36000706: Mod: PO | Performed by: SURGERY

## 2021-01-20 PROCEDURE — 25000003 PHARM REV CODE 250: Mod: PO | Performed by: SURGERY

## 2021-01-20 PROCEDURE — 63600175 PHARM REV CODE 636 W HCPCS: Mod: PO | Performed by: ANESTHESIOLOGY

## 2021-01-20 PROCEDURE — 88305 TISSUE EXAM BY PATHOLOGIST: CPT | Performed by: PATHOLOGY

## 2021-01-20 PROCEDURE — 25000003 PHARM REV CODE 250: Mod: PO | Performed by: NURSE ANESTHETIST, CERTIFIED REGISTERED

## 2021-01-20 PROCEDURE — 27327 EXC THIGH/KNEE LES SC < 3 CM: CPT | Mod: LT,,, | Performed by: SURGERY

## 2021-01-20 PROCEDURE — D9220A PRA ANESTHESIA: ICD-10-PCS | Mod: ,,, | Performed by: NURSE ANESTHETIST, CERTIFIED REGISTERED

## 2021-01-20 PROCEDURE — 71000033 HC RECOVERY, INTIAL HOUR: Mod: PO | Performed by: SURGERY

## 2021-01-20 PROCEDURE — D9220A PRA ANESTHESIA: ICD-10-PCS | Mod: ,,, | Performed by: ANESTHESIOLOGY

## 2021-01-20 PROCEDURE — D9220A PRA ANESTHESIA: Mod: ,,, | Performed by: NURSE ANESTHETIST, CERTIFIED REGISTERED

## 2021-01-20 PROCEDURE — 36000707: Mod: PO | Performed by: SURGERY

## 2021-01-20 PROCEDURE — 63600175 PHARM REV CODE 636 W HCPCS: Mod: PO | Performed by: SURGERY

## 2021-01-20 PROCEDURE — 27327 PR EXCISION TUMOR SOFT TISSUE THIGH/KNEE SUBQ <3CM: ICD-10-PCS | Mod: LT,,, | Performed by: SURGERY

## 2021-01-20 PROCEDURE — 25000003 PHARM REV CODE 250: Mod: PO | Performed by: ANESTHESIOLOGY

## 2021-01-20 PROCEDURE — D9220A PRA ANESTHESIA: Mod: ,,, | Performed by: ANESTHESIOLOGY

## 2021-01-20 PROCEDURE — 37000008 HC ANESTHESIA 1ST 15 MINUTES: Mod: PO | Performed by: SURGERY

## 2021-01-20 PROCEDURE — 88305 TISSUE EXAM BY PATHOLOGIST: CPT | Mod: 26,,, | Performed by: PATHOLOGY

## 2021-01-20 PROCEDURE — 37000009 HC ANESTHESIA EA ADD 15 MINS: Mod: PO | Performed by: SURGERY

## 2021-01-20 PROCEDURE — 88305 TISSUE EXAM BY PATHOLOGIST: ICD-10-PCS | Mod: 26,,, | Performed by: PATHOLOGY

## 2021-01-20 RX ORDER — LIDOCAINE HYDROCHLORIDE 20 MG/ML
INJECTION INTRAVENOUS
Status: DISCONTINUED | OUTPATIENT
Start: 2021-01-20 | End: 2021-01-20

## 2021-01-20 RX ORDER — SODIUM CHLORIDE 0.9 % (FLUSH) 0.9 %
3 SYRINGE (ML) INJECTION
Status: DISCONTINUED | OUTPATIENT
Start: 2021-01-20 | End: 2021-01-20 | Stop reason: HOSPADM

## 2021-01-20 RX ORDER — FENTANYL CITRATE 50 UG/ML
INJECTION, SOLUTION INTRAMUSCULAR; INTRAVENOUS
Status: DISCONTINUED | OUTPATIENT
Start: 2021-01-20 | End: 2021-01-20

## 2021-01-20 RX ORDER — HYDROCODONE BITARTRATE AND ACETAMINOPHEN 5; 325 MG/1; MG/1
1 TABLET ORAL EVERY 6 HOURS PRN
Qty: 15 TABLET | Refills: 0 | OUTPATIENT
Start: 2021-01-20 | End: 2021-02-21

## 2021-01-20 RX ORDER — MEPERIDINE HYDROCHLORIDE 50 MG/ML
12.5 INJECTION INTRAMUSCULAR; INTRAVENOUS; SUBCUTANEOUS ONCE AS NEEDED
Status: DISCONTINUED | OUTPATIENT
Start: 2021-01-20 | End: 2021-01-20 | Stop reason: HOSPADM

## 2021-01-20 RX ORDER — MIDAZOLAM HYDROCHLORIDE 1 MG/ML
INJECTION, SOLUTION INTRAMUSCULAR; INTRAVENOUS
Status: DISCONTINUED | OUTPATIENT
Start: 2021-01-20 | End: 2021-01-20

## 2021-01-20 RX ORDER — CEFAZOLIN SODIUM 2 G/50ML
2 SOLUTION INTRAVENOUS
Status: DISCONTINUED | OUTPATIENT
Start: 2021-01-20 | End: 2021-01-20 | Stop reason: HOSPADM

## 2021-01-20 RX ORDER — LIDOCAINE HYDROCHLORIDE 10 MG/ML
1 INJECTION, SOLUTION EPIDURAL; INFILTRATION; INTRACAUDAL; PERINEURAL ONCE
Status: DISCONTINUED | OUTPATIENT
Start: 2021-01-20 | End: 2021-01-20 | Stop reason: HOSPADM

## 2021-01-20 RX ORDER — HYDROMORPHONE HYDROCHLORIDE 2 MG/ML
0.2 INJECTION, SOLUTION INTRAMUSCULAR; INTRAVENOUS; SUBCUTANEOUS EVERY 5 MIN PRN
Status: DISCONTINUED | OUTPATIENT
Start: 2021-01-20 | End: 2021-01-20 | Stop reason: HOSPADM

## 2021-01-20 RX ORDER — SODIUM CHLORIDE, SODIUM LACTATE, POTASSIUM CHLORIDE, CALCIUM CHLORIDE 600; 310; 30; 20 MG/100ML; MG/100ML; MG/100ML; MG/100ML
INJECTION, SOLUTION INTRAVENOUS CONTINUOUS
Status: DISCONTINUED | OUTPATIENT
Start: 2021-01-20 | End: 2021-01-20 | Stop reason: HOSPADM

## 2021-01-20 RX ORDER — DIPHENHYDRAMINE HYDROCHLORIDE 50 MG/ML
25 INJECTION INTRAMUSCULAR; INTRAVENOUS EVERY 6 HOURS PRN
Status: DISCONTINUED | OUTPATIENT
Start: 2021-01-20 | End: 2021-01-20 | Stop reason: HOSPADM

## 2021-01-20 RX ORDER — SODIUM CHLORIDE 9 MG/ML
INJECTION, SOLUTION INTRAVENOUS CONTINUOUS
Status: DISCONTINUED | OUTPATIENT
Start: 2021-01-20 | End: 2021-01-20 | Stop reason: HOSPADM

## 2021-01-20 RX ORDER — ONDANSETRON 2 MG/ML
4 INJECTION INTRAMUSCULAR; INTRAVENOUS EVERY 12 HOURS PRN
Status: DISCONTINUED | OUTPATIENT
Start: 2021-01-20 | End: 2021-01-20 | Stop reason: HOSPADM

## 2021-01-20 RX ORDER — ONDANSETRON 2 MG/ML
INJECTION INTRAMUSCULAR; INTRAVENOUS
Status: DISCONTINUED | OUTPATIENT
Start: 2021-01-20 | End: 2021-01-20

## 2021-01-20 RX ORDER — LIDOCAINE HYDROCHLORIDE AND EPINEPHRINE 10; 10 MG/ML; UG/ML
INJECTION, SOLUTION INFILTRATION; PERINEURAL
Status: DISCONTINUED | OUTPATIENT
Start: 2021-01-20 | End: 2021-01-20 | Stop reason: HOSPADM

## 2021-01-20 RX ORDER — OXYCODONE HYDROCHLORIDE 5 MG/1
5 TABLET ORAL
Status: DISCONTINUED | OUTPATIENT
Start: 2021-01-20 | End: 2021-01-20 | Stop reason: HOSPADM

## 2021-01-20 RX ORDER — HYDROCODONE BITARTRATE AND ACETAMINOPHEN 5; 325 MG/1; MG/1
1 TABLET ORAL EVERY 4 HOURS PRN
Status: DISCONTINUED | OUTPATIENT
Start: 2021-01-20 | End: 2021-01-20 | Stop reason: HOSPADM

## 2021-01-20 RX ORDER — PROPOFOL 10 MG/ML
VIAL (ML) INTRAVENOUS CONTINUOUS PRN
Status: DISCONTINUED | OUTPATIENT
Start: 2021-01-20 | End: 2021-01-20

## 2021-01-20 RX ORDER — FENTANYL CITRATE 50 UG/ML
25 INJECTION, SOLUTION INTRAMUSCULAR; INTRAVENOUS EVERY 5 MIN PRN
Status: DISCONTINUED | OUTPATIENT
Start: 2021-01-20 | End: 2021-01-20 | Stop reason: HOSPADM

## 2021-01-20 RX ORDER — PROPOFOL 10 MG/ML
VIAL (ML) INTRAVENOUS
Status: DISCONTINUED | OUTPATIENT
Start: 2021-01-20 | End: 2021-01-20

## 2021-01-20 RX ADMIN — OXYCODONE HYDROCHLORIDE 5 MG: 5 TABLET ORAL at 10:01

## 2021-01-20 RX ADMIN — PROPOFOL 90 MG: 10 INJECTION, EMULSION INTRAVENOUS at 09:01

## 2021-01-20 RX ADMIN — FENTANYL CITRATE 25 MCG: 50 INJECTION, SOLUTION INTRAMUSCULAR; INTRAVENOUS at 09:01

## 2021-01-20 RX ADMIN — FENTANYL CITRATE 50 MCG: 50 INJECTION, SOLUTION INTRAMUSCULAR; INTRAVENOUS at 09:01

## 2021-01-20 RX ADMIN — PROPOFOL 75 MCG/KG/MIN: 10 INJECTION, EMULSION INTRAVENOUS at 09:01

## 2021-01-20 RX ADMIN — ONDANSETRON 4 MG: 2 INJECTION, SOLUTION INTRAMUSCULAR; INTRAVENOUS at 09:01

## 2021-01-20 RX ADMIN — LIDOCAINE HYDROCHLORIDE 75 MG: 20 INJECTION, SOLUTION INTRAVENOUS at 09:01

## 2021-01-20 RX ADMIN — SODIUM CHLORIDE, SODIUM LACTATE, POTASSIUM CHLORIDE, AND CALCIUM CHLORIDE: .6; .31; .03; .02 INJECTION, SOLUTION INTRAVENOUS at 08:01

## 2021-01-20 RX ADMIN — MIDAZOLAM 2 MG: 1 INJECTION INTRAMUSCULAR; INTRAVENOUS at 08:01

## 2021-01-20 RX ADMIN — CEFAZOLIN SODIUM 2 G: 2 SOLUTION INTRAVENOUS at 08:01

## 2021-01-21 VITALS
TEMPERATURE: 97 F | BODY MASS INDEX: 30.56 KG/M2 | WEIGHT: 179 LBS | SYSTOLIC BLOOD PRESSURE: 116 MMHG | HEIGHT: 64 IN | OXYGEN SATURATION: 98 % | RESPIRATION RATE: 16 BRPM | DIASTOLIC BLOOD PRESSURE: 66 MMHG | HEART RATE: 79 BPM

## 2021-01-22 ENCOUNTER — PATIENT MESSAGE (OUTPATIENT)
Dept: RHEUMATOLOGY | Facility: CLINIC | Age: 61
End: 2021-01-22

## 2021-01-22 DIAGNOSIS — L40.50 PSORIATIC ARTHRITIS: Primary | ICD-10-CM

## 2021-01-26 LAB
FINAL PATHOLOGIC DIAGNOSIS: NORMAL
GROSS: NORMAL
Lab: NORMAL

## 2021-01-26 RX ORDER — SULFASALAZINE 500 MG/1
500 TABLET, DELAYED RELEASE ORAL 2 TIMES DAILY
Qty: 60 TABLET | Refills: 3 | Status: SHIPPED | OUTPATIENT
Start: 2021-01-26 | End: 2021-03-09 | Stop reason: SDUPTHER

## 2021-02-10 ENCOUNTER — OFFICE VISIT (OUTPATIENT)
Dept: SURGERY | Facility: CLINIC | Age: 61
End: 2021-02-10
Payer: COMMERCIAL

## 2021-02-10 VITALS — HEIGHT: 64 IN | WEIGHT: 181.88 LBS | BODY MASS INDEX: 31.05 KG/M2 | TEMPERATURE: 98 F

## 2021-02-10 DIAGNOSIS — Z09 POSTOP CHECK: Primary | ICD-10-CM

## 2021-02-10 PROCEDURE — 1125F AMNT PAIN NOTED PAIN PRSNT: CPT | Mod: S$GLB,,, | Performed by: SURGERY

## 2021-02-10 PROCEDURE — 99024 POSTOP FOLLOW-UP VISIT: CPT | Mod: S$GLB,,, | Performed by: SURGERY

## 2021-02-10 PROCEDURE — 99999 PR PBB SHADOW E&M-EST. PATIENT-LVL V: ICD-10-PCS | Mod: PBBFAC,,, | Performed by: SURGERY

## 2021-02-10 PROCEDURE — 3008F BODY MASS INDEX DOCD: CPT | Mod: CPTII,S$GLB,, | Performed by: SURGERY

## 2021-02-10 PROCEDURE — 3008F PR BODY MASS INDEX (BMI) DOCUMENTED: ICD-10-PCS | Mod: CPTII,S$GLB,, | Performed by: SURGERY

## 2021-02-10 PROCEDURE — 1125F PR PAIN SEVERITY QUANTIFIED, PAIN PRESENT: ICD-10-PCS | Mod: S$GLB,,, | Performed by: SURGERY

## 2021-02-10 PROCEDURE — 99999 PR PBB SHADOW E&M-EST. PATIENT-LVL V: CPT | Mod: PBBFAC,,, | Performed by: SURGERY

## 2021-02-10 PROCEDURE — 99024 PR POST-OP FOLLOW-UP VISIT: ICD-10-PCS | Mod: S$GLB,,, | Performed by: SURGERY

## 2021-02-19 ENCOUNTER — PATIENT MESSAGE (OUTPATIENT)
Dept: FAMILY MEDICINE | Facility: CLINIC | Age: 61
End: 2021-02-19

## 2021-02-19 ENCOUNTER — TELEPHONE (OUTPATIENT)
Dept: NEUROLOGY | Facility: CLINIC | Age: 61
End: 2021-02-19

## 2021-02-22 ENCOUNTER — TELEPHONE (OUTPATIENT)
Dept: NEUROLOGY | Facility: CLINIC | Age: 61
End: 2021-02-22

## 2021-03-03 ENCOUNTER — LAB VISIT (OUTPATIENT)
Dept: LAB | Facility: HOSPITAL | Age: 61
End: 2021-03-03
Attending: PHYSICIAN ASSISTANT
Payer: COMMERCIAL

## 2021-03-03 DIAGNOSIS — R76.8 ANTI-SMITH (ANTI-SM) ANTIBODY AND ANTIRIBONUCLEAR PROTEIN (ANTI-RNP) ANTIBODY POSITIVE: ICD-10-CM

## 2021-03-03 DIAGNOSIS — L40.50 PSORIATIC ARTHRITIS: ICD-10-CM

## 2021-03-03 DIAGNOSIS — R76.8 HISTONE ANTIBODY POSITIVE: ICD-10-CM

## 2021-03-03 LAB
ALBUMIN SERPL BCP-MCNC: 3.9 G/DL (ref 3.5–5.2)
ALP SERPL-CCNC: 75 U/L (ref 55–135)
ALT SERPL W/O P-5'-P-CCNC: 13 U/L (ref 10–44)
ANION GAP SERPL CALC-SCNC: 10 MMOL/L (ref 8–16)
AST SERPL-CCNC: 17 U/L (ref 10–40)
BASOPHILS # BLD AUTO: 0.04 K/UL (ref 0–0.2)
BASOPHILS NFR BLD: 0.7 % (ref 0–1.9)
BILIRUB SERPL-MCNC: 0.4 MG/DL (ref 0.1–1)
BUN SERPL-MCNC: 15 MG/DL (ref 6–20)
C3 SERPL-MCNC: 152 MG/DL (ref 50–180)
C4 SERPL-MCNC: 26 MG/DL (ref 11–44)
CALCIUM SERPL-MCNC: 9.6 MG/DL (ref 8.7–10.5)
CHLORIDE SERPL-SCNC: 105 MMOL/L (ref 95–110)
CO2 SERPL-SCNC: 26 MMOL/L (ref 23–29)
CREAT SERPL-MCNC: 0.9 MG/DL (ref 0.5–1.4)
CRP SERPL-MCNC: 5.8 MG/L (ref 0–8.2)
DIFFERENTIAL METHOD: ABNORMAL
EOSINOPHIL # BLD AUTO: 0.4 K/UL (ref 0–0.5)
EOSINOPHIL NFR BLD: 6 % (ref 0–8)
ERYTHROCYTE [DISTWIDTH] IN BLOOD BY AUTOMATED COUNT: 13.1 % (ref 11.5–14.5)
ERYTHROCYTE [SEDIMENTATION RATE] IN BLOOD BY WESTERGREN METHOD: 26 MM/HR (ref 0–20)
EST. GFR  (AFRICAN AMERICAN): >60 ML/MIN/1.73 M^2
EST. GFR  (NON AFRICAN AMERICAN): >60 ML/MIN/1.73 M^2
GLUCOSE SERPL-MCNC: 121 MG/DL (ref 70–110)
HCT VFR BLD AUTO: 41.7 % (ref 37–48.5)
HGB BLD-MCNC: 13.6 G/DL (ref 12–16)
IMM GRANULOCYTES # BLD AUTO: 0.01 K/UL (ref 0–0.04)
IMM GRANULOCYTES NFR BLD AUTO: 0.2 % (ref 0–0.5)
LYMPHOCYTES # BLD AUTO: 2.3 K/UL (ref 1–4.8)
LYMPHOCYTES NFR BLD: 37.4 % (ref 18–48)
MCH RBC QN AUTO: 30.6 PG (ref 27–31)
MCHC RBC AUTO-ENTMCNC: 32.6 G/DL (ref 32–36)
MCV RBC AUTO: 94 FL (ref 82–98)
MONOCYTES # BLD AUTO: 0.4 K/UL (ref 0.3–1)
MONOCYTES NFR BLD: 6.5 % (ref 4–15)
NEUTROPHILS # BLD AUTO: 3 K/UL (ref 1.8–7.7)
NEUTROPHILS NFR BLD: 49.2 % (ref 38–73)
NRBC BLD-RTO: 0 /100 WBC
PLATELET # BLD AUTO: 354 K/UL (ref 150–350)
PMV BLD AUTO: 9.9 FL (ref 9.2–12.9)
POTASSIUM SERPL-SCNC: 4.2 MMOL/L (ref 3.5–5.1)
PROT SERPL-MCNC: 7.5 G/DL (ref 6–8.4)
RBC # BLD AUTO: 4.44 M/UL (ref 4–5.4)
SODIUM SERPL-SCNC: 141 MMOL/L (ref 136–145)
WBC # BLD AUTO: 6.13 K/UL (ref 3.9–12.7)

## 2021-03-03 PROCEDURE — 85025 COMPLETE CBC W/AUTO DIFF WBC: CPT | Performed by: PHYSICIAN ASSISTANT

## 2021-03-03 PROCEDURE — 86235 NUCLEAR ANTIGEN ANTIBODY: CPT | Performed by: PHYSICIAN ASSISTANT

## 2021-03-03 PROCEDURE — 86140 C-REACTIVE PROTEIN: CPT | Performed by: PHYSICIAN ASSISTANT

## 2021-03-03 PROCEDURE — 80053 COMPREHEN METABOLIC PANEL: CPT | Performed by: PHYSICIAN ASSISTANT

## 2021-03-03 PROCEDURE — 86160 COMPLEMENT ANTIGEN: CPT | Performed by: PHYSICIAN ASSISTANT

## 2021-03-03 PROCEDURE — 36415 COLL VENOUS BLD VENIPUNCTURE: CPT | Mod: PO | Performed by: PHYSICIAN ASSISTANT

## 2021-03-03 PROCEDURE — 83516 IMMUNOASSAY NONANTIBODY: CPT | Performed by: PHYSICIAN ASSISTANT

## 2021-03-03 PROCEDURE — 86038 ANTINUCLEAR ANTIBODIES: CPT | Performed by: PHYSICIAN ASSISTANT

## 2021-03-03 PROCEDURE — 86160 COMPLEMENT ANTIGEN: CPT | Mod: 59 | Performed by: PHYSICIAN ASSISTANT

## 2021-03-03 PROCEDURE — 85651 RBC SED RATE NONAUTOMATED: CPT | Mod: PO | Performed by: PHYSICIAN ASSISTANT

## 2021-03-04 LAB — ANA SER QL IF: NORMAL

## 2021-03-05 LAB
ANTI SM/RNP ANTIBODY: 0.4 RATIO (ref 0–0.99)
ANTI-SM/RNP INTERPRETATION: NEGATIVE

## 2021-03-06 LAB — HISTONE IGG SER IA-ACNC: 1.3 UNITS (ref 0–0.9)

## 2021-03-08 ENCOUNTER — OFFICE VISIT (OUTPATIENT)
Dept: NEUROLOGY | Facility: CLINIC | Age: 61
End: 2021-03-08
Payer: COMMERCIAL

## 2021-03-08 VITALS
TEMPERATURE: 99 F | HEIGHT: 64 IN | WEIGHT: 177.25 LBS | DIASTOLIC BLOOD PRESSURE: 82 MMHG | HEART RATE: 76 BPM | BODY MASS INDEX: 30.26 KG/M2 | SYSTOLIC BLOOD PRESSURE: 121 MMHG | RESPIRATION RATE: 17 BRPM

## 2021-03-08 DIAGNOSIS — G50.0 TRIGEMINAL NEURALGIA PAIN: ICD-10-CM

## 2021-03-08 DIAGNOSIS — G43.019 INTRACTABLE MIGRAINE WITHOUT AURA AND WITHOUT STATUS MIGRAINOSUS: ICD-10-CM

## 2021-03-08 DIAGNOSIS — S04.892A: ICD-10-CM

## 2021-03-08 DIAGNOSIS — R51.9 LEFT-SIDED HEADACHE: Primary | ICD-10-CM

## 2021-03-08 PROCEDURE — 1126F AMNT PAIN NOTED NONE PRSNT: CPT | Mod: S$GLB,,, | Performed by: PSYCHIATRY & NEUROLOGY

## 2021-03-08 PROCEDURE — 99417 PROLNG OP E/M EACH 15 MIN: CPT | Mod: S$GLB,,, | Performed by: PSYCHIATRY & NEUROLOGY

## 2021-03-08 PROCEDURE — 3074F SYST BP LT 130 MM HG: CPT | Mod: CPTII,S$GLB,, | Performed by: PSYCHIATRY & NEUROLOGY

## 2021-03-08 PROCEDURE — 99999 PR PBB SHADOW E&M-EST. PATIENT-LVL V: CPT | Mod: PBBFAC,,, | Performed by: PSYCHIATRY & NEUROLOGY

## 2021-03-08 PROCEDURE — 99205 OFFICE O/P NEW HI 60 MIN: CPT | Mod: S$GLB,,, | Performed by: PSYCHIATRY & NEUROLOGY

## 2021-03-08 PROCEDURE — 3008F BODY MASS INDEX DOCD: CPT | Mod: CPTII,S$GLB,, | Performed by: PSYCHIATRY & NEUROLOGY

## 2021-03-08 PROCEDURE — 3074F PR MOST RECENT SYSTOLIC BLOOD PRESSURE < 130 MM HG: ICD-10-PCS | Mod: CPTII,S$GLB,, | Performed by: PSYCHIATRY & NEUROLOGY

## 2021-03-08 PROCEDURE — 3008F PR BODY MASS INDEX (BMI) DOCUMENTED: ICD-10-PCS | Mod: CPTII,S$GLB,, | Performed by: PSYCHIATRY & NEUROLOGY

## 2021-03-08 PROCEDURE — 1126F PR PAIN SEVERITY QUANTIFIED, NO PAIN PRESENT: ICD-10-PCS | Mod: S$GLB,,, | Performed by: PSYCHIATRY & NEUROLOGY

## 2021-03-08 PROCEDURE — 99999 PR PBB SHADOW E&M-EST. PATIENT-LVL V: ICD-10-PCS | Mod: PBBFAC,,, | Performed by: PSYCHIATRY & NEUROLOGY

## 2021-03-08 PROCEDURE — 3079F DIAST BP 80-89 MM HG: CPT | Mod: CPTII,S$GLB,, | Performed by: PSYCHIATRY & NEUROLOGY

## 2021-03-08 PROCEDURE — 3079F PR MOST RECENT DIASTOLIC BLOOD PRESSURE 80-89 MM HG: ICD-10-PCS | Mod: CPTII,S$GLB,, | Performed by: PSYCHIATRY & NEUROLOGY

## 2021-03-08 PROCEDURE — 99205 PR OFFICE/OUTPT VISIT, NEW, LEVL V, 60-74 MIN: ICD-10-PCS | Mod: S$GLB,,, | Performed by: PSYCHIATRY & NEUROLOGY

## 2021-03-08 PROCEDURE — 99417 PR PROLONGED SVC, OUTPT, W/WO DIRECT PT CONTACT,  EA ADDTL 15 MIN: ICD-10-PCS | Mod: S$GLB,,, | Performed by: PSYCHIATRY & NEUROLOGY

## 2021-03-08 RX ORDER — CARBAMAZEPINE 100 MG/1
TABLET, EXTENDED RELEASE ORAL
Qty: 120 TABLET | Refills: 11 | Status: SHIPPED | OUTPATIENT
Start: 2021-03-08 | End: 2022-02-03

## 2021-03-08 RX ORDER — RIZATRIPTAN BENZOATE 10 MG/1
10 TABLET ORAL
Qty: 9 TABLET | Refills: 2 | Status: SHIPPED | OUTPATIENT
Start: 2021-03-08 | End: 2021-08-25 | Stop reason: SDUPTHER

## 2021-03-08 RX ORDER — KETOROLAC TROMETHAMINE 10 MG/1
10 TABLET, FILM COATED ORAL EVERY 8 HOURS PRN
Qty: 20 TABLET | Refills: 2 | Status: SHIPPED | OUTPATIENT
Start: 2021-03-08 | End: 2021-10-24

## 2021-03-08 RX ORDER — METOCLOPRAMIDE 10 MG/1
10 TABLET ORAL 3 TIMES DAILY PRN
Qty: 15 TABLET | Refills: 3 | Status: SHIPPED | OUTPATIENT
Start: 2021-03-08 | End: 2022-03-10 | Stop reason: CLARIF

## 2021-03-09 ENCOUNTER — OFFICE VISIT (OUTPATIENT)
Dept: RHEUMATOLOGY | Facility: CLINIC | Age: 61
End: 2021-03-09
Payer: COMMERCIAL

## 2021-03-09 VITALS
DIASTOLIC BLOOD PRESSURE: 81 MMHG | HEART RATE: 83 BPM | BODY MASS INDEX: 30.04 KG/M2 | SYSTOLIC BLOOD PRESSURE: 129 MMHG | HEIGHT: 64 IN | WEIGHT: 175.94 LBS

## 2021-03-09 DIAGNOSIS — M17.0 PRIMARY OSTEOARTHRITIS OF BOTH KNEES: ICD-10-CM

## 2021-03-09 DIAGNOSIS — R79.82 ELEVATED C-REACTIVE PROTEIN (CRP): ICD-10-CM

## 2021-03-09 DIAGNOSIS — R76.8 HISTONE ANTIBODY POSITIVE: Primary | ICD-10-CM

## 2021-03-09 DIAGNOSIS — L40.50 PSORIATIC ARTHRITIS: ICD-10-CM

## 2021-03-09 PROCEDURE — 3008F BODY MASS INDEX DOCD: CPT | Mod: CPTII,S$GLB,, | Performed by: INTERNAL MEDICINE

## 2021-03-09 PROCEDURE — 3079F PR MOST RECENT DIASTOLIC BLOOD PRESSURE 80-89 MM HG: ICD-10-PCS | Mod: CPTII,S$GLB,, | Performed by: INTERNAL MEDICINE

## 2021-03-09 PROCEDURE — 99214 PR OFFICE/OUTPT VISIT, EST, LEVL IV, 30-39 MIN: ICD-10-PCS | Mod: S$GLB,,, | Performed by: INTERNAL MEDICINE

## 2021-03-09 PROCEDURE — 1126F AMNT PAIN NOTED NONE PRSNT: CPT | Mod: S$GLB,,, | Performed by: INTERNAL MEDICINE

## 2021-03-09 PROCEDURE — 99999 PR PBB SHADOW E&M-EST. PATIENT-LVL V: ICD-10-PCS | Mod: PBBFAC,,, | Performed by: INTERNAL MEDICINE

## 2021-03-09 PROCEDURE — 3074F PR MOST RECENT SYSTOLIC BLOOD PRESSURE < 130 MM HG: ICD-10-PCS | Mod: CPTII,S$GLB,, | Performed by: INTERNAL MEDICINE

## 2021-03-09 PROCEDURE — 3008F PR BODY MASS INDEX (BMI) DOCUMENTED: ICD-10-PCS | Mod: CPTII,S$GLB,, | Performed by: INTERNAL MEDICINE

## 2021-03-09 PROCEDURE — 99214 OFFICE O/P EST MOD 30 MIN: CPT | Mod: S$GLB,,, | Performed by: INTERNAL MEDICINE

## 2021-03-09 PROCEDURE — 1126F PR PAIN SEVERITY QUANTIFIED, NO PAIN PRESENT: ICD-10-PCS | Mod: S$GLB,,, | Performed by: INTERNAL MEDICINE

## 2021-03-09 PROCEDURE — 3079F DIAST BP 80-89 MM HG: CPT | Mod: CPTII,S$GLB,, | Performed by: INTERNAL MEDICINE

## 2021-03-09 PROCEDURE — 3074F SYST BP LT 130 MM HG: CPT | Mod: CPTII,S$GLB,, | Performed by: INTERNAL MEDICINE

## 2021-03-09 PROCEDURE — 99999 PR PBB SHADOW E&M-EST. PATIENT-LVL V: CPT | Mod: PBBFAC,,, | Performed by: INTERNAL MEDICINE

## 2021-03-09 RX ORDER — SULFASALAZINE 500 MG/1
1000 TABLET, DELAYED RELEASE ORAL 2 TIMES DAILY
Qty: 360 TABLET | Refills: 3 | Status: SHIPPED | OUTPATIENT
Start: 2021-03-09 | End: 2021-08-23

## 2021-03-09 ASSESSMENT — ROUTINE ASSESSMENT OF PATIENT INDEX DATA (RAPID3)
PAIN SCORE: 0
MDHAQ FUNCTION SCORE: 0
PATIENT GLOBAL ASSESSMENT SCORE: 0
TOTAL RAPID3 SCORE: 0
PSYCHOLOGICAL DISTRESS SCORE: 0

## 2021-03-16 DIAGNOSIS — M79.604 PAIN OF RIGHT LOWER EXTREMITY: ICD-10-CM

## 2021-03-16 RX ORDER — TIZANIDINE 4 MG/1
4 TABLET ORAL NIGHTLY
Qty: 90 TABLET | Refills: 3 | Status: CANCELLED | OUTPATIENT
Start: 2021-03-16

## 2021-03-17 DIAGNOSIS — M79.604 PAIN OF RIGHT LOWER EXTREMITY: ICD-10-CM

## 2021-03-18 RX ORDER — TIZANIDINE 4 MG/1
4 TABLET ORAL NIGHTLY
Qty: 90 TABLET | Refills: 3 | Status: SHIPPED | OUTPATIENT
Start: 2021-03-18 | End: 2021-08-03 | Stop reason: SDUPTHER

## 2021-03-24 DIAGNOSIS — R10.9 ABDOMINAL PAIN, UNSPECIFIED ABDOMINAL LOCATION: ICD-10-CM

## 2021-03-24 RX ORDER — PANTOPRAZOLE SODIUM 20 MG/1
20 TABLET, DELAYED RELEASE ORAL DAILY
Qty: 30 TABLET | Refills: 3 | Status: SHIPPED | OUTPATIENT
Start: 2021-03-24 | End: 2021-07-12 | Stop reason: SDUPTHER

## 2021-04-01 ENCOUNTER — DOCUMENTATION ONLY (OUTPATIENT)
Dept: NEUROLOGY | Facility: CLINIC | Age: 61
End: 2021-04-01

## 2021-04-01 ENCOUNTER — HOSPITAL ENCOUNTER (OUTPATIENT)
Dept: RADIOLOGY | Facility: HOSPITAL | Age: 61
Discharge: HOME | End: 2021-04-01
Attending: PSYCHIATRY & NEUROLOGY
Payer: COMMERCIAL

## 2021-04-01 DIAGNOSIS — G50.0 TRIGEMINAL NEURALGIA PAIN: ICD-10-CM

## 2021-04-01 DIAGNOSIS — S04.892A: ICD-10-CM

## 2021-04-01 PROCEDURE — 70544 MR ANGIOGRAPHY HEAD W/O DYE: CPT | Mod: TC,PO

## 2021-04-01 PROCEDURE — 70544 MR ANGIOGRAPHY HEAD W/O DYE: CPT | Mod: 26,59,, | Performed by: RADIOLOGY

## 2021-04-01 PROCEDURE — 70544 MRA BRAIN WITHOUT CONTRAST: ICD-10-PCS | Mod: 26,59,, | Performed by: RADIOLOGY

## 2021-04-01 PROCEDURE — A9585 GADOBUTROL INJECTION: HCPCS | Mod: PO | Performed by: PSYCHIATRY & NEUROLOGY

## 2021-04-01 PROCEDURE — 70553 MRI BRAIN STEM W/O & W/DYE: CPT | Mod: 26,,, | Performed by: RADIOLOGY

## 2021-04-01 PROCEDURE — 70553 MRI BRAIN STEM W/O & W/DYE: CPT | Mod: TC,PO

## 2021-04-01 PROCEDURE — 70553 MRI BRAIN W WO CONTRAST: ICD-10-PCS | Mod: 26,,, | Performed by: RADIOLOGY

## 2021-04-01 PROCEDURE — 25500020 PHARM REV CODE 255: Mod: PO | Performed by: PSYCHIATRY & NEUROLOGY

## 2021-04-01 RX ORDER — GADOBUTROL 604.72 MG/ML
7 INJECTION INTRAVENOUS
Status: COMPLETED | OUTPATIENT
Start: 2021-04-01 | End: 2021-04-01

## 2021-04-01 RX ADMIN — GADOBUTROL 7 ML: 604.72 INJECTION INTRAVENOUS at 02:04

## 2021-04-04 ENCOUNTER — PATIENT MESSAGE (OUTPATIENT)
Dept: RHEUMATOLOGY | Facility: CLINIC | Age: 61
End: 2021-04-04

## 2021-04-05 ENCOUNTER — PATIENT MESSAGE (OUTPATIENT)
Dept: NEUROLOGY | Facility: CLINIC | Age: 61
End: 2021-04-05

## 2021-04-07 ENCOUNTER — PATIENT MESSAGE (OUTPATIENT)
Dept: RHEUMATOLOGY | Facility: CLINIC | Age: 61
End: 2021-04-07

## 2021-04-07 DIAGNOSIS — L40.8 INVERSE PSORIASIS: Primary | ICD-10-CM

## 2021-04-07 RX ORDER — TRIAMCINOLONE ACETONIDE 0.25 MG/G
CREAM TOPICAL 2 TIMES DAILY
Qty: 80 G | Refills: 1 | Status: SHIPPED | OUTPATIENT
Start: 2021-04-07 | End: 2022-09-08

## 2021-04-13 ENCOUNTER — PATIENT MESSAGE (OUTPATIENT)
Dept: FAMILY MEDICINE | Facility: CLINIC | Age: 61
End: 2021-04-13

## 2021-04-19 ENCOUNTER — OFFICE VISIT (OUTPATIENT)
Dept: NEUROLOGY | Facility: CLINIC | Age: 61
End: 2021-04-19
Payer: COMMERCIAL

## 2021-04-19 VITALS
RESPIRATION RATE: 17 BRPM | HEART RATE: 73 BPM | WEIGHT: 176.56 LBS | HEIGHT: 64 IN | SYSTOLIC BLOOD PRESSURE: 134 MMHG | DIASTOLIC BLOOD PRESSURE: 83 MMHG | TEMPERATURE: 98 F | BODY MASS INDEX: 30.14 KG/M2

## 2021-04-19 DIAGNOSIS — R51.9 FACIAL PAIN: ICD-10-CM

## 2021-04-19 DIAGNOSIS — R51.9 LEFT-SIDED HEADACHE: ICD-10-CM

## 2021-04-19 DIAGNOSIS — G43.019 INTRACTABLE MIGRAINE WITHOUT AURA AND WITHOUT STATUS MIGRAINOSUS: Primary | ICD-10-CM

## 2021-04-19 PROCEDURE — 1126F AMNT PAIN NOTED NONE PRSNT: CPT | Mod: S$GLB,,, | Performed by: PSYCHIATRY & NEUROLOGY

## 2021-04-19 PROCEDURE — 1126F PR PAIN SEVERITY QUANTIFIED, NO PAIN PRESENT: ICD-10-PCS | Mod: S$GLB,,, | Performed by: PSYCHIATRY & NEUROLOGY

## 2021-04-19 PROCEDURE — 3008F PR BODY MASS INDEX (BMI) DOCUMENTED: ICD-10-PCS | Mod: CPTII,S$GLB,, | Performed by: PSYCHIATRY & NEUROLOGY

## 2021-04-19 PROCEDURE — 99999 PR PBB SHADOW E&M-EST. PATIENT-LVL V: CPT | Mod: PBBFAC,,, | Performed by: PSYCHIATRY & NEUROLOGY

## 2021-04-19 PROCEDURE — 3008F BODY MASS INDEX DOCD: CPT | Mod: CPTII,S$GLB,, | Performed by: PSYCHIATRY & NEUROLOGY

## 2021-04-19 PROCEDURE — 99214 OFFICE O/P EST MOD 30 MIN: CPT | Mod: S$GLB,,, | Performed by: PSYCHIATRY & NEUROLOGY

## 2021-04-19 PROCEDURE — 99999 PR PBB SHADOW E&M-EST. PATIENT-LVL V: ICD-10-PCS | Mod: PBBFAC,,, | Performed by: PSYCHIATRY & NEUROLOGY

## 2021-04-19 PROCEDURE — 99214 PR OFFICE/OUTPT VISIT, EST, LEVL IV, 30-39 MIN: ICD-10-PCS | Mod: S$GLB,,, | Performed by: PSYCHIATRY & NEUROLOGY

## 2021-04-23 ENCOUNTER — PATIENT MESSAGE (OUTPATIENT)
Dept: NEUROLOGY | Facility: CLINIC | Age: 61
End: 2021-04-23

## 2021-04-24 ENCOUNTER — PATIENT MESSAGE (OUTPATIENT)
Dept: RHEUMATOLOGY | Facility: CLINIC | Age: 61
End: 2021-04-24

## 2021-05-26 ENCOUNTER — OFFICE VISIT (OUTPATIENT)
Dept: ORTHOPEDICS | Facility: CLINIC | Age: 61
End: 2021-05-26
Payer: COMMERCIAL

## 2021-05-26 VITALS — BODY MASS INDEX: 30.05 KG/M2 | RESPIRATION RATE: 18 BRPM | HEIGHT: 64 IN | WEIGHT: 176 LBS

## 2021-05-26 DIAGNOSIS — M17.10 ARTHRITIS OF KNEE: Primary | ICD-10-CM

## 2021-05-26 DIAGNOSIS — M17.12 PRIMARY OSTEOARTHRITIS OF LEFT KNEE: ICD-10-CM

## 2021-05-26 PROCEDURE — 99213 OFFICE O/P EST LOW 20 MIN: CPT | Mod: S$GLB,,, | Performed by: ORTHOPAEDIC SURGERY

## 2021-05-26 PROCEDURE — 99213 PR OFFICE/OUTPT VISIT, EST, LEVL III, 20-29 MIN: ICD-10-PCS | Mod: S$GLB,,, | Performed by: ORTHOPAEDIC SURGERY

## 2021-05-26 PROCEDURE — 3008F PR BODY MASS INDEX (BMI) DOCUMENTED: ICD-10-PCS | Mod: CPTII,S$GLB,, | Performed by: ORTHOPAEDIC SURGERY

## 2021-05-26 PROCEDURE — 3008F BODY MASS INDEX DOCD: CPT | Mod: CPTII,S$GLB,, | Performed by: ORTHOPAEDIC SURGERY

## 2021-05-26 PROCEDURE — 99999 PR PBB SHADOW E&M-EST. PATIENT-LVL IV: CPT | Mod: PBBFAC,,, | Performed by: ORTHOPAEDIC SURGERY

## 2021-05-26 PROCEDURE — 99999 PR PBB SHADOW E&M-EST. PATIENT-LVL IV: ICD-10-PCS | Mod: PBBFAC,,, | Performed by: ORTHOPAEDIC SURGERY

## 2021-06-07 ENCOUNTER — PATIENT MESSAGE (OUTPATIENT)
Dept: ORTHOPEDICS | Facility: CLINIC | Age: 61
End: 2021-06-07

## 2021-07-12 DIAGNOSIS — R10.9 ABDOMINAL PAIN, UNSPECIFIED ABDOMINAL LOCATION: ICD-10-CM

## 2021-07-12 RX ORDER — PANTOPRAZOLE SODIUM 20 MG/1
20 TABLET, DELAYED RELEASE ORAL DAILY
Qty: 30 TABLET | Refills: 6 | Status: SHIPPED | OUTPATIENT
Start: 2021-07-12 | End: 2022-02-23 | Stop reason: SDUPTHER

## 2021-08-03 ENCOUNTER — PATIENT MESSAGE (OUTPATIENT)
Dept: FAMILY MEDICINE | Facility: CLINIC | Age: 61
End: 2021-08-03

## 2021-08-03 DIAGNOSIS — M79.604 PAIN OF RIGHT LOWER EXTREMITY: ICD-10-CM

## 2021-08-06 RX ORDER — TIZANIDINE 4 MG/1
4 TABLET ORAL NIGHTLY
Qty: 90 TABLET | Refills: 0 | Status: SHIPPED | OUTPATIENT
Start: 2021-08-06 | End: 2021-11-28 | Stop reason: SDUPTHER

## 2021-08-18 ENCOUNTER — PATIENT MESSAGE (OUTPATIENT)
Dept: RHEUMATOLOGY | Facility: CLINIC | Age: 61
End: 2021-08-18

## 2021-08-19 ENCOUNTER — PATIENT MESSAGE (OUTPATIENT)
Dept: FAMILY MEDICINE | Facility: CLINIC | Age: 61
End: 2021-08-19

## 2021-08-20 ENCOUNTER — IMMUNIZATION (OUTPATIENT)
Dept: FAMILY MEDICINE | Facility: CLINIC | Age: 61
End: 2021-08-20
Payer: COMMERCIAL

## 2021-08-20 DIAGNOSIS — Z23 NEED FOR VACCINATION: Primary | ICD-10-CM

## 2021-08-20 PROCEDURE — 0001A COVID-19, MRNA, LNP-S, PF, 30 MCG/0.3 ML DOSE VACCINE: ICD-10-PCS | Mod: CV19,,, | Performed by: FAMILY MEDICINE

## 2021-08-20 PROCEDURE — 0001A COVID-19, MRNA, LNP-S, PF, 30 MCG/0.3 ML DOSE VACCINE: CPT | Mod: CV19,,, | Performed by: FAMILY MEDICINE

## 2021-08-20 PROCEDURE — 91300 COVID-19, MRNA, LNP-S, PF, 30 MCG/0.3 ML DOSE VACCINE: ICD-10-PCS | Mod: ,,, | Performed by: FAMILY MEDICINE

## 2021-08-20 PROCEDURE — 91300 COVID-19, MRNA, LNP-S, PF, 30 MCG/0.3 ML DOSE VACCINE: CPT | Mod: ,,, | Performed by: FAMILY MEDICINE

## 2021-08-23 ENCOUNTER — TELEPHONE (OUTPATIENT)
Dept: PHARMACY | Facility: CLINIC | Age: 61
End: 2021-08-23

## 2021-08-23 DIAGNOSIS — L40.50 PSORIATIC ARTHRITIS: Primary | ICD-10-CM

## 2021-08-23 RX ORDER — SULFASALAZINE 500 MG/1
1000 TABLET ORAL 2 TIMES DAILY
Qty: 120 TABLET | Refills: 5 | Status: SHIPPED | OUTPATIENT
Start: 2021-08-23 | End: 2021-11-03

## 2021-08-25 ENCOUNTER — OFFICE VISIT (OUTPATIENT)
Dept: NEUROLOGY | Facility: CLINIC | Age: 61
End: 2021-08-25
Payer: COMMERCIAL

## 2021-08-25 VITALS
SYSTOLIC BLOOD PRESSURE: 137 MMHG | HEIGHT: 64 IN | RESPIRATION RATE: 17 BRPM | TEMPERATURE: 98 F | BODY MASS INDEX: 31.02 KG/M2 | DIASTOLIC BLOOD PRESSURE: 87 MMHG | HEART RATE: 78 BPM | WEIGHT: 181.69 LBS

## 2021-08-25 DIAGNOSIS — G43.019 INTRACTABLE MIGRAINE WITHOUT AURA AND WITHOUT STATUS MIGRAINOSUS: Primary | ICD-10-CM

## 2021-08-25 DIAGNOSIS — R51.9 FACIAL PAIN: ICD-10-CM

## 2021-08-25 PROCEDURE — 1125F PR PAIN SEVERITY QUANTIFIED, PAIN PRESENT: ICD-10-PCS | Mod: CPTII,S$GLB,, | Performed by: PSYCHIATRY & NEUROLOGY

## 2021-08-25 PROCEDURE — 1160F RVW MEDS BY RX/DR IN RCRD: CPT | Mod: CPTII,S$GLB,, | Performed by: PSYCHIATRY & NEUROLOGY

## 2021-08-25 PROCEDURE — 1159F MED LIST DOCD IN RCRD: CPT | Mod: CPTII,S$GLB,, | Performed by: PSYCHIATRY & NEUROLOGY

## 2021-08-25 PROCEDURE — 1160F PR REVIEW ALL MEDS BY PRESCRIBER/CLIN PHARMACIST DOCUMENTED: ICD-10-PCS | Mod: CPTII,S$GLB,, | Performed by: PSYCHIATRY & NEUROLOGY

## 2021-08-25 PROCEDURE — 3008F PR BODY MASS INDEX (BMI) DOCUMENTED: ICD-10-PCS | Mod: CPTII,S$GLB,, | Performed by: PSYCHIATRY & NEUROLOGY

## 2021-08-25 PROCEDURE — 1125F AMNT PAIN NOTED PAIN PRSNT: CPT | Mod: CPTII,S$GLB,, | Performed by: PSYCHIATRY & NEUROLOGY

## 2021-08-25 PROCEDURE — 3008F BODY MASS INDEX DOCD: CPT | Mod: CPTII,S$GLB,, | Performed by: PSYCHIATRY & NEUROLOGY

## 2021-08-25 PROCEDURE — 3079F PR MOST RECENT DIASTOLIC BLOOD PRESSURE 80-89 MM HG: ICD-10-PCS | Mod: CPTII,S$GLB,, | Performed by: PSYCHIATRY & NEUROLOGY

## 2021-08-25 PROCEDURE — 99999 PR PBB SHADOW E&M-EST. PATIENT-LVL V: ICD-10-PCS | Mod: PBBFAC,,, | Performed by: PSYCHIATRY & NEUROLOGY

## 2021-08-25 PROCEDURE — 1159F PR MEDICATION LIST DOCUMENTED IN MEDICAL RECORD: ICD-10-PCS | Mod: CPTII,S$GLB,, | Performed by: PSYCHIATRY & NEUROLOGY

## 2021-08-25 PROCEDURE — 99999 PR PBB SHADOW E&M-EST. PATIENT-LVL V: CPT | Mod: PBBFAC,,, | Performed by: PSYCHIATRY & NEUROLOGY

## 2021-08-25 PROCEDURE — 3075F SYST BP GE 130 - 139MM HG: CPT | Mod: CPTII,S$GLB,, | Performed by: PSYCHIATRY & NEUROLOGY

## 2021-08-25 PROCEDURE — 99214 PR OFFICE/OUTPT VISIT, EST, LEVL IV, 30-39 MIN: ICD-10-PCS | Mod: S$GLB,,, | Performed by: PSYCHIATRY & NEUROLOGY

## 2021-08-25 PROCEDURE — 3075F PR MOST RECENT SYSTOLIC BLOOD PRESS GE 130-139MM HG: ICD-10-PCS | Mod: CPTII,S$GLB,, | Performed by: PSYCHIATRY & NEUROLOGY

## 2021-08-25 PROCEDURE — 3079F DIAST BP 80-89 MM HG: CPT | Mod: CPTII,S$GLB,, | Performed by: PSYCHIATRY & NEUROLOGY

## 2021-08-25 PROCEDURE — 99214 OFFICE O/P EST MOD 30 MIN: CPT | Mod: S$GLB,,, | Performed by: PSYCHIATRY & NEUROLOGY

## 2021-08-25 RX ORDER — RIZATRIPTAN BENZOATE 10 MG/1
10 TABLET ORAL
Qty: 9 TABLET | Refills: 2 | Status: SHIPPED | OUTPATIENT
Start: 2021-08-25 | End: 2021-11-03

## 2021-08-25 RX ORDER — TOPIRAMATE 50 MG/1
TABLET, FILM COATED ORAL
Qty: 60 TABLET | Refills: 6 | Status: SHIPPED | OUTPATIENT
Start: 2021-08-25 | End: 2021-11-03

## 2021-09-10 ENCOUNTER — IMMUNIZATION (OUTPATIENT)
Dept: FAMILY MEDICINE | Facility: CLINIC | Age: 61
End: 2021-09-10
Payer: COMMERCIAL

## 2021-09-10 DIAGNOSIS — Z23 NEED FOR VACCINATION: Primary | ICD-10-CM

## 2021-09-10 PROCEDURE — 91300 COVID-19, MRNA, LNP-S, PF, 30 MCG/0.3 ML DOSE VACCINE: CPT | Mod: ,,, | Performed by: FAMILY MEDICINE

## 2021-09-10 PROCEDURE — 91300 COVID-19, MRNA, LNP-S, PF, 30 MCG/0.3 ML DOSE VACCINE: ICD-10-PCS | Mod: ,,, | Performed by: FAMILY MEDICINE

## 2021-09-10 PROCEDURE — 0002A COVID-19, MRNA, LNP-S, PF, 30 MCG/0.3 ML DOSE VACCINE: CPT | Mod: CV19,,, | Performed by: FAMILY MEDICINE

## 2021-09-10 PROCEDURE — 0002A COVID-19, MRNA, LNP-S, PF, 30 MCG/0.3 ML DOSE VACCINE: ICD-10-PCS | Mod: CV19,,, | Performed by: FAMILY MEDICINE

## 2021-09-15 ENCOUNTER — PATIENT MESSAGE (OUTPATIENT)
Dept: FAMILY MEDICINE | Facility: CLINIC | Age: 61
End: 2021-09-15

## 2021-09-17 ENCOUNTER — OFFICE VISIT (OUTPATIENT)
Dept: RHEUMATOLOGY | Facility: CLINIC | Age: 61
End: 2021-09-17
Payer: COMMERCIAL

## 2021-09-17 DIAGNOSIS — L40.50 PSORIATIC ARTHRITIS: Primary | ICD-10-CM

## 2021-09-17 DIAGNOSIS — R76.8 ANTI-SMITH (ANTI-SM) ANTIBODY AND ANTIRIBONUCLEAR PROTEIN (ANTI-RNP) ANTIBODY POSITIVE: ICD-10-CM

## 2021-09-17 DIAGNOSIS — R76.8 HISTONE ANTIBODY POSITIVE: ICD-10-CM

## 2021-09-17 DIAGNOSIS — R79.82 ELEVATED C-REACTIVE PROTEIN (CRP): ICD-10-CM

## 2021-09-17 DIAGNOSIS — R05.9 COUGH: ICD-10-CM

## 2021-09-17 PROCEDURE — 99215 PR OFFICE/OUTPT VISIT, EST, LEVL V, 40-54 MIN: ICD-10-PCS | Mod: 95,,, | Performed by: INTERNAL MEDICINE

## 2021-09-17 PROCEDURE — 99215 OFFICE O/P EST HI 40 MIN: CPT | Mod: 95,,, | Performed by: INTERNAL MEDICINE

## 2021-09-17 RX ORDER — FAMOTIDINE 40 MG/1
40 TABLET, FILM COATED ORAL DAILY
Qty: 30 TABLET | Refills: 11 | Status: SHIPPED | OUTPATIENT
Start: 2021-09-17 | End: 2022-09-08

## 2021-09-17 RX ORDER — PREDNISONE 2.5 MG/1
2.5 TABLET ORAL DAILY PRN
Qty: 30 TABLET | Refills: 1 | Status: SHIPPED | OUTPATIENT
Start: 2021-09-17 | End: 2021-10-20

## 2021-09-22 ENCOUNTER — PATIENT MESSAGE (OUTPATIENT)
Dept: RHEUMATOLOGY | Facility: CLINIC | Age: 61
End: 2021-09-22

## 2021-09-23 ENCOUNTER — PATIENT MESSAGE (OUTPATIENT)
Dept: RHEUMATOLOGY | Facility: CLINIC | Age: 61
End: 2021-09-23

## 2021-09-23 DIAGNOSIS — L40.50 PSORIATIC ARTHRITIS: Primary | ICD-10-CM

## 2021-09-24 RX ORDER — SECUKINUMAB 150 MG/ML
150 INJECTION SUBCUTANEOUS
Qty: 5 ML | Refills: 0 | Status: SHIPPED | OUTPATIENT
Start: 2021-09-24 | End: 2021-10-23

## 2021-09-24 RX ORDER — SECUKINUMAB 150 MG/ML
150 INJECTION SUBCUTANEOUS
Qty: 1 ML | Refills: 6 | Status: SHIPPED | OUTPATIENT
Start: 2021-09-24 | End: 2022-01-11 | Stop reason: SDUPTHER

## 2021-09-28 ENCOUNTER — LAB VISIT (OUTPATIENT)
Dept: LAB | Facility: HOSPITAL | Age: 61
End: 2021-09-28
Attending: PHYSICIAN ASSISTANT
Payer: COMMERCIAL

## 2021-09-28 DIAGNOSIS — L40.50 PSORIATIC ARTHRITIS: ICD-10-CM

## 2021-09-28 PROCEDURE — 86480 TB TEST CELL IMMUN MEASURE: CPT | Performed by: PHYSICIAN ASSISTANT

## 2021-09-30 ENCOUNTER — SPECIALTY PHARMACY (OUTPATIENT)
Dept: PHARMACY | Facility: CLINIC | Age: 61
End: 2021-09-30

## 2021-09-30 LAB
GAMMA INTERFERON BACKGROUND BLD IA-ACNC: 0.02 IU/ML
M TB IFN-G CD4+ BCKGRND COR BLD-ACNC: 0.01 IU/ML
MITOGEN IGNF BCKGRD COR BLD-ACNC: 9.76 IU/ML
TB GOLD PLUS: NEGATIVE
TB2 - NIL: 0.01 IU/ML

## 2021-10-12 ENCOUNTER — TELEPHONE (OUTPATIENT)
Dept: RHEUMATOLOGY | Facility: CLINIC | Age: 61
End: 2021-10-12
Payer: COMMERCIAL

## 2021-10-14 ENCOUNTER — LAB VISIT (OUTPATIENT)
Dept: LAB | Facility: HOSPITAL | Age: 61
End: 2021-10-14
Attending: FAMILY MEDICINE
Payer: COMMERCIAL

## 2021-10-14 ENCOUNTER — TELEPHONE (OUTPATIENT)
Dept: RHEUMATOLOGY | Facility: CLINIC | Age: 61
End: 2021-10-14

## 2021-10-14 ENCOUNTER — OFFICE VISIT (OUTPATIENT)
Dept: FAMILY MEDICINE | Facility: CLINIC | Age: 61
End: 2021-10-14
Payer: COMMERCIAL

## 2021-10-14 VITALS
DIASTOLIC BLOOD PRESSURE: 84 MMHG | HEART RATE: 73 BPM | HEIGHT: 64 IN | TEMPERATURE: 98 F | BODY MASS INDEX: 30.48 KG/M2 | WEIGHT: 178.56 LBS | OXYGEN SATURATION: 97 % | SYSTOLIC BLOOD PRESSURE: 122 MMHG

## 2021-10-14 DIAGNOSIS — G43.809 OTHER MIGRAINE WITHOUT STATUS MIGRAINOSUS, NOT INTRACTABLE: ICD-10-CM

## 2021-10-14 DIAGNOSIS — L40.50 PSORIATIC ARTHRITIS: ICD-10-CM

## 2021-10-14 DIAGNOSIS — Z00.00 ROUTINE HEALTH MAINTENANCE: ICD-10-CM

## 2021-10-14 DIAGNOSIS — Z00.00 ROUTINE HEALTH MAINTENANCE: Primary | ICD-10-CM

## 2021-10-14 DIAGNOSIS — F33.1 MODERATE EPISODE OF RECURRENT MAJOR DEPRESSIVE DISORDER: ICD-10-CM

## 2021-10-14 LAB
ALBUMIN SERPL BCP-MCNC: 4.2 G/DL (ref 3.5–5.2)
ALP SERPL-CCNC: 74 U/L (ref 55–135)
ALT SERPL W/O P-5'-P-CCNC: 12 U/L (ref 10–44)
ANION GAP SERPL CALC-SCNC: 12 MMOL/L (ref 8–16)
AST SERPL-CCNC: 16 U/L (ref 10–40)
BILIRUB SERPL-MCNC: 0.4 MG/DL (ref 0.1–1)
BUN SERPL-MCNC: 19 MG/DL (ref 6–20)
CALCIUM SERPL-MCNC: 9.3 MG/DL (ref 8.7–10.5)
CHLORIDE SERPL-SCNC: 106 MMOL/L (ref 95–110)
CHOLEST SERPL-MCNC: 214 MG/DL (ref 120–199)
CHOLEST/HDLC SERPL: 4.5 {RATIO} (ref 2–5)
CO2 SERPL-SCNC: 20 MMOL/L (ref 23–29)
CREAT SERPL-MCNC: 0.9 MG/DL (ref 0.5–1.4)
EST. GFR  (AFRICAN AMERICAN): >60 ML/MIN/1.73 M^2
EST. GFR  (NON AFRICAN AMERICAN): >60 ML/MIN/1.73 M^2
GLUCOSE SERPL-MCNC: 104 MG/DL (ref 70–110)
HDLC SERPL-MCNC: 48 MG/DL (ref 40–75)
HDLC SERPL: 22.4 % (ref 20–50)
LDLC SERPL CALC-MCNC: 144 MG/DL (ref 63–159)
NONHDLC SERPL-MCNC: 166 MG/DL
POTASSIUM SERPL-SCNC: 4.4 MMOL/L (ref 3.5–5.1)
PROT SERPL-MCNC: 7.4 G/DL (ref 6–8.4)
SODIUM SERPL-SCNC: 138 MMOL/L (ref 136–145)
TRIGL SERPL-MCNC: 110 MG/DL (ref 30–150)

## 2021-10-14 PROCEDURE — 99999 PR PBB SHADOW E&M-EST. PATIENT-LVL V: ICD-10-PCS | Mod: PBBFAC,,, | Performed by: FAMILY MEDICINE

## 2021-10-14 PROCEDURE — 90686 IIV4 VACC NO PRSV 0.5 ML IM: CPT | Mod: S$GLB,,, | Performed by: FAMILY MEDICINE

## 2021-10-14 PROCEDURE — 1159F PR MEDICATION LIST DOCUMENTED IN MEDICAL RECORD: ICD-10-PCS | Mod: CPTII,S$GLB,, | Performed by: FAMILY MEDICINE

## 2021-10-14 PROCEDURE — 3008F PR BODY MASS INDEX (BMI) DOCUMENTED: ICD-10-PCS | Mod: CPTII,S$GLB,, | Performed by: FAMILY MEDICINE

## 2021-10-14 PROCEDURE — 1159F MED LIST DOCD IN RCRD: CPT | Mod: CPTII,S$GLB,, | Performed by: FAMILY MEDICINE

## 2021-10-14 PROCEDURE — 90471 FLU VACCINE (QUAD) GREATER THAN OR EQUAL TO 3YO PRESERVATIVE FREE IM: ICD-10-PCS | Mod: S$GLB,,, | Performed by: FAMILY MEDICINE

## 2021-10-14 PROCEDURE — 87389 HIV-1 AG W/HIV-1&-2 AB AG IA: CPT | Performed by: FAMILY MEDICINE

## 2021-10-14 PROCEDURE — 90686 FLU VACCINE (QUAD) GREATER THAN OR EQUAL TO 3YO PRESERVATIVE FREE IM: ICD-10-PCS | Mod: S$GLB,,, | Performed by: FAMILY MEDICINE

## 2021-10-14 PROCEDURE — 36415 COLL VENOUS BLD VENIPUNCTURE: CPT | Mod: PO | Performed by: FAMILY MEDICINE

## 2021-10-14 PROCEDURE — 3074F SYST BP LT 130 MM HG: CPT | Mod: CPTII,S$GLB,, | Performed by: FAMILY MEDICINE

## 2021-10-14 PROCEDURE — 3008F BODY MASS INDEX DOCD: CPT | Mod: CPTII,S$GLB,, | Performed by: FAMILY MEDICINE

## 2021-10-14 PROCEDURE — 90471 IMMUNIZATION ADMIN: CPT | Mod: S$GLB,,, | Performed by: FAMILY MEDICINE

## 2021-10-14 PROCEDURE — 1160F RVW MEDS BY RX/DR IN RCRD: CPT | Mod: CPTII,S$GLB,, | Performed by: FAMILY MEDICINE

## 2021-10-14 PROCEDURE — 99396 PR PREVENTIVE VISIT,EST,40-64: ICD-10-PCS | Mod: 25,S$GLB,, | Performed by: FAMILY MEDICINE

## 2021-10-14 PROCEDURE — 3079F DIAST BP 80-89 MM HG: CPT | Mod: CPTII,S$GLB,, | Performed by: FAMILY MEDICINE

## 2021-10-14 PROCEDURE — 80061 LIPID PANEL: CPT | Performed by: FAMILY MEDICINE

## 2021-10-14 PROCEDURE — 3079F PR MOST RECENT DIASTOLIC BLOOD PRESSURE 80-89 MM HG: ICD-10-PCS | Mod: CPTII,S$GLB,, | Performed by: FAMILY MEDICINE

## 2021-10-14 PROCEDURE — 1160F PR REVIEW ALL MEDS BY PRESCRIBER/CLIN PHARMACIST DOCUMENTED: ICD-10-PCS | Mod: CPTII,S$GLB,, | Performed by: FAMILY MEDICINE

## 2021-10-14 PROCEDURE — 80053 COMPREHEN METABOLIC PANEL: CPT | Performed by: FAMILY MEDICINE

## 2021-10-14 PROCEDURE — 99999 PR PBB SHADOW E&M-EST. PATIENT-LVL V: CPT | Mod: PBBFAC,,, | Performed by: FAMILY MEDICINE

## 2021-10-14 PROCEDURE — 3074F PR MOST RECENT SYSTOLIC BLOOD PRESSURE < 130 MM HG: ICD-10-PCS | Mod: CPTII,S$GLB,, | Performed by: FAMILY MEDICINE

## 2021-10-14 PROCEDURE — 99396 PREV VISIT EST AGE 40-64: CPT | Mod: 25,S$GLB,, | Performed by: FAMILY MEDICINE

## 2021-10-15 LAB — HIV 1+2 AB+HIV1 P24 AG SERPL QL IA: NEGATIVE

## 2021-10-19 ENCOUNTER — PATIENT MESSAGE (OUTPATIENT)
Dept: RHEUMATOLOGY | Facility: CLINIC | Age: 61
End: 2021-10-19
Payer: COMMERCIAL

## 2021-10-28 ENCOUNTER — TELEPHONE (OUTPATIENT)
Dept: NEUROLOGY | Facility: CLINIC | Age: 61
End: 2021-10-28
Payer: COMMERCIAL

## 2021-10-28 ENCOUNTER — PATIENT MESSAGE (OUTPATIENT)
Dept: NEUROLOGY | Facility: CLINIC | Age: 61
End: 2021-10-28
Payer: COMMERCIAL

## 2021-10-28 DIAGNOSIS — R51.9 RIGHT FACIAL PAIN: Primary | ICD-10-CM

## 2021-10-29 ENCOUNTER — TELEPHONE (OUTPATIENT)
Dept: AUDIOLOGY | Facility: CLINIC | Age: 61
End: 2021-10-29
Payer: COMMERCIAL

## 2021-11-03 ENCOUNTER — OFFICE VISIT (OUTPATIENT)
Dept: NEUROLOGY | Facility: CLINIC | Age: 61
End: 2021-11-03
Payer: COMMERCIAL

## 2021-11-03 ENCOUNTER — OFFICE VISIT (OUTPATIENT)
Dept: NEUROSURGERY | Facility: CLINIC | Age: 61
End: 2021-11-03
Payer: COMMERCIAL

## 2021-11-03 VITALS
DIASTOLIC BLOOD PRESSURE: 87 MMHG | WEIGHT: 177.13 LBS | TEMPERATURE: 98 F | HEIGHT: 64 IN | RESPIRATION RATE: 17 BRPM | BODY MASS INDEX: 30.24 KG/M2 | HEART RATE: 73 BPM | SYSTOLIC BLOOD PRESSURE: 149 MMHG

## 2021-11-03 VITALS
RESPIRATION RATE: 18 BRPM | BODY MASS INDEX: 30.22 KG/M2 | WEIGHT: 177 LBS | HEART RATE: 64 BPM | SYSTOLIC BLOOD PRESSURE: 156 MMHG | DIASTOLIC BLOOD PRESSURE: 85 MMHG | HEIGHT: 64 IN

## 2021-11-03 DIAGNOSIS — T88.7XXA MEDICATION SIDE EFFECT: ICD-10-CM

## 2021-11-03 DIAGNOSIS — G50.0 TRIGEMINAL NEURALGIA PAIN: ICD-10-CM

## 2021-11-03 DIAGNOSIS — R51.9 RIGHT FACIAL PAIN: ICD-10-CM

## 2021-11-03 DIAGNOSIS — G43.019 INTRACTABLE MIGRAINE WITHOUT AURA AND WITHOUT STATUS MIGRAINOSUS: Primary | ICD-10-CM

## 2021-11-03 PROCEDURE — 99215 OFFICE O/P EST HI 40 MIN: CPT | Mod: S$GLB,,, | Performed by: PSYCHIATRY & NEUROLOGY

## 2021-11-03 PROCEDURE — 99999 PR PBB SHADOW E&M-EST. PATIENT-LVL V: CPT | Mod: PBBFAC,,, | Performed by: PSYCHIATRY & NEUROLOGY

## 2021-11-03 PROCEDURE — 3079F PR MOST RECENT DIASTOLIC BLOOD PRESSURE 80-89 MM HG: ICD-10-PCS | Mod: CPTII,S$GLB,, | Performed by: PSYCHIATRY & NEUROLOGY

## 2021-11-03 PROCEDURE — 3008F BODY MASS INDEX DOCD: CPT | Mod: CPTII,S$GLB,, | Performed by: NEUROLOGICAL SURGERY

## 2021-11-03 PROCEDURE — 3077F PR MOST RECENT SYSTOLIC BLOOD PRESSURE >= 140 MM HG: ICD-10-PCS | Mod: CPTII,S$GLB,, | Performed by: PSYCHIATRY & NEUROLOGY

## 2021-11-03 PROCEDURE — 99205 PR OFFICE/OUTPT VISIT, NEW, LEVL V, 60-74 MIN: ICD-10-PCS | Mod: S$GLB,,, | Performed by: NEUROLOGICAL SURGERY

## 2021-11-03 PROCEDURE — 1159F MED LIST DOCD IN RCRD: CPT | Mod: CPTII,S$GLB,, | Performed by: PSYCHIATRY & NEUROLOGY

## 2021-11-03 PROCEDURE — 99999 PR PBB SHADOW E&M-EST. PATIENT-LVL V: ICD-10-PCS | Mod: PBBFAC,,, | Performed by: NEUROLOGICAL SURGERY

## 2021-11-03 PROCEDURE — 99215 PR OFFICE/OUTPT VISIT, EST, LEVL V, 40-54 MIN: ICD-10-PCS | Mod: S$GLB,,, | Performed by: PSYCHIATRY & NEUROLOGY

## 2021-11-03 PROCEDURE — 3079F DIAST BP 80-89 MM HG: CPT | Mod: CPTII,S$GLB,, | Performed by: PSYCHIATRY & NEUROLOGY

## 2021-11-03 PROCEDURE — 3008F PR BODY MASS INDEX (BMI) DOCUMENTED: ICD-10-PCS | Mod: CPTII,S$GLB,, | Performed by: NEUROLOGICAL SURGERY

## 2021-11-03 PROCEDURE — 3008F PR BODY MASS INDEX (BMI) DOCUMENTED: ICD-10-PCS | Mod: CPTII,S$GLB,, | Performed by: PSYCHIATRY & NEUROLOGY

## 2021-11-03 PROCEDURE — 99205 OFFICE O/P NEW HI 60 MIN: CPT | Mod: S$GLB,,, | Performed by: NEUROLOGICAL SURGERY

## 2021-11-03 PROCEDURE — 1159F PR MEDICATION LIST DOCUMENTED IN MEDICAL RECORD: ICD-10-PCS | Mod: CPTII,S$GLB,, | Performed by: PSYCHIATRY & NEUROLOGY

## 2021-11-03 PROCEDURE — 3079F DIAST BP 80-89 MM HG: CPT | Mod: CPTII,S$GLB,, | Performed by: NEUROLOGICAL SURGERY

## 2021-11-03 PROCEDURE — 99999 PR PBB SHADOW E&M-EST. PATIENT-LVL V: ICD-10-PCS | Mod: PBBFAC,,, | Performed by: PSYCHIATRY & NEUROLOGY

## 2021-11-03 PROCEDURE — 99999 PR PBB SHADOW E&M-EST. PATIENT-LVL V: CPT | Mod: PBBFAC,,, | Performed by: NEUROLOGICAL SURGERY

## 2021-11-03 PROCEDURE — 3077F SYST BP >= 140 MM HG: CPT | Mod: CPTII,S$GLB,, | Performed by: NEUROLOGICAL SURGERY

## 2021-11-03 PROCEDURE — 3079F PR MOST RECENT DIASTOLIC BLOOD PRESSURE 80-89 MM HG: ICD-10-PCS | Mod: CPTII,S$GLB,, | Performed by: NEUROLOGICAL SURGERY

## 2021-11-03 PROCEDURE — 3077F PR MOST RECENT SYSTOLIC BLOOD PRESSURE >= 140 MM HG: ICD-10-PCS | Mod: CPTII,S$GLB,, | Performed by: NEUROLOGICAL SURGERY

## 2021-11-03 PROCEDURE — 1160F PR REVIEW ALL MEDS BY PRESCRIBER/CLIN PHARMACIST DOCUMENTED: ICD-10-PCS | Mod: CPTII,S$GLB,, | Performed by: PSYCHIATRY & NEUROLOGY

## 2021-11-03 PROCEDURE — 3077F SYST BP >= 140 MM HG: CPT | Mod: CPTII,S$GLB,, | Performed by: PSYCHIATRY & NEUROLOGY

## 2021-11-03 PROCEDURE — 1160F RVW MEDS BY RX/DR IN RCRD: CPT | Mod: CPTII,S$GLB,, | Performed by: PSYCHIATRY & NEUROLOGY

## 2021-11-03 PROCEDURE — 3008F BODY MASS INDEX DOCD: CPT | Mod: CPTII,S$GLB,, | Performed by: PSYCHIATRY & NEUROLOGY

## 2021-11-03 RX ORDER — SUMATRIPTAN SUCCINATE 100 MG/1
100 TABLET ORAL
Qty: 12 TABLET | Refills: 3 | Status: SHIPPED | OUTPATIENT
Start: 2021-11-03 | End: 2022-02-03

## 2021-11-03 RX ORDER — RIMEGEPANT SULFATE 75 MG/75MG
75 TABLET, ORALLY DISINTEGRATING ORAL ONCE AS NEEDED
Qty: 8 TABLET | Refills: 6 | Status: SHIPPED | OUTPATIENT
Start: 2021-11-03 | End: 2021-11-06

## 2021-11-05 ENCOUNTER — PATIENT MESSAGE (OUTPATIENT)
Dept: NEUROLOGY | Facility: CLINIC | Age: 61
End: 2021-11-05
Payer: COMMERCIAL

## 2021-11-05 ENCOUNTER — TELEPHONE (OUTPATIENT)
Dept: NEUROLOGY | Facility: CLINIC | Age: 61
End: 2021-11-05
Payer: COMMERCIAL

## 2021-11-05 ENCOUNTER — TELEPHONE (OUTPATIENT)
Dept: PHARMACY | Facility: CLINIC | Age: 61
End: 2021-11-05
Payer: COMMERCIAL

## 2021-11-09 ENCOUNTER — PATIENT MESSAGE (OUTPATIENT)
Dept: OBSTETRICS AND GYNECOLOGY | Facility: CLINIC | Age: 61
End: 2021-11-09
Payer: COMMERCIAL

## 2021-11-13 ENCOUNTER — PATIENT MESSAGE (OUTPATIENT)
Dept: RHEUMATOLOGY | Facility: CLINIC | Age: 61
End: 2021-11-13
Payer: COMMERCIAL

## 2021-11-13 DIAGNOSIS — L40.0 PSORIASIS VULGARIS: ICD-10-CM

## 2021-11-16 RX ORDER — KETOCONAZOLE 20 MG/ML
SHAMPOO, SUSPENSION TOPICAL DAILY
Qty: 120 ML | Refills: 1 | Status: SHIPPED | OUTPATIENT
Start: 2021-11-16 | End: 2022-04-25 | Stop reason: SDUPTHER

## 2021-11-28 DIAGNOSIS — M79.604 PAIN OF RIGHT LOWER EXTREMITY: ICD-10-CM

## 2021-12-06 RX ORDER — TIZANIDINE 4 MG/1
4 TABLET ORAL NIGHTLY
Qty: 90 TABLET | Refills: 0 | Status: SHIPPED | OUTPATIENT
Start: 2021-12-06 | End: 2022-02-23 | Stop reason: SDUPTHER

## 2021-12-16 NOTE — PROGRESS NOTES
Subjective:       Patient ID: Clemencia Knight is a 58 y.o. female.    Chief Complaint: Cerumen Impaction and Hearing Loss    Clemencia is here for hearing loss. Symptoms have been present for years. She has noted right sided hearing loss more than left for at least 2 years, possibly longer. No veritgo. She does occasionally get a ringing in the ear. She has had a few brief episodes of sudden right sided hearing loss for an hour which recover to her baseline of right hearing loss. No issues with left ear.  Her dad had a hearing aid in right ear.     She has had c-spine surgery    Social History     Tobacco Use   Smoking Status Never Smoker   Smokeless Tobacco Never Used     Social History     Substance and Sexual Activity   Alcohol Use Yes    Comment: rarely          Review of Systems   Constitutional: Negative for activity change and appetite change.   Eyes: Negative for discharge.   Respiratory: Negative for difficulty breathing and wheezing   Cardiovascular: Negative for chest pain.   Gastrointestinal: Negative for abdominal distention and abdominal pain.   Endocrine: Negative for cold intolerance and heat intolerance.   Genitourinary: Negative for dysuria.   Musculoskeletal: Negative for gait problem and joint swelling.   Skin: Negative for color change and pallor.   Neurological: Negative for syncope and weakness.   Psychiatric/Behavioral: Negative for agitation and confusion.     Objective:        Constitutional:   She is oriented to person, place, and time. She appears well-developed and well-nourished. She appears alert. She is active. Normal speech.      Head:  Normocephalic and atraumatic. Head is without TMJ tenderness. No scars. Salivary glands normal.  Facial strength is normal.      Ears:    Right Ear: No drainage or swelling. No middle ear effusion.   Left Ear: No drainage or swelling.  No middle ear effusion.   Cerumen impaction cleaned    Nose:  No mucosal edema, rhinorrhea or sinus tenderness. No  turbinate hypertrophy.      Mouth/Throat  Oropharynx clear and moist without lesions or asymmetry, normal uvula midline and mirror exam normal. Normal dentition. No uvula swelling, lacerations or trismus. No oropharyngeal exudate. Tonsillar erythema, tonsillar exudate.      Neck:  Full range of motion with neck supple and no adenopathy. Thyroid tenderness is present. No tracheal deviation, no edema, no erythema, normal range of motion, no stridor, no crepitus and no neck rigidity present. No thyroid mass present.     Cardiovascular:   Intact distal pulses and normal pulses.      Pulmonary/Chest:   Effort normal and breath sounds normal. No stridor.     Psychiatric:   Her speech is normal and behavior is normal. Her mood appears not anxious. Her affect is not labile.     Neurological:   She is alert and oriented to person, place, and time. No sensory deficit.     Skin:   No abrasions, lacerations, lesions, or rashes. No abrasion and no bruising noted.         Tests / Results:  Right asymmetric SNHL          Assessment:       1. Asymmetrical sensorineural hearing loss          Plan:         Right hearing loss, likely chronic based on history, but unsure if fluctuating. Discussed MRI IAC and monitor of hearing. As long as no IAC lesion - HA candidate. Will call with results.        Detail Level: Detailed Detail Level: Zone

## 2021-12-20 ENCOUNTER — PATIENT MESSAGE (OUTPATIENT)
Dept: RHEUMATOLOGY | Facility: CLINIC | Age: 61
End: 2021-12-20
Payer: COMMERCIAL

## 2021-12-22 DIAGNOSIS — F33.1 MODERATE EPISODE OF RECURRENT MAJOR DEPRESSIVE DISORDER: ICD-10-CM

## 2021-12-22 RX ORDER — DULOXETIN HYDROCHLORIDE 60 MG/1
60 CAPSULE, DELAYED RELEASE ORAL DAILY
Qty: 30 CAPSULE | Refills: 0 | Status: SHIPPED | OUTPATIENT
Start: 2021-12-22 | End: 2022-01-23 | Stop reason: SDUPTHER

## 2021-12-23 DIAGNOSIS — Z12.31 OTHER SCREENING MAMMOGRAM: ICD-10-CM

## 2021-12-28 ENCOUNTER — PATIENT MESSAGE (OUTPATIENT)
Dept: NEUROLOGY | Facility: CLINIC | Age: 61
End: 2021-12-28
Payer: COMMERCIAL

## 2021-12-28 DIAGNOSIS — Z51.81 MEDICATION MONITORING ENCOUNTER: Primary | ICD-10-CM

## 2022-01-04 ENCOUNTER — PATIENT MESSAGE (OUTPATIENT)
Dept: RHEUMATOLOGY | Facility: CLINIC | Age: 62
End: 2022-01-04
Payer: COMMERCIAL

## 2022-01-04 NOTE — TELEPHONE ENCOUNTER
Pt requesting that you review her message about increasing frequency of Cosentyx injections. Please review and advise.

## 2022-01-05 RX ORDER — CARBAMAZEPINE 100 MG/1
TABLET, EXTENDED RELEASE ORAL
Qty: 120 TABLET | Refills: 6 | Status: SHIPPED | OUTPATIENT
Start: 2022-01-05 | End: 2022-02-03

## 2022-01-11 DIAGNOSIS — L40.50 PSORIATIC ARTHRITIS: ICD-10-CM

## 2022-01-11 NOTE — TELEPHONE ENCOUNTER
----- Message from Alicja Strauss sent at 1/11/2022  1:16 PM CST -----  Regarding: require prior auth  Contact: [facility]on [facility]  Caller: landon with [facility]  Phone: [phone]  Nature of call: caller requesting a prior auth on secukinumab (COSENTYX PEN) 150 mg/mL PnIj  Please call upon request.  Thank you!

## 2022-01-13 RX ORDER — SECUKINUMAB 150 MG/ML
150 INJECTION SUBCUTANEOUS
Qty: 1 ML | Refills: 6 | Status: SHIPPED | OUTPATIENT
Start: 2022-01-13 | End: 2022-07-27 | Stop reason: SDUPTHER

## 2022-01-13 NOTE — TELEPHONE ENCOUNTER
You can offer her a sooner f/u visit with me and she can keep the march visit as well with Dr. Becerril.

## 2022-01-18 ENCOUNTER — PATIENT MESSAGE (OUTPATIENT)
Dept: NEUROLOGY | Facility: CLINIC | Age: 62
End: 2022-01-18
Payer: COMMERCIAL

## 2022-01-19 ENCOUNTER — OFFICE VISIT (OUTPATIENT)
Dept: OBSTETRICS AND GYNECOLOGY | Facility: CLINIC | Age: 62
End: 2022-01-19
Payer: COMMERCIAL

## 2022-01-19 ENCOUNTER — HOSPITAL ENCOUNTER (OUTPATIENT)
Dept: RADIOLOGY | Facility: HOSPITAL | Age: 62
Discharge: HOME | End: 2022-01-19
Attending: OBSTETRICS & GYNECOLOGY
Payer: COMMERCIAL

## 2022-01-19 VITALS
WEIGHT: 172.81 LBS | DIASTOLIC BLOOD PRESSURE: 78 MMHG | HEIGHT: 64 IN | RESPIRATION RATE: 18 BRPM | SYSTOLIC BLOOD PRESSURE: 124 MMHG | BODY MASS INDEX: 29.5 KG/M2

## 2022-01-19 DIAGNOSIS — Z12.31 SCREENING MAMMOGRAM, ENCOUNTER FOR: ICD-10-CM

## 2022-01-19 DIAGNOSIS — L40.50 PSORIATIC ARTHRITIS: ICD-10-CM

## 2022-01-19 DIAGNOSIS — Z01.411 ENCOUNTER FOR GYNECOLOGICAL EXAMINATION WITH ABNORMAL FINDING: Primary | ICD-10-CM

## 2022-01-19 DIAGNOSIS — B37.89 CANDIDIASIS OF ANUS: ICD-10-CM

## 2022-01-19 PROCEDURE — 77067 SCR MAMMO BI INCL CAD: CPT | Mod: 26,,, | Performed by: RADIOLOGY

## 2022-01-19 PROCEDURE — 77067 SCR MAMMO BI INCL CAD: CPT | Mod: TC,PN

## 2022-01-19 PROCEDURE — 1159F MED LIST DOCD IN RCRD: CPT | Mod: CPTII,S$GLB,, | Performed by: OBSTETRICS & GYNECOLOGY

## 2022-01-19 PROCEDURE — 99999 PR PBB SHADOW E&M-EST. PATIENT-LVL V: CPT | Mod: PBBFAC,,, | Performed by: OBSTETRICS & GYNECOLOGY

## 2022-01-19 PROCEDURE — 3078F DIAST BP <80 MM HG: CPT | Mod: CPTII,S$GLB,, | Performed by: OBSTETRICS & GYNECOLOGY

## 2022-01-19 PROCEDURE — 3008F BODY MASS INDEX DOCD: CPT | Mod: CPTII,S$GLB,, | Performed by: OBSTETRICS & GYNECOLOGY

## 2022-01-19 PROCEDURE — 3008F PR BODY MASS INDEX (BMI) DOCUMENTED: ICD-10-PCS | Mod: CPTII,S$GLB,, | Performed by: OBSTETRICS & GYNECOLOGY

## 2022-01-19 PROCEDURE — 77063 BREAST TOMOSYNTHESIS BI: CPT | Mod: TC,PN

## 2022-01-19 PROCEDURE — 77067 MAMMO DIGITAL SCREENING BILAT WITH TOMO: ICD-10-PCS | Mod: 26,,, | Performed by: RADIOLOGY

## 2022-01-19 PROCEDURE — 77063 MAMMO DIGITAL SCREENING BILAT WITH TOMO: ICD-10-PCS | Mod: 26,,, | Performed by: RADIOLOGY

## 2022-01-19 PROCEDURE — 99999 PR PBB SHADOW E&M-EST. PATIENT-LVL V: ICD-10-PCS | Mod: PBBFAC,,, | Performed by: OBSTETRICS & GYNECOLOGY

## 2022-01-19 PROCEDURE — 3074F SYST BP LT 130 MM HG: CPT | Mod: CPTII,S$GLB,, | Performed by: OBSTETRICS & GYNECOLOGY

## 2022-01-19 PROCEDURE — 77063 BREAST TOMOSYNTHESIS BI: CPT | Mod: 26,,, | Performed by: RADIOLOGY

## 2022-01-19 PROCEDURE — 1159F PR MEDICATION LIST DOCUMENTED IN MEDICAL RECORD: ICD-10-PCS | Mod: CPTII,S$GLB,, | Performed by: OBSTETRICS & GYNECOLOGY

## 2022-01-19 PROCEDURE — 99396 PR PREVENTIVE VISIT,EST,40-64: ICD-10-PCS | Mod: S$GLB,,, | Performed by: OBSTETRICS & GYNECOLOGY

## 2022-01-19 PROCEDURE — 99396 PREV VISIT EST AGE 40-64: CPT | Mod: S$GLB,,, | Performed by: OBSTETRICS & GYNECOLOGY

## 2022-01-19 PROCEDURE — 3074F PR MOST RECENT SYSTOLIC BLOOD PRESSURE < 130 MM HG: ICD-10-PCS | Mod: CPTII,S$GLB,, | Performed by: OBSTETRICS & GYNECOLOGY

## 2022-01-19 PROCEDURE — 3078F PR MOST RECENT DIASTOLIC BLOOD PRESSURE < 80 MM HG: ICD-10-PCS | Mod: CPTII,S$GLB,, | Performed by: OBSTETRICS & GYNECOLOGY

## 2022-01-19 RX ORDER — NYSTATIN AND TRIAMCINOLONE ACETONIDE 100000; 1 [USP'U]/G; MG/G
CREAM TOPICAL
Qty: 30 G | Refills: 1 | Status: SHIPPED | OUTPATIENT
Start: 2022-01-19 | End: 2022-09-08

## 2022-01-19 NOTE — PROGRESS NOTES
Chief Complaint   Patient presents with    Well Woman     History of Present Illness: Clemencia Knight is a 61 y.o. female that presents today 1/19/2022 for well gyn visit.    Past Medical History:   Diagnosis Date    Allergy     Drug-induced lupus erythematosus     General anesthetics causing adverse effect in therapeutic use     pt. somewhat aware of extubation with one of her surgeries    GERD (gastroesophageal reflux disease)     Hearing loss     Migraines     Psoriatic arthritis     Restless leg syndrome     Sciatica     Trigeminal neuralgia        Past Surgical History:   Procedure Laterality Date    AUGMENTATION OF BREAST      BELT ABDOMINOPLASTY      breast implants      CERVICAL FUSION      COLONOSCOPY  10/2012    Dr. Paul; internal hemorrhoid; repeat in 10 years    COLONOSCOPY N/A 11/25/2020    Procedure: COLONOSCOPY;  Surgeon: Kiran Paul MD;  Location: [facility];  Service: Endoscopy;  Laterality: N/A;    DEXA      WNL    EPIDURAL STEROID INJECTION INTO CERVICAL SPINE N/A 9/24/2018    Procedure: Injection-steroid-epidural-cervical;  Surgeon: Myles Rocha MD;  Location: [facility] OR;  Service: Pain Management;  Laterality: N/A;  C7-T1    EPIDURAL STEROID INJECTION INTO CERVICAL SPINE N/A 11/20/2018    Procedure: Injection-steroid-epidural-cervical;  Surgeon: Myles Rocha MD;  Location: [facility] OR;  Service: Pain Management;  Laterality: N/A;  C7-T1    ESOPHAGOGASTRODUODENOSCOPY N/A 12/10/2020    Procedure: EGD (ESOPHAGOGASTRODUODENOSCOPY);  Surgeon: Kiran Paul MD;  Location: [facility];  Service: Endoscopy;  Laterality: N/A;    HYSTERECTOMY  2000    OOPHORECTOMY  7/16/2013    laparotomy BSO for benign 10cm tubal cyst    SALPINGOOPHORECTOMY  2013    with removal of fallopian cyst    SHOULDER SURGERY      right    TLH  2000    ovaries remain, benign    TONSILLECTOMY, ADENOIDECTOMY, BILATERAL MYRINGOTOMY AND TUBES         Current Outpatient Medications   Medication Sig  Dispense Refill    carBAMazepine (TEGRETOL XR) 100 MG 12 hr tablet Take 1 tablet by mouth twice daily for 5 days, then 2 tablets by mouth twice daily thereafter. (Patient taking differently: Take 1 tablet by mouth twice daily for 5 days, then 2 tablets by mouth twice daily thereafter.) 120 tablet 11    carBAMazepine (TEGRETOL XR) 100 MG 12 hr tablet Take 1 tablet (100 mg total) by mouth 2 (two) times a day for 5 days, THEN 2 tablets (200 mg total) 2 (two) times a day thereafter. 120 tablet 6    cholecalciferol, vitamin D3, (VITAMIN D3) 100 mcg (4,000 unit) Cap Take by mouth.      COPPER ORAL Take by mouth.      cyanocobalamin, vitamin B-12, 1,000 mcg Subl Place under the tongue.      DULoxetine (CYMBALTA) 60 MG capsule Take 1 capsule (60 mg total) by mouth once daily. 30 capsule 0    evening primrose oil (EVENING PRIMROSE ORAL) Take by mouth.      famotidine (PEPCID) 40 MG tablet Take 1 tablet (40 mg total) by mouth once daily. 30 tablet 11    fluocinonide (LIDEX) 0.05 % external solution Apply topically 2 (two) times daily. 60 mL 1    fluticasone (FLONASE) 50 mcg/actuation nasal spray 1 spray by Each Nare route once daily.      fluticasone propionate (CUTIVATE) 0.05 % cream Apply topically 2 (two) times daily. 30 g 1    folic acid (FOLVITE) 1 MG tablet Take 1 mg by mouth once daily.      galcanezumab-gnlm 120 mg/mL PnIj Inject 1 pen (120 mg total) into the skin every 28 days. maintenance dose 1 mL 6    glucosam/gluc krishna/ba-gze-z-gluc (GLUCOSAMINE COMPLEX ORAL) Take by mouth.      hydrocortisone 2.5 % cream Apply topically 2 (two) times daily. 30 g 1    iron bis-gly/FA/C/B12/Ca/succ (IRON-150 ORAL) once daily.      ketoconazole (NIZORAL) 2 % shampoo Apply topically once daily. 120 mL 1    Lactobac no.41/Bifidobact no.7 (PROBIOTIC-10 ORAL) Take by mouth.      magnesium 30 mg Tab Take by mouth once.      methocarbamoL (ROBAXIN) 500 MG Tab Take 2 tablets (1,000 mg total) by mouth every evening. 180  tablet 3    metoclopramide HCl (REGLAN) 10 MG tablet Take 1 tablet (10 mg total) by mouth 3 (three) times daily as needed (migraine). 15 tablet 3    milk thistle 175 mg tablet Take 175 mg by mouth once daily.      mineral,lanolin oils/prop glyc (BALNEOL TOP) Apply topically.      multivitamin/iron/folic acid (CENTRUM COMPLETE ORAL) Take by mouth.      omega-3 fatty acids fish oil (OMEGA-3 2100) 1,050-1,200 mg Cap capsule once daily.      ondansetron (ZOFRAN-ODT) 4 MG TbDL Take 2 tablets (8 mg total) by mouth every 6 (six) hours as needed (nausea). 20 tablet 0    pantoprazole (PROTONIX) 20 MG tablet Take 1 tablet (20 mg total) by mouth once daily. 30 tablet 6    secukinumab (COSENTYX PEN) 150 mg/mL PnIj Inject 150 mg into the skin every 30 days. 1 mL 6    sumatriptan (IMITREX) 100 MG tablet Take 1 tablet (100 mg total) by mouth as needed for Migraine (can repeat after 2 hours. max is 2/day.). 12 tablet 3    tiZANidine (ZANAFLEX) 4 MG tablet Take 1 tablet (4 mg total) by mouth every evening. 90 tablet 0    triamcinolone acetonide 0.025% (KENALOG) 0.025 % cream Apply topically 2 (two) times daily. 80 g 1    UNABLE TO FIND Palmitoylethanolamide 400 mg once daily      UNABLE TO FIND [place] grape      vitamin A 04614 UNIT capsule Take 10,000 Units by mouth once daily.      VITAMIN K2 ORAL Take by mouth.      zinc gluconate 50 mg tablet Take 50 mg by mouth once daily.      diclofenac sodium (VOLTAREN) 1 % Gel Apply 2 grams topically 4 (four) times daily. 100 g 3    nystatin-triamcinolone (MYCOLOG II) cream Apply to affected area 2 times daily 30 g 1     No current facility-administered medications for this visit.       Review of patient's allergies indicates:   Allergen Reactions    Contrast media Swelling     Swollen eyes and face    Hydrocodone Itching    Plaquenil [hydroxychloroquine]      Worsening psoriasis    Topiramate      Hair loss, cognitive side effects        Family History   Problem  Relation Age of Onset    Cancer Father         bladder    Diabetes Father     Heart disease Father     Diabetes Mother     Melanoma Brother     Charcot-Karla-Tooth disease Brother     Breast cancer Neg Hx     Ovarian cancer Neg Hx     Anesthesia problems Neg Hx        Social History     Socioeconomic History    Marital status:    Occupational History     Employer: OTHER   Tobacco Use    Smoking status: Never Smoker    Smokeless tobacco: Never Used   Substance and Sexual Activity    Alcohol use: Yes     Comment: rarely    Drug use: No    Sexual activity: Yes     Partners: Male     Birth control/protection: Surgical   Other Topics Concern    Are you pregnant or think you may be? No     Social Determinants of Health     Financial Resource Strain: Medium Risk    Difficulty of Paying Living Expenses: Somewhat hard   Food Insecurity: No Food Insecurity    Worried About Running Out of Food in the Last Year: Never true    Ran Out of Food in the Last Year: Never true   Transportation Needs: No Transportation Needs    Lack of Transportation (Medical): No    Lack of Transportation (Non-Medical): No   Physical Activity: Insufficiently Active    Days of Exercise per Week: 2 days    Minutes of Exercise per Session: 30 min   Stress: No Stress Concern Present    Feeling of Stress : Not at all   Social Connections: Unknown    Frequency of Communication with Friends and Family: More than three times a week    Frequency of Social Gatherings with Friends and Family: Twice a week    Active Member of Clubs or Organizations: Yes    Attends Club or Organization Meetings: More than 4 times per year    Marital Status:    Housing Stability: Low Risk     Unable to Pay for Housing in the Last Year: No    Number of Places Lived in the Last Year: 1    Unstable Housing in the Last Year: No       OB History    Para Term  AB Living   2 2 2     2   SAB IAB Ectopic Multiple Live Births         "   2      # Outcome Date GA Lbr Marc/2nd Weight Sex Delivery Anes PTL Lv   2 Term 06/06/84   3.544 kg (7 lb 13 oz) F Vag-Spont EPI N VARSHA   1 Term 03/09/83   3.572 kg (7 lb 14 oz) M Vag-Spont EPI N VARSHA       Review of Symptoms:  GENERAL: Denies weight gain or weight loss. Feeling well overall.   SKIN: Denies rash or lesions.   HEAD: Denies head injury or headache.   NODES: Denies enlarged lymph nodes.   CHEST: Denies chest pain or shortness of breath.   CARDIOVASCULAR: Denies palpitations or left sided chest pain.   ABDOMEN: No abdominal pain, constipation, diarrhea, nausea, vomiting or rectal bleeding.   URINARY: No frequency, dysuria, hematuria, or burning on urination.  HEMATOLOGIC: No easy bruisability or excessive bleeding.   MUSCULOSKELETAL: Denies joint pain or swelling.     /78   Resp 18   Ht 5' 4" (1.626 m)   Wt 78.4 kg (172 lb 13.5 oz)   Physical Exam:  APPEARANCE: Well nourished, well developed, in no acute distress.  SKIN: Normal skin turgor, no lesions.  NECK: Neck symmetric without masses   RESPIRATORY: Normal respiratory effort with no retractions or use of accessory muscles  CARDIOVASCULAR: Peripheral vascular system with no swelling no varicosities and palpation of pulses normal  LYMPHATIC: No enlargements of the lymph nodes noted in the neck, axillae, or groin  ABDOMEN: Soft. No tenderness or masses. No hepatosplenomegaly. No hernias.  BREASTS: Symmetrical, no skin changes or visible lesions. No palpable masses, nipple discharge or adenopathy bilaterally.  PELVIC: Normal external female genitalia without lesions. Normal hair distribution. Adequate perineal body, normal urethral meatus. Urethra with no masses.  Bladder nontender. Vagina moist and well rugated without lesions or discharge. No significant cystocele or rectocele.  Adnexa without masses or tenderness. Urethra and bladder normal.   EXTREMITIES: No clubbing cyanosis or edema.    ASSESSMENT/PLAN:  Encounter for gynecological " examination with abnormal finding    Screening mammogram, encounter for  -     Mammo Digital Screening Biljcarlos w/ Carlo; Future; Expected date: 01/19/2022    Psoriatic arthritis    Candidiasis of anus  -     nystatin-triamcinolone (MYCOLOG II) cream; Apply to affected area 2 times daily  Dispense: 30 g; Refill: 1          Patient was counseled today on Pap guidelines. We discussed the discontinuing the pap smear after hysterectomy except in certain high risk cases.  We discussed the need for pelvic exams.   We discussed STD screening if at high risk for an STD.  We discussed breast cancer screening with mammograms every other year after the age of 40 and annually after the age of 50.    We discussed colon cancer screening.   Osteoporosis screening with the Dexa Bone Scan discussed when indicated.   She will see her PCP for other health maintenance.       FOLLOW-UP:prn

## 2022-01-23 DIAGNOSIS — F33.1 MODERATE EPISODE OF RECURRENT MAJOR DEPRESSIVE DISORDER: ICD-10-CM

## 2022-01-24 ENCOUNTER — PATIENT MESSAGE (OUTPATIENT)
Dept: PHARMACY | Facility: CLINIC | Age: 62
End: 2022-01-24
Payer: COMMERCIAL

## 2022-01-24 ENCOUNTER — PATIENT MESSAGE (OUTPATIENT)
Dept: RHEUMATOLOGY | Facility: CLINIC | Age: 62
End: 2022-01-24
Payer: COMMERCIAL

## 2022-01-24 ENCOUNTER — SPECIALTY PHARMACY (OUTPATIENT)
Dept: PHARMACY | Facility: CLINIC | Age: 62
End: 2022-01-24
Payer: COMMERCIAL

## 2022-01-24 NOTE — TELEPHONE ENCOUNTER
Hal, this is Carmel Newman with [facility].  We are working on your prescription that your doctor has sent us. We will be working with your insurance to get this approved for you. We will be calling you along the way with updates on your medication.  If you have any questions, you can reach us at [phone].    Welcome call outcome: Patient/caregiver reached

## 2022-01-24 NOTE — TELEPHONE ENCOUNTER
Cosentyx PA approved     CaseId:[id number];Status:Approved;Review Type:Prior Auth;Coverage Start Date:12/25/2021;Coverage End Date:07/23/2022    Benefits:  Deductible $480- met  MaxOOP $1800- met  Copay expected $0  OSP OON.    Must fill at [facility].     Patient expressed understanding.  Rx routed.  De-enrolling.

## 2022-01-26 RX ORDER — DULOXETIN HYDROCHLORIDE 60 MG/1
60 CAPSULE, DELAYED RELEASE ORAL DAILY
Qty: 30 CAPSULE | Refills: 0 | Status: SHIPPED | OUTPATIENT
Start: 2022-01-26 | End: 2022-02-23 | Stop reason: SDUPTHER

## 2022-02-02 ENCOUNTER — TELEPHONE (OUTPATIENT)
Dept: RHEUMATOLOGY | Facility: CLINIC | Age: 62
End: 2022-02-02
Payer: COMMERCIAL

## 2022-02-02 NOTE — TELEPHONE ENCOUNTER
----- Message from Leah Downey sent at 2/2/2022  1:20 PM CST -----  Regarding: pharmacy  Contact: Carla with [facility]  Type:  Pharmacy Calling to Clarify an RX    Name of Caller:  Carla  Pharmacy Name:  [facility]  Prescription Name:  Cosentyx  What do they need to clarify?:    Best Call Back Number:  [phone]  Additional Information:  Carla has a question regarding the medication. She is with the disease management program. Please call Carla to advise.Thanks!

## 2022-02-03 ENCOUNTER — OFFICE VISIT (OUTPATIENT)
Dept: NEUROLOGY | Facility: CLINIC | Age: 62
End: 2022-02-03
Payer: COMMERCIAL

## 2022-02-03 VITALS
SYSTOLIC BLOOD PRESSURE: 131 MMHG | WEIGHT: 174.06 LBS | RESPIRATION RATE: 17 BRPM | BODY MASS INDEX: 29.72 KG/M2 | HEIGHT: 64 IN | DIASTOLIC BLOOD PRESSURE: 84 MMHG | HEART RATE: 69 BPM | TEMPERATURE: 99 F

## 2022-02-03 DIAGNOSIS — G43.019 INTRACTABLE MIGRAINE WITHOUT AURA AND WITHOUT STATUS MIGRAINOSUS: Primary | ICD-10-CM

## 2022-02-03 DIAGNOSIS — R51.9 RIGHT FACIAL PAIN: ICD-10-CM

## 2022-02-03 DIAGNOSIS — D32.9 MENINGIOMA: ICD-10-CM

## 2022-02-03 PROCEDURE — 3008F BODY MASS INDEX DOCD: CPT | Mod: CPTII,S$GLB,, | Performed by: PSYCHIATRY & NEUROLOGY

## 2022-02-03 PROCEDURE — 99999 PR PBB SHADOW E&M-EST. PATIENT-LVL V: ICD-10-PCS | Mod: PBBFAC,,, | Performed by: PSYCHIATRY & NEUROLOGY

## 2022-02-03 PROCEDURE — 3079F PR MOST RECENT DIASTOLIC BLOOD PRESSURE 80-89 MM HG: ICD-10-PCS | Mod: CPTII,S$GLB,, | Performed by: PSYCHIATRY & NEUROLOGY

## 2022-02-03 PROCEDURE — 1159F PR MEDICATION LIST DOCUMENTED IN MEDICAL RECORD: ICD-10-PCS | Mod: CPTII,S$GLB,, | Performed by: PSYCHIATRY & NEUROLOGY

## 2022-02-03 PROCEDURE — 99214 PR OFFICE/OUTPT VISIT, EST, LEVL IV, 30-39 MIN: ICD-10-PCS | Mod: S$GLB,,, | Performed by: PSYCHIATRY & NEUROLOGY

## 2022-02-03 PROCEDURE — 3079F DIAST BP 80-89 MM HG: CPT | Mod: CPTII,S$GLB,, | Performed by: PSYCHIATRY & NEUROLOGY

## 2022-02-03 PROCEDURE — 99214 OFFICE O/P EST MOD 30 MIN: CPT | Mod: S$GLB,,, | Performed by: PSYCHIATRY & NEUROLOGY

## 2022-02-03 PROCEDURE — 1160F RVW MEDS BY RX/DR IN RCRD: CPT | Mod: CPTII,S$GLB,, | Performed by: PSYCHIATRY & NEUROLOGY

## 2022-02-03 PROCEDURE — 3008F PR BODY MASS INDEX (BMI) DOCUMENTED: ICD-10-PCS | Mod: CPTII,S$GLB,, | Performed by: PSYCHIATRY & NEUROLOGY

## 2022-02-03 PROCEDURE — 3075F SYST BP GE 130 - 139MM HG: CPT | Mod: CPTII,S$GLB,, | Performed by: PSYCHIATRY & NEUROLOGY

## 2022-02-03 PROCEDURE — 99999 PR PBB SHADOW E&M-EST. PATIENT-LVL V: CPT | Mod: PBBFAC,,, | Performed by: PSYCHIATRY & NEUROLOGY

## 2022-02-03 PROCEDURE — 1160F PR REVIEW ALL MEDS BY PRESCRIBER/CLIN PHARMACIST DOCUMENTED: ICD-10-PCS | Mod: CPTII,S$GLB,, | Performed by: PSYCHIATRY & NEUROLOGY

## 2022-02-03 PROCEDURE — 3075F PR MOST RECENT SYSTOLIC BLOOD PRESS GE 130-139MM HG: ICD-10-PCS | Mod: CPTII,S$GLB,, | Performed by: PSYCHIATRY & NEUROLOGY

## 2022-02-03 PROCEDURE — 1159F MED LIST DOCD IN RCRD: CPT | Mod: CPTII,S$GLB,, | Performed by: PSYCHIATRY & NEUROLOGY

## 2022-02-03 RX ORDER — NARATRIPTAN 2.5 MG/1
TABLET ORAL
Qty: 9 TABLET | Refills: 3 | Status: SHIPPED | OUTPATIENT
Start: 2022-02-03 | End: 2022-05-26 | Stop reason: SDUPTHER

## 2022-02-03 RX ORDER — RIMEGEPANT SULFATE 75 MG/75MG
75 TABLET, ORALLY DISINTEGRATING ORAL ONCE AS NEEDED
Qty: 8 TABLET | Refills: 11 | Status: SHIPPED | OUTPATIENT
Start: 2022-02-03 | End: 2022-12-30

## 2022-02-03 NOTE — PATIENT INSTRUCTIONS
1- For migraine without aura :  A. Continue Emgality 120mg every 28 days.   B. Discontinue tegretol go down to 100mg twice a day for 2 days, then off.     2- For acute management-   Nurtec ODT 75mg for acute management (side effect nausea 2%) . Max 1/day.   +/- Naratriptan 2.5mg , can repeat after 4 hours. Max 2/day      C. MRI brain for monitoring meningioma

## 2022-02-03 NOTE — PROGRESS NOTES
[facility]    Chief complaint: migraine     S:    61Y RH F with PMHx of RLS , psoriatic arthritis, +histone antibody, +SM/RNP antibody, drug induced lupus erythematosus (from Humira 2 months), C6-C7 ACDF, Rt hearing loss, migraine, back pain with sciatica who is here for further evaluation of migraine/facial pain. Since last appointment her migraine is very well controlled on Emgality 120mg monthly. She is not really having headache/migraine as often. She felt that she had been mainly headache free. She had messaged me on 12/28/21 having Rt sided neuralgic facial pain. She reported no headache. She was started on Tegretol  mg twice a day as she had neuralgic pain. She messaged on 1/18/22 reporting multiday severe headache since satrting Tegretol.    Today she reports that since starting Tegretol XR she has been having much more headache. The facial pain described was Rt cheek area into the teeth and then from there builds as pain which is retro orbital and Rt upper teeth with light/sound sensitivities, feeling unwell. Pains is mostly pressure. She also has neck pain. She mentions that the pain will last hours as a migraine.  She has not taken Nurtec for these headache acutely, but when she does have a headache she will use Nurtec ODT 75mg and rest and it will resolve. She denies having stabbing/electrical/burning pain, not short lasting as before, this lasted a lot of hours.     Medications tried:  Acute:  Rizatriptan 10mg - worked once, but has not been resolving all attacks  Ketorolac 10mg- helps the intensity   Nurtec ODT 75mg- resolves attacks     Prevention:  Topiramate from 8/21- 11/21: not much improvement and caused hair loss/cognitive side effects   Cymbalta 60mg daily - no improvement in headache   Tegretol XR 100mg bid - has used PRN when has had attacks of the electrical pain in the Rt V2 distribution   Gabapentin  - in past no benefit   Magnesium - no benefit    Tizanidine 4mg - no help       Emgality 240mg loading dose, then 120mg monthly - since 11/21- present: over 75% improvement.     Neuroimaging:  MRI brain w/wo contrast (4/1/21)  FINDINGS:  Trigeminal nerves: The right superior cerebellar artery loops inferiorly and contacts the medial aspect of the proximal cisternal segment of the right trigeminal nerve near the nerve root entry zone, without significant deformity of the nerve (series 11, images 37-38) (series 1101, image 315) (series 1102, images 348-357).  No abnormal signal or enhancement.  There is no significant mass effect or evidence of abnormal signal or enhancement along the cisternal or ganglionic portions of the left trigeminal nerve.  Normal fluid signal filling the Meckel's cave bilaterally.     Ventricles and sulci are normal in size for age without evidence of hydrocephalus. No extra-axial blood or fluid collections.     The brain appears within normal limits for age, noting only a few scattered punctate foci of T2/FLAIR signal hyperintensity in the supratentorial white matter. Diffusion-weighted images demonstrate no evidence of an acute infarct.   Susceptibility weighted images demonstrate no evidence of acute or chronic hemorrhage.     Note is made of a homogeneously enhancing, extra-axial, dural-based lesion overlying the right parietal lobe, measuring approximately 1.0 x 4.6 x 1.1 cm in AP by TV by cc dimensions, most consistent with a small meningioma.  No significant mass effect or underlying vasogenic edema.  No abnormal intracranial enhancement elsewhere.     Normal vascular flow voids are preserved.     Bone marrow signal intensity is normal. Small right mastoid effusion.  Left mastoid air cells are clear.  Scattered minimal mucosal thickening within the paranasal sinuses.  Orbits are unremarkable.     Impression:     1. Right superior cerebellar artery contacts the proximal cisternal segment of the right trigeminal nerve near the nerve  root entry zone.  No significant deformity of the nerve or associated signal abnormality or enhancement.  Findings likely account for the patient's reported right-sided trigeminal neuralgia.  2. Incidental small right parietal meningioma, measuring 1.1 cm.  Otherwise, no acute intracranial abnormality or abnormal enhancement elsewhere.  3. Small right mastoid effusion.    MRA head 4/1/21:  The internal carotid arteries are of normal caliber. The middle cerebral arteries are normal.  Hypoplastic A1 segment of the right anterior cerebral artery, a normal developmental variant.  Anterior communicating artery is present.  Otherwise, the anterior cerebral arteries are patent.  The vertebral arteries are patent. The basilar artery is normal.  The P1 segment of the right posterior cerebral artery is hypoplastic, with a P2 segment supplied by dominant posterior communicating artery, a normal developmental variant.  Otherwise, the posterior cerebral arteries are patent.  No evidence of high-grade stenosis or large vessel occlusion.  There is no aneurysm or vascular malformation identified.  Although MRA is a screening examination, catheter angiography remains the definitive study for small aneurysms, vasculitis, and other vascular abnormalities.     Impression:     No evidence of intracranial high-grade stenosis, large vessel occlusion, or aneurysm..       ROS: The fourteen point review of system was performed and negative other than HPI.     No changes to PMHx, surgical history or family history since last appt.     Social History     Tobacco Use    Smoking status: Never Smoker    Smokeless tobacco: Never Used   Substance Use Topics    Alcohol use: Yes     Comment: rarely    Drug use: No     Review of patient's allergies indicates:   Allergen Reactions    Contrast media Swelling     Swollen eyes and face    Hydrocodone Itching    Plaquenil [hydroxychloroquine]      Worsening psoriasis    Topiramate      Hair loss,  cognitive side effects        Current Outpatient Medications:     carBAMazepine (TEGRETOL XR) 100 MG 12 hr tablet, Take 1 tablet by mouth twice daily for 5 days, then 2 tablets by mouth twice daily thereafter. (Patient taking differently: Take 1 tablet by mouth twice daily for 5 days, then 2 tablets by mouth twice daily thereafter.), Disp: 120 tablet, Rfl: 11    carBAMazepine (TEGRETOL XR) 100 MG 12 hr tablet, Take 1 tablet (100 mg total) by mouth 2 (two) times a day for 5 days, THEN 2 tablets (200 mg total) 2 (two) times a day thereafter., Disp: 120 tablet, Rfl: 6    cholecalciferol, vitamin D3, (VITAMIN D3) 100 mcg (4,000 unit) Cap, Take by mouth., Disp: , Rfl:     COPPER ORAL, Take by mouth., Disp: , Rfl:     cyanocobalamin, vitamin B-12, 1,000 mcg Subl, Place under the tongue., Disp: , Rfl:     DULoxetine (CYMBALTA) 60 MG capsule, Take 1 capsule (60 mg total) by mouth once daily., Disp: 30 capsule, Rfl: 0    evening primrose oil (EVENING PRIMROSE ORAL), Take by mouth., Disp: , Rfl:     famotidine (PEPCID) 40 MG tablet, Take 1 tablet (40 mg total) by mouth once daily., Disp: 30 tablet, Rfl: 11    fluocinonide (LIDEX) 0.05 % external solution, Apply topically 2 (two) times daily., Disp: 60 mL, Rfl: 1    fluticasone (FLONASE) 50 mcg/actuation nasal spray, 1 spray by Each Nare route once daily., Disp: , Rfl:     fluticasone propionate (CUTIVATE) 0.05 % cream, Apply topically 2 (two) times daily., Disp: 30 g, Rfl: 1    folic acid (FOLVITE) 1 MG tablet, Take 1 mg by mouth once daily., Disp: , Rfl:     galcanezumab-gnlm 120 mg/mL PnIj, Inject 1 pen (120 mg total) into the skin every 28 days. maintenance dose, Disp: 1 mL, Rfl: 6    glucosam/gluc krishna/wo-owv-e-gluc (GLUCOSAMINE COMPLEX ORAL), Take by mouth., Disp: , Rfl:     hydrocortisone 2.5 % cream, Apply topically 2 (two) times daily., Disp: 30 g, Rfl: 1    iron bis-gly/FA/C/B12/Ca/succ (IRON-150 ORAL), once daily., Disp: , Rfl:     ketoconazole  (NIZORAL) 2 % shampoo, Apply topically once daily., Disp: 120 mL, Rfl: 1    Lactobac no.41/Bifidobact no.7 (PROBIOTIC-10 ORAL), Take by mouth., Disp: , Rfl:     magnesium 30 mg Tab, Take by mouth once., Disp: , Rfl:     methocarbamoL (ROBAXIN) 500 MG Tab, Take 2 tablets (1,000 mg total) by mouth every evening., Disp: 180 tablet, Rfl: 3    metoclopramide HCl (REGLAN) 10 MG tablet, Take 1 tablet (10 mg total) by mouth 3 (three) times daily as needed (migraine)., Disp: 15 tablet, Rfl: 3    milk thistle 175 mg tablet, Take 175 mg by mouth once daily., Disp: , Rfl:     mineral,lanolin oils/prop glyc (BALNEOL TOP), Apply topically., Disp: , Rfl:     multivitamin/iron/folic acid (CENTRUM COMPLETE ORAL), Take by mouth., Disp: , Rfl:     nystatin-triamcinolone (MYCOLOG II) cream, Apply to affected area 2 times daily, Disp: 30 g, Rfl: 1    omega-3 fatty acids fish oil (OMEGA-3 2100) 1,050-1,200 mg Cap capsule, once daily., Disp: , Rfl:     ondansetron (ZOFRAN-ODT) 4 MG TbDL, Take 2 tablets (8 mg total) by mouth every 6 (six) hours as needed (nausea)., Disp: 20 tablet, Rfl: 0    pantoprazole (PROTONIX) 20 MG tablet, Take 1 tablet (20 mg total) by mouth once daily., Disp: 30 tablet, Rfl: 6    secukinumab (COSENTYX PEN) 150 mg/mL PnIj, Inject 150 mg into the skin every 30 days., Disp: 1 mL, Rfl: 6    sumatriptan (IMITREX) 100 MG tablet, Take 1 tablet (100 mg total) by mouth as needed for Migraine (can repeat after 2 hours. max is 2/day.)., Disp: 12 tablet, Rfl: 3    tiZANidine (ZANAFLEX) 4 MG tablet, Take 1 tablet (4 mg total) by mouth every evening., Disp: 90 tablet, Rfl: 0    triamcinolone acetonide 0.025% (KENALOG) 0.025 % cream, Apply topically 2 (two) times daily., Disp: 80 g, Rfl: 1    UNABLE TO FIND, Palmitoylethanolamide 400 mg once daily, Disp: , Rfl:     UNABLE TO FIND, [place] grape, Disp: , Rfl:     vitamin A 42575 UNIT capsule, Take 10,000 Units by mouth once daily., Disp: , Rfl:     VITAMIN K2  ORAL, Take by mouth., Disp: , Rfl:     zinc gluconate 50 mg tablet, Take 50 mg by mouth once daily., Disp: , Rfl:     diclofenac sodium (VOLTAREN) 1 % Gel, Apply 2 grams topically 4 (four) times daily., Disp: 100 g, Rfl: 3    PHYSICAL EXAMINATION  Vitals:    02/03/22 1113   BP: 131/84   Pulse: 69   Resp: 17   Temp: 98.6 °F (37 °C)   General: The patient is a well-developed, well-nourished looking of stated age in no acute distress.  NEUROLOGIC EXAM:  MENTAL: The patient is awake, alert and oriented times to time, place, location and situation. Intact recent memory, attention, concentration. Language and speech are normal. No aphasia, no dysarthria  CRANIAL NERVES:   EOMI, no facial asymmetry    MOTOR EXAM: 5/5 throughout in UE/LEs  CEREBELLAR EXAM: no observable dysmetria   GAIT/STANCE: Standard gait was normal with normal stride, arm swing and turning.  No gait ataxia.       Impression:  Migraine without aura, high frequency episodic  - Initially occurring about 2 times a week and they are lasting hours with significant disability and impairment in functionality. She was started on TPX and first months she had improvement, but by second month back at 9 attacks very disabling per month, not responding as well to rizatriptan , 1 of the attacks lasted 4 days triggered by weather changes. Attacks can be left sided or right sided have light/sound sensitivities, nausea (has had vomiting with very severe attacks), and kinesiophobia. She had side effect from TPX and has already been on tizanidine, cymbalta , gabapentin in the past.  Since starting Emgality she has had significant improvement, barely any migraine attacks. She has found Nurtec ODT resolves the attacks.     Facial pain -   Right sided facial pain from the inferior orbital down in a linear aspect into the upper teeth- sharp/electrical pain lasting about 30 minutes x 3 times at times thought to be provoked by some maneuvers like flossing/brushing teeth  lasting 30 minutes of the severe pain without any migrainous or autonomic features about 1 time per month and then resolving until next month. This pain is not consistent with TN which pain lasts second up to 2 minutes. There were no recurrent paroxysms it was once pain which was sharp 30 minutes of continuous pain. This is not consistent with trigeminal neuralgia, not consistent with a trigeminal autonomic cephalalgia or migraine. 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 Neuroimaging showing contact between Right SCA and Rt CN V she has remained pain free wihtout medication. She wanted to discuss with neurosurgery and messaged about this. She has appointment today with Dr. Mi to discuss. At this moment this is NOT active as per the history I obtained today. She has not really been ahving much of this. The episodes she has had do not meet criteria for neuralgic or neuropathic pain , meets criteria for migraine. Will discontinue Tegretol.     Other medical conditions:  RLS , psoriatic arthritis, +histone antibody, +SM/RNP antibody, drug induced lupus erythematosus (from Humira 2 months), C6-C7 ACDF  Rt parietal meningioma small - discussed can repeat imaging in 1 year to ensure stability -> this will be due on 4/22, order already placed.     Plan:   1- For migraine without aura :  A. Continue Emgality 120mg every 28 days.   B. Discontinue tegretol go down to 100mg twice a day for 2 days, then off.     2- For acute management-   Nurtec ODT 75mg for acute management (side effect nausea 2%) . Max 1/day.   +/- Naratriptan 2.5mg , can repeat after 4 hours. Max 2/day      C. MRI brain for monitoring meningioma in 4/22      RTC in 3 months.       Sue Todd MD   Board Certified Neurologist  [facility] Certified Headache Medicine

## 2022-02-07 ENCOUNTER — TELEPHONE (OUTPATIENT)
Dept: RHEUMATOLOGY | Facility: CLINIC | Age: 62
End: 2022-02-07
Payer: COMMERCIAL

## 2022-02-07 NOTE — TELEPHONE ENCOUNTER
----- Message from Arlen Alvarado sent at 2/7/2022  9:49 AM CST -----  Type:  Patient Returning Call    Who Called:  Carla From [employer]  Who Left Message for Patient:  leah   Does the patient know what this is regarding?:  Medication   Best Call Back Number:  [phone]  Additional Information:  She will fax the information over but someone call call her back.

## 2022-02-14 ENCOUNTER — PATIENT MESSAGE (OUTPATIENT)
Dept: OBSTETRICS AND GYNECOLOGY | Facility: CLINIC | Age: 62
End: 2022-02-14
Payer: COMMERCIAL

## 2022-02-14 RX ORDER — CLOBETASOL PROPIONATE 0.5 MG/G
CREAM TOPICAL 2 TIMES DAILY
Qty: 30 G | Refills: 0 | Status: SHIPPED | OUTPATIENT
Start: 2022-02-14 | End: 2022-03-09 | Stop reason: SDUPTHER

## 2022-02-23 DIAGNOSIS — M79.604 PAIN OF RIGHT LOWER EXTREMITY: ICD-10-CM

## 2022-02-23 DIAGNOSIS — R10.9 ABDOMINAL PAIN, UNSPECIFIED ABDOMINAL LOCATION: ICD-10-CM

## 2022-02-23 DIAGNOSIS — F33.1 MODERATE EPISODE OF RECURRENT MAJOR DEPRESSIVE DISORDER: ICD-10-CM

## 2022-02-23 RX ORDER — DULOXETIN HYDROCHLORIDE 60 MG/1
60 CAPSULE, DELAYED RELEASE ORAL DAILY
Qty: 30 CAPSULE | Refills: 0 | Status: SHIPPED | OUTPATIENT
Start: 2022-02-23 | End: 2022-03-07 | Stop reason: SDUPTHER

## 2022-02-23 RX ORDER — TIZANIDINE 4 MG/1
4 TABLET ORAL NIGHTLY
Qty: 90 TABLET | Refills: 0 | Status: CANCELLED | OUTPATIENT
Start: 2022-02-23

## 2022-02-23 RX ORDER — PANTOPRAZOLE SODIUM 20 MG/1
20 TABLET, DELAYED RELEASE ORAL DAILY
Qty: 30 TABLET | Refills: 6 | Status: CANCELLED | OUTPATIENT
Start: 2022-02-23 | End: 2023-02-23

## 2022-02-23 RX ORDER — TIZANIDINE 4 MG/1
4 TABLET ORAL NIGHTLY
Qty: 90 TABLET | Refills: 0 | Status: SHIPPED | OUTPATIENT
Start: 2022-02-23 | End: 2022-06-06 | Stop reason: SDUPTHER

## 2022-02-23 NOTE — TELEPHONE ENCOUNTER
No new care gaps identified.  Powered by [employer]. Reference number: [id number].   2/23/2022 3:58:17 PM CST

## 2022-02-23 NOTE — TELEPHONE ENCOUNTER
No new care gaps identified.  Powered by [employer]. Reference number: [id number].   2/23/2022 7:30:24 AM CST

## 2022-02-24 RX ORDER — PANTOPRAZOLE SODIUM 20 MG/1
20 TABLET, DELAYED RELEASE ORAL DAILY
Qty: 30 TABLET | Refills: 6 | Status: SHIPPED | OUTPATIENT
Start: 2022-02-24 | End: 2022-03-14

## 2022-03-07 ENCOUNTER — HOSPITAL ENCOUNTER (OUTPATIENT)
Dept: RADIOLOGY | Facility: HOSPITAL | Age: 62
Discharge: HOME | End: 2022-03-07
Attending: PSYCHIATRY & NEUROLOGY
Payer: COMMERCIAL

## 2022-03-07 DIAGNOSIS — F33.1 MODERATE EPISODE OF RECURRENT MAJOR DEPRESSIVE DISORDER: ICD-10-CM

## 2022-03-07 DIAGNOSIS — D32.9 MENINGIOMA: ICD-10-CM

## 2022-03-07 PROCEDURE — 70551 MRI BRAIN WITHOUT CONTRAST: ICD-10-PCS | Mod: 26,,, | Performed by: RADIOLOGY

## 2022-03-07 PROCEDURE — 70551 MRI BRAIN STEM W/O DYE: CPT | Mod: 26,,, | Performed by: RADIOLOGY

## 2022-03-07 PROCEDURE — 70551 MRI BRAIN STEM W/O DYE: CPT | Mod: TC,PO

## 2022-03-07 RX ORDER — DULOXETIN HYDROCHLORIDE 60 MG/1
60 CAPSULE, DELAYED RELEASE ORAL DAILY
Qty: 30 CAPSULE | Refills: 0 | Status: CANCELLED | OUTPATIENT
Start: 2022-03-07

## 2022-03-08 DIAGNOSIS — F33.1 MODERATE EPISODE OF RECURRENT MAJOR DEPRESSIVE DISORDER: ICD-10-CM

## 2022-03-08 RX ORDER — DULOXETIN HYDROCHLORIDE 60 MG/1
60 CAPSULE, DELAYED RELEASE ORAL DAILY
Qty: 30 CAPSULE | Refills: 0 | Status: SHIPPED | OUTPATIENT
Start: 2022-03-08 | End: 2022-03-28 | Stop reason: SDUPTHER

## 2022-03-09 RX ORDER — CLOBETASOL PROPIONATE 0.5 MG/G
CREAM TOPICAL 2 TIMES DAILY
Qty: 30 G | Refills: 0 | Status: SHIPPED | OUTPATIENT
Start: 2022-03-09 | End: 2022-03-24 | Stop reason: SDUPTHER

## 2022-03-10 ENCOUNTER — PATIENT MESSAGE (OUTPATIENT)
Dept: RHEUMATOLOGY | Facility: CLINIC | Age: 62
End: 2022-03-10
Payer: COMMERCIAL

## 2022-03-10 PROBLEM — R10.9 ACUTE RIGHT FLANK PAIN: Status: ACTIVE | Noted: 2022-03-10

## 2022-03-10 PROBLEM — R11.2 NAUSEA AND VOMITING: Status: ACTIVE | Noted: 2022-03-10

## 2022-03-14 ENCOUNTER — PATIENT MESSAGE (OUTPATIENT)
Dept: FAMILY MEDICINE | Facility: CLINIC | Age: 62
End: 2022-03-14
Payer: COMMERCIAL

## 2022-03-14 ENCOUNTER — OFFICE VISIT (OUTPATIENT)
Dept: FAMILY MEDICINE | Facility: CLINIC | Age: 62
End: 2022-03-14
Payer: COMMERCIAL

## 2022-03-14 VITALS
SYSTOLIC BLOOD PRESSURE: 124 MMHG | OXYGEN SATURATION: 98 % | HEIGHT: 64 IN | BODY MASS INDEX: 28.15 KG/M2 | HEART RATE: 72 BPM | WEIGHT: 164.88 LBS | DIASTOLIC BLOOD PRESSURE: 74 MMHG

## 2022-03-14 DIAGNOSIS — K91.30 SMALL BOWEL OBSTRUCTION DUE TO POSTOPERATIVE ADHESIONS: ICD-10-CM

## 2022-03-14 DIAGNOSIS — Z09 HOSPITAL DISCHARGE FOLLOW-UP: Primary | ICD-10-CM

## 2022-03-14 PROCEDURE — 3008F BODY MASS INDEX DOCD: CPT | Mod: CPTII,S$GLB,, | Performed by: PHYSICIAN ASSISTANT

## 2022-03-14 PROCEDURE — 3074F SYST BP LT 130 MM HG: CPT | Mod: CPTII,S$GLB,, | Performed by: PHYSICIAN ASSISTANT

## 2022-03-14 PROCEDURE — 3008F PR BODY MASS INDEX (BMI) DOCUMENTED: ICD-10-PCS | Mod: CPTII,S$GLB,, | Performed by: PHYSICIAN ASSISTANT

## 2022-03-14 PROCEDURE — 3074F PR MOST RECENT SYSTOLIC BLOOD PRESSURE < 130 MM HG: ICD-10-PCS | Mod: CPTII,S$GLB,, | Performed by: PHYSICIAN ASSISTANT

## 2022-03-14 PROCEDURE — 3078F DIAST BP <80 MM HG: CPT | Mod: CPTII,S$GLB,, | Performed by: PHYSICIAN ASSISTANT

## 2022-03-14 PROCEDURE — 99214 PR OFFICE/OUTPT VISIT, EST, LEVL IV, 30-39 MIN: ICD-10-PCS | Mod: S$GLB,,, | Performed by: PHYSICIAN ASSISTANT

## 2022-03-14 PROCEDURE — 1111F DSCHRG MED/CURRENT MED MERGE: CPT | Mod: CPTII,S$GLB,, | Performed by: PHYSICIAN ASSISTANT

## 2022-03-14 PROCEDURE — 1111F PR DISCHARGE MEDS RECONCILED W/ CURRENT OUTPATIENT MED LIST: ICD-10-PCS | Mod: CPTII,S$GLB,, | Performed by: PHYSICIAN ASSISTANT

## 2022-03-14 PROCEDURE — 99999 PR PBB SHADOW E&M-EST. PATIENT-LVL V: ICD-10-PCS | Mod: PBBFAC,,, | Performed by: PHYSICIAN ASSISTANT

## 2022-03-14 PROCEDURE — 99214 OFFICE O/P EST MOD 30 MIN: CPT | Mod: S$GLB,,, | Performed by: PHYSICIAN ASSISTANT

## 2022-03-14 PROCEDURE — 3078F PR MOST RECENT DIASTOLIC BLOOD PRESSURE < 80 MM HG: ICD-10-PCS | Mod: CPTII,S$GLB,, | Performed by: PHYSICIAN ASSISTANT

## 2022-03-14 PROCEDURE — 99999 PR PBB SHADOW E&M-EST. PATIENT-LVL V: CPT | Mod: PBBFAC,,, | Performed by: PHYSICIAN ASSISTANT

## 2022-03-14 NOTE — PROGRESS NOTES
"Subjective:      Patient ID: Clemencia Knight is a 61 y.o. female.    Chief Complaint: Hospital Follow Up (From 3/10 to 3/12 for small bowel obstruction; [place])  Patient is new to me.    HPI   Patient has PMH of cervical radiculopathy, GERD, psoriatic arthritis, and plantar fasciitis.    Patient went to [facility] ED 3/10/2022 to 3/12/2022 with partial small bowel obstruction, acute right flank pain, and nausea/vomiting.    Patient reports decreased right flank pain 5/10, but continuing.  Has had flatulence with small bout of diarrhea yesterday.  Denies nausea/vomiting, constipation, blood in stool, or fever.  She has had multiple abdominal surgeries in the past.    Reviewed CT renal stone study without contrast 3/10/2022: Prominent loops of proximal small bowel this may represent enteritis cannot rule out early or partial bowel obstruction.  Small fat containing umbilical hernia.    Review of Systems   Constitutional: Positive for chills. Negative for appetite change and fever.   Respiratory: Negative for shortness of breath.    Cardiovascular: Negative for chest pain.   Gastrointestinal: Positive for abdominal pain (RLQ radiating to right flank pain) and diarrhea (small yesterday). Negative for blood in stool, constipation, nausea and vomiting.   Genitourinary: Positive for flank pain (right).       Objective:   /74   Pulse 72   Ht 5' 4" (1.626 m)   Wt 74.8 kg (164 lb 14.5 oz)   SpO2 98%   BMI 28.31 kg/m²      Physical Exam  Vitals reviewed.   Constitutional:       General: She is not in acute distress.     Appearance: Normal appearance. She is well-developed.   HENT:      Head: Normocephalic and atraumatic.      Right Ear: External ear normal.      Left Ear: External ear normal.   Eyes:      Conjunctiva/sclera: Conjunctivae normal.   Cardiovascular:      Rate and Rhythm: Normal rate and regular rhythm.      Heart sounds: Normal heart sounds. No murmur heard.    No friction rub. No gallop.   Pulmonary:    "   Effort: Pulmonary effort is normal. No respiratory distress.      Breath sounds: Normal breath sounds. No wheezing or rales.   Abdominal:      General: Bowel sounds are normal.      Palpations: Abdomen is soft.      Tenderness: There is no abdominal tenderness. There is right CVA tenderness. There is no left CVA tenderness.   Musculoskeletal:         General: Normal range of motion.      Cervical back: Normal range of motion.   Skin:     General: Skin is warm and dry.      Findings: No rash.   Neurological:      General: No focal deficit present.      Mental Status: She is alert and oriented to person, place, and time.   Psychiatric:         Mood and Affect: Mood normal.         Behavior: Behavior normal.         Judgment: Judgment normal.        Assessment:      1. Hospital discharge follow-up    2. Small bowel obstruction due to postoperative adhesions       Plan:   1. Hospital discharge follow-up  Improving.  Gave strict ER precautions with worsening symptoms or stops passing gas, having BMS, and nausea.  Encouraged hydration.    2. Small bowel obstruction due to postoperative adhesions  - Ambulatory referral/consult to Gastroenterology; Future    Follow up as needed.  Patient agreed with plan and expressed understanding.  I spent 30 minutes on this encounter, time includes face-to-face, chart review, documentation, test review and orders.   Thank you for allowing me to serve you,

## 2022-03-18 ENCOUNTER — PATIENT MESSAGE (OUTPATIENT)
Dept: GASTROENTEROLOGY | Facility: CLINIC | Age: 62
End: 2022-03-18
Payer: COMMERCIAL

## 2022-03-21 ENCOUNTER — PATIENT OUTREACH (OUTPATIENT)
Dept: ADMINISTRATIVE | Facility: OTHER | Age: 62
End: 2022-03-21
Payer: COMMERCIAL

## 2022-03-21 NOTE — PROGRESS NOTES
Health Maintenance Due   Topic Date Due    Shingles Vaccine (1 of 2) Never done    COVID-19 Vaccine (3 - Booster for Pfizer series) 02/10/2022     Updates were requested from care everywhere.  Chart was reviewed for overdue Proactive [facility] Encounters (SKYE) topics (CRS, Breast Cancer Screening, Eye exam)  Health Maintenance has been updated.  [place] immunization registry triggered.  Immunizations were reconciled.

## 2022-03-22 ENCOUNTER — OFFICE VISIT (OUTPATIENT)
Dept: GASTROENTEROLOGY | Facility: CLINIC | Age: 62
End: 2022-03-22
Payer: COMMERCIAL

## 2022-03-22 VITALS — WEIGHT: 165.81 LBS | HEIGHT: 64 IN | BODY MASS INDEX: 28.31 KG/M2

## 2022-03-22 DIAGNOSIS — R19.5 LOOSE STOOLS: ICD-10-CM

## 2022-03-22 DIAGNOSIS — R10.11 RIGHT UPPER QUADRANT PAIN: ICD-10-CM

## 2022-03-22 DIAGNOSIS — K52.9 JEJUNITIS: ICD-10-CM

## 2022-03-22 DIAGNOSIS — R93.3 ABNORMAL CT SCAN, GASTROINTESTINAL TRACT: Primary | ICD-10-CM

## 2022-03-22 DIAGNOSIS — Z87.19 HISTORY OF SMALL BOWEL OBSTRUCTION: ICD-10-CM

## 2022-03-22 PROCEDURE — 3008F BODY MASS INDEX DOCD: CPT | Mod: CPTII,S$GLB,, | Performed by: NURSE PRACTITIONER

## 2022-03-22 PROCEDURE — 99214 OFFICE O/P EST MOD 30 MIN: CPT | Mod: S$GLB,,, | Performed by: NURSE PRACTITIONER

## 2022-03-22 PROCEDURE — 1159F MED LIST DOCD IN RCRD: CPT | Mod: CPTII,S$GLB,, | Performed by: NURSE PRACTITIONER

## 2022-03-22 PROCEDURE — 99999 PR PBB SHADOW E&M-EST. PATIENT-LVL V: CPT | Mod: PBBFAC,,, | Performed by: NURSE PRACTITIONER

## 2022-03-22 PROCEDURE — 3008F PR BODY MASS INDEX (BMI) DOCUMENTED: ICD-10-PCS | Mod: CPTII,S$GLB,, | Performed by: NURSE PRACTITIONER

## 2022-03-22 PROCEDURE — 1160F RVW MEDS BY RX/DR IN RCRD: CPT | Mod: CPTII,S$GLB,, | Performed by: NURSE PRACTITIONER

## 2022-03-22 PROCEDURE — 99999 PR PBB SHADOW E&M-EST. PATIENT-LVL V: ICD-10-PCS | Mod: PBBFAC,,, | Performed by: NURSE PRACTITIONER

## 2022-03-22 PROCEDURE — 1160F PR REVIEW ALL MEDS BY PRESCRIBER/CLIN PHARMACIST DOCUMENTED: ICD-10-PCS | Mod: CPTII,S$GLB,, | Performed by: NURSE PRACTITIONER

## 2022-03-22 PROCEDURE — 1111F PR DISCHARGE MEDS RECONCILED W/ CURRENT OUTPATIENT MED LIST: ICD-10-PCS | Mod: CPTII,S$GLB,, | Performed by: NURSE PRACTITIONER

## 2022-03-22 PROCEDURE — 1159F PR MEDICATION LIST DOCUMENTED IN MEDICAL RECORD: ICD-10-PCS | Mod: CPTII,S$GLB,, | Performed by: NURSE PRACTITIONER

## 2022-03-22 PROCEDURE — 1111F DSCHRG MED/CURRENT MED MERGE: CPT | Mod: CPTII,S$GLB,, | Performed by: NURSE PRACTITIONER

## 2022-03-22 PROCEDURE — 99214 PR OFFICE/OUTPT VISIT, EST, LEVL IV, 30-39 MIN: ICD-10-PCS | Mod: S$GLB,,, | Performed by: NURSE PRACTITIONER

## 2022-03-22 NOTE — PATIENT INSTRUCTIONS
Uncertain Causes of Diarrhea (Adult)    Diarrhea is when stools are loose and watery. This can be caused by:  Viral infections  Bacterial infections  Food poisoning  Parasites  Irritable bowel syndrome (IBS)  Inflammatory bowel diseases such as ulcerative colitis, Crohn's disease, and celiac disease  Food intolerance, such as to lactose, the sugar found in milk and milk products  Reaction to medicines like antibiotics, laxatives, cancer drugs, and antacids  Along with diarrhea, you may also have:  Abdominal pain and cramping  Nausea and vomiting  Loss of bowel control  Fever and chills  Bloody stools  In some cases, antibiotics may help to treat diarrhea. You may have a stool sample test. This is done to see what is causing your diarrhea, and if antibiotics will help treat it. The results of a stool sample test may take up to 2 days. The healthcare provider may not give you antibiotics until he or she has the stool test results.  Diarrhea can cause dehydration. This is the loss of too much water and other fluids from the body. When this occurs, body fluid must be replaced. This can be done with oral rehydration solutions. Oral rehydration solutions are available at drugstores and grocery stores without a prescription.  Home care  Follow all instructions given by your healthcare provider. Rest at home for the next 24 hours, or until you feel better. Avoid caffeine, tobacco, and alcohol. These can make diarrhea, cramping, and pain worse.  If taking medicines:  Dont take over-the-counter diarrhea or nausea medicines unless your healthcare provider tells you to.  You may use acetaminophen or NSAID medicines like ibuprofen or naproxen to reduce pain and fever. Dont use these if you have chronic liver or kidney disease, or ever had a stomach ulcer or gastrointestinal bleeding. Don't use NSAID medicines if you are already taking one for another condition (like arthritis) or are on daily aspirin therapy (such as for heart  disease or after a stroke). Talk with your healthcare provider first.  If antibiotics were prescribed, be sure you take them until they are finished. Dont stop taking them even when you feel better. Antibiotics must be taken as a full course.  To prevent the spread of illness:  Remember that washing with soap and water and using alcohol-based  is the best way to prevent the spread of infection.  Clean the toilet after each use.  Wash your hands before eating.  Wash your hands before and after preparing food. Keep in mind that people with diarrhea or vomiting should not prepare food for others.  Wash your hands after using cutting boards, countertops, and knives that have been in contact with raw foods.  Wash and then peel fruits and vegetables.  Keep uncooked meats away from cooked and ready-to-eat foods.  Use a food thermometer when cooking. Cook poultry to at least 165°F (74°C). Cook ground meat (beef, veal, pork, lamb) to at least 160°F (71°C). Cook fresh beef, veal, lamb, and pork to at least 145°F (63°C).  Dont eat raw or undercooked eggs (poached or rosalio side up), poultry, meat, or unpasteurized milk and juices.  Food and drinks  The main goal while treating vomiting or diarrhea is to prevent dehydration. This is done by taking small amounts of liquids often.  Keep in mind that liquids are more important than food right now.  Drink only small amounts of liquids at a time.  Dont force yourself to eat, especially if you are having cramping, vomiting, or diarrhea. Dont eat large amounts at a time, even if you are hungry.  If you eat, avoid fatty, greasy, spicy, or fried foods.  Dont eat dairy foods or drink milk if you have diarrhea. These can make diarrhea worse.  During the first 24 hours you can try:  Oral rehydration solutions. Do not use sports drinks. They have too much sugar and not enough electrolytes.  Soft drinks without caffeine  Ginger ale  Water (plain or flavored)  Decaf tea or  coffee  Clear broth, consommé, or bouillon  Gelatin, popsicles, or frozen fruit juice bars  The second 24 hours, if you are feeling better, you can add:  Hot cereal, plain toast, bread, rolls, or crackers  Plain noodles, rice, mashed potatoes, chicken noodle soup, or rice soup  Unsweetened canned fruit (no pineapple)  Bananas  As you recover:  Limit fat intake to less than 15 grams per day. Dont eat margarine, butter, oils, mayonnaise, sauces, gravies, fried foods, peanut butter, meat, poultry, or fish.  Limit fiber. Dont eat raw or cooked vegetables, fresh fruits except bananas, or bran cereals.  Limit caffeine and chocolate.  Limit dairy.  Dont use spices or seasonings except salt.  Go back to your normal diet over time, as you feel better and your symptoms improve.  If the symptoms come back, go back to a simple diet or clear liquids.  Follow-up care  Follow up with your healthcare provider, or as advised. If a stool sample was taken or cultures were done, call the healthcare provider for the results as instructed.  Call 911  Call 911 if you have any of these symptoms:  Trouble breathing  Confusion  Extreme drowsiness or trouble walking  Loss of consciousness  Rapid heart rate  Chest pain  Stiff neck  Seizure  When to seek medical advice  Call your healthcare provider right away if any of these occur:  Abdominal pain that gets worse  Constant lower right abdominal pain  Continued vomiting and inability to keep liquids down  Diarrhea more than 5 times a day  Blood in vomit or stool  Dark urine or no urine for 8 hours, dry mouth and tongue, tiredness, weakness, or dizziness  Drowsiness  New rash  You dont get better in 2 to 3 days  Fever of 100.4°F (38°C) or higher that doesnt get lower with medicine  Date Last Reviewed: 1/3/2016  © 1125-3373 [employer]. [address] [place] [address], [place] [address]. All rights reserved. This information is not intended as a substitute for professional medical care.  Always follow your healthcare professional's instructions.           Abdominal Pain    Abdominal pain is pain in the stomach or belly area. Everyone has this pain from time to time. In many cases it goes away on its own. But abdominal pain can sometimes be due to a serious problem, such as appendicitis. So its important to know when to seek help.  Causes of abdominal pain  There are many possible causes of abdominal pain. Common causes in adults include:  Constipation, diarrhea, or gas  Stomach acid flowing back up into the esophagus (acid reflux or heartburn)  Severe acid reflux, called GERD (gastroesophageal reflux disease)  A sore in the lining of the stomach or small intestine (peptic ulcer)  Inflammation of the gallbladder, liver, or pancreas  Gallstones or kidney stones  Appendicitis   Intestinal blockage   An internal organ pushing through a muscle or other tissue (hernia)  Urinary tract infections  In women, menstrual cramps, fibroids, or endometriosis  Inflammation or infection of the intestines  Diagnosing the cause of abdominal pain  Your healthcare provider will do a physical exam help find the cause of your pain. If needed, tests will be ordered. Belly pain has many possible causes. So it can be hard to find the reason for your pain. Giving details about your pain can help. Tell your provider where and when you feel the pain, and what makes it better or worse. Also let your provider know if you have other symptoms such as:  Fever  Tiredness  Upset stomach (nausea)  Vomiting  Changes in bathroom habits  Treating abdominal pain  Some causes of pain need emergency medical treatment right away. These include appendicitis or a bowel blockage. Other problems can be treated with rest, fluids, or medicines. Your healthcare provider can give you specific instructions for treatment or self-care based on what is causing your pain.  If you have vomiting or diarrhea, sip water or other clear fluids. When you are  ready to eat solid foods again, start with small amounts of easy-to-digest, low-fat foods. These include apple sauce, toast, or crackers.   When to seek medical care  Call 911 or go to the hospital right away if you:  Cant pass stool and are vomiting  Are vomiting blood or have bloody diarrhea or black, tarry diarrhea  Have chest, neck, or shoulder pain  Feel like you might pass out  Have pain in your shoulder blades with nausea  Have sudden, severe belly pain  Have new, severe pain unlike any you have felt before  Have a belly that is rigid, hard, and tender to touch  Call your healthcare provider if you have:  Pain for more than 5 days  Bloating for more than 2 days  Diarrhea for more than 5 days  A fever of 100.4°F (38.0°C) or higher, or as directed by your provider  Pain that gets worse  Weight loss for no reason  Continued lack of appetite  Blood in your stool  How to prevent abdominal pain  Here are some tips to help prevent abdominal pain:  Eat smaller amounts of food at one time.  Avoid greasy, fried, or other high-fat foods.  Avoid foods that give you gas.  Exercise regularly.  Drink plenty of fluids.  To help prevent GERD symptoms:  Quit smoking.  Reduce alcohol and certain foods that increase stomach acid.  Avoid aspirin and over-the-counter pain and fever medicines (NSAIDS or nonsteroidal anti-inflammatory drugs), if possible  Lose extra weight.  Finish eating at least 2 hours before you go to bed or lie down.  Raise the head of your bed.  Date Last Reviewed: 7/1/2016  © 8324-6338 [employer]. [address] [place] [address], [place] [address]. All rights reserved. This information is not intended as a substitute for professional medical care. Always follow your healthcare professional's instructions.

## 2022-03-22 NOTE — PROGRESS NOTES
"Subjective:       Patient ID: Clemencia Knight is a 61 y.o. female Body mass index is 28.46 kg/m².    Chief Complaint: Abdominal Pain, Diarrhea, and er follow up    This patient is new to me.  Established patient of Dr. Paul & CARMELITA Burch NP.     Reviewed hospital discharge summary: "Admit Date: 3/10/2022  Discharge Date and Time:  03/12/2022 8:39 AM  Attending Physician: Dionisio Tavarez MD   Reason for Admission: Right flank pain, nausea, vomiting  Procedures Performed: * No surgery found *  Hospital Course (synopsis of major diagnoses, care, treatment, and services provided during the course of the hospital stay): <30 minutes spent with discharge.  61 year old female presented to ED c/o right flank pain and "stomach cramps" that began around 8pm evening prior to admission after eating red beans and rice. She woke up at 3am with nausea and vomiting that persisted until around 5am. No fever or chills.  Renal stone protocol CT scan with prominent loops of small intestine.  Patient was treated with bowel rest, IV hydration.  Symptoms improved.  She was tolerating oral intake, having bowel function, had no abdominal pain the morning of discharge."    GI Problem  The primary symptoms include weight loss (trying to lose weight with dieting but also decreased appetite since GI symptoms started), abdominal pain and diarrhea. Primary symptoms do not include fever, fatigue, nausea, vomiting, melena, hematemesis, hematochezia or dysuria. The illness began more than 7 days ago (started 3/10/2022 and was admitted for right flank pain and RUQ abdominal pain and vomiting; admitted for SBO; had resolved and was discharged; had small amount of stools since then of stool rated 5 on [facility] stool chart).   The abdominal pain began more than 2 days ago. The abdominal pain has been rapidly improving since its onset. The abdominal pain is located in the RUQ (intermittent, has it daily, improved since the ER yesterday; described as " twisting pain, lasts for several hours). The severity of the abdominal pain is 0/10 (currently). Relieved by: worse with red beans & popcorn.   The illness is also significant for bloating and constipation. The illness does not include chills, dysphagia or odynophagia. Associated symptoms comments: Bowel movements once last night since ER visit of soft stool  TREATMENT: miralax 17 grams daily, prunes daily, bland diet, probiotic once daily  Denies NSAID use. Significant associated medical issues include GERD (controlled with taking pepcid 40 mg once daily and lifestyle modifications).     Review of Systems   Constitutional: Positive for appetite change (decreased) and weight loss (trying to lose weight with dieting but also decreased appetite since GI symptoms started). Negative for chills, fatigue and fever.   HENT: Negative for sore throat and trouble swallowing.    Respiratory: Negative for cough, choking and shortness of breath.    Cardiovascular: Negative for chest pain.   Gastrointestinal: Positive for abdominal pain, bloating, constipation and diarrhea. Negative for anal bleeding, blood in stool, dysphagia, hematemesis, hematochezia, melena, nausea, rectal pain and vomiting.   Genitourinary: Negative for difficulty urinating, dysuria and flank pain.   Neurological: Negative for weakness.       No LMP recorded. Patient has had a hysterectomy.  Past Medical History:   Diagnosis Date    Allergy     Drug-induced lupus erythematosus     General anesthetics causing adverse effect in therapeutic use     pt. somewhat aware of extubation with one of her surgeries    GERD (gastroesophageal reflux disease)     Hearing loss     Migraines     Psoriatic arthritis     Restless leg syndrome     Sciatica     Trigeminal neuralgia      Past Surgical History:   Procedure Laterality Date    AUGMENTATION OF BREAST      BELT ABDOMINOPLASTY      breast implants      CERVICAL FUSION      COLONOSCOPY  10/2012      [place]; internal hemorrhoid; repeat in 10 years    COLONOSCOPY N/A 11/25/2020    Procedure: COLONOSCOPY;  Surgeon: Kiran Paul MD;  Location: [facility];  Service: Endoscopy;  Laterality: N/A; hemorrhoids; Repeat colonoscopy in 10 years for screening; random biopsy: WNL    DEXA      WNL    EPIDURAL STEROID INJECTION INTO CERVICAL SPINE N/A 09/24/2018    Procedure: Injection-steroid-epidural-cervical;  Surgeon: Myles Rocha MD;  Location: [facility] OR;  Service: Pain Management;  Laterality: N/A;  C7-T1    EPIDURAL STEROID INJECTION INTO CERVICAL SPINE N/A 11/20/2018    Procedure: Injection-steroid-epidural-cervical;  Surgeon: Myles Rocha MD;  Location: [facility] OR;  Service: Pain Management;  Laterality: N/A;  C7-T1    ESOPHAGOGASTRODUODENOSCOPY N/A 12/10/2020    Procedure: EGD (ESOPHAGOGASTRODUODENOSCOPY);  Surgeon: Kiran Paul MD;  Location: [facility];  Service: Endoscopy;  Laterality: N/A; unremarkable; biopsy: duodenum WNL, stomach-minimal chronic inflammation, negative h pylori    HYSTERECTOMY  2000    OOPHORECTOMY  07/16/2013    laparotomy BSO for benign 10cm tubal cyst    SALPINGOOPHORECTOMY  2013    with removal of fallopian cyst    SHOULDER SURGERY      right    TLH  2000    ovaries remain, benign    TONSILLECTOMY, ADENOIDECTOMY, BILATERAL MYRINGOTOMY AND TUBES       Family History   Problem Relation Age of Onset    Cancer Father         bladder    Diabetes Father     Heart disease Father     Diabetes Mother     Melanoma Brother     Charcot-Karla-Tooth disease Brother     Breast cancer Neg Hx     Ovarian cancer Neg Hx     Anesthesia problems Neg Hx     Colon cancer Neg Hx     Crohn's disease Neg Hx     Esophageal cancer Neg Hx     Ulcerative colitis Neg Hx     Stomach cancer Neg Hx     Celiac disease Neg Hx      Social History     Tobacco Use    Smoking status: Never Smoker    Smokeless tobacco: Never Used   Substance Use Topics    Alcohol use: Yes     Comment: rarely     Drug use: No     Wt Readings from Last 10 Encounters:   03/22/22 75.2 kg (165 lb 12.6 oz)   03/21/22 74.8 kg (164 lb 14.5 oz)   03/11/22 76.3 kg (168 lb 3.4 oz)   02/03/22 78.9 kg (174 lb 0.9 oz)   01/19/22 78.4 kg (172 lb 13.5 oz)   11/03/21 80.3 kg (177 lb 0.5 oz)   11/03/21 80.3 kg (177 lb 2.2 oz)   10/14/21 81 kg (178 lb 9.2 oz)   08/25/21 82.4 kg (181 lb 10.5 oz)   05/26/21 79.8 kg (176 lb)     Lab Results   Component Value Date    WBC 7.66 03/21/2022    HGB 13.9 03/21/2022    HCT 41.7 03/21/2022    MCV 91 03/21/2022     03/21/2022     CMP  Sodium   Date Value Ref Range Status   03/21/2022 142 136 - 145 mmol/L Final     Potassium   Date Value Ref Range Status   03/21/2022 4.0 3.5 - 5.1 mmol/L Final     Chloride   Date Value Ref Range Status   03/21/2022 103 95 - 110 mmol/L Final     CO2   Date Value Ref Range Status   03/21/2022 28 22 - 31 mmol/L Final     Glucose   Date Value Ref Range Status   03/21/2022 108 70 - 110 mg/dL Final     Comment:     The ADA recommends the following guidelines for fasting glucose:    Normal:       less than 100 mg/dL    Prediabetes:  100 mg/dL to 125 mg/dL    Diabetes:     126 mg/dL or higher       BUN   Date Value Ref Range Status   03/21/2022 13 7 - 18 mg/dL Final     Creatinine   Date Value Ref Range Status   03/21/2022 0.75 0.50 - 1.40 mg/dL Final     Calcium   Date Value Ref Range Status   03/21/2022 9.5 8.4 - 10.2 mg/dL Final     Total Protein   Date Value Ref Range Status   03/21/2022 8.0 6.0 - 8.4 g/dL Final     Albumin   Date Value Ref Range Status   03/21/2022 4.6 3.5 - 5.2 g/dL Final     Total Bilirubin   Date Value Ref Range Status   03/21/2022 0.3 0.2 - 1.3 mg/dL Final     Alkaline Phosphatase   Date Value Ref Range Status   03/21/2022 84 38 - 145 U/L Final     AST   Date Value Ref Range Status   03/21/2022 28 14 - 36 U/L Final     ALT   Date Value Ref Range Status   03/21/2022 19 0 - 35 U/L Final     Anion Gap   Date Value Ref Range Status   03/21/2022 11 8 -  16 mmol/L Final     eGFR if    Date Value Ref Range Status   03/21/2022 >60 >60 mL/min/1.73 m^2 Final     eGFR if non    Date Value Ref Range Status   03/21/2022 >60 >60 mL/min/1.73 m^2 Final     Comment:     Calculation used to obtain the estimated glomerular filtration  rate (eGFR) is the CKD-EPI equation.        Lab Results   Component Value Date    TSH 1.314 09/28/2020     Reviewed prior medical records including radiology report of 3/21/2022 CT abdomen pelvis and ER visit note; 3/10/2022 CT renal stone abdomen pelvis; & endoscopy history (see surgical history).    Objective:      Physical Exam  Vitals and nursing note reviewed.   Constitutional:       General: She is not in acute distress.     Appearance: Normal appearance. She is well-developed. She is not diaphoretic.   HENT:      Mouth/Throat:      Comments: Patient is wearing a face mask, which covers patient's mouth and nose, due to COVID 19 concerns.  Eyes:      General: No scleral icterus.     Conjunctiva/sclera: Conjunctivae normal.      Pupils: Pupils are equal, round, and reactive to light.   Pulmonary:      Effort: Pulmonary effort is normal. No respiratory distress.      Breath sounds: Normal breath sounds. No wheezing.   Abdominal:      General: Bowel sounds are normal. There is no distension or abdominal bruit.      Palpations: Abdomen is soft. Abdomen is not rigid. There is no mass.      Tenderness: There is no abdominal tenderness. There is no guarding or rebound. Negative signs include Plunkett's sign and McBurney's sign.   Skin:     General: Skin is warm and dry.      Coloration: Skin is not pale.      Findings: No erythema or rash.      Comments: Non-jaundiced   Neurological:      Mental Status: She is alert and oriented to person, place, and time.   Psychiatric:         Behavior: Behavior normal.         Thought Content: Thought content normal.         Judgment: Judgment normal.         Assessment:       1.  Abnormal CT scan, gastrointestinal tract    2. Jejunitis    3. Loose stools    4. Right upper quadrant pain    5. History of small bowel obstruction        Plan:     discussed case with Dr. Paul; he recommended SBFT & agreed with below management plan    Abnormal CT scan, gastrointestinal tract  -     FL Small Bowel Follow Through; Future; Expected date: 03/22/2022    Jejunitis  -     Stool Exam-Ova,Cysts,Parasites; Future; Expected date: 03/22/2022  -     Fecal fat, qualitative; Future; Expected date: 03/22/2022  -     Giardia / Cryptosporidum, EIA; Future; Expected date: 03/22/2022  -     Occult blood x 1, stool; Future; Expected date: 03/22/2022  -     pH, stool; Future; Expected date: 03/22/2022  -     Rotavirus antigen, stool; Future; Expected date: 03/22/2022  -     WBC, Stool; Future; Expected date: 03/22/2022  -     Stool culture; Future; Expected date: 03/22/2022  -     Clostridium difficile EIA; Future; Expected date: 03/22/2022  -     Adenovirus Molecular Detection, PCR, Non-Blood Stool; Future; Expected date: 03/22/2022  -     FL Small Bowel Follow Through; Future; Expected date: 03/22/2022  - Possible CT enterography pending results of testing and if symptoms persist    Loose stools  -     Stool Exam-Ova,Cysts,Parasites; Future; Expected date: 03/22/2022  -     Fecal fat, qualitative; Future; Expected date: 03/22/2022  -     Giardia / Cryptosporidum, EIA; Future; Expected date: 03/22/2022  -     Occult blood x 1, stool; Future; Expected date: 03/22/2022  -     pH, stool; Future; Expected date: 03/22/2022  -     Rotavirus antigen, stool; Future; Expected date: 03/22/2022  -     WBC, Stool; Future; Expected date: 03/22/2022  -     Stool culture; Future; Expected date: 03/22/2022  -     Clostridium difficile EIA; Future; Expected date: 03/22/2022  -     Adenovirus Molecular Detection, PCR, Non-Blood Stool; Future; Expected date: 03/22/2022  -     FL Small Bowel Follow Through; Future; Expected date:  03/22/2022  - CONTINUE OTC probiotic as directed  - avoid lactose & caffeine  - avoid known triggers    Right upper quadrant pain  -     NM Hepatobiliary Imaging with GB (HIDA); Future; Expected date: 03/22/2022  -     FL Small Bowel Follow Through; Future; Expected date: 03/22/2022  - avoid/minimize use of NSAIDs- since they can cause GI upset, bleeding and/or ulcers. If NSAID must be taken, recommend take with food.  - Possible EGD pending results of testing and if symptoms persist    History of small bowel obstruction  -     FL Small Bowel Follow Through; Future; Expected date: 03/22/2022  - CONTINUE MIRALAX 17 GRAMS DAILY AS DIRECTED    Follow up in about 1 month (around 4/22/2022), or if symptoms worsen or fail to improve.      If no improvement in symptoms or symptoms worsen, call/follow-up at clinic or go to ER.        43 minutes of total time spent on the encounter, which includes face to face time and non-face to face time preparing to see the patient (eg, review of tests), Obtaining and/or reviewing separately obtained history, Documenting clinical information in the electronic or other health record, Independently interpreting results (not separately reported) and communicating results to the patient/family/caregiver, or Care coordination (not separately reported).

## 2022-03-24 ENCOUNTER — LAB VISIT (OUTPATIENT)
Dept: LAB | Facility: HOSPITAL | Age: 62
End: 2022-03-24
Attending: FAMILY MEDICINE
Payer: COMMERCIAL

## 2022-03-24 DIAGNOSIS — R19.5 LOOSE STOOLS: ICD-10-CM

## 2022-03-24 DIAGNOSIS — K52.9 JEJUNITIS: ICD-10-CM

## 2022-03-24 LAB
C DIFF GDH STL QL: NEGATIVE
C DIFF TOX A+B STL QL IA: NEGATIVE

## 2022-03-24 PROCEDURE — 87329 GIARDIA AG IA: CPT | Performed by: NURSE PRACTITIONER

## 2022-03-24 PROCEDURE — 87046 STOOL CULTR AEROBIC BACT EA: CPT | Mod: 59 | Performed by: NURSE PRACTITIONER

## 2022-03-24 PROCEDURE — 87177 OVA AND PARASITES SMEARS: CPT | Performed by: NURSE PRACTITIONER

## 2022-03-24 PROCEDURE — 87449 NOS EACH ORGANISM AG IA: CPT | Performed by: NURSE PRACTITIONER

## 2022-03-24 PROCEDURE — 82272 OCCULT BLD FECES 1-3 TESTS: CPT | Performed by: NURSE PRACTITIONER

## 2022-03-24 PROCEDURE — 87798 DETECT AGENT NOS DNA AMP: CPT | Performed by: NURSE PRACTITIONER

## 2022-03-24 PROCEDURE — 83986 ASSAY PH BODY FLUID NOS: CPT | Performed by: NURSE PRACTITIONER

## 2022-03-24 PROCEDURE — 87045 FECES CULTURE AEROBIC BACT: CPT | Performed by: NURSE PRACTITIONER

## 2022-03-24 PROCEDURE — 87425 ROTAVIRUS AG IA: CPT | Performed by: NURSE PRACTITIONER

## 2022-03-24 PROCEDURE — 87209 SMEAR COMPLEX STAIN: CPT | Performed by: NURSE PRACTITIONER

## 2022-03-24 PROCEDURE — 89055 LEUKOCYTE ASSESSMENT FECAL: CPT | Performed by: NURSE PRACTITIONER

## 2022-03-24 PROCEDURE — 82705 FATS/LIPIDS FECES QUAL: CPT | Performed by: NURSE PRACTITIONER

## 2022-03-24 PROCEDURE — 87427 SHIGA-LIKE TOXIN AG IA: CPT | Performed by: NURSE PRACTITIONER

## 2022-03-24 RX ORDER — CLOBETASOL PROPIONATE 0.5 MG/G
CREAM TOPICAL 2 TIMES DAILY
Qty: 30 G | Refills: 0 | Status: SHIPPED | OUTPATIENT
Start: 2022-03-24 | End: 2022-04-08 | Stop reason: SDUPTHER

## 2022-03-25 LAB
CRYPTOSP AG STL QL IA: NEGATIVE
E COLI SXT1 STL QL IA: NEGATIVE
E COLI SXT2 STL QL IA: NEGATIVE
G LAMBLIA AG STL QL IA: NEGATIVE
OB PNL STL: NEGATIVE
RV AG STL QL IA.RAPID: NEGATIVE
WBC #/AREA STL HPF: NORMAL /[HPF]

## 2022-03-28 ENCOUNTER — OFFICE VISIT (OUTPATIENT)
Dept: RHEUMATOLOGY | Facility: CLINIC | Age: 62
End: 2022-03-28
Payer: COMMERCIAL

## 2022-03-28 VITALS
DIASTOLIC BLOOD PRESSURE: 81 MMHG | WEIGHT: 165.81 LBS | BODY MASS INDEX: 28.31 KG/M2 | SYSTOLIC BLOOD PRESSURE: 122 MMHG | HEIGHT: 64 IN | HEART RATE: 69 BPM

## 2022-03-28 DIAGNOSIS — L40.50 PSORIATIC ARTHRITIS: Primary | ICD-10-CM

## 2022-03-28 DIAGNOSIS — M19.90 OSTEOARTHRITIS, UNSPECIFIED OSTEOARTHRITIS TYPE, UNSPECIFIED SITE: ICD-10-CM

## 2022-03-28 DIAGNOSIS — I73.00 RAYNAUD'S PHENOMENON WITHOUT GANGRENE: ICD-10-CM

## 2022-03-28 DIAGNOSIS — L40.8 INVERSE PSORIASIS: ICD-10-CM

## 2022-03-28 DIAGNOSIS — F33.1 MODERATE EPISODE OF RECURRENT MAJOR DEPRESSIVE DISORDER: ICD-10-CM

## 2022-03-28 LAB
BACTERIA STL CULT: NORMAL
BACTERIA STL CULT: NORMAL
HADV DNA SERPL QL NAA+PROBE: NEGATIVE
SPECIMEN SOURCE: NORMAL

## 2022-03-28 PROCEDURE — 99215 OFFICE O/P EST HI 40 MIN: CPT | Mod: S$GLB,,, | Performed by: INTERNAL MEDICINE

## 2022-03-28 PROCEDURE — 3074F SYST BP LT 130 MM HG: CPT | Mod: CPTII,S$GLB,, | Performed by: INTERNAL MEDICINE

## 2022-03-28 PROCEDURE — 3008F PR BODY MASS INDEX (BMI) DOCUMENTED: ICD-10-PCS | Mod: CPTII,S$GLB,, | Performed by: INTERNAL MEDICINE

## 2022-03-28 PROCEDURE — 1111F DSCHRG MED/CURRENT MED MERGE: CPT | Mod: CPTII,S$GLB,, | Performed by: INTERNAL MEDICINE

## 2022-03-28 PROCEDURE — 3008F BODY MASS INDEX DOCD: CPT | Mod: CPTII,S$GLB,, | Performed by: INTERNAL MEDICINE

## 2022-03-28 PROCEDURE — 3074F PR MOST RECENT SYSTOLIC BLOOD PRESSURE < 130 MM HG: ICD-10-PCS | Mod: CPTII,S$GLB,, | Performed by: INTERNAL MEDICINE

## 2022-03-28 PROCEDURE — 3079F DIAST BP 80-89 MM HG: CPT | Mod: CPTII,S$GLB,, | Performed by: INTERNAL MEDICINE

## 2022-03-28 PROCEDURE — 1159F MED LIST DOCD IN RCRD: CPT | Mod: CPTII,S$GLB,, | Performed by: INTERNAL MEDICINE

## 2022-03-28 PROCEDURE — 3079F PR MOST RECENT DIASTOLIC BLOOD PRESSURE 80-89 MM HG: ICD-10-PCS | Mod: CPTII,S$GLB,, | Performed by: INTERNAL MEDICINE

## 2022-03-28 PROCEDURE — 1159F PR MEDICATION LIST DOCUMENTED IN MEDICAL RECORD: ICD-10-PCS | Mod: CPTII,S$GLB,, | Performed by: INTERNAL MEDICINE

## 2022-03-28 PROCEDURE — 99215 PR OFFICE/OUTPT VISIT, EST, LEVL V, 40-54 MIN: ICD-10-PCS | Mod: S$GLB,,, | Performed by: INTERNAL MEDICINE

## 2022-03-28 PROCEDURE — 99999 PR PBB SHADOW E&M-EST. PATIENT-LVL IV: CPT | Mod: PBBFAC,,, | Performed by: INTERNAL MEDICINE

## 2022-03-28 PROCEDURE — 1111F PR DISCHARGE MEDS RECONCILED W/ CURRENT OUTPATIENT MED LIST: ICD-10-PCS | Mod: CPTII,S$GLB,, | Performed by: INTERNAL MEDICINE

## 2022-03-28 PROCEDURE — 99999 PR PBB SHADOW E&M-EST. PATIENT-LVL IV: ICD-10-PCS | Mod: PBBFAC,,, | Performed by: INTERNAL MEDICINE

## 2022-03-28 RX ORDER — DULOXETIN HYDROCHLORIDE 60 MG/1
60 CAPSULE, DELAYED RELEASE ORAL DAILY
Qty: 90 CAPSULE | Refills: 3 | Status: SHIPPED | OUTPATIENT
Start: 2022-03-28 | End: 2022-09-08

## 2022-03-28 NOTE — PROGRESS NOTES
Subjective:       Patient ID: Clemencia Knight is a 61 y.o. female.    Chief Complaint: No chief complaint on file.    Follow up: 60 year old female who presents to clinic for follow up on psoriatic arthritis. On cosentyx and had two bouts of colitis and was hospitalized  She has had gi issues in the past She recently found tumor in her brain however started tumor on the brain she was started on Topamax and her headaches have improved.   Patient complains of arthralgias and myalgias for which has been present for a few years. Pain is located in multiple joints, both shoulder(s), both elbow(s), both wrist(s), both MCP(s): 1st, 2nd, 3rd, 4th and 5th, both PIP(s): 1st, 2nd, 3rd, 4th and 5th, both DIP(s): 1st and 2nd, both hip(s), both knee(s) and both MTP(s): 1st, 2nd, 3rd, 4th and 5th, is described as aching, pulsating, shooting and throbbing, and is constant, moderate .  Associated symptoms include: crepitation, decreased range of motion, edema, effusion, tenderness and warmth.      current tx: cosentyx     Prior tx:  1. Otezla  2. Stelara  3. MTX  4. Humira      Review of Systems   Constitutional: Positive for activity change and fatigue. Negative for appetite change, chills, diaphoresis, fever and unexpected weight change.   HENT: Negative for congestion, dental problem, ear discharge, ear pain, facial swelling, mouth sores, nosebleeds, postnasal drip, rhinorrhea, sinus pressure, sneezing, sore throat, tinnitus, trouble swallowing and voice change.         Facial pain   Eyes: Negative for photophobia, pain, discharge, redness, itching and visual disturbance.   Respiratory: Negative for apnea, cough, chest tightness, shortness of breath and wheezing.    Cardiovascular: Negative for chest pain, palpitations and leg swelling.   Gastrointestinal: Negative for abdominal distention, abdominal pain, constipation, diarrhea, nausea and vomiting.   Endocrine: Negative for cold intolerance, heat intolerance, polydipsia and  polyuria.   Genitourinary: Positive for pelvic pain. Negative for decreased urine volume, difficulty urinating, dysuria, flank pain, frequency, genital sores, hematuria and urgency.   Musculoskeletal: Positive for arthralgias, back pain, gait problem, joint swelling, myalgias, neck pain and neck stiffness.   Skin: Negative for pallor, rash and wound.   Allergic/Immunologic: Negative for immunocompromised state.   Neurological: Negative for dizziness, tremors, weakness, light-headedness, numbness and headaches.   Hematological: Negative for adenopathy. Does not bruise/bleed easily.   Psychiatric/Behavioral: Negative for sleep disturbance. The patient is not nervous/anxious.          Objective:     Vitals:    03/28/22 1245   BP: 122/81   Pulse: 69       Past Medical History:   Diagnosis Date    Allergy     Drug-induced lupus erythematosus     General anesthetics causing adverse effect in therapeutic use     pt. somewhat aware of extubation with one of her surgeries    GERD (gastroesophageal reflux disease)     Hearing loss     Migraines     Psoriatic arthritis     Restless leg syndrome     Sciatica     Trigeminal neuralgia      Past Surgical History:   Procedure Laterality Date    AUGMENTATION OF BREAST      BELT ABDOMINOPLASTY      breast implants      CERVICAL FUSION      COLONOSCOPY  10/2012    Dr. Paul; internal hemorrhoid; repeat in 10 years    COLONOSCOPY N/A 11/25/2020    Procedure: COLONOSCOPY;  Surgeon: Kiran Paul MD;  Location: [facility];  Service: Endoscopy;  Laterality: N/A; hemorrhoids; Repeat colonoscopy in 10 years for screening; random biopsy: WNL    DEXA      WNL    EPIDURAL STEROID INJECTION INTO CERVICAL SPINE N/A 09/24/2018    Procedure: Injection-steroid-epidural-cervical;  Surgeon: Myles Rocha MD;  [facility];  Service: Pain Management;  Laterality: N/A;  C7-T1    EPIDURAL STEROID INJECTION INTO CERVICAL SPINE N/A 11/20/2018    Procedure:  Injection-steroid-epidural-cervical;  Surgeon: Myles Rocha MD;  Location: [facility] OR;  Service: Pain Management;  Laterality: N/A;  C7-T1    ESOPHAGOGASTRODUODENOSCOPY N/A 12/10/2020    Procedure: EGD (ESOPHAGOGASTRODUODENOSCOPY);  Surgeon: Kiran Paul MD;  Location: [facility];  Service: Endoscopy;  Laterality: N/A; unremarkable; biopsy: duodenum WNL, stomach-minimal chronic inflammation, negative h pylori    HYSTERECTOMY  2000    OOPHORECTOMY  07/16/2013    laparotomy BSO for benign 10cm tubal cyst    SALPINGOOPHORECTOMY  2013    with removal of fallopian cyst    SHOULDER SURGERY      right    TLH  2000    ovaries remain, benign    TONSILLECTOMY, ADENOIDECTOMY, BILATERAL MYRINGOTOMY AND TUBES            Physical Exam   Constitutional: She is oriented to person, place, and time.   HENT:   Head: Normocephalic and atraumatic.   Mouth/Throat: Oropharynx is clear and moist.   Eyes: Pupils are equal, round, and reactive to light.   Neck: No thyromegaly present.   Cardiovascular: Normal rate, regular rhythm and normal heart sounds. Exam reveals no gallop and no friction rub.   No murmur heard.  Pulmonary/Chest: Breath sounds normal. She has no wheezes. She has no rales. She exhibits no tenderness.   Abdominal: There is no abdominal tenderness. There is no rebound and no guarding.   Musculoskeletal:         General: Tenderness present.      Right shoulder: Tenderness present.      Left shoulder: Tenderness present.      Right elbow: Normal.      Left elbow: Normal.      Cervical back: Neck supple.      Right knee: Swelling and effusion present. Tenderness present.      Left knee: Effusion present.   Lymphadenopathy:     She has no cervical adenopathy.   Neurological: She is alert and oriented to person, place, and time. She has normal sensation. Gait normal.   Reflex Scores:       Patellar reflexes are 3+ on the right side and 3+ on the left side.  Skin: No rash noted. No erythema. No pallor.   Psychiatric:  Mood, affect and judgment normal.   Vitals reviewed.      Right Side Rheumatological Exam     Examination finds the elbow, 1st MCP, 2nd MCP, 3rd MCP, 4th MCP and 5th MCP normal.    The patient is tender to palpation of the shoulder and knee    She has swelling of the knee    The patient has an enlarged wrist, 1st PIP, 2nd PIP, 3rd PIP, 4th PIP and 5th PIP    Shoulder Exam   Tenderness Location: acromion    Range of Motion   Active abduction: abnormal   Adduction: abnormal  Sensation: normal    Knee Exam   Tenderness Location: medial joint line  Patellofemoral Crepitus: positive  Effusion: positive  Sensation: normal    Hip Exam   Tenderness Location: no tenderness  Sensation: normal    Elbow/Wrist Exam   Tenderness Location: no tenderness  Sensation: normal    Left Side Rheumatological Exam     Examination finds the elbow, 1st MCP, 2nd MCP, 3rd MCP, 4th MCP and 5th MCP normal.    The patient is tender to palpation of the shoulder.    The patient has an enlarged wrist, 1st PIP, 2nd PIP, 3rd PIP, 4th PIP and 5th PIP.    Shoulder Exam   Tenderness Location: acromion    Range of Motion   Active abduction: abnormal   Sensation: normal    Knee Exam   Tenderness Location: lateral joint line and medial joint line    Patellofemoral Crepitus: positive  Effusion: positive  Sensation: normal    Hip Exam   Tenderness Location: no tenderness  Sensation: normal    Elbow/Wrist Exam   Sensation: normal      Back/Neck Exam   General Inspection   Gait: normal       Tenderness Right paramedian tenderness of the Lower C-Spine and Lower L-Spine.Left paramedian tenderness of the Upper C-Spine and Lower L-Spine.            Results for orders placed or performed in visit on 03/24/22   Stool culture    Specimen: Stool   Result Value Ref Range    Stool Culture No enteric gemma     Stool Culture       No Salmonella,Shigella,Vibrio,Campylobacter,Yersinia isolated.   Clostridium difficile EIA    Specimen: Stool   Result Value Ref Range    C.  diff Antigen Negative Negative    C difficile Toxins A+B, EIA Negative Negative   E. coli 0157 antigen    Specimen: Stool   Result Value Ref Range    Shiga Toxin 1 E.coli Negative     Shiga Toxin 2 E.coli Negative    Giardia / Cryptosporidum, EIA   Result Value Ref Range    Giardia Antigen - EIA Negative Negative    Cryptosporidium Antigen Negative Negative   Occult blood x 1, stool    Specimen: Stool   Result Value Ref Range    Occult Blood Negative Negative   Rotavirus antigen, stool   Result Value Ref Range    Rotavirus Negative Negative   WBC, Stool   Result Value Ref Range    Stool WBC No neutrophils seen No neutrophils seen   Adenovirus Molecular Detection, PCR, Non-Blood Stool   Result Value Ref Range    Adenovirus Specimen Source Stool      *Note: Due to a large number of results and/or encounters for the requested time period, some results have not been displayed. A complete set of results can be found in Results Review.     reviewed labs with patient during this visit       Assessment:       1. Psoriatic arthritis    2. Inverse psoriasis    3. Osteoarthritis, unspecified osteoarthritis type, unspecified site    4. Moderate episode of recurrent major depressive disorder    5. Raynaud's phenomenon without gangrene            Plan:       Psoriatic arthritis  -     CBC Auto Differential; Future; Expected date: 03/28/2022  -     Comprehensive Metabolic Panel; Future; Expected date: 03/28/2022  -     Sedimentation rate; Future; Expected date: 03/28/2022  -     C-Reactive Protein; Future; Expected date: 03/28/2022    Inverse psoriasis  -     CBC Auto Differential; Future; Expected date: 03/28/2022  -     Comprehensive Metabolic Panel; Future; Expected date: 03/28/2022  -     Sedimentation rate; Future; Expected date: 03/28/2022  -     C-Reactive Protein; Future; Expected date: 03/28/2022    Osteoarthritis, unspecified osteoarthritis type, unspecified site  -     CBC Auto Differential; Future; Expected date:  03/28/2022  -     Comprehensive Metabolic Panel; Future; Expected date: 03/28/2022  -     Sedimentation rate; Future; Expected date: 03/28/2022  -     C-Reactive Protein; Future; Expected date: 03/28/2022    Moderate episode of recurrent major depressive disorder  -     DULoxetine (CYMBALTA) 60 MG capsule; Take 1 capsule (60 mg total) by mouth once daily.  Dispense: 90 capsule; Refill: 3  -     CBC Auto Differential; Future; Expected date: 03/28/2022  -     Comprehensive Metabolic Panel; Future; Expected date: 03/28/2022  -     Sedimentation rate; Future; Expected date: 03/28/2022  -     C-Reactive Protein; Future; Expected date: 03/28/2022    Raynaud's phenomenon without gangrene        Assessment:  60 year old female with  Psoriatic arthritis, +histone antibody, +Sm/RNP antibody, negative GEEN, mildly elevated CRP      1. continue Cosentyx,   2. Azulfidine  May need to be added  3. Will wait for GI testing if has colitis will either change meds or continue cosentyx with an additional meds    More than 50% of the  40 minute encounter was spent face to face counseling the patient regarding current status and future plan of care as well as side of the medications. All questions were answered to patient's satisfaction

## 2022-03-29 ENCOUNTER — TELEPHONE (OUTPATIENT)
Dept: GASTROENTEROLOGY | Facility: CLINIC | Age: 62
End: 2022-03-29
Payer: COMMERCIAL

## 2022-03-29 ENCOUNTER — PATIENT MESSAGE (OUTPATIENT)
Dept: NEUROLOGY | Facility: CLINIC | Age: 62
End: 2022-03-29
Payer: COMMERCIAL

## 2022-03-29 LAB — O+P STL MICRO: NORMAL

## 2022-03-29 NOTE — TELEPHONE ENCOUNTER
----- Message from JACOBO Acevedo sent at 3/29/2022 11:35 AM CDT -----  Please call to inform & review the results with the patient- The radiology report of the HIDA scan showed unremarkable findings (normal functioning of the gallbladder).   Continue with previous recommendations.  Thanks,  Rita CENTENO-C

## 2022-03-30 ENCOUNTER — TELEPHONE (OUTPATIENT)
Dept: GASTROENTEROLOGY | Facility: CLINIC | Age: 62
End: 2022-03-30
Payer: COMMERCIAL

## 2022-03-30 LAB
FAT STL QL: NORMAL
NEUTRAL FAT STL QL: NORMAL
PH STL: 7 [PH] (ref 5–8.5)

## 2022-03-30 NOTE — TELEPHONE ENCOUNTER
Please call to inform & review the results with the patient- stool studies showed no enteric gemma. Recommend taking OTC probiotic, such as Florastor or Culturelle, as directed on packaging. Otherwise, negative/unremarkable findings on stool studies.    Continue with previous recommendations. If no improvement in symptoms or symptoms worsen, call/follow-up at clinic or go to ER.    Thanks,

## 2022-04-04 ENCOUNTER — PATIENT MESSAGE (OUTPATIENT)
Dept: GASTROENTEROLOGY | Facility: CLINIC | Age: 62
End: 2022-04-04
Payer: COMMERCIAL

## 2022-04-07 ENCOUNTER — PATIENT MESSAGE (OUTPATIENT)
Dept: GASTROENTEROLOGY | Facility: CLINIC | Age: 62
End: 2022-04-07
Payer: COMMERCIAL

## 2022-04-08 ENCOUNTER — HOSPITAL ENCOUNTER (OUTPATIENT)
Dept: RADIOLOGY | Facility: HOSPITAL | Age: 62
Discharge: HOME | End: 2022-04-08
Attending: NURSE PRACTITIONER
Payer: COMMERCIAL

## 2022-04-08 DIAGNOSIS — R93.3 ABNORMAL CT SCAN, GASTROINTESTINAL TRACT: ICD-10-CM

## 2022-04-08 DIAGNOSIS — R10.11 RIGHT UPPER QUADRANT PAIN: ICD-10-CM

## 2022-04-08 DIAGNOSIS — Z87.19 HISTORY OF SMALL BOWEL OBSTRUCTION: ICD-10-CM

## 2022-04-08 DIAGNOSIS — R19.5 LOOSE STOOLS: ICD-10-CM

## 2022-04-08 DIAGNOSIS — K52.9 JEJUNITIS: ICD-10-CM

## 2022-04-08 PROCEDURE — 74250 X-RAY XM SM INT 1CNTRST STD: CPT | Mod: TC,FY,PO

## 2022-04-08 PROCEDURE — 74250 FL SMALL BOWEL FOLLOW THROUGH: ICD-10-PCS | Mod: 26,,, | Performed by: RADIOLOGY

## 2022-04-08 PROCEDURE — 74250 X-RAY XM SM INT 1CNTRST STD: CPT | Mod: 26,,, | Performed by: RADIOLOGY

## 2022-04-08 RX ORDER — CLOBETASOL PROPIONATE 0.5 MG/G
CREAM TOPICAL 2 TIMES DAILY
Qty: 30 G | Refills: 0 | Status: SHIPPED | OUTPATIENT
Start: 2022-04-08 | End: 2022-09-08

## 2022-04-12 ENCOUNTER — ANESTHESIA (OUTPATIENT)
Dept: ENDOSCOPY | Facility: HOSPITAL | Age: 62
End: 2022-04-12
Payer: COMMERCIAL

## 2022-04-12 ENCOUNTER — ANESTHESIA EVENT (OUTPATIENT)
Dept: ENDOSCOPY | Facility: HOSPITAL | Age: 62
End: 2022-04-12
Payer: COMMERCIAL

## 2022-04-12 ENCOUNTER — HOSPITAL ENCOUNTER (OUTPATIENT)
Facility: HOSPITAL | Age: 62
Discharge: HOME | End: 2022-04-12
Attending: INTERNAL MEDICINE | Admitting: INTERNAL MEDICINE
Payer: COMMERCIAL

## 2022-04-12 DIAGNOSIS — R10.9 ABDOMINAL PAIN: ICD-10-CM

## 2022-04-12 PROCEDURE — D9220A PRA ANESTHESIA: Mod: ANES,,, | Performed by: ANESTHESIOLOGY

## 2022-04-12 PROCEDURE — 88305 TISSUE EXAM BY PATHOLOGIST: ICD-10-PCS | Mod: 26,,, | Performed by: PATHOLOGY

## 2022-04-12 PROCEDURE — 63600175 PHARM REV CODE 636 W HCPCS: Mod: PO | Performed by: NURSE ANESTHETIST, CERTIFIED REGISTERED

## 2022-04-12 PROCEDURE — 43239 PR EGD, FLEX, W/BIOPSY, SGL/MULTI: ICD-10-PCS | Mod: ,,, | Performed by: INTERNAL MEDICINE

## 2022-04-12 PROCEDURE — 37000009 HC ANESTHESIA EA ADD 15 MINS: Mod: PO | Performed by: INTERNAL MEDICINE

## 2022-04-12 PROCEDURE — D9220A PRA ANESTHESIA: ICD-10-PCS | Mod: CRNA,,, | Performed by: NURSE ANESTHETIST, CERTIFIED REGISTERED

## 2022-04-12 PROCEDURE — 43239 EGD BIOPSY SINGLE/MULTIPLE: CPT | Mod: ,,, | Performed by: INTERNAL MEDICINE

## 2022-04-12 PROCEDURE — 27201012 HC FORCEPS, HOT/COLD, DISP: Mod: PO | Performed by: INTERNAL MEDICINE

## 2022-04-12 PROCEDURE — 88305 TISSUE EXAM BY PATHOLOGIST: CPT | Mod: 26,,, | Performed by: PATHOLOGY

## 2022-04-12 PROCEDURE — 63600175 PHARM REV CODE 636 W HCPCS: Mod: PO | Performed by: INTERNAL MEDICINE

## 2022-04-12 PROCEDURE — D9220A PRA ANESTHESIA: Mod: CRNA,,, | Performed by: NURSE ANESTHETIST, CERTIFIED REGISTERED

## 2022-04-12 PROCEDURE — 43239 EGD BIOPSY SINGLE/MULTIPLE: CPT | Mod: PO | Performed by: INTERNAL MEDICINE

## 2022-04-12 PROCEDURE — 25000003 PHARM REV CODE 250: Mod: PO | Performed by: NURSE ANESTHETIST, CERTIFIED REGISTERED

## 2022-04-12 PROCEDURE — D9220A PRA ANESTHESIA: ICD-10-PCS | Mod: ANES,,, | Performed by: ANESTHESIOLOGY

## 2022-04-12 PROCEDURE — 37000008 HC ANESTHESIA 1ST 15 MINUTES: Mod: PO | Performed by: INTERNAL MEDICINE

## 2022-04-12 PROCEDURE — 88305 TISSUE EXAM BY PATHOLOGIST: CPT | Mod: 59 | Performed by: PATHOLOGY

## 2022-04-12 RX ORDER — SODIUM CHLORIDE 0.9 % (FLUSH) 0.9 %
10 SYRINGE (ML) INJECTION
Status: DISCONTINUED | OUTPATIENT
Start: 2022-04-12 | End: 2022-04-12 | Stop reason: HOSPADM

## 2022-04-12 RX ORDER — LIDOCAINE HCL/PF 100 MG/5ML
SYRINGE (ML) INTRAVENOUS
Status: DISCONTINUED | OUTPATIENT
Start: 2022-04-12 | End: 2022-04-12

## 2022-04-12 RX ORDER — PROPOFOL 10 MG/ML
VIAL (ML) INTRAVENOUS
Status: DISCONTINUED | OUTPATIENT
Start: 2022-04-12 | End: 2022-04-12

## 2022-04-12 RX ORDER — SODIUM CHLORIDE, SODIUM LACTATE, POTASSIUM CHLORIDE, CALCIUM CHLORIDE 600; 310; 30; 20 MG/100ML; MG/100ML; MG/100ML; MG/100ML
INJECTION, SOLUTION INTRAVENOUS CONTINUOUS
Status: DISCONTINUED | OUTPATIENT
Start: 2022-04-12 | End: 2022-04-12 | Stop reason: HOSPADM

## 2022-04-12 RX ADMIN — PROPOFOL 30 MG: 10 INJECTION, EMULSION INTRAVENOUS at 11:04

## 2022-04-12 RX ADMIN — PROPOFOL 60 MG: 10 INJECTION, EMULSION INTRAVENOUS at 11:04

## 2022-04-12 RX ADMIN — LIDOCAINE HYDROCHLORIDE 100 MG: 20 INJECTION, SOLUTION INTRAVENOUS at 11:04

## 2022-04-12 RX ADMIN — SODIUM CHLORIDE, SODIUM LACTATE, POTASSIUM CHLORIDE, AND CALCIUM CHLORIDE: .6; .31; .03; .02 INJECTION, SOLUTION INTRAVENOUS at 11:04

## 2022-04-12 NOTE — ANESTHESIA PREPROCEDURE EVALUATION
04/12/2022  Clemencia Knight is a 61 y.o., female.      Pre-op Assessment    I have reviewed the Patient Summary Reports.     I have reviewed the Nursing Notes. I have reviewed the NPO Status.   I have reviewed the Medications.     Review of Systems  Anesthesia Hx:  Hx of Anesthetic complications    Social:  Non-Smoker    Cardiovascular:  Cardiovascular Normal     Pulmonary:  Pulmonary Normal    Renal/:  Renal/ Normal     Hepatic/GI:   GERD, well controlled    Musculoskeletal:   Arthritis     Neurological:   Neuromuscular Disease, Headaches Raynaud's  Brain tumor stable but no seizures   Endocrine:  Endocrine Normal        Physical Exam  General: Well nourished, Cooperative, Alert and Oriented    Airway:  Mallampati: I   Mouth Opening: Normal  Neck ROM: Normal ROM        Anesthesia Plan  Type of Anesthesia, risks & benefits discussed:    Anesthesia Type: Gen ETT, Gen Supraglottic Airway, Gen Natural Airway, MAC  Intra-op Monitoring Plan: Standard ASA Monitors  Post Op Pain Control Plan: multimodal analgesia  Induction:  IV  Airway Plan: Direct, Video and Fiberoptic, Post-Induction  Informed Consent: Informed consent signed with the Patient and all parties understand the risks and agree with anesthesia plan.  All questions answered.   ASA Score: 3    Ready For Surgery From Anesthesia Perspective.     .

## 2022-04-12 NOTE — DISCHARGE SUMMARY
[facility] - Endoscopy  Discharge Note  Short Stay    Discharge Note  Short Stay      SUMMARY     Admit Date: 4/12/2022    Attending Physician: Kiran Paul MD     Discharge Physician: Kiran Paul MD    Discharge Date: 4/12/2022 11:50 AM    Final Diagnosis: RUQ pain [R10.11]  Abnormal CT scan [R93.89]  Nausea [R11.0]    Disposition: HOME OR SELF CARE    Patient Instructions:   Current Discharge Medication List      CONTINUE these medications which have NOT CHANGED    Details   cyanocobalamin, vitamin B-12, 1,000 mcg Subl Place 1 tablet under the tongue once daily.      DULoxetine (CYMBALTA) 60 MG capsule Take 1 capsule (60 mg total) by mouth once daily.  Qty: 90 capsule, Refills: 3    Associated Diagnoses: Moderate episode of recurrent major depressive disorder      famotidine (PEPCID) 40 MG tablet Take 1 tablet (40 mg total) by mouth once daily.  Qty: 30 tablet, Refills: 11    Comments: At night  Associated Diagnoses: Cough      galcanezumab-gnlm 120 mg/mL PnIj Inject 1 pen (120 mg total) into the skin every 28 days. maintenance dose  Qty: 1 mL, Refills: 6      Lactobac no.41/Bifidobact no.7 (PROBIOTIC-10 ORAL) Take by mouth.      naratriptan (AMERGE) 2.5 MG tablet Take 1 tablet (2.5 mg) at onset of headache, may repeat in 4 hours if needed. Max 2/day.  Qty: 9 tablet, Refills: 3      rimegepant (NURTEC) 75 mg odt Take 1 tablet (75 mg total) by mouth once as needed. Place ODT tablet on the tongue; alternatively the ODT tablet may be placed under the tongue  Qty: 8 tablet, Refills: 11    Comments: Sumatriptan, rizatriptan not tolerated, this works well      secukinumab (COSENTYX PEN) 150 mg/mL PnIj Inject 150 mg into the skin every 30 days.  Qty: 1 mL, Refills: 6    Associated Diagnoses: Psoriatic arthritis      tiZANidine (ZANAFLEX) 4 MG tablet Take 1 tablet (4 mg total) by mouth every evening.  Qty: 90 tablet, Refills: 0    Associated Diagnoses: Pain of right lower extremity      clobetasoL (TEMOVATE)  0.05 % cream Apply topically 2 (two) times daily.  Qty: 30 g, Refills: 0      fluticasone (FLONASE) 50 mcg/actuation nasal spray 1 spray by Each Nare route once daily.      fluticasone propionate (CUTIVATE) 0.05 % cream Apply topically 2 (two) times daily.  Qty: 30 g, Refills: 1      ketoconazole (NIZORAL) 2 % shampoo Apply topically once daily.  Qty: 120 mL, Refills: 1    Associated Diagnoses: Psoriasis vulgaris      nystatin-triamcinolone (MYCOLOG II) cream Apply to affected area 2 times daily  Qty: 30 g, Refills: 1    Associated Diagnoses: Candidiasis of anus      triamcinolone acetonide 0.025% (KENALOG) 0.025 % cream Apply topically 2 (two) times daily.  Qty: 80 g, Refills: 1    Associated Diagnoses: Inverse psoriasis             Discharge Procedure Orders (must include Diet, Follow-up, Activity)    Follow Up:  Follow up with PCP as previously scheduled  Resume routine diet.  Activity as tolerated.    No driving day of procedure.

## 2022-04-12 NOTE — [employer] PATIENT INSTRUCTIONS
Discharge Summary/Instructions after an Endoscopic Procedure  Patient Name: Clemencia Knight  Patient MRN: [id number]  Patient YOB: 1960 Tuesday, April 12, 2022  Kiran Paul MD  Dear patient,  As a result of recent federal legislation (The Federal Cures Act), you may   receive lab or pathology results from your procedure in your [facility]   account before your physician is able to contact you. Your physician or   their representative will relay the results to you with their   recommendations at their soonest availability.  Thank you,  RESTRICTIONS:  During your procedure today, you received medications for sedation.  These   medications may affect your judgment, balance and coordination.  Therefore,   for 24 hours, you have the following restrictions:   - DO NOT drive a car, operate machinery, make legal/financial decisions,   sign important papers or drink alcohol.    ACTIVITY:  Today: no heavy lifting, straining or running due to procedural   sedation/anesthesia.  The following day: return to full activity including work.  DIET:  Eat and drink normally unless instructed otherwise.     TREATMENT FOR COMMON SIDE EFFECTS:  - Mild abdominal pain, nausea, belching, bloating or excessive gas:  rest,   eat lightly and use a heating pad.  - Sore Throat: treat with throat lozenges and/or gargle with warm salt   water.  - Because air was used during the procedure, expelling large amounts of air   from your rectum or belching is normal.  - If a bowel prep was taken, you may not have a bowel movement for 1-3 days.    This is normal.  SYMPTOMS TO WATCH FOR AND REPORT TO YOUR PHYSICIAN:  1. Abdominal pain or bloating, other than gas cramps.  2. Chest pain.  3. Back pain.  4. Signs of infection such as: chills or fever occurring within 24 hours   after the procedure.  5. Rectal bleeding, which would show as bright red, maroon, or black stools.   (A tablespoon of blood from the rectum is not serious, especially  if   hemorrhoids are present.)  6. Vomiting.  7. Weakness or dizziness.  GO DIRECTLY TO THE NEAREST EMERGENCY ROOM IF YOU HAVE ANY OF THE FOLLOWING:      Difficulty breathing              Chills and/or fever over 101 F   Persistent vomiting and/or vomiting blood   Severe abdominal pain   Severe chest pain   Black, tarry stools   Bleeding- more than one tablespoon   Any other symptom or condition that you feel may need urgent attention  Your doctor recommends these additional instructions:  If any biopsies were taken, your doctors clinic will contact you in 1 to 2   weeks with any results.  We are waiting for your pathology results.   Continue your present medications.   You are being discharged to home.  For questions, problems or results please call your physician - Kiran Paul MD at Work:  [phone].  EMERGENCY PHONE NUMBER: [phone], LAB RESULTS: [phone]  IF A COMPLICATION OR EMERGENCY SITUATION ARISES AND YOU ARE UNABLE TO REACH   YOUR PHYSICIAN - GO DIRECTLY TO THE EMERGENCY ROOM.  ___________________________________________  Nurse Signature  ___________________________________________  Patient/Designated Responsible Party Signature  Kiran Paul MD  4/12/2022 11:50:01 AM  This report has been verified and signed electronically.  Dear patient,  As a result of recent federal legislation (The Federal Cures Act), you may   receive lab or pathology results from your procedure in your [facility]   account before your physician is able to contact you. Your physician or   their representative will relay the results to you with their   recommendations at their soonest availability.  Thank you.  [employer]

## 2022-04-12 NOTE — TRANSFER OF CARE
"Anesthesia Transfer of Care Note    Patient: lCemencia Knight    Procedure(s) Performed: Procedure(s) (LRB):  EGD (ESOPHAGOGASTRODUODENOSCOPY) (N/A)    Patient location: PACU    Anesthesia Type: general    Transport from OR: Transported from OR on room air with adequate spontaneous ventilation    Post pain: adequate analgesia    Post assessment: no apparent anesthetic complications and tolerated procedure well    Post vital signs: stable    Level of consciousness: awake and alert    Nausea/Vomiting: no nausea/vomiting    Complications: none    Transfer of care protocol was followed      Last vitals:   Visit Vitals  /70   Pulse (!) 58   Temp 36.2 °C (97.1 °F) (Skin)   Resp 18   Ht 5' 4" (1.626 m)   Wt 72.6 kg (160 lb)   SpO2 99%   Breastfeeding No   BMI 27.46 kg/m²     "

## 2022-04-12 NOTE — ANESTHESIA POSTPROCEDURE EVALUATION
Anesthesia Post Evaluation    Patient: Clemencia Knight    Procedure(s) Performed: Procedure(s) (LRB):  EGD (ESOPHAGOGASTRODUODENOSCOPY) (N/A)    Final Anesthesia Type: general      Patient location during evaluation: PACU  Patient participation: Yes- Able to Participate  Level of consciousness: awake and alert and oriented  Post-procedure vital signs: reviewed and stable  Pain management: adequate  Airway patency: patent    PONV status at discharge: No PONV  Anesthetic complications: no      Cardiovascular status: blood pressure returned to baseline and stable  Respiratory status: unassisted and spontaneous ventilation  Hydration status: euvolemic  Follow-up not needed.          Vitals Value Taken Time   /81 04/12/22 1205   Temp 36.4 °C (97.5 °F) 04/12/22 1155   Pulse 64 04/12/22 1205   Resp 15 04/12/22 1205   SpO2 98 % 04/12/22 1205         Event Time   Out of Recovery 12:25:00         Pain/Ruddy Score: Ruddy Score: 10 (4/12/2022 12:25 PM)

## 2022-04-12 NOTE — H&P
History & Physical - Short Stay  Gastroenterology      SUBJECTIVE:     Procedure: EGD    Chief Complaint/Indication for Procedure: Abdominal Pain    PTA Medications   Medication Sig    cyanocobalamin, vitamin B-12, 1,000 mcg Subl Place 1 tablet under the tongue once daily.    DULoxetine (CYMBALTA) 60 MG capsule Take 1 capsule (60 mg total) by mouth once daily.    famotidine (PEPCID) 40 MG tablet Take 1 tablet (40 mg total) by mouth once daily.    galcanezumab-gnlm 120 mg/mL PnIj Inject 1 pen (120 mg total) into the skin every 28 days. maintenance dose    Lactobac no.41/Bifidobact no.7 (PROBIOTIC-10 ORAL) Take by mouth.    naratriptan (AMERGE) 2.5 MG tablet Take 1 tablet (2.5 mg) at onset of headache, may repeat in 4 hours if needed. Max 2/day. (Patient taking differently: Take 2.5 mg by mouth as needed for Migraine. may repeat in 4 hours if needed. Max 2/day.)    rimegepant (NURTEC) 75 mg odt Take 1 tablet (75 mg total) by mouth once as needed. Place ODT tablet on the tongue; alternatively the ODT tablet may be placed under the tongue    secukinumab (COSENTYX PEN) 150 mg/mL PnIj Inject 150 mg into the skin every 30 days.    tiZANidine (ZANAFLEX) 4 MG tablet Take 1 tablet (4 mg total) by mouth every evening.    clobetasoL (TEMOVATE) 0.05 % cream Apply topically 2 (two) times daily.    fluticasone (FLONASE) 50 mcg/actuation nasal spray 1 spray by Each Nare route once daily.    fluticasone propionate (CUTIVATE) 0.05 % cream Apply topically 2 (two) times daily.    ketoconazole (NIZORAL) 2 % shampoo Apply topically once daily.    nystatin-triamcinolone (MYCOLOG II) cream Apply to affected area 2 times daily (Patient taking differently: Apply 1 g topically daily as needed (rash).)    triamcinolone acetonide 0.025% (KENALOG) 0.025 % cream Apply topically 2 (two) times daily.       Review of patient's allergies indicates:   Allergen Reactions    Contrast media Swelling     Swollen eyes and face     Hydrocodone Itching    Plaquenil [hydroxychloroquine]      Worsening psoriasis    Topiramate      Hair loss, cognitive side effects         Past Medical History:   Diagnosis Date    Allergy     Brain tumor     Drug-induced lupus erythematosus     General anesthetics causing adverse effect in therapeutic use     pt. somewhat aware of extubation with one of her surgeries    GERD (gastroesophageal reflux disease)     Hearing loss     Migraines     Psoriatic arthritis     Raynaud's syndrome without gangrene     Restless leg syndrome     Sciatica     Trigeminal neuralgia      Past Surgical History:   Procedure Laterality Date    AUGMENTATION OF BREAST      BELT ABDOMINOPLASTY      breast implants      CERVICAL FUSION      COLONOSCOPY  10/2012    Dr. Paul; internal hemorrhoid; repeat in 10 years    COLONOSCOPY N/A 11/25/2020    Procedure: COLONOSCOPY;  Surgeon: Kiran Paul MD;  Location: [facility];  Service: Endoscopy;  Laterality: N/A; hemorrhoids; Repeat colonoscopy in 10 years for screening; random biopsy: WNL    DEXA      WNL    EPIDURAL STEROID INJECTION INTO CERVICAL SPINE N/A 09/24/2018    Procedure: Injection-steroid-epidural-cervical;  Surgeon: Myles Rocha MD;  Location: [facility] OR;  Service: Pain Management;  Laterality: N/A;  C7-T1    EPIDURAL STEROID INJECTION INTO CERVICAL SPINE N/A 11/20/2018    Procedure: Injection-steroid-epidural-cervical;  Surgeon: Myles Rocha MD;  Location: [facility] OR;  Service: Pain Management;  Laterality: N/A;  C7-T1    ESOPHAGOGASTRODUODENOSCOPY N/A 12/10/2020    Procedure: EGD (ESOPHAGOGASTRODUODENOSCOPY);  Surgeon: Kiran Paul MD;  Location: [facility];  Service: Endoscopy;  Laterality: N/A; unremarkable; biopsy: duodenum WNL, stomach-minimal chronic inflammation, negative h pylori    HYSTERECTOMY  2000    OOPHORECTOMY  07/16/2013    laparotomy BSO for benign 10cm tubal cyst    SALPINGOOPHORECTOMY  2013    with removal of fallopian cyst     SHOULDER SURGERY      right    TLH  2000    ovaries remain, benign    TONSILLECTOMY, ADENOIDECTOMY, BILATERAL MYRINGOTOMY AND TUBES       Family History   Problem Relation Age of Onset    Cancer Father         bladder    Diabetes Father     Heart disease Father     Diabetes Mother     Melanoma Brother     Charcot-Karla-Tooth disease Brother     Breast cancer Neg Hx     Ovarian cancer Neg Hx     Anesthesia problems Neg Hx     Colon cancer Neg Hx     Crohn's disease Neg Hx     Esophageal cancer Neg Hx     Ulcerative colitis Neg Hx     Stomach cancer Neg Hx     Celiac disease Neg Hx      Social History     Tobacco Use    Smoking status: Never Smoker    Smokeless tobacco: Never Used   Substance Use Topics    Alcohol use: Yes     Comment: rarely    Drug use: No         OBJECTIVE:     Vital Signs (Most Recent)  Temp: 97.1 °F (36.2 °C) (04/12/22 1053)  Pulse: (!) 58 (04/12/22 1053)  Resp: 18 (04/12/22 1053)  BP: (!) 147/70 (04/12/22 1053)  SpO2: 99 % (04/12/22 1053)    Physical Exam:                                                       GENERAL:  Comfortable, in no acute distress.                                 HEENT EXAM:  Nonicteric.  No adenopathy.  Oropharynx is clear.               NECK:  Supple.                                                               LUNGS:  Clear.                                                               CARDIAC:  Regular rate and rhythm.  S1, S2.  No murmur.                      ABDOMEN:  Soft, positive bowel sounds, nontender.  No hepatosplenomegaly or masses.  No rebound or guarding.                                             EXTREMITIES:  No edema.     MENTAL STATUS:  Normal, alert and oriented.      ASSESSMENT/PLAN:     Assessment: Abdominal Pain    Plan: EGD    Anesthesia Plan: General    ASA Grade: ASA 2 - Patient with mild systemic disease with no functional limitations    MALLAMPATI SCORE:  I (soft palate, uvula, fauces, and tonsillar pillars visible)

## 2022-04-13 VITALS
RESPIRATION RATE: 15 BRPM | TEMPERATURE: 98 F | HEIGHT: 64 IN | HEART RATE: 64 BPM | SYSTOLIC BLOOD PRESSURE: 135 MMHG | OXYGEN SATURATION: 98 % | WEIGHT: 160 LBS | DIASTOLIC BLOOD PRESSURE: 81 MMHG | BODY MASS INDEX: 27.31 KG/M2

## 2022-04-19 LAB
FINAL PATHOLOGIC DIAGNOSIS: NORMAL
GROSS: NORMAL
Lab: NORMAL

## 2022-04-19 RX ORDER — CLOBETASOL PROPIONATE 0.5 MG/G
CREAM TOPICAL 2 TIMES DAILY
Qty: 30 G | Refills: 0 | OUTPATIENT
Start: 2022-04-19

## 2022-04-25 DIAGNOSIS — L40.0 PSORIASIS VULGARIS: ICD-10-CM

## 2022-04-26 RX ORDER — KETOCONAZOLE 20 MG/ML
SHAMPOO, SUSPENSION TOPICAL DAILY
Qty: 120 ML | Refills: 1 | Status: SHIPPED | OUTPATIENT
Start: 2022-04-26 | End: 2022-06-16 | Stop reason: SDUPTHER

## 2022-05-09 ENCOUNTER — PATIENT MESSAGE (OUTPATIENT)
Dept: SMOKING CESSATION | Facility: CLINIC | Age: 62
End: 2022-05-09
Payer: COMMERCIAL

## 2022-05-26 RX ORDER — NARATRIPTAN 2.5 MG/1
TABLET ORAL
Qty: 9 TABLET | Refills: 11 | Status: SHIPPED | OUTPATIENT
Start: 2022-05-26 | End: 2022-09-28 | Stop reason: SDUPTHER

## 2022-06-06 DIAGNOSIS — M79.604 PAIN OF RIGHT LOWER EXTREMITY: ICD-10-CM

## 2022-06-06 RX ORDER — TIZANIDINE 4 MG/1
4 TABLET ORAL NIGHTLY
Qty: 90 TABLET | Refills: 0 | Status: CANCELLED | OUTPATIENT
Start: 2022-06-06

## 2022-06-06 NOTE — TELEPHONE ENCOUNTER
No new care gaps identified.  [facility] Embedded Care Gaps. Reference number: [id number]. 6/06/2022   12:08:45 PM CDT

## 2022-06-07 ENCOUNTER — OFFICE VISIT (OUTPATIENT)
Dept: NEUROLOGY | Facility: CLINIC | Age: 62
End: 2022-06-07
Payer: COMMERCIAL

## 2022-06-07 VITALS
BODY MASS INDEX: 28.88 KG/M2 | DIASTOLIC BLOOD PRESSURE: 88 MMHG | HEART RATE: 65 BPM | SYSTOLIC BLOOD PRESSURE: 150 MMHG | RESPIRATION RATE: 17 BRPM | WEIGHT: 169.19 LBS | HEIGHT: 64 IN

## 2022-06-07 DIAGNOSIS — D32.9 MENINGIOMA: ICD-10-CM

## 2022-06-07 DIAGNOSIS — R51.9 FACIAL PAIN: ICD-10-CM

## 2022-06-07 DIAGNOSIS — G43.019 INTRACTABLE MIGRAINE WITHOUT AURA AND WITHOUT STATUS MIGRAINOSUS: Primary | ICD-10-CM

## 2022-06-07 PROCEDURE — 99214 PR OFFICE/OUTPT VISIT, EST, LEVL IV, 30-39 MIN: ICD-10-PCS | Mod: S$GLB,,, | Performed by: PSYCHIATRY & NEUROLOGY

## 2022-06-07 PROCEDURE — 1160F PR REVIEW ALL MEDS BY PRESCRIBER/CLIN PHARMACIST DOCUMENTED: ICD-10-PCS | Mod: CPTII,S$GLB,, | Performed by: PSYCHIATRY & NEUROLOGY

## 2022-06-07 PROCEDURE — 1160F RVW MEDS BY RX/DR IN RCRD: CPT | Mod: CPTII,S$GLB,, | Performed by: PSYCHIATRY & NEUROLOGY

## 2022-06-07 PROCEDURE — 1159F PR MEDICATION LIST DOCUMENTED IN MEDICAL RECORD: ICD-10-PCS | Mod: CPTII,S$GLB,, | Performed by: PSYCHIATRY & NEUROLOGY

## 2022-06-07 PROCEDURE — 3008F BODY MASS INDEX DOCD: CPT | Mod: CPTII,S$GLB,, | Performed by: PSYCHIATRY & NEUROLOGY

## 2022-06-07 PROCEDURE — 99999 PR PBB SHADOW E&M-EST. PATIENT-LVL IV: CPT | Mod: PBBFAC,,, | Performed by: PSYCHIATRY & NEUROLOGY

## 2022-06-07 PROCEDURE — 3079F PR MOST RECENT DIASTOLIC BLOOD PRESSURE 80-89 MM HG: ICD-10-PCS | Mod: CPTII,S$GLB,, | Performed by: PSYCHIATRY & NEUROLOGY

## 2022-06-07 PROCEDURE — 3079F DIAST BP 80-89 MM HG: CPT | Mod: CPTII,S$GLB,, | Performed by: PSYCHIATRY & NEUROLOGY

## 2022-06-07 PROCEDURE — 99999 PR PBB SHADOW E&M-EST. PATIENT-LVL IV: ICD-10-PCS | Mod: PBBFAC,,, | Performed by: PSYCHIATRY & NEUROLOGY

## 2022-06-07 PROCEDURE — 3077F SYST BP >= 140 MM HG: CPT | Mod: CPTII,S$GLB,, | Performed by: PSYCHIATRY & NEUROLOGY

## 2022-06-07 PROCEDURE — 3008F PR BODY MASS INDEX (BMI) DOCUMENTED: ICD-10-PCS | Mod: CPTII,S$GLB,, | Performed by: PSYCHIATRY & NEUROLOGY

## 2022-06-07 PROCEDURE — 99214 OFFICE O/P EST MOD 30 MIN: CPT | Mod: S$GLB,,, | Performed by: PSYCHIATRY & NEUROLOGY

## 2022-06-07 PROCEDURE — 1159F MED LIST DOCD IN RCRD: CPT | Mod: CPTII,S$GLB,, | Performed by: PSYCHIATRY & NEUROLOGY

## 2022-06-07 PROCEDURE — 3077F PR MOST RECENT SYSTOLIC BLOOD PRESSURE >= 140 MM HG: ICD-10-PCS | Mod: CPTII,S$GLB,, | Performed by: PSYCHIATRY & NEUROLOGY

## 2022-06-07 NOTE — PATIENT INSTRUCTIONS
1- For migraine without aura :  A. Continue Emgality 120mg every 28 days.     2- For acute management-  Nurtec ODT 75 mg for acute management (side effect nausea 2%) . Max 1/day.   +/- Naratriptan 2.5 mg, can repeat after 4 hours. Max 2/day

## 2022-06-07 NOTE — PROGRESS NOTES
[facility]- Headache Clinic    Chief complaint: migraine     S:    61Y RH F with PMHx of RLS , psoriatic arthritis, +histone antibody, +SM/RNP antibody, drug induced lupus erythematosus (from Humira 2 months), C6-C7 ACDF, Rt hearing loss, migraine, back pain with sciatica who is here for further evaluation of migraine/facial pain. She was last seen on 3/22 at which time she was on Emgality 120mg monthly doing very well. She had been having Rt sided facial pain per her report neuralgic pain and wanted to trial tegretol, but after starting Tegretol headache worsened significantly.  The pain on description sounded migrainous thus we opted to discontinue Tegretol. She had been using Nurtec which worked very well.      She mentions that she continues to do very well. She is having at most 1 attack per week . As soon as she feels that headache coming on she will use the Nurtec and naratriptan and that will resolve the attack and she will be able to continue to do her daily activities. Overall much better than previously. She continues to find Nurtec/naratriptan combination working very well. When attacks occur they are typically behind the eye and on top of the brow frontally. She has been having some gastric issues. She was admitted to [facility] on 3/22 for abdominal pain and found to have a partial SBO, nausea/vomiting, and right flank pain. She was discharged and has been following up with gastroenterology. She is feeling better. She mentions that they do not know the cause of the SBO.     Medications tried:  Acute:  Rizatriptan 10mg - worked once, but has not been resolving all attacks  Ketorolac 10mg- helps the intensity   Nurtec ODT 75mg- resolves attacks   Naratriptan 2.5mg - resolves attacks     Prevention:  Topiramate from 8/21- 11/21: not much improvement and caused hair loss/cognitive side effects   Cymbalta 60mg daily - no improvement in headache   Tegretol XR 100mg bid - has used PRN when has  had attacks of the electrical pain in the Rt V2 distribution   Gabapentin  - in past no benefit   Magnesium - no benefit   Tizanidine 4mg - no help       Emgality 240mg loading dose, then 120mg monthly - since 11/21- present: over 75% improvement.     Neuroimaging:  MRI brain w/wo contrast (3/7/22):  MRI BRAIN WITHOUT CONTRAST     CLINICAL HISTORY:  monitor meningioma; Benign neoplasm of meninges, unspecified     TECHNIQUE:  Multiplanar multisequence MR imaging of the brain was performed without contrast.     COMPARISON:  04/01/2021     FINDINGS:  There is a 1.0 cm T1 isointense, mildly T2 hyperintense extra-axial mass in the right parietal region, corresponding to the patient's known meningioma.  There is slight mass effect on the subjacent cortex, with no accompanying parenchymal edema.  There has been no interval change in size.  No additional mass is identified the ventricles are nondilated.  There is no hemorrhage or restricted diffusion.  The brainstem and cerebellum are normal mild fluid is present within the right mastoid air cells.     Impression:     Stable appearance of the patient's small right parietal meningioma.  No significant mass effect.     MRI brain w/wo contrast (4/1/21)  FINDINGS:  Trigeminal nerves: The right superior cerebellar artery loops inferiorly and contacts the medial aspect of the proximal cisternal segment of the right trigeminal nerve near the nerve root entry zone, without significant deformity of the nerve (series 11, images 37-38) (series 1101, image 315) (series 1102, images 348-357).  No abnormal signal or enhancement.  There is no significant mass effect or evidence of abnormal signal or enhancement along the cisternal or ganglionic portions of the left trigeminal nerve.  Normal fluid signal filling the Meckel's cave bilaterally.     Ventricles and sulci are normal in size for age without evidence of hydrocephalus. No extra-axial blood or fluid collections.     The brain  appears within normal limits for age, noting only a few scattered punctate foci of T2/FLAIR signal hyperintensity in the supratentorial white matter. Diffusion-weighted images demonstrate no evidence of an acute infarct.   Susceptibility weighted images demonstrate no evidence of acute or chronic hemorrhage.     Note is made of a homogeneously enhancing, extra-axial, dural-based lesion overlying the right parietal lobe, measuring approximately 1.0 x 4.6 x 1.1 cm in AP by TV by cc dimensions, most consistent with a small meningioma.  No significant mass effect or underlying vasogenic edema.  No abnormal intracranial enhancement elsewhere.     Normal vascular flow voids are preserved.     Bone marrow signal intensity is normal. Small right mastoid effusion.  Left mastoid air cells are clear.  Scattered minimal mucosal thickening within the paranasal sinuses.  Orbits are unremarkable.     Impression:     1. Right superior cerebellar artery contacts the proximal cisternal segment of the right trigeminal nerve near the nerve root entry zone.  No significant deformity of the nerve or associated signal abnormality or enhancement.  Findings likely account for the patient's reported right-sided trigeminal neuralgia.  2. Incidental small right parietal meningioma, measuring 1.1 cm.  Otherwise, no acute intracranial abnormality or abnormal enhancement elsewhere.  3. Small right mastoid effusion.    MRA head 4/1/21:  The internal carotid arteries are of normal caliber. The middle cerebral arteries are normal.  Hypoplastic A1 segment of the right anterior cerebral artery, a normal developmental variant.  Anterior communicating artery is present.  Otherwise, the anterior cerebral arteries are patent.  The vertebral arteries are patent. The basilar artery is normal.  The P1 segment of the right posterior cerebral artery is hypoplastic, with a P2 segment supplied by dominant posterior communicating artery, a normal developmental  variant.  Otherwise, the posterior cerebral arteries are patent.  No evidence of high-grade stenosis or large vessel occlusion.  There is no aneurysm or vascular malformation identified.  Although MRA is a screening examination, catheter angiography remains the definitive study for small aneurysms, vasculitis, and other vascular abnormalities.     Impression:     No evidence of intracranial high-grade stenosis, large vessel occlusion, or aneurysm..       ROS: The fourteen point review of system was performed and negative other than HPI.     No changes to PMHx, surgical history or family history since last appt.     Social History     Tobacco Use    Smoking status: Never Smoker    Smokeless tobacco: Never Used   Substance Use Topics    Alcohol use: Yes     Comment: rarely    Drug use: No     Review of patient's allergies indicates:   Allergen Reactions    Contrast media Swelling     Swollen eyes and face    Hydrocodone Itching    Plaquenil [hydroxychloroquine]      Worsening psoriasis    Topiramate      Hair loss, cognitive side effects        Current Outpatient Medications:     clobetasoL (TEMOVATE) 0.05 % cream, Apply topically 2 (two) times daily., Disp: 30 g, Rfl: 0    cyanocobalamin, vitamin B-12, 1,000 mcg Subl, Place 1 tablet under the tongue once daily., Disp: , Rfl:     DULoxetine (CYMBALTA) 60 MG capsule, Take 1 capsule (60 mg total) by mouth once daily., Disp: 90 capsule, Rfl: 3    famotidine (PEPCID) 40 MG tablet, Take 1 tablet (40 mg total) by mouth once daily at night., Disp: 30 tablet, Rfl: 11    fluticasone (FLONASE) 50 mcg/actuation nasal spray, 1 spray by Each Nare route once daily., Disp: , Rfl:     fluticasone propionate (CUTIVATE) 0.05 % cream, Apply topically 2 (two) times daily., Disp: 30 g, Rfl: 1    galcanezumab-gnlm 120 mg/mL PnIj, Inject 1 pen (120 mg total) into the skin every 28 days. maintenance dose, Disp: 1 mL, Rfl: 6    ketoconazole (NIZORAL) 2 % shampoo, Apply  topically once daily., Disp: 120 mL, Rfl: 1    Lactobac no.41/Bifidobact no.7 (PROBIOTIC-10 ORAL), Take by mouth., Disp: , Rfl:     naratriptan (AMERGE) 2.5 MG tablet, Take 1 tablet (2.5 mg) at onset of headache, may repeat in 4 hours if needed. Max 2/day., Disp: 9 tablet, Rfl: 11    nystatin-triamcinolone (MYCOLOG II) cream, Apply to affected area 2 times daily (Patient taking differently: Apply 1 g topically daily as needed (rash).), Disp: 30 g, Rfl: 1    rimegepant (NURTEC) 75 mg odt, Take 1 tablet (75 mg total) by mouth once as needed. Place ODT tablet on the tongue; alternatively the ODT tablet may be placed under the tongue, Disp: 8 tablet, Rfl: 11    secukinumab (COSENTYX PEN) 150 mg/mL PnIj, Inject 150 mg into the skin every 30 days., Disp: 1 mL, Rfl: 6    tiZANidine (ZANAFLEX) 4 MG tablet, Take 1 tablet (4 mg total) by mouth every evening., Disp: 90 tablet, Rfl: 0    triamcinolone acetonide 0.025% (KENALOG) 0.025 % cream, Apply topically 2 (two) times daily., Disp: 80 g, Rfl: 1    PHYSICAL EXAMINATION  There were no vitals filed for this visit.   General: The patient is a well-developed, well-nourished looking of stated age in no acute distress.  NEUROLOGIC EXAM:  MENTAL: The patient is awake, alert and oriented times to time, place, location and situation. Intact recent memory, attention, concentration. Language and speech are normal. No aphasia, no dysarthria  CRANIAL NERVES:   EOMI, no facial asymmetry    MOTOR EXAM: 5/5 throughout in UE/LEs  CEREBELLAR EXAM: no observable dysmetria   GAIT/STANCE: Standard gait was normal with normal stride, arm swing and turning.  No gait ataxia.       Impression:  Migraine without aura, high frequency episodic  - Initially occurring about 2 times a week and they are lasting hours with significant disability and impairment in functionality. She was started on TPX and first months she had improvement, but by second month back at 9 attacks very disabling per month,  not responding as well to rizatriptan , 1 of the attacks lasted 4 days triggered by weather changes. Attacks can be left sided or right sided have light/sound sensitivities, nausea (has had vomiting with very severe attacks), and kinesiophobia. She had side effect from TPX and has already been on tizanidine, cymbalta , gabapentin in the past.  Since starting Emgality she has had significant improvement, barely any migraine attacks. She has found Nurtec ODT +/- naratriptan resolves the attacks. At most 1 attack per week, they are treated at mild pain. Things have continued to do well. Will continue current regimen.     Facial pain -   Right sided facial pain from the inferior orbital down in a linear aspect into the upper teeth- sharp/electrical pain lasting about 30 minutes x 3 times at times thought to be provoked by some maneuvers like flossing/brushing teeth lasting 30 minutes of the severe pain without any migrainous or autonomic features about 1 time per month and then resolving until next month. This pain is not consistent with TN which pain lasts second up to 2 minutes. There were no recurrent paroxysms it was once pain which was sharp 30 minutes of continuous pain. This is not consistent with trigeminal neuralgia, not consistent with a trigeminal autonomic cephalalgia or migraine. 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 Neuroimaging showing contact between Right SCA and Rt CN V she has remained pain free wihtout medication. No surgical intervention. No recurrence.       Other medical conditions:  RLS , psoriatic arthritis, +histone antibody, +SM/RNP antibody, drug induced lupus erythematosus (from Humira 2 months), C6-C7 ACDF  Rt parietal meningioma small - stable.     Plan:   1- For migraine without aura :  A. Continue Emgality 120mg every 28 days.     2- For acute management-   Nurtec ODT 75mg for acute management (side effect nausea 2%) . Max 1/day.   +/- Naratriptan 2.5mg, can repeat after 4  hours. Max 2/day       RTC in 6  months.       Sue Todd MD   Board Certified Neurologist  [facility] Certified Headache Medicine

## 2022-06-09 DIAGNOSIS — M79.604 PAIN OF RIGHT LOWER EXTREMITY: ICD-10-CM

## 2022-06-09 NOTE — TELEPHONE ENCOUNTER
No new care gaps identified.  [facility] Embedded Care Gaps. Reference number: [id number]. 6/09/2022   4:09:32 PM CDT

## 2022-06-13 RX ORDER — TIZANIDINE 4 MG/1
4 TABLET ORAL NIGHTLY
Qty: 90 TABLET | Refills: 0 | Status: SHIPPED | OUTPATIENT
Start: 2022-06-13 | End: 2022-09-08

## 2022-06-16 DIAGNOSIS — L40.0 PSORIASIS VULGARIS: ICD-10-CM

## 2022-06-16 RX ORDER — KETOCONAZOLE 20 MG/ML
SHAMPOO, SUSPENSION TOPICAL DAILY
Qty: 120 ML | Refills: 1 | Status: CANCELLED | OUTPATIENT
Start: 2022-06-16

## 2022-06-16 RX ORDER — KETOCONAZOLE 20 MG/ML
SHAMPOO, SUSPENSION TOPICAL DAILY
Qty: 120 ML | Refills: 1 | Status: SHIPPED | OUTPATIENT
Start: 2022-06-16 | End: 2022-07-18 | Stop reason: SDUPTHER

## 2022-06-20 ENCOUNTER — LAB VISIT (OUTPATIENT)
Dept: LAB | Facility: HOSPITAL | Age: 62
End: 2022-06-20
Attending: INTERNAL MEDICINE
Payer: COMMERCIAL

## 2022-06-20 DIAGNOSIS — M19.90 OSTEOARTHRITIS, UNSPECIFIED OSTEOARTHRITIS TYPE, UNSPECIFIED SITE: ICD-10-CM

## 2022-06-20 DIAGNOSIS — L40.50 PSORIATIC ARTHRITIS: ICD-10-CM

## 2022-06-20 DIAGNOSIS — I73.00 RAYNAUD'S PHENOMENON WITHOUT GANGRENE: ICD-10-CM

## 2022-06-20 DIAGNOSIS — L40.8 INVERSE PSORIASIS: ICD-10-CM

## 2022-06-20 DIAGNOSIS — F33.1 MODERATE EPISODE OF RECURRENT MAJOR DEPRESSIVE DISORDER: ICD-10-CM

## 2022-06-20 LAB
ALBUMIN SERPL BCP-MCNC: 4.2 G/DL (ref 3.5–5.2)
ALP SERPL-CCNC: 64 U/L (ref 55–135)
ALT SERPL W/O P-5'-P-CCNC: 12 U/L (ref 10–44)
ANION GAP SERPL CALC-SCNC: 8 MMOL/L (ref 8–16)
AST SERPL-CCNC: 19 U/L (ref 10–40)
BASOPHILS # BLD AUTO: 0.04 K/UL (ref 0–0.2)
BASOPHILS NFR BLD: 0.5 % (ref 0–1.9)
BILIRUB SERPL-MCNC: 0.6 MG/DL (ref 0.1–1)
BUN SERPL-MCNC: 16 MG/DL (ref 6–20)
CALCIUM SERPL-MCNC: 9.5 MG/DL (ref 8.7–10.5)
CHLORIDE SERPL-SCNC: 103 MMOL/L (ref 95–110)
CO2 SERPL-SCNC: 28 MMOL/L (ref 23–29)
CREAT SERPL-MCNC: 0.8 MG/DL (ref 0.5–1.4)
CRP SERPL-MCNC: 4.8 MG/L (ref 0–8.2)
DIFFERENTIAL METHOD: ABNORMAL
EOSINOPHIL # BLD AUTO: 0.3 K/UL (ref 0–0.5)
EOSINOPHIL NFR BLD: 3.4 % (ref 0–8)
ERYTHROCYTE [DISTWIDTH] IN BLOOD BY AUTOMATED COUNT: 12.6 % (ref 11.5–14.5)
ERYTHROCYTE [SEDIMENTATION RATE] IN BLOOD BY WESTERGREN METHOD: 4 MM/HR (ref 0–36)
EST. GFR  (AFRICAN AMERICAN): >60 ML/MIN/1.73 M^2
EST. GFR  (NON AFRICAN AMERICAN): >60 ML/MIN/1.73 M^2
GLUCOSE SERPL-MCNC: 95 MG/DL (ref 70–110)
HCT VFR BLD AUTO: 41.6 % (ref 37–48.5)
HGB BLD-MCNC: 13.6 G/DL (ref 12–16)
IMM GRANULOCYTES # BLD AUTO: 0.02 K/UL (ref 0–0.04)
IMM GRANULOCYTES NFR BLD AUTO: 0.3 % (ref 0–0.5)
LYMPHOCYTES # BLD AUTO: 2.4 K/UL (ref 1–4.8)
LYMPHOCYTES NFR BLD: 31.3 % (ref 18–48)
MCH RBC QN AUTO: 31.3 PG (ref 27–31)
MCHC RBC AUTO-ENTMCNC: 32.7 G/DL (ref 32–36)
MCV RBC AUTO: 96 FL (ref 82–98)
MONOCYTES # BLD AUTO: 0.6 K/UL (ref 0.3–1)
MONOCYTES NFR BLD: 7.9 % (ref 4–15)
NEUTROPHILS # BLD AUTO: 4.3 K/UL (ref 1.8–7.7)
NEUTROPHILS NFR BLD: 56.6 % (ref 38–73)
NRBC BLD-RTO: 0 /100 WBC
PLATELET # BLD AUTO: 326 K/UL (ref 150–450)
PMV BLD AUTO: 10.6 FL (ref 9.2–12.9)
POTASSIUM SERPL-SCNC: 4.5 MMOL/L (ref 3.5–5.1)
PROT SERPL-MCNC: 7.1 G/DL (ref 6–8.4)
RBC # BLD AUTO: 4.35 M/UL (ref 4–5.4)
SARS-COV-2 IGG SERPL IA-ACNC: 764.2 AU/ML
SARS-COV-2 IGG SERPL QL IA: POSITIVE
SODIUM SERPL-SCNC: 139 MMOL/L (ref 136–145)
WBC # BLD AUTO: 7.58 K/UL (ref 3.9–12.7)

## 2022-06-20 PROCEDURE — 85652 RBC SED RATE AUTOMATED: CPT | Performed by: INTERNAL MEDICINE

## 2022-06-20 PROCEDURE — 36415 COLL VENOUS BLD VENIPUNCTURE: CPT | Mod: PO | Performed by: INTERNAL MEDICINE

## 2022-06-20 PROCEDURE — 86140 C-REACTIVE PROTEIN: CPT | Performed by: INTERNAL MEDICINE

## 2022-06-20 PROCEDURE — 86769 SARS-COV-2 COVID-19 ANTIBODY: CPT | Performed by: INTERNAL MEDICINE

## 2022-06-20 PROCEDURE — 80053 COMPREHEN METABOLIC PANEL: CPT | Performed by: INTERNAL MEDICINE

## 2022-06-20 PROCEDURE — 85025 COMPLETE CBC W/AUTO DIFF WBC: CPT | Performed by: INTERNAL MEDICINE

## 2022-06-30 ENCOUNTER — OFFICE VISIT (OUTPATIENT)
Dept: RHEUMATOLOGY | Facility: CLINIC | Age: 62
End: 2022-06-30
Payer: COMMERCIAL

## 2022-06-30 VITALS
WEIGHT: 169 LBS | HEIGHT: 64 IN | HEART RATE: 72 BPM | DIASTOLIC BLOOD PRESSURE: 78 MMHG | BODY MASS INDEX: 28.85 KG/M2 | SYSTOLIC BLOOD PRESSURE: 128 MMHG

## 2022-06-30 DIAGNOSIS — L40.50 PSORIATIC ARTHRITIS: Primary | ICD-10-CM

## 2022-06-30 DIAGNOSIS — Z79.899 IMMUNOCOMPROMISED STATE DUE TO DRUG THERAPY: ICD-10-CM

## 2022-06-30 DIAGNOSIS — I73.00 RAYNAUD'S PHENOMENON WITHOUT GANGRENE: ICD-10-CM

## 2022-06-30 DIAGNOSIS — D84.821 IMMUNOCOMPROMISED STATE DUE TO DRUG THERAPY: ICD-10-CM

## 2022-06-30 DIAGNOSIS — L40.8 INVERSE PSORIASIS: ICD-10-CM

## 2022-06-30 PROCEDURE — 99214 OFFICE O/P EST MOD 30 MIN: CPT | Mod: S$GLB,,, | Performed by: PHYSICIAN ASSISTANT

## 2022-06-30 PROCEDURE — 3078F PR MOST RECENT DIASTOLIC BLOOD PRESSURE < 80 MM HG: ICD-10-PCS | Mod: CPTII,S$GLB,, | Performed by: PHYSICIAN ASSISTANT

## 2022-06-30 PROCEDURE — 3008F PR BODY MASS INDEX (BMI) DOCUMENTED: ICD-10-PCS | Mod: CPTII,S$GLB,, | Performed by: PHYSICIAN ASSISTANT

## 2022-06-30 PROCEDURE — 99999 PR PBB SHADOW E&M-EST. PATIENT-LVL IV: ICD-10-PCS | Mod: PBBFAC,,, | Performed by: PHYSICIAN ASSISTANT

## 2022-06-30 PROCEDURE — 1160F PR REVIEW ALL MEDS BY PRESCRIBER/CLIN PHARMACIST DOCUMENTED: ICD-10-PCS | Mod: CPTII,S$GLB,, | Performed by: PHYSICIAN ASSISTANT

## 2022-06-30 PROCEDURE — 3074F PR MOST RECENT SYSTOLIC BLOOD PRESSURE < 130 MM HG: ICD-10-PCS | Mod: CPTII,S$GLB,, | Performed by: PHYSICIAN ASSISTANT

## 2022-06-30 PROCEDURE — 1159F PR MEDICATION LIST DOCUMENTED IN MEDICAL RECORD: ICD-10-PCS | Mod: CPTII,S$GLB,, | Performed by: PHYSICIAN ASSISTANT

## 2022-06-30 PROCEDURE — 1160F RVW MEDS BY RX/DR IN RCRD: CPT | Mod: CPTII,S$GLB,, | Performed by: PHYSICIAN ASSISTANT

## 2022-06-30 PROCEDURE — 3008F BODY MASS INDEX DOCD: CPT | Mod: CPTII,S$GLB,, | Performed by: PHYSICIAN ASSISTANT

## 2022-06-30 PROCEDURE — 1159F MED LIST DOCD IN RCRD: CPT | Mod: CPTII,S$GLB,, | Performed by: PHYSICIAN ASSISTANT

## 2022-06-30 PROCEDURE — 99214 PR OFFICE/OUTPT VISIT, EST, LEVL IV, 30-39 MIN: ICD-10-PCS | Mod: S$GLB,,, | Performed by: PHYSICIAN ASSISTANT

## 2022-06-30 PROCEDURE — 3074F SYST BP LT 130 MM HG: CPT | Mod: CPTII,S$GLB,, | Performed by: PHYSICIAN ASSISTANT

## 2022-06-30 PROCEDURE — 3078F DIAST BP <80 MM HG: CPT | Mod: CPTII,S$GLB,, | Performed by: PHYSICIAN ASSISTANT

## 2022-06-30 PROCEDURE — 99999 PR PBB SHADOW E&M-EST. PATIENT-LVL IV: CPT | Mod: PBBFAC,,, | Performed by: PHYSICIAN ASSISTANT

## 2022-06-30 NOTE — PROGRESS NOTES
Subjective:       Patient ID: Clemencia Knight is a 61 y.o. female.    Chief Complaint: Disease Management    Mrs. Knight is a 61 year old female who presents to clinic for follow up on psoriatic arthritis. She is doing fairly well overall on cosentyx 150 mg monthly. She underwent extensive GI work up and this was unremarkable. She continues to have belching and gas. Taking famotidine nightly. She feels cosentyx is working well for her arthritis. She has intermittent swelling and stiffness in her hands, neck, and shoulders. She has history of cervical fusion and has chronic neck/shoulder discomfort. Using machine roller to help with muscle tightness.     Rayanuds is active in her hands and feet. She feels hands are senstivity to extremes of temperature. No ulcerations. Manageable with conservative measures.    Psoriasis remains on her scalp and lower back/buttock which is stable with cosentyx and topical steroids.    We reviewed her recent labs.  BP is stable.     HA are stable with emgality and nurtec.    Current tx:  1. cosentyx 150    Prior tx:  1. Otezla  2. Stelara  3. MTX  4. Humira    Review of Systems   Constitutional: Positive for activity change and fatigue. Negative for appetite change, chills, fever and unexpected weight change.   HENT: Negative for mouth sores and trouble swallowing.    Eyes: Negative for redness and visual disturbance.   Respiratory: Negative for cough and shortness of breath.    Cardiovascular: Negative for chest pain, palpitations and leg swelling.   Gastrointestinal: Negative for abdominal pain, constipation, diarrhea, nausea and vomiting.        Belching   Genitourinary: Negative for dysuria, genital sores and pelvic pain.   Musculoskeletal: Positive for arthralgias, joint swelling, myalgias and neck pain.   Skin: Negative for rash.   Allergic/Immunologic: Positive for immunocompromised state.   Neurological: Positive for headaches. Negative for dizziness, weakness and  light-headedness.   Hematological: Does not bruise/bleed easily.         Objective:     Vitals:    06/30/22 0758   BP: 128/78   Pulse: 72       Past Medical History:   Diagnosis Date    Allergy     Brain tumor     Drug-induced lupus erythematosus     General anesthetics causing adverse effect in therapeutic use     pt. somewhat aware of extubation with one of her surgeries    GERD (gastroesophageal reflux disease)     Hearing loss     Migraines     Psoriatic arthritis     Raynaud's syndrome without gangrene     Restless leg syndrome     Sciatica     Trigeminal neuralgia      Past Surgical History:   Procedure Laterality Date    AUGMENTATION OF BREAST      BELT ABDOMINOPLASTY      breast implants      CERVICAL FUSION      COLONOSCOPY  10/2012    Dr. Paul; internal hemorrhoid; repeat in 10 years    COLONOSCOPY N/A 11/25/2020    Procedure: COLONOSCOPY;  Surgeon: Kiran Paul MD;  Location: [facility];  Service: Endoscopy;  Laterality: N/A; hemorrhoids; Repeat colonoscopy in 10 years for screening; random biopsy: WNL    DEXA      WNL    EPIDURAL STEROID INJECTION INTO CERVICAL SPINE N/A 09/24/2018    Procedure: Injection-steroid-epidural-cervical;  Surgeon: Myles Rocha MD;  Location: [facility] OR;  Service: Pain Management;  Laterality: N/A;  C7-T1    EPIDURAL STEROID INJECTION INTO CERVICAL SPINE N/A 11/20/2018    Procedure: Injection-steroid-epidural-cervical;  Surgeon: Myles Rocha MD;  Location: [facility] OR;  Service: Pain Management;  Laterality: N/A;  C7-T1    ESOPHAGOGASTRODUODENOSCOPY N/A 12/10/2020    Procedure: EGD (ESOPHAGOGASTRODUODENOSCOPY);  Surgeon: Kiran Paul MD;  Location: [facility];  Service: Endoscopy;  Laterality: N/A; unremarkable; biopsy: duodenum WNL, stomach-minimal chronic inflammation, negative h pylori    ESOPHAGOGASTRODUODENOSCOPY N/A 4/12/2022    Procedure: EGD (ESOPHAGOGASTRODUODENOSCOPY);  Surgeon: Kiran Paul MD;  Location: [facility];  Service:  Endoscopy;  Laterality: N/A;    HYSTERECTOMY  2000    OOPHORECTOMY  07/16/2013    laparotomy BSO for benign 10cm tubal cyst    SALPINGOOPHORECTOMY  2013    with removal of fallopian cyst    SHOULDER SURGERY      right    TLH  2000    ovaries remain, benign    TONSILLECTOMY, ADENOIDECTOMY, BILATERAL MYRINGOTOMY AND TUBES            Physical Exam   Eyes: Right conjunctiva is not injected. Left conjunctiva is not injected.   Neck: No JVD present. No thyromegaly present.   Cardiovascular: Normal rate and regular rhythm. Exam reveals no decreased pulses.   Pulmonary/Chest: Effort normal.   Musculoskeletal:      Right shoulder: Normal.      Left shoulder: Normal.      Right elbow: Normal.      Left elbow: Normal.      Right wrist: Normal.      Left wrist: Normal.      Right knee: Normal.      Left knee: Normal.   Lymphadenopathy:     She has no cervical adenopathy.   Neurological: Gait normal.   Skin: Rash (psoriasis) noted.   Psychiatric: Mood and affect normal.       Right Side Rheumatological Exam     Examination finds the shoulder, elbow, wrist, knee, 1st MCP, 2nd MCP, 3rd MCP, 4th MCP and 5th MCP normal.    The patient is tender to palpation of the 1st PIP, 2nd PIP, 3rd PIP, 4th PIP and 5th PIP    She has swelling of the 1st PIP, 2nd PIP, 3rd PIP and 4th PIP    Left Side Rheumatological Exam     Examination finds the shoulder, elbow, wrist, knee, 1st MCP, 2nd MCP, 3rd MCP, 4th MCP and 5th MCP normal.    The patient is tender to palpation of the 1st PIP, 2nd PIP, 3rd PIP, 4th PIP and 5th PIP.          Labs reviewed:  Component      Latest Ref Rng & Units 6/20/2022   WBC      3.90 - 12.70 K/uL 7.58   RBC      4.00 - 5.40 M/uL 4.35   Hemoglobin      12.0 - 16.0 g/dL 13.6   Hematocrit      37.0 - 48.5 % 41.6   MCV      82 - 98 fL 96   MCH      27.0 - 31.0 pg 31.3 (H)   MCHC      32.0 - 36.0 g/dL 32.7   RDW      11.5 - 14.5 % 12.6   Platelets      150 - 450 K/uL 326   MPV      9.2 - 12.9 fL 10.6   Immature  Granulocytes      0.0 - 0.5 % 0.3   Gran # (ANC)      1.8 - 7.7 K/uL 4.3   Immature Grans (Abs)      0.00 - 0.04 K/uL 0.02   Lymph #      1.0 - 4.8 K/uL 2.4   Mono #      0.3 - 1.0 K/uL 0.6   Eos #      0.0 - 0.5 K/uL 0.3   Baso #      0.00 - 0.20 K/uL 0.04   nRBC      0 /100 WBC 0   Gran %      38.0 - 73.0 % 56.6   Lymph %      18.0 - 48.0 % 31.3   Mono %      4.0 - 15.0 % 7.9   Eosinophil %      0.0 - 8.0 % 3.4   Basophil %      0.0 - 1.9 % 0.5   Differential Method       Automated   Sodium      136 - 145 mmol/L 139   Potassium      3.5 - 5.1 mmol/L 4.5   Chloride      95 - 110 mmol/L 103   CO2      23 - 29 mmol/L 28   Glucose      70 - 110 mg/dL 95   BUN      6 - 20 mg/dL 16   Creatinine      0.5 - 1.4 mg/dL 0.8   Calcium      8.7 - 10.5 mg/dL 9.5   PROTEIN TOTAL      6.0 - 8.4 g/dL 7.1   Albumin      3.5 - 5.2 g/dL 4.2   BILIRUBIN TOTAL      0.1 - 1.0 mg/dL 0.6   Alkaline Phosphatase      55 - 135 U/L 64   AST      10 - 40 U/L 19   ALT      10 - 44 U/L 12   Anion Gap      8 - 16 mmol/L 8   eGFR if African American      >60 mL/min/1.73 m:2 >60.0   eGFR if non African American      >60 mL/min/1.73 m:2 >60.0   COVID-19 (SARS CoV-2) IgG Antibody Quantitative      AU/ml 764.2   COVID-19 (SARS CoV-2) IgG Antibody Interpretation       Positive   Sed Rate      0 - 36 mm/Hr 4   CRP      0.0 - 8.2 mg/L 4.8       Assessment:       1. Psoriatic arthritis    2. Inverse psoriasis    3. Raynaud's phenomenon without gangrene    4. Immunocompromised state due to drug therapy            Plan:       Psoriatic arthritis    Inverse psoriasis    Raynaud's phenomenon without gangrene    Immunocompromised state due to drug therapy        Assessment:  61 year old female with  Psoriatic arthritis, +histone antibody, +Sm/RNP antibody, negative GENE, elevated CRP (normalized), Raynauds  --inverse psoriasis  --chronic migraine  --hx of small bowel obstruction  --hx of cervical fusion    Plan:  1. Cosentyx 150 mg monthly  2. Tizanidine 4 mg  qhs for RLS  3. Cymbalta 60 mg daily  4. Cont pepcid  5. Cont topical steroids prn psoriasis flare  6. Cont conservative tx for raynauds. May consider low dose ccb if needed and avoid triptans if sx worsen  7. Consider checking antiparietal cell antibodies, anti-intrinsic factor antibodies, and serum gastrin, and b12 in the future   8. Discussed covid antibody and pt wishes to hold off on additional vaccinations at this time    Follow up:  4 mo w/ Dr. Becerril

## 2022-07-18 DIAGNOSIS — L40.0 PSORIASIS VULGARIS: ICD-10-CM

## 2022-07-18 RX ORDER — KETOCONAZOLE 20 MG/ML
SHAMPOO, SUSPENSION TOPICAL DAILY
Qty: 120 ML | Refills: 1 | Status: SHIPPED | OUTPATIENT
Start: 2022-07-18 | End: 2022-09-08

## 2022-07-27 DIAGNOSIS — L40.50 PSORIATIC ARTHRITIS: ICD-10-CM

## 2022-07-27 NOTE — TELEPHONE ENCOUNTER
Pharmacy requesting refill on Cosentyx 150mg  Pt's LOV 03/28/2022  Pt's NOV 09/28/2022  Medication pending

## 2022-07-30 RX ORDER — SECUKINUMAB 150 MG/ML
150 INJECTION SUBCUTANEOUS
Qty: 1 ML | Refills: 6 | Status: SHIPPED | OUTPATIENT
Start: 2022-07-30 | End: 2022-09-28 | Stop reason: SDUPTHER

## 2022-08-02 ENCOUNTER — SPECIALTY PHARMACY (OUTPATIENT)
Dept: PHARMACY | Facility: CLINIC | Age: 62
End: 2022-08-02
Payer: COMMERCIAL

## 2022-08-08 NOTE — TELEPHONE ENCOUNTER
Cosentyx PA approved from 7/3/22 to 8/7/23.  Case ID: [id number]     Benefits Investigation:    Insurance name: [employer]  Rep name: Kj TERRAZAS    Copay amount: $0  Deductible: $480 (satisfied)   OOPmax: $1800 (satisfied)  OSP in network? No  Tier level (if applicable): N/A     Must use [facility] [phone]

## 2022-08-08 NOTE — TELEPHONE ENCOUNTER
Outgoing call to notify patient of Cosentyx approval and transfer to [facility] per plan requirement. Patient familiar with [facility] and already has their phone number.

## 2022-08-30 ENCOUNTER — TELEPHONE (OUTPATIENT)
Dept: PHARMACY | Facility: CLINIC | Age: 62
End: 2022-08-30
Payer: COMMERCIAL

## 2022-08-30 ENCOUNTER — PATIENT MESSAGE (OUTPATIENT)
Dept: FAMILY MEDICINE | Facility: CLINIC | Age: 62
End: 2022-08-30
Payer: COMMERCIAL

## 2022-09-02 ENCOUNTER — OFFICE VISIT (OUTPATIENT)
Dept: PAIN MEDICINE | Facility: CLINIC | Age: 62
End: 2022-09-02
Payer: COMMERCIAL

## 2022-09-02 ENCOUNTER — OFFICE VISIT (OUTPATIENT)
Dept: FAMILY MEDICINE | Facility: CLINIC | Age: 62
End: 2022-09-02
Payer: COMMERCIAL

## 2022-09-02 VITALS
SYSTOLIC BLOOD PRESSURE: 127 MMHG | HEIGHT: 64 IN | HEART RATE: 80 BPM | DIASTOLIC BLOOD PRESSURE: 85 MMHG | WEIGHT: 178.88 LBS | BODY MASS INDEX: 30.54 KG/M2

## 2022-09-02 VITALS
OXYGEN SATURATION: 99 % | HEIGHT: 64 IN | BODY MASS INDEX: 30.56 KG/M2 | DIASTOLIC BLOOD PRESSURE: 76 MMHG | WEIGHT: 179 LBS | HEART RATE: 80 BPM | SYSTOLIC BLOOD PRESSURE: 126 MMHG

## 2022-09-02 DIAGNOSIS — M54.50 ACUTE LEFT-SIDED LOW BACK PAIN WITHOUT SCIATICA: Primary | ICD-10-CM

## 2022-09-02 DIAGNOSIS — M54.50 ACUTE LEFT-SIDED LOW BACK PAIN, UNSPECIFIED WHETHER SCIATICA PRESENT: ICD-10-CM

## 2022-09-02 PROCEDURE — 99999 PR PBB SHADOW E&M-EST. PATIENT-LVL IV: CPT | Mod: PBBFAC,,, | Performed by: PHYSICIAN ASSISTANT

## 2022-09-02 PROCEDURE — 3074F SYST BP LT 130 MM HG: CPT | Mod: CPTII,S$GLB,, | Performed by: PHYSICIAN ASSISTANT

## 2022-09-02 PROCEDURE — 3078F DIAST BP <80 MM HG: CPT | Mod: CPTII,S$GLB,, | Performed by: PHYSICIAN ASSISTANT

## 2022-09-02 PROCEDURE — 3079F DIAST BP 80-89 MM HG: CPT | Mod: CPTII,S$GLB,, | Performed by: PHYSICIAN ASSISTANT

## 2022-09-02 PROCEDURE — 1159F MED LIST DOCD IN RCRD: CPT | Mod: CPTII,S$GLB,, | Performed by: PHYSICIAN ASSISTANT

## 2022-09-02 PROCEDURE — 99999 PR PBB SHADOW E&M-EST. PATIENT-LVL IV: ICD-10-PCS | Mod: PBBFAC,,, | Performed by: PHYSICIAN ASSISTANT

## 2022-09-02 PROCEDURE — 99214 OFFICE O/P EST MOD 30 MIN: CPT | Mod: S$GLB,,, | Performed by: PHYSICIAN ASSISTANT

## 2022-09-02 PROCEDURE — 3008F BODY MASS INDEX DOCD: CPT | Mod: CPTII,S$GLB,, | Performed by: PHYSICIAN ASSISTANT

## 2022-09-02 PROCEDURE — 99214 PR OFFICE/OUTPT VISIT, EST, LEVL IV, 30-39 MIN: ICD-10-PCS | Mod: S$GLB,,, | Performed by: PHYSICIAN ASSISTANT

## 2022-09-02 PROCEDURE — 3079F PR MOST RECENT DIASTOLIC BLOOD PRESSURE 80-89 MM HG: ICD-10-PCS | Mod: CPTII,S$GLB,, | Performed by: PHYSICIAN ASSISTANT

## 2022-09-02 PROCEDURE — 1159F PR MEDICATION LIST DOCUMENTED IN MEDICAL RECORD: ICD-10-PCS | Mod: CPTII,S$GLB,, | Performed by: PHYSICIAN ASSISTANT

## 2022-09-02 PROCEDURE — 1160F PR REVIEW ALL MEDS BY PRESCRIBER/CLIN PHARMACIST DOCUMENTED: ICD-10-PCS | Mod: CPTII,S$GLB,, | Performed by: PHYSICIAN ASSISTANT

## 2022-09-02 PROCEDURE — 3008F PR BODY MASS INDEX (BMI) DOCUMENTED: ICD-10-PCS | Mod: CPTII,S$GLB,, | Performed by: PHYSICIAN ASSISTANT

## 2022-09-02 PROCEDURE — 3074F PR MOST RECENT SYSTOLIC BLOOD PRESSURE < 130 MM HG: ICD-10-PCS | Mod: CPTII,S$GLB,, | Performed by: PHYSICIAN ASSISTANT

## 2022-09-02 PROCEDURE — 1160F RVW MEDS BY RX/DR IN RCRD: CPT | Mod: CPTII,S$GLB,, | Performed by: PHYSICIAN ASSISTANT

## 2022-09-02 PROCEDURE — 3078F PR MOST RECENT DIASTOLIC BLOOD PRESSURE < 80 MM HG: ICD-10-PCS | Mod: CPTII,S$GLB,, | Performed by: PHYSICIAN ASSISTANT

## 2022-09-02 RX ORDER — METHYLPREDNISOLONE 4 MG/1
TABLET ORAL
Qty: 21 TABLET | Refills: 0 | Status: SHIPPED | OUTPATIENT
Start: 2022-09-02 | End: 2022-09-12

## 2022-09-02 NOTE — PROGRESS NOTES
Back and Spine Consult    Patient ID: Clemencia Knight is a 62 y.o. female.    Chief Complaint   Patient presents with    Low-back Pain     Since 8-27-22, left-sided , constant.       Review of Systems   Constitutional:  Negative for activity change, appetite change, chills, fever and unexpected weight change.   HENT:  Negative for tinnitus, trouble swallowing and voice change.    Respiratory:  Negative for apnea, cough, chest tightness and shortness of breath.    Cardiovascular:  Negative for chest pain and palpitations.   Gastrointestinal:  Negative for constipation, diarrhea, nausea and vomiting.   Genitourinary:  Negative for difficulty urinating, dysuria, frequency and urgency.   Musculoskeletal:  Positive for back pain. Negative for gait problem, neck pain and neck stiffness.   Skin:  Negative for wound.   Neurological:  Negative for dizziness, tremors, seizures, facial asymmetry, speech difficulty, weakness, light-headedness, numbness and headaches.   Psychiatric/Behavioral:  Negative for confusion and decreased concentration.      Past Medical History:   Diagnosis Date    Allergy     Brain tumor     Drug-induced lupus erythematosus     General anesthetics causing adverse effect in therapeutic use     pt. somewhat aware of extubation with one of her surgeries    GERD (gastroesophageal reflux disease)     Hearing loss     Migraines     Psoriatic arthritis     Raynaud's syndrome without gangrene     Restless leg syndrome     Sciatica     Trigeminal neuralgia      Social History     Socioeconomic History    Marital status:    Occupational History     Employer: OTHER   Tobacco Use    Smoking status: Never    Smokeless tobacco: Never   Substance and Sexual Activity    Alcohol use: Yes     Comment: rarely    Drug use: No    Sexual activity: Yes     Partners: Male     Birth control/protection: Surgical   Other Topics Concern    Are you pregnant or think you may be? No     Social Determinants of Health      Financial Resource Strain: Low Risk     Difficulty of Paying Living Expenses: Not very hard   Food Insecurity: No Food Insecurity    Worried About Running Out of Food in the Last Year: Never true    Ran Out of Food in the Last Year: Never true   Transportation Needs: No Transportation Needs    Lack of Transportation (Medical): No    Lack of Transportation (Non-Medical): No   Physical Activity: Insufficiently Active    Days of Exercise per Week: 2 days    Minutes of Exercise per Session: 30 min   Stress: No Stress Concern Present    Feeling of Stress : Not at all   Social Connections: Unknown    Frequency of Communication with Friends and Family: Three times a week    Frequency of Social Gatherings with Friends and Family: Once a week    Active Member of Clubs or Organizations: Yes    Attends Club or Organization Meetings: More than 4 times per year    Marital Status:    Housing Stability: Low Risk     Unable to Pay for Housing in the Last Year: No    Number of Places Lived in the Last Year: 1    Unstable Housing in the Last Year: No     Family History   Problem Relation Age of Onset    Cancer Father         bladder    Diabetes Father     Heart disease Father     Diabetes Mother     Melanoma Brother     Charcot-Karla-Tooth disease Brother     Breast cancer Neg Hx     Ovarian cancer Neg Hx     Anesthesia problems Neg Hx     Colon cancer Neg Hx     Crohn's disease Neg Hx     Esophageal cancer Neg Hx     Ulcerative colitis Neg Hx     Stomach cancer Neg Hx     Celiac disease Neg Hx      Review of patient's allergies indicates:   Allergen Reactions    Contrast media Swelling     Swollen eyes and face    Hydrocodone Itching    Plaquenil [hydroxychloroquine]      Worsening psoriasis    Topiramate      Hair loss, cognitive side effects        Current Outpatient Medications:     galcanezumab-gnlm 120 mg/mL Annmarie, Inject 1 pen (120 mg total) into the skin every 28 days. maintenance dose, Disp: 1 mL, Rfl: 11     "LIDOcaine (LIDODERM) 5 %, Place 1 patch onto the skin once daily. Remove & Discard patch within 12 hours or as directed by MD, Disp: 30 patch, Rfl: 0    meloxicam (MOBIC) 7.5 MG tablet, Take 1 tablet (7.5 mg total) by mouth once daily., Disp: 10 tablet, Rfl: 0    naratriptan (AMERGE) 2.5 MG tablet, Take 1 tablet (2.5 mg) by mouth at onset of headache. May repeat in 4 hours, if needed. Max 2 tablets/day., Disp: 9 tablet, Rfl: 11    secukinumab (COSENTYX PEN) 150 mg/mL PnIj, Inject 150 mg into the skin every 30 days., Disp: 1 mL, Rfl: 6    fluticasone (FLONASE) 50 mcg/actuation nasal spray, 1 spray by Each Nare route once daily., Disp: , Rfl:     fluticasone propionate (CUTIVATE) 0.05 % cream, Apply topically 2 (two) times daily., Disp: 30 g, Rfl: 1    methylPREDNISolone (MEDROL, QUINTIN,) 4 mg tablet, use as directed on inside of package, Disp: 21 tablet, Rfl: 0    Vitals:    09/02/22 1416   BP: 127/85   BP Location: Right arm   Patient Position: Sitting   BP Method: Medium (Automatic)   Pulse: 80   Weight: 81.1 kg (178 lb 14.5 oz)   Height: 5' 4" (1.626 m)       Physical Exam  Vitals and nursing note reviewed.   Constitutional:       Appearance: She is well-developed.   HENT:      Head: Normocephalic and atraumatic.   Eyes:      Pupils: Pupils are equal, round, and reactive to light.   Cardiovascular:      Rate and Rhythm: Normal rate.   Pulmonary:      Effort: Pulmonary effort is normal.   Musculoskeletal:         General: Normal range of motion.      Cervical back: Normal range of motion and neck supple.   Skin:     General: Skin is warm and dry.   Neurological:      Mental Status: She is alert and oriented to person, place, and time.      Coordination: Finger-Nose-Finger Test normal.      Gait: Gait is intact. Tandem walk normal.      Deep Tendon Reflexes:      Reflex Scores:       Tricep reflexes are 2+ on the right side and 2+ on the left side.       Bicep reflexes are 2+ on the right side and 2+ on the left " side.       Brachioradialis reflexes are 2+ on the right side and 2+ on the left side.       Patellar reflexes are 2+ on the right side and 2+ on the left side.       Achilles reflexes are 2+ on the right side and 2+ on the left side.  Psychiatric:         Speech: Speech normal.         Behavior: Behavior normal.         Thought Content: Thought content normal.         Judgment: Judgment normal.       Neurologic Exam     Mental Status   Oriented to person, place, and time.   Oriented to person.   Oriented to place.   Oriented to time.   Attention: normal. Concentration: normal.   Speech: speech is normal   Level of consciousness: alert  Knowledge: consistent with education.     Cranial Nerves     CN III, IV, VI   Pupils are equal, round, and reactive to light.    CN XI   Right sternocleidomastoid strength: normal  Left sternocleidomastoid strength: normal  Right trapezius strength: normal  Left trapezius strength: normal    Motor Exam   Muscle bulk: normal  Overall muscle tone: normal  Right arm tone: normal  Left arm tone: normal  Right arm pronator drift: absent  Left arm pronator drift: absent  Right leg tone: normal  Left leg tone: normal    Strength   Right neck flexion: 5/5  Left neck flexion: 5/5  Right neck extension: 5/5  Left neck extension: 5/5  Right deltoid: 5/5  Left deltoid: 5/5  Right biceps: 5/5  Left biceps: 5/5  Right triceps: 5/5  Left triceps: 5/5  Right wrist flexion: 5/5  Left wrist flexion: 5/5  Right wrist extension: 5/5  Left wrist extension: 5/5  Right interossei: 5/5  Left interossei: 5/5  Right abdominals: 5/5  Left abdominals: 5/5  Right iliopsoas: 5/5  Left iliopsoas: 5/5  Right quadriceps: 5/5  Left quadriceps: 5/5  Right hamstrin/5  Left hamstrin/5  Right glutei: 5/5  Left glutei: 5/5  Right anterior tibial: 5/5  Left anterior tibial: 5/5  Right posterior tibial: 5/5  Left posterior tibial: 5/5  Right peroneal: 5/5  Left peroneal: 5/5  Right gastroc: 5/5  Left gastroc:  5/5    Sensory Exam   Right arm light touch: normal  Left arm light touch: normal  Right leg light touch: normal  Left leg light touch: normal    Gait, Coordination, and Reflexes     Gait  Gait: normal    Coordination   Finger to nose coordination: normal  Tandem walking coordination: normal    Tremor   Resting tremor: absent  Intention tremor: absent  Action tremor: absent    Reflexes   Right brachioradialis: 2+  Left brachioradialis: 2+  Right biceps: 2+  Left biceps: 2+  Right triceps: 2+  Left triceps: 2+  Right patellar: 2+  Left patellar: 2+  Right achilles: 2+  Left achilles: 2+  Right Reyes: absent  Left Reyes: absent  Right ankle clonus: absent  Left ankle clonus: absent    Provider dictation:    62 year old female with GERD, migraine, RLS, history of cervical spine surgery with some chronic neck pain is referred by alpa Rojo for evaluation of lumbar back pain.  Pain started 8/27/22 with no focal injury.  She stood up from a seated position and felt a pinching pain in the left lower back.  Pain progressed since then becoming so intense she was seen in the ER 8/28/22.  She has pain in the left lower back with radiation to the anterior thigh to the inner knee.  She denies any numbness/ tingling.  In the ER she was given IM toradol and IM steroid; she was prescribed flexeril, lidoderm and mobic and has been taking the medications.  She has tried home exercise with a therapy ball.  No other treatments.  She uses the therapy/ exercise ball regularly over the leve gluteal region.      Current medications:  flexeril, lidoderm patch, mobic  Medications tried:  IM toradoal, IM steroid  Physical therapy:  none  Interventional Procedures:  none     On exam, there is 5/5 strength with 2+ DTR and no sensory deficits.  Gait and station fluid.  Denies bowel/ bladder dysfunction.  Full range of motion of the upper and lower extremities. No pain with axial facet loading.  No SI joint pain on palpation.  No pain with  lumbar flexion/ extension.  No skin redness over the buttocks.  Denies home fevers.    MRI lumbar spine 9-11-18:  minimal degenerative changes with no significant central canal or foraminal narrowing.    CT renal stone study 8/28/22:  No suspicious osseous lesions.  Spondylotic changes are identified.  Diffuse soft tissue stranding is identified involving the subcutaneous tissue of the left extremity with extension towards the gluteus joe without evidence for underlying myositis or drainable fluid collection.  Possible cellulitic changes, but most likely from soft tissue trauma from repeated use of therapy/ exercise ball over this same region.     Ms. Khanna has acute onset left lower back pain and thigh pain.  With no significnat foraminal narrowing and no neurological defciits.  Pain most likely myofascial, can also be SI joint pain.  I agree with trial of flexeril, lidoderm.  Will hold on mobic in lieu of trying medrol taper.  May resume mobic after completion of medrol.  If no improvement over the next week with these medications, we can consider PT.  Follow up as needed.  She is agreeable to plan.      Visit Diagnosis:  Acute left-sided low back pain, unspecified whether sciatica present  -     Ambulatory referral/consult to [facility] & Spine Clinic  -     methylPREDNISolone (MEDROL, QUINTIN,) 4 mg tablet; use as directed on inside of package  Dispense: 21 tablet; Refill: 0      Total time spent counseling greater than fifty percent of total visit time.  Counseling included discussion regarding imaging findings, diagnosis possibilities, treatment options, risks and benefits.   The patient had many questions regarding the options and long-term effects.

## 2022-09-02 NOTE — PATIENT INSTRUCTIONS
Start steroid today. Take half of today's allotted pills when you get the rx today and the other half right before bed.    While taking the steroid pack, no aleve, naproxen, ibuprofen, advil, mobic.    Walk, exercise, stretch.

## 2022-09-02 NOTE — PROGRESS NOTES
"Subjective:      Patient ID: Clemencia Knight is a 62 y.o. female.    Chief Complaint: ER follow up ([place]; Sunday; left hip)    HPI  Patient has PMH of cervical radiculopathy, GERD, psoriatic arthritis, partial small bowel obstruction, and plantar fasciitis.    Patient went to [facility] ED 8/28/2022 with acute left-sided low back pain and treated with ice, lidocaine patches, cyclobenzaprine 3 times a day, and mobic.  CT renal stone study clear for stones, but mentioned cellulitic changes.    Patient reports continued left-sided low back pain that radiates down thigh.  Hurts when walking and laying in bed.  Medication has not helped to date.  No trauma or injury noted, but does PT personally with rubbing a ball down lateral upper left leg.  No groin pain.  Denies fecal/urinary incontinence.    Has had lumbar spine issues in the past.  Reviewed MRI lumbar spine from 2018.  Has not had any formal PT for this.    Review of Systems   Constitutional:  Negative for chills and fever.   Respiratory:  Negative for shortness of breath.    Cardiovascular:  Negative for chest pain.   Genitourinary:  Negative for dysuria and hematuria.   Musculoskeletal:  Positive for back pain.       Objective:   /76   Pulse 80   Ht 5' 4" (1.626 m)   Wt 81.2 kg (179 lb 0.2 oz)   SpO2 99%   BMI 30.73 kg/m²     Physical Exam  Vitals reviewed.   Constitutional:       Appearance: Normal appearance. She is well-developed.   HENT:      Head: Normocephalic and atraumatic.      Right Ear: External ear normal.      Left Ear: External ear normal.   Eyes:      Conjunctiva/sclera: Conjunctivae normal.   Cardiovascular:      Rate and Rhythm: Normal rate and regular rhythm.      Heart sounds: Normal heart sounds. No murmur heard.    No friction rub. No gallop.   Pulmonary:      Effort: Pulmonary effort is normal. No respiratory distress.      Breath sounds: Normal breath sounds. No wheezing, rhonchi or rales.   Musculoskeletal:         General: " Normal range of motion.      Cervical back: No bony tenderness.      Thoracic back: No bony tenderness.      Lumbar back: Tenderness (left-sided) present. No bony tenderness. Negative right straight leg raise test and negative left straight leg raise test.   Skin:     General: Skin is warm and dry.      Findings: No rash.   Neurological:      General: No focal deficit present.      Mental Status: She is alert and oriented to person, place, and time.   Psychiatric:         Mood and Affect: Mood normal.         Behavior: Behavior normal.         Judgment: Judgment normal.     Assessment:      1. Acute left-sided low back pain without sciatica       Plan:   1. Acute left-sided low back pain without sciatica  Hesistant to order steroids due to CT renal stone study findings.  - Ambulatory referral/consult to [facility] Sp[facility] Clinic; Future    Follow up as needed.  Patient agreed with plan and expressed understanding.  I spent 30 minutes on this encounter, time includes face-to-face, chart review, documentation, test review and orders.    Thank you for allowing me to serve you,

## 2022-09-12 ENCOUNTER — PATIENT MESSAGE (OUTPATIENT)
Dept: PAIN MEDICINE | Facility: CLINIC | Age: 62
End: 2022-09-12
Payer: COMMERCIAL

## 2022-09-12 ENCOUNTER — HOSPITAL ENCOUNTER (OUTPATIENT)
Dept: RADIOLOGY | Facility: HOSPITAL | Age: 62
Discharge: HOME | End: 2022-09-12
Attending: FAMILY MEDICINE
Payer: COMMERCIAL

## 2022-09-12 ENCOUNTER — OFFICE VISIT (OUTPATIENT)
Dept: FAMILY MEDICINE | Facility: CLINIC | Age: 62
End: 2022-09-12
Payer: COMMERCIAL

## 2022-09-12 VITALS
HEIGHT: 64 IN | BODY MASS INDEX: 30.22 KG/M2 | HEART RATE: 71 BPM | RESPIRATION RATE: 18 BRPM | OXYGEN SATURATION: 99 % | WEIGHT: 177 LBS | SYSTOLIC BLOOD PRESSURE: 138 MMHG | DIASTOLIC BLOOD PRESSURE: 76 MMHG

## 2022-09-12 DIAGNOSIS — L40.50 PSORIATIC ARTHRITIS: ICD-10-CM

## 2022-09-12 DIAGNOSIS — G43.809 OTHER MIGRAINE WITHOUT STATUS MIGRAINOSUS, NOT INTRACTABLE: ICD-10-CM

## 2022-09-12 DIAGNOSIS — Z00.00 ROUTINE HEALTH MAINTENANCE: Primary | ICD-10-CM

## 2022-09-12 DIAGNOSIS — M51.36 DDD (DEGENERATIVE DISC DISEASE), LUMBAR: ICD-10-CM

## 2022-09-12 PROCEDURE — 99396 PR PREVENTIVE VISIT,EST,40-64: ICD-10-PCS | Mod: S$GLB,,, | Performed by: FAMILY MEDICINE

## 2022-09-12 PROCEDURE — 3075F SYST BP GE 130 - 139MM HG: CPT | Mod: CPTII,S$GLB,, | Performed by: FAMILY MEDICINE

## 2022-09-12 PROCEDURE — 3008F PR BODY MASS INDEX (BMI) DOCUMENTED: ICD-10-PCS | Mod: CPTII,S$GLB,, | Performed by: FAMILY MEDICINE

## 2022-09-12 PROCEDURE — 99999 PR PBB SHADOW E&M-EST. PATIENT-LVL IV: ICD-10-PCS | Mod: PBBFAC,,, | Performed by: FAMILY MEDICINE

## 2022-09-12 PROCEDURE — 3078F DIAST BP <80 MM HG: CPT | Mod: CPTII,S$GLB,, | Performed by: FAMILY MEDICINE

## 2022-09-12 PROCEDURE — 1160F RVW MEDS BY RX/DR IN RCRD: CPT | Mod: CPTII,S$GLB,, | Performed by: FAMILY MEDICINE

## 2022-09-12 PROCEDURE — 99999 PR PBB SHADOW E&M-EST. PATIENT-LVL IV: CPT | Mod: PBBFAC,,, | Performed by: FAMILY MEDICINE

## 2022-09-12 PROCEDURE — 99396 PREV VISIT EST AGE 40-64: CPT | Mod: S$GLB,,, | Performed by: FAMILY MEDICINE

## 2022-09-12 PROCEDURE — 72100 X-RAY EXAM L-S SPINE 2/3 VWS: CPT | Mod: TC,FY,PO

## 2022-09-12 PROCEDURE — 3008F BODY MASS INDEX DOCD: CPT | Mod: CPTII,S$GLB,, | Performed by: FAMILY MEDICINE

## 2022-09-12 PROCEDURE — 3078F PR MOST RECENT DIASTOLIC BLOOD PRESSURE < 80 MM HG: ICD-10-PCS | Mod: CPTII,S$GLB,, | Performed by: FAMILY MEDICINE

## 2022-09-12 PROCEDURE — 72100 XR LUMBAR SPINE AP AND LATERAL: ICD-10-PCS | Mod: 26,,, | Performed by: RADIOLOGY

## 2022-09-12 PROCEDURE — 1160F PR REVIEW ALL MEDS BY PRESCRIBER/CLIN PHARMACIST DOCUMENTED: ICD-10-PCS | Mod: CPTII,S$GLB,, | Performed by: FAMILY MEDICINE

## 2022-09-12 PROCEDURE — 1159F PR MEDICATION LIST DOCUMENTED IN MEDICAL RECORD: ICD-10-PCS | Mod: CPTII,S$GLB,, | Performed by: FAMILY MEDICINE

## 2022-09-12 PROCEDURE — 1159F MED LIST DOCD IN RCRD: CPT | Mod: CPTII,S$GLB,, | Performed by: FAMILY MEDICINE

## 2022-09-12 PROCEDURE — 72100 X-RAY EXAM L-S SPINE 2/3 VWS: CPT | Mod: 26,,, | Performed by: RADIOLOGY

## 2022-09-12 PROCEDURE — 3075F PR MOST RECENT SYSTOLIC BLOOD PRESS GE 130-139MM HG: ICD-10-PCS | Mod: CPTII,S$GLB,, | Performed by: FAMILY MEDICINE

## 2022-09-12 NOTE — PROGRESS NOTES
HPI  Clemencia Knight is a 62 y.o. female with multiple medical diagnoses as listed in the medical history and problem list that presents for Annual Exam  .      HPI  Here today for routine health maintenance.      PAST MEDICAL HISTORY:  Past Medical History:   Diagnosis Date    Allergy     Brain tumor     Drug-induced lupus erythematosus     General anesthetics causing adverse effect in therapeutic use     pt. somewhat aware of extubation with one of her surgeries    GERD (gastroesophageal reflux disease)     Hearing loss     Migraines     Psoriatic arthritis     Raynaud's syndrome without gangrene     Restless leg syndrome     Sciatica     Trigeminal neuralgia        PAST SURGICAL HISTORY:  Past Surgical History:   Procedure Laterality Date    AUGMENTATION OF BREAST      BELT ABDOMINOPLASTY      breast implants      CERVICAL FUSION      COLONOSCOPY  10/2012    Dr. Paul; internal hemorrhoid; repeat in 10 years    COLONOSCOPY N/A 11/25/2020    Procedure: COLONOSCOPY;  Surgeon: Kiran Paul MD;  Location: [facility];  Service: Endoscopy;  Laterality: N/A; hemorrhoids; Repeat colonoscopy in 10 years for screening; random biopsy: WNL    DEXA      WNL    EPIDURAL STEROID INJECTION INTO CERVICAL SPINE N/A 09/24/2018    Procedure: Injection-steroid-epidural-cervical;  Surgeon: Myles Rocha MD;  Location: [facility] OR;  Service: Pain Management;  Laterality: N/A;  C7-T1    EPIDURAL STEROID INJECTION INTO CERVICAL SPINE N/A 11/20/2018    Procedure: Injection-steroid-epidural-cervical;  Surgeon: Myles Rocha MD;  Location: [facility] OR;  Service: Pain Management;  Laterality: N/A;  C7-T1    ESOPHAGOGASTRODUODENOSCOPY N/A 12/10/2020    Procedure: EGD (ESOPHAGOGASTRODUODENOSCOPY);  Surgeon: Kiran Paul MD;  Location: [facility];  Service: Endoscopy;  Laterality: N/A; unremarkable; biopsy: duodenum WNL, stomach-minimal chronic inflammation, negative h pylori    ESOPHAGOGASTRODUODENOSCOPY N/A 4/12/2022    Procedure: EGD  (ESOPHAGOGASTRODUODENOSCOPY);  Surgeon: Kiran Paul MD;  Location: [facility];  Service: Endoscopy;  Laterality: N/A;    HYSTERECTOMY  2000    OOPHORECTOMY  07/16/2013    laparotomy BSO for benign 10cm tubal cyst    SALPINGOOPHORECTOMY  2013    with removal of fallopian cyst    SHOULDER SURGERY      right    [facility]  2000    ovaries remain, benign    TONSILLECTOMY, ADENOIDECTOMY, BILATERAL MYRINGOTOMY AND TUBES         SOCIAL HISTORY:  Social History     Socioeconomic History    Marital status:    Occupational History     Employer: OTHER   Tobacco Use    Smoking status: Never    Smokeless tobacco: Never   Substance and Sexual Activity    Alcohol use: Yes     Comment: rarely    Drug use: No    Sexual activity: Yes     Partners: Male     Birth control/protection: Surgical   Other Topics Concern    Are you pregnant or think you may be? No     Social Determinants of Health     Financial Resource Strain: Low Risk     Difficulty of Paying Living Expenses: Not very hard   Food Insecurity: No Food Insecurity    Worried About Running Out of Food in the Last Year: Never true    Ran Out of Food in the Last Year: Never true   Transportation Needs: No Transportation Needs    Lack of Transportation (Medical): No    Lack of Transportation (Non-Medical): No   Physical Activity: Insufficiently Active    Days of Exercise per Week: 1 day    Minutes of Exercise per Session: 30 min   Stress: No Stress Concern Present    Feeling of Stress : Not at all   Social Connections: Unknown    Frequency of Communication with Friends and Family: More than three times a week    Frequency of Social Gatherings with Friends and Family: Twice a week    Active Member of Clubs or Organizations: Yes    Attends Club or Organization Meetings: More than 4 times per year    Marital Status:    Housing Stability: Low Risk     Unable to Pay for Housing in the Last Year: No    Number of Places Lived in the Last Year: 1    Unstable Housing in the  Last Year: No       FAMILY HISTORY:  Family History   Problem Relation Age of Onset    Cancer Father         bladder    Diabetes Father     Heart disease Father     Diabetes Mother     Melanoma Brother     Charcot-Karla-Tooth disease Brother     Breast cancer Neg Hx     Ovarian cancer Neg Hx     Anesthesia problems Neg Hx     Colon cancer Neg Hx     Crohn's disease Neg Hx     Esophageal cancer Neg Hx     Ulcerative colitis Neg Hx     Stomach cancer Neg Hx     Celiac disease Neg Hx        ALLERGIES AND MEDICATIONS: updated and reviewed.  Review of patient's allergies indicates:   Allergen Reactions    Contrast media Swelling     Swollen eyes and face    Hydrocodone Itching    Plaquenil [hydroxychloroquine]      Worsening psoriasis    Topiramate      Hair loss, cognitive side effects      Current Outpatient Medications   Medication Sig Dispense Refill    galcanezumab-gnlm 120 mg/mL PnIj Inject 1 pen (120 mg total) into the skin every 28 days. maintenance dose 1 mL 11    LIDOcaine (LIDODERM) 5 % Place 1 patch onto the skin once daily. Remove & Discard patch within 12 hours or as directed by MD 30 patch 0    naratriptan (AMERGE) 2.5 MG tablet Take 1 tablet (2.5 mg) by mouth at onset of headache. May repeat in 4 hours, if needed. Max 2 tablets/day. 9 tablet 11    secukinumab (COSENTYX PEN) 150 mg/mL PnIj Inject 150 mg into the skin every 30 days. 1 mL 6     No current facility-administered medications for this visit.       ROS  Review of Systems   Constitutional:  Negative for fatigue, fever and unexpected weight change.   HENT:  Negative for congestion, hearing loss, rhinorrhea and sore throat.    Eyes:  Negative for visual disturbance.   Respiratory:  Negative for cough, chest tightness, shortness of breath and wheezing.    Cardiovascular:  Negative for chest pain, palpitations and leg swelling.   Gastrointestinal:  Negative for abdominal distention, abdominal pain, blood in stool, constipation, diarrhea, nausea and  "vomiting.   Genitourinary:  Negative for difficulty urinating, dysuria, frequency, hematuria, menstrual problem, pelvic pain, urgency and vaginal bleeding.   Musculoskeletal:  Positive for arthralgias and back pain (required ER visit, diagnosed with possible tendinits in the hip.  Treated with steroids and pain patch). Negative for joint swelling and neck pain.   Skin:  Negative for rash.   Neurological:  Positive for headaches. Negative for dizziness, tremors, weakness, light-headedness and numbness.   Psychiatric/Behavioral:  Negative for confusion, dysphoric mood and sleep disturbance. The patient is not nervous/anxious.      Physical Exam  Vitals:    09/12/22 0827   BP: 138/76   Pulse: 71   Resp: 18    Body mass index is 30.39 kg/m².  Weight: 80.3 kg (177 lb 0.5 oz)   Height: 5' 4" (162.6 cm)     Physical Exam  Vitals reviewed.   Constitutional:       Appearance: She is well-developed.   HENT:      Head: Normocephalic and atraumatic.      Right Ear: External ear normal.      Left Ear: External ear normal.      Nose: Nose normal.   Eyes:      General: No scleral icterus.     Conjunctiva/sclera: Conjunctivae normal.      Pupils: Pupils are equal, round, and reactive to light.   Neck:      Thyroid: No thyromegaly.      Vascular: No JVD.   Cardiovascular:      Rate and Rhythm: Normal rate and regular rhythm.      Heart sounds: Normal heart sounds. No murmur heard.    No friction rub. No gallop.   Pulmonary:      Effort: Pulmonary effort is normal.      Breath sounds: Normal breath sounds. No wheezing or rales.   Abdominal:      General: Bowel sounds are normal. There is no distension.      Palpations: Abdomen is soft. There is no mass.      Tenderness: There is no abdominal tenderness. There is no guarding or rebound.   Musculoskeletal:         General: Normal range of motion.      Cervical back: Normal range of motion and neck supple.   Lymphadenopathy:      Cervical: No cervical adenopathy.   Skin:     General: " Skin is warm and dry.      Findings: No rash.   Neurological:      Mental Status: She is alert and oriented to person, place, and time.      Cranial Nerves: No cranial nerve deficit.      Sensory: No sensory deficit.       Health Maintenance         Date Due Completion Date    Pneumococcal Vaccines (Age 0-64) (1 - PCV) Never done ---    Shingles Vaccine (1 of 2) Never done ---    COVID-19 Vaccine (3 - Booster for Pfizer series) 02/10/2022 9/10/2021    Influenza Vaccine (1) 09/01/2022 10/14/2021    Mammogram 01/19/2023 1/19/2022    Lipid Panel 10/14/2026 10/14/2021    TETANUS VACCINE 08/14/2028 8/14/2018    Colorectal Cancer Screening 11/25/2030 11/25/2020            Assessment & Plan    Routine health maintenance  - Health maintenance reviewed  - Diet and exercise education.    Psoriatic arthritis  - Continue current therapy  - Continue Rheumatology    Other migraine without status migrainosus, not intractable  - Continue current therapy  - Continue Neurology    DDD (degenerative disc disease), lumbar  - Lumbar spine films  - Physical therapy if persists    Follow up in about 1 year (around 9/12/2023).

## 2022-09-21 ENCOUNTER — PATIENT MESSAGE (OUTPATIENT)
Dept: PAIN MEDICINE | Facility: CLINIC | Age: 62
End: 2022-09-21
Payer: COMMERCIAL

## 2022-09-21 ENCOUNTER — PATIENT MESSAGE (OUTPATIENT)
Dept: FAMILY MEDICINE | Facility: CLINIC | Age: 62
End: 2022-09-21
Payer: COMMERCIAL

## 2022-09-21 DIAGNOSIS — M54.50 ACUTE LEFT-SIDED LOW BACK PAIN, UNSPECIFIED WHETHER SCIATICA PRESENT: Primary | ICD-10-CM

## 2022-09-21 DIAGNOSIS — M54.50 MIDLINE LOW BACK PAIN, UNSPECIFIED CHRONICITY, UNSPECIFIED WHETHER SCIATICA PRESENT: Primary | ICD-10-CM

## 2022-09-23 ENCOUNTER — PATIENT MESSAGE (OUTPATIENT)
Dept: NEUROLOGY | Facility: CLINIC | Age: 62
End: 2022-09-23
Payer: COMMERCIAL

## 2022-09-23 DIAGNOSIS — G43.019 INTRACTABLE MIGRAINE WITHOUT AURA AND WITHOUT STATUS MIGRAINOSUS: Primary | ICD-10-CM

## 2022-09-26 ENCOUNTER — TELEPHONE (OUTPATIENT)
Dept: NEUROLOGY | Facility: CLINIC | Age: 62
End: 2022-09-26
Payer: COMMERCIAL

## 2022-09-26 ENCOUNTER — TELEPHONE (OUTPATIENT)
Dept: FAMILY MEDICINE | Facility: CLINIC | Age: 62
End: 2022-09-26
Payer: COMMERCIAL

## 2022-09-27 ENCOUNTER — CLINICAL SUPPORT (OUTPATIENT)
Dept: REHABILITATION | Facility: HOSPITAL | Age: 62
End: 2022-09-27
Attending: FAMILY MEDICINE
Payer: COMMERCIAL

## 2022-09-27 DIAGNOSIS — M53.86 DECREASED RANGE OF MOTION OF LUMBAR SPINE: ICD-10-CM

## 2022-09-27 DIAGNOSIS — M54.50 MIDLINE LOW BACK PAIN, UNSPECIFIED CHRONICITY, UNSPECIFIED WHETHER SCIATICA PRESENT: ICD-10-CM

## 2022-09-27 DIAGNOSIS — M54.50 ACUTE LEFT-SIDED LOW BACK PAIN, UNSPECIFIED WHETHER SCIATICA PRESENT: ICD-10-CM

## 2022-09-27 DIAGNOSIS — M54.42 ACUTE LEFT-SIDED LOW BACK PAIN WITH LEFT-SIDED SCIATICA: ICD-10-CM

## 2022-09-27 PROCEDURE — 97161 PT EVAL LOW COMPLEX 20 MIN: CPT | Mod: PO | Performed by: PHYSICAL THERAPIST

## 2022-09-27 NOTE — PLAN OF CARE
[facility]  Physical Therapy Initial Evaluation    Name: Clemencia Knight  Clinic Number: [id number]    Therapy Diagnosis:   Encounter Diagnoses   Name Primary?    Acute left-sided low back pain, unspecified whether sciatica present     Midline low back pain, unspecified chronicity, unspecified whether sciatica present     Acute left-sided low back pain with left-sided sciatica     Decreased range of motion of lumbar spine      Physician: Dallas Higgins MD, Cecille Hester PA-C    Physician Orders: PT Eval and Treat   Medical Diagnosis from Referral:   Midline low back pain, unspecified chronicity, unspecified whether sciatica present   Evaluation Date: 9/27/2022  Authorization Period Expiration: 12/31/2022  Plan of Care Expiration: 11/23/2022  Visit # / Visits authorized: 1    Time In: 1250  Time Out: 1340  Total Billable Time: 50 minutes    Precautions: Standard    Subjective   Date of onset: 08/28/2022  History of current condition - Clemencia reports: having recent onset of low back pain a month ago after getting off the couch noted which put her in the ER. No falls noted. Patient recalls having intermittent bouts of low back pain (L>R) with referred pain down left leg. No radicular pain currently. No numbness/tingling noted. No groin/buttock pain.       Past Medical History:   Diagnosis Date    Allergy     Brain tumor     Drug-induced lupus erythematosus     General anesthetics causing adverse effect in therapeutic use     pt. somewhat aware of extubation with one of her surgeries    GERD (gastroesophageal reflux disease)     Hearing loss     Migraines     Psoriatic arthritis     Raynaud's syndrome without gangrene     Restless leg syndrome     Sciatica     Trigeminal neuralgia      Clemencia Knight  has a past surgical history that includes breast implants; Cervical fusion; Shoulder surgery; TONSILLECTOMY, ADENOIDECTOMY, BILATERAL yringotomy and tubes; [facility] (2000); Colonoscopy  (10/2012); DEXA; Oophorectomy (2013); Hysterectomy (); Salpingoophorectomy (); Belt abdominoplasty; Epidural steroid injection into cervical spine (N/A, 2018); Epidural steroid injection into cervical spine (N/A, 2018); Augmentation of breast; Colonoscopy (N/A, 2020); Esophagogastroduodenoscopy (N/A, 12/10/2020); and Esophagogastroduodenoscopy (N/A, 2022).    Clemencia has a current medication list which includes the following prescription(s): galcanezumab-gnlm, lidocaine, naratriptan, rimegepant, cosentyx pen, and [DISCONTINUED] albuterol.    Review of patient's allergies indicates:   Allergen Reactions    Contrast media Swelling     Swollen eyes and face    Hydrocodone Itching    Plaquenil [hydroxychloroquine]      Worsening psoriasis    Topiramate      Hair loss, cognitive side effects         Imagin2022  Alignment: Alignment is maintained.     Vertebrae: Vertebral body heights are maintained.  No suspicious appearing lytic or blastic lesions.     Discs and facets: Multilevel intervertebral disc space narrowing, most pronounced on the left at L3-4. Lower lumbar facet arthropathy.     Miscellaneous: No additional findings.     Impression:     Mild degenerative change of the lumbar spine, as above.     Prior Therapy: 2018 for c-spine  Social History:  lives with their spouse  Occupation: Retired   Work demands: none  Leisure activities: walking, gardening  Prior Level of Function: independent  Current Level of Function/limitation: modified independent, increase time noted with home management  Recent or major surgery: none noted  Accidents: none noted      Pain:  Current 2/10, worst 8/10, best 0/10   Location: bilateral back   Description: Aching, Dull, Tight, Shooting, and Variable  Constant symptoms: no  Intermittent symptoms: yes  Worse: Sitting and Bending  Better: rest     Disturbed sleep: yes  Is it positional? yes  Unexplained weight loss: no    Pts goals: To  "alleviate the low back pain and return to PLOF    Objective   Posture observation:  Sitting: lordotic/neutral/kyphotic: neutral  Change in posture: better/worse/same: better  Standing: lordotic/neutral/kyphotic: neutral                               Lateral shift: right/left/nil: nil  Shift relevant: yes/no: no    Neurological:  Sensation: Dermatomes     Right Left Comment   L2 (lateral thigh) intact intact    L3 (medial thigh) intact intact    L4 (medial calf) intact intact    L5 (lateral calf) intact intact    S1 (lateral foot) intact intact    S2 (gastro/HS) intact intact    Saddle (cauda equina) intact intact      Myotomes:   Right Left Comment   Hip flexion (L2-3): 4+/5 5/5    Knee extension (L3-4): 5/5 5/5    DF (L4-5): 5/5 5/5    Great Toe Ext (L5-S1):   5/5 5/5    Glut Medius (L5) 4/5 4-/5    Great Toe Flex/HS (S1-S2) 5/5 5/5      DTR:   Right Left Comment   Patellar (L3-4) 2+ 2+    Achilles (S1) 2+ 2+      Movement Loss  Thoracic/Lumbar AROM:     % limitation Pain/Dysfunction/movement   Flexion  (55-60 N)    25% Cannot touch toes  Non-uniform curvature     Extension (25 N)  50%   ASIS does not clear toes  Lack of posterior weight shift                     Repeated:   DANNY: no effect  EIL: no effect with the exception of decreased lumbar lordosis    Bridge test/Endurance: (mean= 76.7 " compared to no low back pain, 172.9") + left hip      Lumbar:   SLR (with leg dominant pain)-  Clonus-    UMN testing:  Cross over sign: -    GAIT: Clemencia ambulates 50 feet with no assistive device with independently.     GAIT DEVIATIONS: No obvious deviations.    Transfers: independent    Pt/family was provided educational information, including: role of PT, goals for PT, scheduling - pt verbalized understanding. Discussed insurance limitations with pt.        CMS Impairment/Limitation/Restriction for FOTO Lumbar Spine Survey  Status Limitation G-Code CMS Severity Modifier  Intake 46% 54% Current Status CK - At least 40 percent " but less than 60 percent  Predicted 64% 36% Goal Status+ CJ - At least 20 percent but less than 40 percent       TREATMENT   Treatment Time In: 1330  Treatment Time Out: 1335  Total Treatment time separate from Evaluation: 5 minutes    Clemencia received therapeutic exercises to develop strength, endurance, ROM, flexibility, posture, and core stabilization for 5 minutes including:  Supine: TA, bridge  S/L clam shells  Extension in standing  Extension in lying progression    Ergonomic:lumbar lordosis with slim lumbar roll    Home Exercises and Patient Education Provided    Education provided:   - yes    Written Home Exercises Provided: yes.  Exercises were reviewed and Clemencia was able to demonstrate them prior to the end of the session.  Clemencia demonstrated good  understanding of the education provided.     See EMR under Patient Instructions for exercises provided 9/27/2022.    Assessment   Clemencia is a 62 y.o. female referred to outpatient Physical Therapy with a medical diagnosis of Midline low back pain, unspecified chronicity, unspecified whether sciatica present. Pt presents with low back pain, decreased lumbar mobility, decreased hip/core strength.    Problem List: pain, decreased ROM, decreased flexibility, decreased strength, decreased balance and stability, decreased motor control, antalgic gait, inability to participate fully in vocation pursuits, and decreased ability to fully participate in recreational/sports related activities.    Pt prognosis is Good.   Pt will benefit from skilled outpatient Physical Therapy to address the deficits stated above and in the chart below, provide pt/family education, and to maximize pt's level of independence.     Plan of care discussed with patient: Yes  Pt's spiritual, cultural and educational needs considered and patient is agreeable to the plan of care and goals as stated below:     Anticipated Barriers for therapy: none    Medical Necessity is demonstrated by the  following  History  Co-morbidities and personal factors that may impact the plan of care Co-morbidities:   advanced age, high BMI, immunosuppression, and prior c-spine fusion 2018    Personal Factors:   no deficits     high   Examination  Body Structures and Functions, activity limitations and participation restrictions that may impact the plan of care Body Regions:   back  lower extremities  trunk    Body Systems:    ROM  strength  balance  gait  transfers  transitions  motor control    Participation Restrictions:   Home management  Yard work    Activity limitations:   Learning and applying knowledge  no deficits    General Tasks and Commands  no deficits    Communication  no deficits    Mobility  lifting and carrying objects  walking    Self care  no deficits    Domestic Life  doing house work (cleaning house, washing dishes, laundry)    Interactions/Relationships  no deficits    Life Areas  no deficits    Community and Social Life  no deficits         high   Clinical Presentation stable and uncomplicated low   Decision Making/ Complexity Score: low     Short Term GOALS:  In 4 weeks, pt. will:  - improve left hip MMT 1/2 grade for standing/ambulatory tasks.  - improve lumbar extension by 25% for mobility purposes.  - report > 50% reduction in low back painful episodes with home management  - decrease outcome measure limitation to <54%    Long Term GOALS:  In 8 weeks, pt. will:  - be independent and compliant with HEP and SX management   - decrease outcome measure limitation to <50%  - demonstrate hip/core MMT > 4/5 for ADL tasks.  - report no painful limitations with flexion in standing including yard work.    Plan   Plan of care Certification: 9/27/2022 to 11/23/2022.  Outpatient Physical Therapy 2 times weekly for 8 weeks to include the following interventions: Gait Training, Manual Therapy, Moist Heat/ Ice, Neuromuscular Re-ed, Patient Education, Therapeutic Activities, and Therapeutic Exercise.      Clemencia lowery  at times be seen by a PTA as part of the Rehab Team.    Aidan Last, PT

## 2022-09-28 ENCOUNTER — OFFICE VISIT (OUTPATIENT)
Dept: NEUROLOGY | Facility: CLINIC | Age: 62
End: 2022-09-28
Payer: COMMERCIAL

## 2022-09-28 ENCOUNTER — OFFICE VISIT (OUTPATIENT)
Dept: RHEUMATOLOGY | Facility: CLINIC | Age: 62
End: 2022-09-28
Payer: COMMERCIAL

## 2022-09-28 VITALS
HEIGHT: 64 IN | HEART RATE: 63 BPM | WEIGHT: 177 LBS | DIASTOLIC BLOOD PRESSURE: 90 MMHG | SYSTOLIC BLOOD PRESSURE: 134 MMHG | RESPIRATION RATE: 17 BRPM | BODY MASS INDEX: 30.22 KG/M2

## 2022-09-28 VITALS
HEIGHT: 64 IN | BODY MASS INDEX: 30.86 KG/M2 | SYSTOLIC BLOOD PRESSURE: 128 MMHG | HEART RATE: 83 BPM | WEIGHT: 180.75 LBS | DIASTOLIC BLOOD PRESSURE: 82 MMHG

## 2022-09-28 DIAGNOSIS — L40.50 PSORIATIC ARTHRITIS: ICD-10-CM

## 2022-09-28 DIAGNOSIS — R79.82 ELEVATED C-REACTIVE PROTEIN (CRP): ICD-10-CM

## 2022-09-28 DIAGNOSIS — M19.90 OSTEOARTHRITIS, UNSPECIFIED OSTEOARTHRITIS TYPE, UNSPECIFIED SITE: ICD-10-CM

## 2022-09-28 DIAGNOSIS — G43.019 INTRACTABLE MIGRAINE WITHOUT AURA AND WITHOUT STATUS MIGRAINOSUS: Primary | ICD-10-CM

## 2022-09-28 DIAGNOSIS — I73.00 RAYNAUD'S PHENOMENON WITHOUT GANGRENE: ICD-10-CM

## 2022-09-28 DIAGNOSIS — D32.9 MENINGIOMA: ICD-10-CM

## 2022-09-28 DIAGNOSIS — M47.817 LUMBAR AND SACRAL OSTEOARTHRITIS: Primary | ICD-10-CM

## 2022-09-28 PROCEDURE — 96372 PR INJECTION,THERAP/PROPH/DIAG2ST, IM OR SUBCUT: ICD-10-PCS | Mod: S$GLB,,, | Performed by: INTERNAL MEDICINE

## 2022-09-28 PROCEDURE — 1159F PR MEDICATION LIST DOCUMENTED IN MEDICAL RECORD: ICD-10-PCS | Mod: CPTII,S$GLB,, | Performed by: INTERNAL MEDICINE

## 2022-09-28 PROCEDURE — 3080F PR MOST RECENT DIASTOLIC BLOOD PRESSURE >= 90 MM HG: ICD-10-PCS | Mod: CPTII,S$GLB,, | Performed by: PSYCHIATRY & NEUROLOGY

## 2022-09-28 PROCEDURE — 1160F PR REVIEW ALL MEDS BY PRESCRIBER/CLIN PHARMACIST DOCUMENTED: ICD-10-PCS | Mod: CPTII,S$GLB,, | Performed by: PSYCHIATRY & NEUROLOGY

## 2022-09-28 PROCEDURE — 99999 PR PBB SHADOW E&M-EST. PATIENT-LVL III: CPT | Mod: PBBFAC,,, | Performed by: PSYCHIATRY & NEUROLOGY

## 2022-09-28 PROCEDURE — 3075F SYST BP GE 130 - 139MM HG: CPT | Mod: CPTII,S$GLB,, | Performed by: PSYCHIATRY & NEUROLOGY

## 2022-09-28 PROCEDURE — 96372 THER/PROPH/DIAG INJ SC/IM: CPT | Mod: S$GLB,,, | Performed by: INTERNAL MEDICINE

## 2022-09-28 PROCEDURE — 1159F MED LIST DOCD IN RCRD: CPT | Mod: CPTII,S$GLB,, | Performed by: PSYCHIATRY & NEUROLOGY

## 2022-09-28 PROCEDURE — 99213 PR OFFICE/OUTPT VISIT, EST, LEVL III, 20-29 MIN: ICD-10-PCS | Mod: S$GLB,,, | Performed by: PSYCHIATRY & NEUROLOGY

## 2022-09-28 PROCEDURE — 3008F PR BODY MASS INDEX (BMI) DOCUMENTED: ICD-10-PCS | Mod: CPTII,S$GLB,, | Performed by: PSYCHIATRY & NEUROLOGY

## 2022-09-28 PROCEDURE — 3074F SYST BP LT 130 MM HG: CPT | Mod: CPTII,S$GLB,, | Performed by: INTERNAL MEDICINE

## 2022-09-28 PROCEDURE — 99999 PR PBB SHADOW E&M-EST. PATIENT-LVL III: ICD-10-PCS | Mod: PBBFAC,,, | Performed by: INTERNAL MEDICINE

## 2022-09-28 PROCEDURE — 99999 PR PBB SHADOW E&M-EST. PATIENT-LVL III: CPT | Mod: PBBFAC,,, | Performed by: INTERNAL MEDICINE

## 2022-09-28 PROCEDURE — 3079F PR MOST RECENT DIASTOLIC BLOOD PRESSURE 80-89 MM HG: ICD-10-PCS | Mod: CPTII,S$GLB,, | Performed by: INTERNAL MEDICINE

## 2022-09-28 PROCEDURE — 1160F RVW MEDS BY RX/DR IN RCRD: CPT | Mod: CPTII,S$GLB,, | Performed by: PSYCHIATRY & NEUROLOGY

## 2022-09-28 PROCEDURE — 3075F PR MOST RECENT SYSTOLIC BLOOD PRESS GE 130-139MM HG: ICD-10-PCS | Mod: CPTII,S$GLB,, | Performed by: PSYCHIATRY & NEUROLOGY

## 2022-09-28 PROCEDURE — 99999 PR PBB SHADOW E&M-EST. PATIENT-LVL III: ICD-10-PCS | Mod: PBBFAC,,, | Performed by: PSYCHIATRY & NEUROLOGY

## 2022-09-28 PROCEDURE — 3080F DIAST BP >= 90 MM HG: CPT | Mod: CPTII,S$GLB,, | Performed by: PSYCHIATRY & NEUROLOGY

## 2022-09-28 PROCEDURE — 1159F MED LIST DOCD IN RCRD: CPT | Mod: CPTII,S$GLB,, | Performed by: INTERNAL MEDICINE

## 2022-09-28 PROCEDURE — 3079F DIAST BP 80-89 MM HG: CPT | Mod: CPTII,S$GLB,, | Performed by: INTERNAL MEDICINE

## 2022-09-28 PROCEDURE — 99214 OFFICE O/P EST MOD 30 MIN: CPT | Mod: 25,S$GLB,, | Performed by: INTERNAL MEDICINE

## 2022-09-28 PROCEDURE — 99213 OFFICE O/P EST LOW 20 MIN: CPT | Mod: S$GLB,,, | Performed by: PSYCHIATRY & NEUROLOGY

## 2022-09-28 PROCEDURE — 3074F PR MOST RECENT SYSTOLIC BLOOD PRESSURE < 130 MM HG: ICD-10-PCS | Mod: CPTII,S$GLB,, | Performed by: INTERNAL MEDICINE

## 2022-09-28 PROCEDURE — 1159F PR MEDICATION LIST DOCUMENTED IN MEDICAL RECORD: ICD-10-PCS | Mod: CPTII,S$GLB,, | Performed by: PSYCHIATRY & NEUROLOGY

## 2022-09-28 PROCEDURE — 99214 PR OFFICE/OUTPT VISIT, EST, LEVL IV, 30-39 MIN: ICD-10-PCS | Mod: 25,S$GLB,, | Performed by: INTERNAL MEDICINE

## 2022-09-28 PROCEDURE — 3008F BODY MASS INDEX DOCD: CPT | Mod: CPTII,S$GLB,, | Performed by: PSYCHIATRY & NEUROLOGY

## 2022-09-28 RX ORDER — MELOXICAM 15 MG/1
15 TABLET ORAL DAILY
Qty: 30 TABLET | Refills: 3 | Status: SHIPPED | OUTPATIENT
Start: 2022-09-28 | End: 2022-12-16

## 2022-09-28 RX ORDER — CYANOCOBALAMIN 1000 UG/ML
1000 INJECTION, SOLUTION INTRAMUSCULAR; SUBCUTANEOUS
Status: COMPLETED | OUTPATIENT
Start: 2022-09-28 | End: 2022-09-28

## 2022-09-28 RX ORDER — SECUKINUMAB 150 MG/ML
300 INJECTION SUBCUTANEOUS
Qty: 1 ML | Refills: 12 | OUTPATIENT
Start: 2022-09-28 | End: 2022-09-29

## 2022-09-28 RX ORDER — NARATRIPTAN 2.5 MG/1
TABLET ORAL
Qty: 9 TABLET | Refills: 11 | Status: SHIPPED | OUTPATIENT
Start: 2022-09-28 | End: 2023-05-31 | Stop reason: SDUPTHER

## 2022-09-28 RX ADMIN — CYANOCOBALAMIN 1000 MCG: 1000 INJECTION, SOLUTION INTRAMUSCULAR; SUBCUTANEOUS at 02:09

## 2022-09-28 NOTE — PROGRESS NOTES
[facility]    Chief complaint: migraine     S:    61Y RH F with PMHx of RLS , psoriatic arthritis, +histone antibody, +SM/RNP antibody, drug induced lupus erythematosus (from Humira 2 months), C6-C7 ACDF, Rt hearing loss, migraine, back pain with sciatica who is here for further evaluation of migraine/facial pain. Last visit on 6/22 having 1 attack/week.      She is currently on Emgality 120mg q28 days and for acute management Nurtec/naratriptan which resolves attacks within 2 hours. She has continued to do very well. She is having at most 2 headache/month and quickly resolving with acute management, rest of time pain free. Has not been having any facial pain. She had low back pain needed medrol dose pack, muscle relaxant and NSAIds, but that has been off them and doing PT currently. Overall doing very well wants to continue current regimen.     Medications tried:  Acute:  Rizatriptan 10mg - worked once, but has not been resolving all attacks  Ketorolac 10mg- helps the intensity   Nurtec ODT 75mg- resolves attacks   Naratriptan 2.5mg - resolves attacks     Prevention:  Topiramate from 8/21- 11/21: not much improvement and caused hair loss/cognitive side effects   Cymbalta 60mg daily - no improvement in headache   Tegretol XR 100mg bid - has used PRN when has had attacks of the electrical pain in the Rt V2 distribution   Gabapentin  - in past no benefit   Magnesium - no benefit   Tizanidine 4mg - no help       Emgality 240mg loading dose, then 120mg monthly - since 11/21- present: over 75% improvement.     Neuroimaging:  MRI brain w/wo contrast (3/7/22):  MRI BRAIN WITHOUT CONTRAST     CLINICAL HISTORY:  monitor meningioma; Benign neoplasm of meninges, unspecified     TECHNIQUE:  Multiplanar multisequence MR imaging of the brain was performed without contrast.     COMPARISON:  04/01/2021     FINDINGS:  There is a 1.0 cm T1 isointense, mildly T2 hyperintense extra-axial mass in  the right parietal region, corresponding to the patient's known meningioma.  There is slight mass effect on the subjacent cortex, with no accompanying parenchymal edema.  There has been no interval change in size.  No additional mass is identified the ventricles are nondilated.  There is no hemorrhage or restricted diffusion.  The brainstem and cerebellum are normal mild fluid is present within the right mastoid air cells.     Impression:     Stable appearance of the patient's small right parietal meningioma.  No significant mass effect.     MRI brain w/wo contrast (4/1/21)  FINDINGS:  Trigeminal nerves: The right superior cerebellar artery loops inferiorly and contacts the medial aspect of the proximal cisternal segment of the right trigeminal nerve near the nerve root entry zone, without significant deformity of the nerve (series 11, images 37-38) (series 1101, image 315) (series 1102, images 348-357).  No abnormal signal or enhancement.  There is no significant mass effect or evidence of abnormal signal or enhancement along the cisternal or ganglionic portions of the left trigeminal nerve.  Normal fluid signal filling the Meckel's cave bilaterally.     Ventricles and sulci are normal in size for age without evidence of hydrocephalus. No extra-axial blood or fluid collections.     The brain appears within normal limits for age, noting only a few scattered punctate foci of T2/FLAIR signal hyperintensity in the supratentorial white matter. Diffusion-weighted images demonstrate no evidence of an acute infarct.   Susceptibility weighted images demonstrate no evidence of acute or chronic hemorrhage.     Note is made of a homogeneously enhancing, extra-axial, dural-based lesion overlying the right parietal lobe, measuring approximately 1.0 x 4.6 x 1.1 cm in AP by TV by cc dimensions, most consistent with a small meningioma.  No significant mass effect or underlying vasogenic edema.  No abnormal intracranial enhancement  elsewhere.     Normal vascular flow voids are preserved.     Bone marrow signal intensity is normal. Small right mastoid effusion.  Left mastoid air cells are clear.  Scattered minimal mucosal thickening within the paranasal sinuses.  Orbits are unremarkable.     Impression:     1. Right superior cerebellar artery contacts the proximal cisternal segment of the right trigeminal nerve near the nerve root entry zone.  No significant deformity of the nerve or associated signal abnormality or enhancement.  Findings likely account for the patient's reported right-sided trigeminal neuralgia.  2. Incidental small right parietal meningioma, measuring 1.1 cm.  Otherwise, no acute intracranial abnormality or abnormal enhancement elsewhere.  3. Small right mastoid effusion.    MRA head 4/1/21:  The internal carotid arteries are of normal caliber. The middle cerebral arteries are normal.  Hypoplastic A1 segment of the right anterior cerebral artery, a normal developmental variant.  Anterior communicating artery is present.  Otherwise, the anterior cerebral arteries are patent.  The vertebral arteries are patent. The basilar artery is normal.  The P1 segment of the right posterior cerebral artery is hypoplastic, with a P2 segment supplied by dominant posterior communicating artery, a normal developmental variant.  Otherwise, the posterior cerebral arteries are patent.  No evidence of high-grade stenosis or large vessel occlusion.  There is no aneurysm or vascular malformation identified.  Although MRA is a screening examination, catheter angiography remains the definitive study for small aneurysms, vasculitis, and other vascular abnormalities.     Impression:     No evidence of intracranial high-grade stenosis, large vessel occlusion, or aneurysm..       ROS: The fourteen point review of system was performed and negative other than HPI.     No changes to PMHx, surgical history or family history since last appt.     Social History      Tobacco Use    Smoking status: Never    Smokeless tobacco: Never   Substance Use Topics    Alcohol use: Yes     Comment: rarely    Drug use: No     Review of patient's allergies indicates:   Allergen Reactions    Contrast media Swelling     Swollen eyes and face    Hydrocodone Itching    Plaquenil [hydroxychloroquine]      Worsening psoriasis    Topiramate      Hair loss, cognitive side effects        Current Outpatient Medications:     LIDOcaine (LIDODERM) 5 %, Place 1 patch onto the skin once daily. Remove & Discard patch within 12 hours or as directed by MD, Disp: 30 patch, Rfl: 0    secukinumab (COSENTYX PEN) 150 mg/mL PnIj, Inject 150 mg into the skin every 30 days., Disp: 1 mL, Rfl: 6    galcanezumab-gnlm 120 mg/mL PnIj, Inject 1 pen (120 mg total) into the skin every 28 days. maintenance dose, Disp: 1 mL, Rfl: 11    naratriptan (AMERGE) 2.5 MG tablet, Take 1 tablet (2.5 mg) by mouth at onset of headache. May repeat in 4 hours, if needed. Max 2 tablets/day., Disp: 9 tablet, Rfl: 11    rimegepant 75 mg odt, Take 1 tablet (75 mg total) by mouth once as needed for Migraine (max 1/day.). Place ODT tablet on the tongue; alternatively the ODT tablet may be placed under the tongue, Disp: 8 tablet, Rfl: 11    PHYSICAL EXAMINATION  Vitals:    09/28/22 1116   BP: (!) 134/90   Pulse: 63   Resp: 17      General: The patient is a well-developed, well-nourished looking of stated age in no acute distress.  NEUROLOGIC EXAM:  MENTAL: The patient is awake, alert and oriented times to time, place, location and situation. Intact recent memory, attention, concentration. Language and speech are normal. No aphasia, no dysarthria  CRANIAL NERVES:   EOMI, no facial asymmetry    MOTOR EXAM: 5/5 throughout in UE/LEs  CEREBELLAR EXAM: no observable dysmetria   GAIT/STANCE: Standard gait was normal with normal stride, arm swing and turning.  No gait ataxia.       Impression:  Migraine without aura, high frequency episodic  - Initially  occurring about 2 times a week and they are lasting hours with significant disability and impairment in functionality. She was started on TPX and first months she had improvement, but by second month back at 9 attacks very disabling per month, not responding as well to rizatriptan , 1 of the attacks lasted 4 days triggered by weather changes. Attacks can be left sided or right sided have light/sound sensitivities, nausea (has had vomiting with very severe attacks), facial pain also occurs and kinesiophobia. She had side effect from TPX and had been on  tizanidine, cymbalta , gabapentin in the past.  Since starting Emgality she has had continued significant improvement having 2 headache days/month. fOR Acute management Nurtec ODT + naratriptan 2.5mg resolves the attacks quickly. She has continued to improve and we will continue current regimen. She will return in 6 months to establish care with my colleague Dr. Viveros (patient preference).     Facial pain -   Right sided facial pain from the inferior orbital down in a linear aspect into the upper teeth- sharp/electrical pain lasting about 30 minutes x 3 times at times thought to be provoked by some maneuvers like flossing/brushing teeth lasting 30 minutes of the severe pain without any migrainous or autonomic features about 1 time per month and then resolving until next month. This pain is not consistent with TN which pain lasts second up to 2 minutes. There were no recurrent paroxysms it was once pain which was sharp 30 minutes of continuous pain. This is not consistent with trigeminal neuralgia, not consistent with a trigeminal autonomic cephalalgia or migraine. 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 Neuroimaging showing contact between Right SCA and Rt CN V she has remained pain free without medication. No surgical intervention. No recurrence.       Other medical conditions:  RLS , psoriatic arthritis, +histone antibody, +SM/RNP antibody, drug induced  lupus erythematosus (from Humira 2 months), C6-C7 ACDF  Rt parietal meningioma small - stable. Prefers to ensure stability  MRI in 3/23     Plan:   1- For migraine without aura :  A. Continue Emgality 120mg every 28 days.     2- For acute management-   Nurtec ODT 75mg for acute management (side effect nausea 2%) . Max 1/day.   +/- Naratriptan 2.5mg, can repeat after 4 hours. Max 2/day       RTC in 6  months.       Sue Todd MD   Board Certified Neurologist  [facility] Certified Headache Medicine

## 2022-09-28 NOTE — PATIENT INSTRUCTIONS
1- For prevention: continue Emgality 120mg every 28 days     2- For acute management:  Nurtec 75mg ODT + Naratriptan 2.5mg at onset of attack, can repeat naratriptan after 4 hours. Max 1 nurtec per day or 2 naratriptan per day.     3- MRI due in 3/23     Follow up with Dr. Viveros after MRI

## 2022-09-28 NOTE — PROGRESS NOTES
Subjective:       Patient ID: Clemencia Knight is a 62 y.o. female.    Chief Complaint: Disease Management    Follow up: 61 year old female who presents to clinic for follow up on psoriatic arthritis. She is doing fairly well overall on cosentyx 150 mg monthly. She underwent extensive GI work up and this was unremarkable. She continues to have belching and gas. Taking famotidine nightly. She feels cosentyx is working well for her arthritis. She has intermittent swelling and stiffness in her hands, neck, and shoulders. She has history of cervical fusion and has chronic neck/shoulder discomfort. Using machine roller to help with muscle tightness.     Rayanuds is active in her hands and feet. She feels hands are senstivity to extremes of temperature. No ulcerations. Manageable with conservative measures.    Psoriasis remains on her scalp and lower back/buttock which is stable with cosentyx and topical steroids.    We reviewed her recent labs.  BP is stable.     HA are stable with emgality and nurtec.    Current tx:  1. cosentyx 150    Prior tx:  1. Otezla  2. Stelara  3. MTX  4. Humira    Review of Systems   Constitutional:  Positive for activity change and fatigue. Negative for appetite change, chills and unexpected weight change.   HENT:  Negative for mouth sores.    Eyes:  Negative for redness and visual disturbance.   Respiratory:  Negative for cough and shortness of breath.    Cardiovascular:  Negative for chest pain, palpitations and leg swelling.   Gastrointestinal:  Negative for abdominal pain, constipation, diarrhea, nausea and vomiting.        Belching   Genitourinary:  Negative for genital sores and pelvic pain.   Musculoskeletal:  Positive for arthralgias, joint swelling and neck pain.   Skin:  Negative for rash.   Allergic/Immunologic: Positive for immunocompromised state.   Neurological:  Negative for dizziness, weakness and light-headedness.   Hematological:  Does not bruise/bleed easily.       Objective:      Vitals:    09/28/22 1316   BP: 128/82   Pulse: 83       Past Medical History:   Diagnosis Date    Allergy     Brain tumor     Drug-induced lupus erythematosus     General anesthetics causing adverse effect in therapeutic use     pt. somewhat aware of extubation with one of her surgeries    GERD (gastroesophageal reflux disease)     Hearing loss     Migraines     Psoriatic arthritis     Raynaud's syndrome without gangrene     Restless leg syndrome     Sciatica     Trigeminal neuralgia      Past Surgical History:   Procedure Laterality Date    AUGMENTATION OF BREAST      BELT ABDOMINOPLASTY      breast implants      CERVICAL FUSION      COLONOSCOPY  10/2012    Dr. Paul; internal hemorrhoid; repeat in 10 years    COLONOSCOPY N/A 11/25/2020    Procedure: COLONOSCOPY;  Surgeon: Kiran Paul MD;  Location: [facility];  Service: Endoscopy;  Laterality: N/A; hemorrhoids; Repeat colonoscopy in 10 years for screening; random biopsy: WNL    DEXA      WNL    EPIDURAL STEROID INJECTION INTO CERVICAL SPINE N/A 09/24/2018    Procedure: Injection-steroid-epidural-cervical;  Surgeon: Myles Rocha MD;  Location: [facility] OR;  Service: Pain Management;  Laterality: N/A;  C7-T1    EPIDURAL STEROID INJECTION INTO CERVICAL SPINE N/A 11/20/2018    Procedure: Injection-steroid-epidural-cervical;  Surgeon: Myles Rocha MD;  Location: [facility] OR;  Service: Pain Management;  Laterality: N/A;  C7-T1    ESOPHAGOGASTRODUODENOSCOPY N/A 12/10/2020    Procedure: EGD (ESOPHAGOGASTRODUODENOSCOPY);  Surgeon: iKran Paul MD;  Location: [facility];  Service: Endoscopy;  Laterality: N/A; unremarkable; biopsy: duodenum WNL, stomach-minimal chronic inflammation, negative h pylori    ESOPHAGOGASTRODUODENOSCOPY N/A 4/12/2022    Procedure: EGD (ESOPHAGOGASTRODUODENOSCOPY);  Surgeon: Kiran Paul MD;  Location: [facility];  Service: Endoscopy;  Laterality: N/A;    HYSTERECTOMY  2000    OOPHORECTOMY  07/16/2013    laparotomy BSO for benign  10cm tubal cyst    SALPINGOOPHORECTOMY  2013    with removal of fallopian cyst    SHOULDER SURGERY      right    TLH  2000    ovaries remain, benign    TONSILLECTOMY, ADENOIDECTOMY, BILATERAL MYRINGOTOMY AND TUBES            Physical Exam   Eyes: Right conjunctiva is not injected. Left conjunctiva is not injected.   Neck: No JVD present. No thyromegaly present.   Cardiovascular: Normal rate and regular rhythm. Exam reveals no decreased pulses.   Pulmonary/Chest: Effort normal.   Musculoskeletal:         General: Tenderness and deformity present.      Right shoulder: Normal.      Left shoulder: Normal.      Right elbow: Normal.      Left elbow: Normal.      Right wrist: Normal.      Left wrist: Normal.      Right knee: Normal.      Left knee: Normal.   Lymphadenopathy:     She has no cervical adenopathy.   Neurological: Gait normal.   Skin: Rash (psoriasis) noted.   Psychiatric: Mood and affect normal.       Right Side Rheumatological Exam     Examination finds the shoulder, elbow, wrist, knee, 1st MCP, 2nd MCP, 3rd MCP, 4th MCP and 5th MCP normal.    The patient is tender to palpation of the 1st PIP, 2nd PIP, 3rd PIP, 4th PIP and 5th PIP    She has swelling of the 1st PIP, 2nd PIP, 3rd PIP and 4th PIP    Left Side Rheumatological Exam     Examination finds the shoulder, elbow, wrist, knee, 1st MCP, 2nd MCP, 3rd MCP, 4th MCP and 5th MCP normal.    The patient is tender to palpation of the 1st PIP, 2nd PIP, 3rd PIP, 4th PIP and 5th PIP.          Results for orders placed or performed during the hospital encounter of 08/28/22   Urine culture    Specimen: Urine   Result Value Ref Range    Urine Culture, Routine       Multiple organisms isolated. None in predominance.  Repeat if    Urine Culture, Routine clinically necessary.    Comprehensive metabolic panel   Result Value Ref Range    Sodium 141 136 - 145 mmol/L    Potassium 4.1 3.5 - 5.1 mmol/L    Chloride 102 95 - 110 mmol/L    CO2 29 22 - 31 mmol/L    Glucose 112 (H)  70 - 110 mg/dL    BUN 18 7 - 18 mg/dL    Creatinine 0.74 0.50 - 1.40 mg/dL    Calcium 9.3 8.4 - 10.2 mg/dL    Total Protein 8.0 6.0 - 8.4 g/dL    Albumin 4.3 3.5 - 5.2 g/dL    Total Bilirubin 0.4 0.2 - 1.3 mg/dL    Alkaline Phosphatase 76 38 - 145 U/L    AST 27 14 - 36 U/L    ALT 19 0 - 35 U/L    Anion Gap 10 8 - 16 mmol/L    eGFR >60 >60 mL/min/1.73 m^2   CBC auto differential   Result Value Ref Range    WBC 7.64 3.90 - 12.70 K/uL    RBC 4.57 4.00 - 5.40 M/uL    Hemoglobin 13.7 12.0 - 16.0 g/dL    Hematocrit 41.4 37.0 - 48.5 %    MCV 91 82 - 98 fL    MCH 30.0 27.0 - 31.0 pg    MCHC 33.1 32.0 - 36.0 g/dL    RDW 12.8 11.5 - 14.5 %    Platelets 339 150 - 450 K/uL    MPV 9.6 9.2 - 12.9 fL    Immature Granulocytes 0.1 0.0 - 0.5 %    Gran # (ANC) 3.3 1.8 - 7.7 K/uL    Immature Grans (Abs) 0.01 0.00 - 0.04 K/uL    Lymph # 3.3 1.0 - 4.8 K/uL    Mono # 0.7 0.3 - 1.0 K/uL    Eos # 0.3 0.0 - 0.5 K/uL    Baso # 0.03 0.00 - 0.20 K/uL    nRBC 0 0 /100 WBC    Gran % 43.2 38.0 - 73.0 %    Lymph % 43.2 18.0 - 48.0 %    Mono % 9.2 4.0 - 15.0 %    Eosinophil % 3.9 0.0 - 8.0 %    Basophil % 0.4 0.0 - 1.9 %    Differential Method Automated    Lipase   Result Value Ref Range    Lipase Result 290 23 - 300 U/L   Urinalysis, Reflex to Urine Culture Urine, Clean Catch    Specimen: Urine   Result Value Ref Range    Specimen UA Urine, Clean Catch     Color, UA Yellow Yellow, Straw, Gudelia    Appearance, UA Clear Clear    pH, UA 6.5 5.0 - 8.0    Specific Gravity, UA 1.020 1.005 - 1.030    Protein, UA Negative Negative    Glucose, UA Negative Negative    Ketones, UA Negative Negative    Bilirubin (UA) Negative Negative    Occult Blood UA 1+ (A) Negative    Nitrite, UA Negative Negative    Urobilinogen, UA 0.2 <2.0 EU/dL    Leukocytes, UA 2+ (A) Negative   WBC, UA   Result Value Ref Range    WBC, UA 12 (H) 0 - 5 /hpf   RBC, UA   Result Value Ref Range    RBC, UA 7 (H) 0 - 4 /hpf   Hyaline Casts, UA   Result Value Ref Range    Hyaline Casts, UA 2  (A) 0 - 1 /lpf   Squamous Epithelial, UA   Result Value Ref Range    Squam Epithel, UA 8 /hpf   Bacteria, UA   Result Value Ref Range    Bacteria Negative Negative /hpf   Urinalysis Microscopic   Result Value Ref Range    RBC, UA 7 (H) 0 - 4 /hpf    WBC, UA 12 (H) 0 - 5 /hpf    Bacteria Negative Negative /hpf    Squam Epithel, UA 8 /hpf    Hyaline Casts, UA 2 (A) 0 - 1 /lpf    Microscopic Comment SEE COMMENT      *Note: Due to a large number of results and/or encounters for the requested time period, some results have not been displayed. A complete set of results can be found in Results Review.           Assessment:       1. Lumbar and sacral osteoarthritis    2. Psoriatic arthritis    3. Raynaud's phenomenon without gangrene    4. Osteoarthritis, unspecified osteoarthritis type, unspecified site    5. Elevated C-reactive protein (CRP)              Plan:       Lumbar and sacral osteoarthritis  -     Ambulatory referral/consult to Physical/Occupational Therapy; Future; Expected date: 10/05/2022  -     cyanocobalamin injection 1,000 mcg  -     CBC Auto Differential; Future; Expected date: 09/28/2022  -     Comprehensive Metabolic Panel; Future; Expected date: 09/28/2022  -     Sedimentation rate; Future; Expected date: 09/28/2022  -     C-Reactive Protein; Future; Expected date: 09/28/2022    Psoriatic arthritis  -     cyanocobalamin injection 1,000 mcg  -     secukinumab (COSENTYX PEN) 150 mg/mL PnIj; Inject 300 mg into the skin every 30 days.  Dispense: 1 mL; Refill: 12  -     CBC Auto Differential; Future; Expected date: 09/28/2022  -     Comprehensive Metabolic Panel; Future; Expected date: 09/28/2022  -     Sedimentation rate; Future; Expected date: 09/28/2022  -     C-Reactive Protein; Future; Expected date: 09/28/2022    Raynaud's phenomenon without gangrene  -     cyanocobalamin injection 1,000 mcg  -     CBC Auto Differential; Future; Expected date: 09/28/2022  -     Comprehensive Metabolic Panel; Future;  Expected date: 09/28/2022  -     Sedimentation rate; Future; Expected date: 09/28/2022  -     C-Reactive Protein; Future; Expected date: 09/28/2022    Osteoarthritis, unspecified osteoarthritis type, unspecified site  -     cyanocobalamin injection 1,000 mcg  -     CBC Auto Differential; Future; Expected date: 09/28/2022  -     Comprehensive Metabolic Panel; Future; Expected date: 09/28/2022  -     Sedimentation rate; Future; Expected date: 09/28/2022  -     C-Reactive Protein; Future; Expected date: 09/28/2022    Elevated C-reactive protein (CRP)  -     cyanocobalamin injection 1,000 mcg  -     CBC Auto Differential; Future; Expected date: 09/28/2022  -     Comprehensive Metabolic Panel; Future; Expected date: 09/28/2022  -     Sedimentation rate; Future; Expected date: 09/28/2022  -     C-Reactive Protein; Future; Expected date: 09/28/2022        Assessment:  61 year old female with  Psoriatic arthritis, +histone antibody, +Sm/RNP antibody, negative GENE, elevated CRP (normalized), Raynauds  --inverse psoriasis  --chronic migraine  --hx of small bowel obstruction  --hx of cervical fusion    Plan:  1. Cosentyx 300 mg monthly  2. Tizanidine 4 mg qhs for RLS  3. Cymbalta 60 mg daily  4. Cont pepcid  5. Cont topical steroids prn psoriasis flare

## 2022-09-29 ENCOUNTER — SPECIALTY PHARMACY (OUTPATIENT)
Dept: PHARMACY | Facility: CLINIC | Age: 62
End: 2022-09-29
Payer: COMMERCIAL

## 2022-09-29 ENCOUNTER — TELEPHONE (OUTPATIENT)
Dept: RHEUMATOLOGY | Facility: CLINIC | Age: 62
End: 2022-09-29

## 2022-09-29 DIAGNOSIS — L40.50 PSA (PSORIATIC ARTHRITIS): Primary | ICD-10-CM

## 2022-09-29 DIAGNOSIS — L40.50 PSORIATIC ARTHRITIS: Primary | ICD-10-CM

## 2022-09-29 RX ORDER — SECUKINUMAB 150 MG/ML
300 INJECTION SUBCUTANEOUS
Qty: 2 ML | Refills: 5 | Status: SHIPPED | OUTPATIENT
Start: 2022-09-29 | End: 2023-03-15 | Stop reason: SDUPTHER

## 2022-09-29 NOTE — TELEPHONE ENCOUNTER
New order received for Cosentyx 150 mg.  Directions are to inject 300 mg SC every 30 days.  Last OV unsigned and does not specify if [facility] would like to increase dose.  Sent message to [facility] for clarification.  Will continue to f/u.

## 2022-09-30 NOTE — TELEPHONE ENCOUNTER
----- Message from Cayla Garcia, Nicko sent at 9/29/2022 10:20 AM CDT -----  Regarding: Cosentyx  Good morning Dr. Becerril,    [facility] has received the order for Mrs. Knight's Cosentyx.  The order is written as 150 mg/ml to inject 300 mg under the skin every 30 days.  I wanted to confirm if you are changing her dosage from 150 mg to 300 mg.  The current office visit is unsigned and does not specify if you would like to increase the dose.  If changing, can you please send a new order for 300 mg dose with a dispense quantity of 2 ml?    Thank you,  Cayla Garcia, PharmD  Clinical Pharmacist    [facility]  [address] [place] [id number]  P [phone]  F [phone]

## 2022-09-30 NOTE — TELEPHONE ENCOUNTER
----- Message from Cayla Garcia, Nicko sent at 9/29/2022 10:20 AM CDT -----  Regarding: Cosentyx  Good morning Dr. Becerril,    OSP has received the order for Mrs. Knight's Cosentyx.  The order is written as 150 mg/ml to inject 300 mg under the skin every 30 days.  I wanted to confirm if you are changing her dosage from 150 mg to 300 mg.  The current office visit is unsigned and does not specify if you would like to increase the dose.  If changing, can you please send a new order for 300 mg dose with a dispense quantity of 2 ml?    Thank you,  Cayla Garcia, PharmD  Clinical Pharmacist    [facility]  [address] [place] [id number]  P [phone]  F [phone]

## 2022-09-30 NOTE — TELEPHONE ENCOUNTER
Hal, this is Cayla Garcia with [facility].  We are working on your prescription that your doctor has sent us. We will be working with your insurance to get this approved for you. We will be calling you along the way with updates on your medication.  If you have any questions, you can reach us at [phone].    Welcome call outcome: Patient/caregiver reached    Received new order for 300 mg dose.  Pt next dose due on 10/27.

## 2022-09-30 NOTE — PROGRESS NOTES
2 pt identifiers used  Allergies reviewed    Administered 1 cc ( 1000 mcg/ml ) of b12 to the right upper outer arm. Informed of s/s to report verbalized understanding. No adverse reactions noted.      Answers submitted by the patient for this visit:  Rheumatology Questionnaire (Submitted on 9/27/2022)  fever: No  eye redness: No  mouth sores: No  headaches: No  shortness of breath: No  chest pain: No  trouble swallowing: No  diarrhea: No  constipation: No  unexpected weight change: No  genital sore: No  dysuria: No  During the last 3 days, have you had a skin rash?: Yes  Bruises or bleeds easily: No  cough: No

## 2022-10-03 ENCOUNTER — TELEPHONE (OUTPATIENT)
Dept: RHEUMATOLOGY | Facility: CLINIC | Age: 62
End: 2022-10-03
Payer: COMMERCIAL

## 2022-10-03 NOTE — TELEPHONE ENCOUNTER
----- Message from Kelle Koo LPN sent at 10/3/2022  2:31 PM CDT -----  Regarding: FW: Cosentyx    ----- Message -----  From: Cayla Garcia PharmD  Sent: 9/29/2022  10:25 AM CDT  To: Migue Becerril MD, Margoth Miranda PA-C, #  Subject: Cosentyx                                         Good morning Dr. Becerril,    [facility] has received the order for Mrs. Knight's Cosentyx.  The order is written as 150 mg/ml to inject 300 mg under the skin every 30 days.  I wanted to confirm if you are changing her dosage from 150 mg to 300 mg.  The current office visit is unsigned and does not specify if you would like to increase the dose.  If changing, can you please send a new order for 300 mg dose with a dispense quantity of 2 ml?    Thank you,  Cayla Garcia, PharmD  Clinical Pharmacist    [facility]  [address] [place] [id number]  P [phone]  F [phone]

## 2022-10-03 NOTE — TELEPHONE ENCOUNTER
PA not required for Cosentyx 300 mg.  PA for 150 mg valid for 300 mg.  PA good til 8/7/23.  [facility] is [facility].  Pt is required to fill at [facility].      Outgoing call to pt to inform her that Cosentyx prescription will be forwarded to [facility].  Pt verbalized understanding.  Closing out pt.

## 2022-10-04 ENCOUNTER — CLINICAL SUPPORT (OUTPATIENT)
Dept: REHABILITATION | Facility: HOSPITAL | Age: 62
End: 2022-10-04
Attending: FAMILY MEDICINE
Payer: COMMERCIAL

## 2022-10-04 DIAGNOSIS — M54.42 ACUTE LEFT-SIDED LOW BACK PAIN WITH LEFT-SIDED SCIATICA: Primary | ICD-10-CM

## 2022-10-04 DIAGNOSIS — M53.86 DECREASED RANGE OF MOTION OF LUMBAR SPINE: ICD-10-CM

## 2022-10-04 PROCEDURE — 97110 THERAPEUTIC EXERCISES: CPT | Mod: PO | Performed by: PHYSICAL THERAPIST

## 2022-10-04 NOTE — PROGRESS NOTES
Physical Therapy Daily Treatment Note     Name: Clemencia Knight  Clinic Number: [id number]    Therapy Diagnosis:   Encounter Diagnoses   Name Primary?    Acute left-sided low back pain with left-sided sciatica Yes    Decreased range of motion of lumbar spine      Physician: Cecille Hester PA-C    Visit Date: 10/4/2022  Therapy Diagnosis:        Encounter Diagnoses   Name Primary?    Acute left-sided low back pain, unspecified whether sciatica present      Midline low back pain, unspecified chronicity, unspecified whether sciatica present      Acute left-sided low back pain with left-sided sciatica      Decreased range of motion of lumbar spine        Physician: Dallas Higgins MD, Cecille Hester PA-C     Physician Orders: PT Eval and Treat   Medical Diagnosis from Referral:   Midline low back pain, unspecified chronicity, unspecified whether sciatica present   Evaluation Date: 9/27/2022  Authorization Period Expiration: 12/31/2022  Plan of Care Expiration: 11/23/2022  Visit # / Visits authorized: 2     Time In: 1355  Time Out: 1445  Total Billable Time: 40 minutes     Precautions: Standard    Subjective     Pt reports: having no low back pain until yesterday while prolong standing/bending over cooking.  She was compliant with home exercise program.  Response to previous treatment: muscle soreness  Functional change: too soon to tell    Pain: 0/10  Location: bilateral low back      Objective     Clemencia received therapeutic exercises to develop strength, endurance, ROM, flexibility, posture, and core stabilization for 45 minutes including:    Posture observation:  Sitting: lordotic/neutral/kyphotic: neutral  Change in posture: better/worse/same: better  Standing: lordotic/neutral/kyphotic: neutral                               Lateral shift: right/left/nil: nil  Shift relevant: yes/no: no     Neurological:  Sensation: Dermatomes     Myotomes:    Right Left Comment   Hip flexion (L2-3): 4+/5 5/5     Knee extension  "(L3-4): 5/5 5/5     DF (L4-5): 5/5 5/5     Great Toe Ext (L5-S1):    5/5 5/5     Glut Medius (L5) 4/5 4-/5     Great Toe Flex/HS (S1-S2) 5/5 5/5        Movement Loss  Thoracic/Lumbar AROM:     % limitation Pain/Dysfunction/movement   Flexion  (55-60 N)    25% Cannot touch toes  Non-uniform curvature      Extension (25 N)  50%    ASIS does not clear toes  Lack of posterior weight shift                              Repeated:   DANNY: no effect  EIL: no effect with the exception of decreased lumbar lordosis     Bridge test/Endurance: (mean= 76.7 " compared to no low back pain, 172.9") + left hip     Recumbent bike x 10 minutes, low intensity for mobility purposes.  Standing GSS on slant board x 1'  Supine: TA x 20  Supine: TA with bridge, G-band x 20  S/L clam shells with G-band x 20 each  EIS x 20  Prone lying in extension x 3', progressed to EIL/belt for counter-PA 2/10    Machine:  Lumbar extension 60# 2/10  Hip abd 45# 2/10    Home Exercises Provided and Patient Education Provided     Education provided:   - Yes    Written Home Exercises Provided: Patient instructed to cont prior HEP.  Exercises were reviewed and Clemencia was able to demonstrate them prior to the end of the session.  Clemencia demonstrated good  understanding of the education provided.     See EMR under Patient Instructions for exercises provided prior visit.    Assessment     Patient was given cueing and tactile input on TA and demonstrated FIS with no painful limitations.    Clemencia Is progressing well towards her goals.   Pt prognosis is Good.     Pt will continue to benefit from skilled outpatient physical therapy to address the deficits listed in the problem list box on initial evaluation, provide pt/family education and to maximize pt's level of independence in the home and community environment.     Pt's spiritual, cultural and educational needs considered and pt agreeable to plan of care and goals.    Anticipated barriers to physical therapy: " none    Goals:   Short Term GOALS:  In 4 weeks, pt. will:  - improve left hip MMT 1/2 grade for standing/ambulatory tasks.  - improve lumbar extension by 25% for mobility purposes.  - report > 50% reduction in low back painful episodes with home management  - decrease outcome measure limitation to <54%     Long Term GOALS:  In 8 weeks, pt. will:  - be independent and compliant with HEP and SX management   - decrease outcome measure limitation to <50%  - demonstrate hip/core MMT > 4/5 for ADL tasks.  - report no painful limitations with flexion in standing including yard work.    Plan     Continue with POC    Aidan Last, PT

## 2022-10-11 ENCOUNTER — CLINICAL SUPPORT (OUTPATIENT)
Dept: REHABILITATION | Facility: HOSPITAL | Age: 62
End: 2022-10-11
Attending: FAMILY MEDICINE
Payer: COMMERCIAL

## 2022-10-11 DIAGNOSIS — M54.42 ACUTE LEFT-SIDED LOW BACK PAIN WITH LEFT-SIDED SCIATICA: Primary | ICD-10-CM

## 2022-10-11 DIAGNOSIS — M53.86 DECREASED RANGE OF MOTION OF LUMBAR SPINE: ICD-10-CM

## 2022-10-11 PROCEDURE — 97110 THERAPEUTIC EXERCISES: CPT | Mod: PO | Performed by: PHYSICAL THERAPIST

## 2022-10-11 NOTE — PROGRESS NOTES
"  Physical Therapy Daily Treatment Note     Name: Clemencia Knight  Clinic Number: [id number]    Therapy Diagnosis:   Encounter Diagnoses   Name Primary?    Acute left-sided low back pain with left-sided sciatica Yes    Decreased range of motion of lumbar spine      Physician: Cecille Hester PA-C    Visit Date: 10/11/2022     Physician: Dallas Higgins MD, Cecille Hester PA-C     Physician Orders: PT Eval and Treat   Medical Diagnosis from Referral:   Midline low back pain, unspecified chronicity, unspecified whether sciatica present   Evaluation Date: 9/27/2022  Authorization Period Expiration: 12/31/2022  Plan of Care Expiration: 11/23/2022  Visit # / Visits authorized: 2     Time In: 1400  Time Out: 1450  Total Billable Time: 40 minutes     Precautions: Standard    Subjective     Pt reports: feeling better compared to last session with muscle soreness noted. No new s/s.  She was compliant with home exercise program.  Response to previous treatment: muscle soreness  Functional change: too soon to tell    Pain: 0/10  Location: bilateral low back      Objective     Clemencia received therapeutic exercises to develop strength, endurance, ROM, flexibility, posture, and core stabilization for 40 minutes including:    Posture observation:  Sitting: lordotic/neutral/kyphotic: neutral  Change in posture: better/worse/same: better  Standing: lordotic/neutral/kyphotic: neutral                               Lateral shift: right/left/nil: nil  Shift relevant: yes/no: no     Neurological:  Sensation: Dermatomes    Movement Loss  Thoracic/Lumbar AROM:     % limitation Pain/Dysfunction/movement   Flexion  (55-60 N)    25% Cannot touch toes  Non-uniform curvature      Extension (25 N)  50%    ASIS does not clear toes  Lack of posterior weight shift                              Repeated:   DANNY: no effect  EIL: no effect with the exception of decreased lumbar lordosis     Bridge test/Endurance: (mean= 76.7 " compared to no low back " "pain, 172.9") + left hip     Recumbent bike x 10 minutes, low intensity for mobility purposes.  Standing GSS on slant board x 1'  Supine: TA x 20 with green band  Supine: TA with bridge, B-band x 20  S/L clam shells with B-band x 20 each  EIS x 20  Prone lying in extension x 3', progressed to EIL/belt for counter-PA 2/10  EIL with clinician over-pressure  Prone: glut squeeze with hip extension x 20, alternating    Machine:  Lumbar extension 60# 2/10  Hip abd 45# 2/10    Home Exercises Provided and Patient Education Provided     Education provided:   - Yes    Written Home Exercises Provided: Patient instructed to cont prior HEP.  Exercises were reviewed and Clemencia was able to demonstrate them prior to the end of the session.  Clemencia demonstrated good  understanding of the education provided.     See EMR under Patient Instructions for exercises provided prior visit.    Assessment     Patient given cueing on lumbar extension along with clinician over-pressure for improvement in motion. No adverse effects.    Clemencia Is progressing well towards her goals.   Pt prognosis is Good.     Pt will continue to benefit from skilled outpatient physical therapy to address the deficits listed in the problem list box on initial evaluation, provide pt/family education and to maximize pt's level of independence in the home and community environment.     Pt's spiritual, cultural and educational needs considered and pt agreeable to plan of care and goals.    Anticipated barriers to physical therapy: none    Goals:   Short Term GOALS:  In 4 weeks, pt. will:  - improve left hip MMT 1/2 grade for standing/ambulatory tasks.  - improve lumbar extension by 25% for mobility purposes.  - report > 50% reduction in low back painful episodes with home management  - decrease outcome measure limitation to <54%     Long Term GOALS:  In 8 weeks, pt. will:  - be independent and compliant with HEP and SX management   - decrease outcome measure limitation to " <50%  - demonstrate hip/core MMT > 4/5 for ADL tasks.  - report no painful limitations with flexion in standing including yard work.    Plan     Continue with POC    Aidan Last, PT

## 2022-10-18 ENCOUNTER — CLINICAL SUPPORT (OUTPATIENT)
Dept: REHABILITATION | Facility: HOSPITAL | Age: 62
End: 2022-10-18
Attending: FAMILY MEDICINE
Payer: COMMERCIAL

## 2022-10-18 DIAGNOSIS — M54.42 ACUTE LEFT-SIDED LOW BACK PAIN WITH LEFT-SIDED SCIATICA: Primary | ICD-10-CM

## 2022-10-18 DIAGNOSIS — M53.86 DECREASED RANGE OF MOTION OF LUMBAR SPINE: ICD-10-CM

## 2022-10-18 PROCEDURE — 97110 THERAPEUTIC EXERCISES: CPT | Mod: PO | Performed by: PHYSICAL THERAPIST

## 2022-10-18 NOTE — PROGRESS NOTES
Physical Therapy Daily Treatment Note     Name: Clemencia Knight  Clinic Number: [id number]    Therapy Diagnosis:   Encounter Diagnoses   Name Primary?    Acute left-sided low back pain with left-sided sciatica Yes    Decreased range of motion of lumbar spine      Physician: Cecille Hester PA-C    Visit Date: 10/18/2022     Physician: Dallas Higgins MD, Cecille Hester PA-C     Physician Orders: PT Eval and Treat   Medical Diagnosis from Referral:   Midline low back pain, unspecified chronicity, unspecified whether sciatica present   Evaluation Date: 9/27/2022  Authorization Period Expiration: 12/31/2022  Plan of Care Expiration: 11/23/2022  Visit # / Visits authorized: 2     Time In: 1345  Time Out: 1430  Total Billable Time: 40 minutes     Precautions: Standard    Subjective     Pt reports: having left sided lower leg pain to left medial knee that is coming from the back and hip area. Patient reported standing, working with spouse around the house over he weekend and did not notice the symptoms until she was resting. Lying down and moving/walking decreased pain.  She was compliant with home exercise program.  Response to previous treatment: muscle soreness  Functional change: too soon to tell    Pain:3-4/10  Location: bilateral low back      Objective     Clemencia received therapeutic exercises to develop strength, endurance, ROM, flexibility, posture, and core stabilization for 40 minutes including:    Posture observation:  Sitting: lordotic/neutral/kyphotic: neutral  Change in posture: better/worse/same: better  Standing: lordotic/neutral/kyphotic: neutral                               Lateral shift: right/left/nil: nil  Shift relevant: yes/no: no     Neurological:  Sensation: Dermatomes    Movement Loss  Thoracic/Lumbar AROM:     % limitation Pain/Dysfunction/movement   Flexion  (55-60 N)    25% Cannot touch toes  Non-uniform curvature      Extension (25 N)  50%    ASIS does not clear toes  Lack of posterior  "weight shift                              Repeated: x10  EIS: no reproducible s/s.  FIS: as above   Rotation x 10 each    Bridge test/Endurance: (mean= 76.7 " compared to no low back pain, 172.9") + left hip     Recumbent bike x 7 minutes, low intensity for mobility purposes.  Standing GSS on slant board x 1'  Supine: TA x 20 with green band  Supine: TA with bridge, B-band x 20  S/L clam shells with B-band x 20 each  EIS x 20    Prone lying in extension x 3', progressed to EIL/belt for counter-PA 2/10  EIL with clinician over-pressure x 10  Prone: glut squeeze with hip extension x 20, alternating    Machine: NP  Lumbar extension 60# 2/10  Hip abd 45# 2/10    Home Exercises Provided and Patient Education Provided     Education provided:   - Yes    Written Home Exercises Provided: Patient instructed to cont prior HEP.  Exercises were reviewed and Clemencia was able to demonstrate them prior to the end of the session.  Clemencia demonstrated good  understanding of the education provided.     See EMR under Patient Instructions for exercises provided prior visit.    Assessment     Patient noted with improvement in extension with no adverse effects. Unable to reproduce or expose low back symptoms at this time.    Clemencia Is progressing well towards her goals.   Pt prognosis is Good.     Pt will continue to benefit from skilled outpatient physical therapy to address the deficits listed in the problem list box on initial evaluation, provide pt/family education and to maximize pt's level of independence in the home and community environment.     Pt's spiritual, cultural and educational needs considered and pt agreeable to plan of care and goals.    Anticipated barriers to physical therapy: none    Goals:   Short Term GOALS:  In 4 weeks, pt. will:  - improve left hip MMT 1/2 grade for standing/ambulatory tasks.  - improve lumbar extension by 25% for mobility purposes.  - report > 50% reduction in low back painful episodes with home " management  - decrease outcome measure limitation to <54%     Long Term GOALS:  In 8 weeks, pt. will:  - be independent and compliant with HEP and SX management   - decrease outcome measure limitation to <50%  - demonstrate hip/core MMT > 4/5 for ADL tasks.  - report no painful limitations with flexion in standing including yard work.    Plan     Continue with POC    Aidan Last, PT

## 2022-11-08 ENCOUNTER — CLINICAL SUPPORT (OUTPATIENT)
Dept: REHABILITATION | Facility: HOSPITAL | Age: 62
End: 2022-11-08
Attending: FAMILY MEDICINE
Payer: COMMERCIAL

## 2022-11-08 DIAGNOSIS — M53.86 DECREASED RANGE OF MOTION OF LUMBAR SPINE: ICD-10-CM

## 2022-11-08 DIAGNOSIS — M54.42 ACUTE LEFT-SIDED LOW BACK PAIN WITH LEFT-SIDED SCIATICA: Primary | ICD-10-CM

## 2022-11-08 PROCEDURE — 97110 THERAPEUTIC EXERCISES: CPT | Mod: PO | Performed by: PHYSICAL THERAPIST

## 2022-11-08 NOTE — PROGRESS NOTES
Physical Therapy Daily Treatment Note     Name: Clemencia Knight  Clinic Number: [id number]    Therapy Diagnosis:   Encounter Diagnoses   Name Primary?    Acute left-sided low back pain with left-sided sciatica Yes    Decreased range of motion of lumbar spine      Physician: Cecille Hester PA-C    Visit Date: 11/8/2022     Physician: Dallas Higgins MD, Cecille Hester PA-C     Physician Orders: PT Eval and Treat   Medical Diagnosis from Referral:   Midline low back pain, unspecified chronicity, unspecified whether sciatica present   Evaluation Date: 9/27/2022  Authorization Period Expiration: 12/31/2022  Plan of Care Expiration: 11/23/2022  Visit # / Visits authorized: 2     Time In: 1350  Time Out: 1435  Total Billable Time: 40 minutes     Precautions: Standard    Subjective     Pt reports: being away due to vacation with no change in s/s from prior visit. Patient reported left sided low back pain with intermittent radicular pain down the leg. No falls noted.  She was compliant with home exercise program.  Response to previous treatment: muscle soreness  Functional change: too soon to tell    Pain:5/10  Location: bilateral low back      Objective     Clemencia received therapeutic exercises to develop strength, endurance, ROM, flexibility, posture, and core stabilization for 40 minutes including:    Posture observation:  Sitting: lordotic/neutral/kyphotic: neutral  Change in posture: better/worse/same: better  Standing: lordotic/neutral/kyphotic: neutral                               Lateral shift: right/left/nil: nil  Shift relevant: yes/no: no     Neurological:  Sensation: Dermatomes: intact    Movement Loss  Thoracic/Lumbar AROM:     % limitation Pain/Dysfunction/movement   Flexion  (55-60 N)    25% Cannot touch toes  Non-uniform curvature      Extension (25 N)  50%    ASIS does not clear toes  Lack of posterior weight shift                              Repeated: x10  EIS: no reproducible s/s.  FIS: as above  "  Rotation x 10 each  -Minimal contralateral shift to right side (left sided back pain)    Bridge test/Endurance: (mean= 76.7 " compared to no low back pain, 172.9") + left hip     Standing GSS on slant board x 1'  Supine: TA x 20 with green band  Supine: TA with bridge, B-band x 20  S/L clam shells with B-band x 20 each  EIS x 20    Prone lying in extension hips off center x 3', progressed to EIL/belt for counter-PA 2/10 (hips off center)  Side glides (left side against wall) x 10 f/b EIS x 10  EIL with clinician over-pressure x 10  Prone: glut squeeze with hip extension x 20, alternating    Machine: NP  Lumbar extension 60# 2/10  Hip abd 45# 2/10    Home Exercises Provided and Patient Education Provided     Education provided:   - Yes    Written Home Exercises Provided: Patient instructed to cont prior HEP.  Exercises were reviewed and Clemencia was able to demonstrate them prior to the end of the session.  Clemencia demonstrated good  understanding of the education provided.     See EMR under Patient Instructions for exercises provided prior visit.    Assessment   Patient noted with minimal contralateral shift to right side due to left low back pain. Patient tolerated change of tx with hips off center on hopes of decreased, better outcomes. No adverse effects.    Clemencia Is progressing well towards her goals.   Pt prognosis is Good.     Pt will continue to benefit from skilled outpatient physical therapy to address the deficits listed in the problem list box on initial evaluation, provide pt/family education and to maximize pt's level of independence in the home and community environment.     Pt's spiritual, cultural and educational needs considered and pt agreeable to plan of care and goals.    Anticipated barriers to physical therapy: none    Goals:   Short Term GOALS:  In 4 weeks, pt. will:  - improve left hip MMT 1/2 grade for standing/ambulatory tasks.  - improve lumbar extension by 25% for mobility purposes.  - " report > 50% reduction in low back painful episodes with home management  - decrease outcome measure limitation to <54%     Long Term GOALS:  In 8 weeks, pt. will:  - be independent and compliant with HEP and SX management   - decrease outcome measure limitation to <50%  - demonstrate hip/core MMT > 4/5 for ADL tasks.  - report no painful limitations with flexion in standing including yard work.    Plan     Continue with POC    Aidan Last, PT

## 2022-11-17 ENCOUNTER — TELEPHONE (OUTPATIENT)
Dept: PHARMACY | Facility: CLINIC | Age: 62
End: 2022-11-17
Payer: COMMERCIAL

## 2022-12-06 ENCOUNTER — PATIENT MESSAGE (OUTPATIENT)
Dept: FAMILY MEDICINE | Facility: CLINIC | Age: 62
End: 2022-12-06
Payer: COMMERCIAL

## 2022-12-12 ENCOUNTER — PATIENT MESSAGE (OUTPATIENT)
Dept: FAMILY MEDICINE | Facility: CLINIC | Age: 62
End: 2022-12-12
Payer: COMMERCIAL

## 2022-12-12 ENCOUNTER — TELEPHONE (OUTPATIENT)
Dept: FAMILY MEDICINE | Facility: CLINIC | Age: 62
End: 2022-12-12
Payer: COMMERCIAL

## 2022-12-12 NOTE — TELEPHONE ENCOUNTER
----- Message from Fanny Rodriguez sent at 12/12/2022  2:27 PM CST -----  Type: Needs Medical Advice    Who Called:Pt Spouse  Best Call Back Number:[phone]    Additional Information Requesting a call back regarding Pt spouse was calling to speak with office in regards to a virtual visit pt stated they are having difficulties signing on wanted to see if office can call please call back Thank you  Please Advise-Thank you

## 2022-12-12 NOTE — TELEPHONE ENCOUNTER
Called pt, scheduled med refill appointment in clinic for 12/16/22 at 2:20 PM with Dr. Gonzalez. Pt verbalized understanding.

## 2022-12-16 ENCOUNTER — OFFICE VISIT (OUTPATIENT)
Dept: FAMILY MEDICINE | Facility: CLINIC | Age: 62
End: 2022-12-16
Payer: COMMERCIAL

## 2022-12-16 VITALS
SYSTOLIC BLOOD PRESSURE: 120 MMHG | BODY MASS INDEX: 30.63 KG/M2 | WEIGHT: 179.44 LBS | HEIGHT: 64 IN | OXYGEN SATURATION: 96 % | DIASTOLIC BLOOD PRESSURE: 80 MMHG | HEART RATE: 72 BPM

## 2022-12-16 DIAGNOSIS — L40.50 PSORIATIC ARTHRITIS: Primary | ICD-10-CM

## 2022-12-16 DIAGNOSIS — F33.1 MODERATE EPISODE OF RECURRENT MAJOR DEPRESSIVE DISORDER: ICD-10-CM

## 2022-12-16 PROCEDURE — 3074F SYST BP LT 130 MM HG: CPT | Mod: CPTII,S$GLB,, | Performed by: STUDENT IN AN ORGANIZED HEALTH CARE EDUCATION/TRAINING PROGRAM

## 2022-12-16 PROCEDURE — 3079F PR MOST RECENT DIASTOLIC BLOOD PRESSURE 80-89 MM HG: ICD-10-PCS | Mod: CPTII,S$GLB,, | Performed by: STUDENT IN AN ORGANIZED HEALTH CARE EDUCATION/TRAINING PROGRAM

## 2022-12-16 PROCEDURE — 3008F PR BODY MASS INDEX (BMI) DOCUMENTED: ICD-10-PCS | Mod: CPTII,S$GLB,, | Performed by: STUDENT IN AN ORGANIZED HEALTH CARE EDUCATION/TRAINING PROGRAM

## 2022-12-16 PROCEDURE — 93010 ELECTROCARDIOGRAM REPORT: CPT | Mod: S$GLB,,, | Performed by: INTERNAL MEDICINE

## 2022-12-16 PROCEDURE — 1160F PR REVIEW ALL MEDS BY PRESCRIBER/CLIN PHARMACIST DOCUMENTED: ICD-10-PCS | Mod: CPTII,S$GLB,, | Performed by: STUDENT IN AN ORGANIZED HEALTH CARE EDUCATION/TRAINING PROGRAM

## 2022-12-16 PROCEDURE — 3008F BODY MASS INDEX DOCD: CPT | Mod: CPTII,S$GLB,, | Performed by: STUDENT IN AN ORGANIZED HEALTH CARE EDUCATION/TRAINING PROGRAM

## 2022-12-16 PROCEDURE — 99214 OFFICE O/P EST MOD 30 MIN: CPT | Mod: S$GLB,,, | Performed by: STUDENT IN AN ORGANIZED HEALTH CARE EDUCATION/TRAINING PROGRAM

## 2022-12-16 PROCEDURE — 1160F RVW MEDS BY RX/DR IN RCRD: CPT | Mod: CPTII,S$GLB,, | Performed by: STUDENT IN AN ORGANIZED HEALTH CARE EDUCATION/TRAINING PROGRAM

## 2022-12-16 PROCEDURE — 3079F DIAST BP 80-89 MM HG: CPT | Mod: CPTII,S$GLB,, | Performed by: STUDENT IN AN ORGANIZED HEALTH CARE EDUCATION/TRAINING PROGRAM

## 2022-12-16 PROCEDURE — 93010 EKG 12-LEAD: ICD-10-PCS | Mod: S$GLB,,, | Performed by: INTERNAL MEDICINE

## 2022-12-16 PROCEDURE — 3074F PR MOST RECENT SYSTOLIC BLOOD PRESSURE < 130 MM HG: ICD-10-PCS | Mod: CPTII,S$GLB,, | Performed by: STUDENT IN AN ORGANIZED HEALTH CARE EDUCATION/TRAINING PROGRAM

## 2022-12-16 PROCEDURE — 99999 PR PBB SHADOW E&M-EST. PATIENT-LVL IV: ICD-10-PCS | Mod: PBBFAC,,, | Performed by: STUDENT IN AN ORGANIZED HEALTH CARE EDUCATION/TRAINING PROGRAM

## 2022-12-16 PROCEDURE — 99999 PR PBB SHADOW E&M-EST. PATIENT-LVL IV: CPT | Mod: PBBFAC,,, | Performed by: STUDENT IN AN ORGANIZED HEALTH CARE EDUCATION/TRAINING PROGRAM

## 2022-12-16 PROCEDURE — 1159F MED LIST DOCD IN RCRD: CPT | Mod: CPTII,S$GLB,, | Performed by: STUDENT IN AN ORGANIZED HEALTH CARE EDUCATION/TRAINING PROGRAM

## 2022-12-16 PROCEDURE — 99214 PR OFFICE/OUTPT VISIT, EST, LEVL IV, 30-39 MIN: ICD-10-PCS | Mod: S$GLB,,, | Performed by: STUDENT IN AN ORGANIZED HEALTH CARE EDUCATION/TRAINING PROGRAM

## 2022-12-16 PROCEDURE — 93005 EKG 12-LEAD: ICD-10-PCS | Mod: S$GLB,,, | Performed by: STUDENT IN AN ORGANIZED HEALTH CARE EDUCATION/TRAINING PROGRAM

## 2022-12-16 PROCEDURE — 93005 ELECTROCARDIOGRAM TRACING: CPT | Mod: S$GLB,,, | Performed by: STUDENT IN AN ORGANIZED HEALTH CARE EDUCATION/TRAINING PROGRAM

## 2022-12-16 PROCEDURE — 1159F PR MEDICATION LIST DOCUMENTED IN MEDICAL RECORD: ICD-10-PCS | Mod: CPTII,S$GLB,, | Performed by: STUDENT IN AN ORGANIZED HEALTH CARE EDUCATION/TRAINING PROGRAM

## 2022-12-16 RX ORDER — HYDROXYZINE HYDROCHLORIDE 25 MG/1
25 TABLET, FILM COATED ORAL NIGHTLY PRN
Qty: 30 TABLET | Refills: 3 | Status: SHIPPED | OUTPATIENT
Start: 2022-12-16 | End: 2023-02-10

## 2022-12-16 RX ORDER — DULOXETIN HYDROCHLORIDE 60 MG/1
60 CAPSULE, DELAYED RELEASE ORAL DAILY
Qty: 90 CAPSULE | Refills: 6 | Status: SHIPPED | OUTPATIENT
Start: 2022-12-16 | End: 2023-02-18 | Stop reason: DRUGHIGH

## 2022-12-17 NOTE — PROGRESS NOTES
"Subjective:       Patient ID: Clemencia Knight is a 62 y.o. female.    Chief Complaint: Medication Refill    Reports nonadherence to cymbalta x 3 months, due to feeling no pain and no depressed and anxious mood  Reports acute worsening of bilateral hand pain and daytime depressed and anxious mood, difficulty falling asleep, now restarted cymbalta 60mg x 2 weeks, denies adverse effects from medication    Medication Refill  Pertinent negatives include no chest pain or headaches.     Review of Systems   Constitutional:  Negative for malaise/fatigue.   Respiratory:  Negative for shortness of breath.    Cardiovascular:  Negative for chest pain and palpitations.        Dyspnea on exertion,    Neurological:  Negative for dizziness and headaches.        No focal neurological changes      Objective:      Vitals:    12/16/22 1413   BP: 120/80   Pulse: 72   SpO2: 96%   Weight: 81.4 kg (179 lb 7.3 oz)   Height: 5' 4.02" (1.626 m)      Physical Exam  Constitutional:       General: She is not in acute distress.  Eyes:      General: No scleral icterus.     Conjunctiva/sclera: Conjunctivae normal.   Cardiovascular:      Rate and Rhythm: Normal rate and regular rhythm.      Comments: Good skin turgor, No edema  Pulmonary:      Comments: Respirations symmetric and not labored  Musculoskeletal:         General: Normal range of motion.      Cervical back: Neck supple.      Comments: Normal strength   Lymphadenopathy:      Cervical: No cervical adenopathy.   Skin:     Findings: No lesion or rash.   Neurological:      General: No focal deficit present.      Mental Status: She is alert and oriented to person, place, and time.   Psychiatric:         Mood and Affect: Mood normal.      Comments: Cooperative, Appropriate affect        Assessment:       1. Psoriatic arthritis    2. Moderate episode of recurrent major depressive disorder          Plan:       Psoriatic arthritis  -     DULoxetine (CYMBALTA) 60 MG capsule; Take 1 capsule (60 mg " total) by mouth once daily.  Dispense: 90 capsule; Refill: 6  -     Hemoglobin A1C; Future; Expected date: 12/16/2022  Worsening in setting of nonadherence, has restarted x 2 weeks, will continue cymbalta, monitor for continued improvement    Moderate episode of recurrent major depressive disorder  -     DULoxetine (CYMBALTA) 60 MG capsule; Take 1 capsule (60 mg total) by mouth once daily.  Dispense: 90 capsule; Refill: 6  -     hydrOXYzine HCL (ATARAX) 25 MG tablet; Take 1 tablet (25 mg total) by mouth nightly as needed (anxiety, sleep).  Dispense: 30 tablet; Refill: 3  -     IN OFFICE EKG 12-LEAD (to Muse)  Worsening in setting of nonadherence, has restarted x 2 weeks  CBC, CMP reviewed  EKG in clinic today, no acute ischemia, no QT prolongation  will continue cymbalta, start hydroxyzine as needed, and monitor for improvement in daytime mood, sleep    Risks, benefits, alternatives discussed with patient, asked if there were any questions, patient said no  Precautions, counseling and education provided, patient verbalized understanding

## 2022-12-19 ENCOUNTER — TELEPHONE (OUTPATIENT)
Dept: PHARMACY | Facility: CLINIC | Age: 62
End: 2022-12-19
Payer: COMMERCIAL

## 2022-12-19 ENCOUNTER — PATIENT MESSAGE (OUTPATIENT)
Dept: FAMILY MEDICINE | Facility: CLINIC | Age: 62
End: 2022-12-19
Payer: COMMERCIAL

## 2022-12-19 DIAGNOSIS — R94.31 ABNORMAL EKG: Primary | ICD-10-CM

## 2022-12-23 ENCOUNTER — OFFICE VISIT (OUTPATIENT)
Dept: RHEUMATOLOGY | Facility: CLINIC | Age: 62
End: 2022-12-23
Payer: COMMERCIAL

## 2022-12-23 DIAGNOSIS — Z79.899 IMMUNOCOMPROMISED STATE DUE TO DRUG THERAPY: ICD-10-CM

## 2022-12-23 DIAGNOSIS — I73.00 RAYNAUD'S PHENOMENON WITHOUT GANGRENE: ICD-10-CM

## 2022-12-23 DIAGNOSIS — L40.50 PSORIATIC ARTHRITIS: Primary | ICD-10-CM

## 2022-12-23 DIAGNOSIS — D84.821 IMMUNOCOMPROMISED STATE DUE TO DRUG THERAPY: ICD-10-CM

## 2022-12-23 DIAGNOSIS — L40.8 INVERSE PSORIASIS: ICD-10-CM

## 2022-12-23 PROCEDURE — 99213 PR OFFICE/OUTPT VISIT, EST, LEVL III, 20-29 MIN: ICD-10-PCS | Mod: 95,,, | Performed by: PHYSICIAN ASSISTANT

## 2022-12-23 PROCEDURE — 1159F MED LIST DOCD IN RCRD: CPT | Mod: CPTII,95,, | Performed by: PHYSICIAN ASSISTANT

## 2022-12-23 PROCEDURE — 1159F PR MEDICATION LIST DOCUMENTED IN MEDICAL RECORD: ICD-10-PCS | Mod: CPTII,95,, | Performed by: PHYSICIAN ASSISTANT

## 2022-12-23 PROCEDURE — 99213 OFFICE O/P EST LOW 20 MIN: CPT | Mod: 95,,, | Performed by: PHYSICIAN ASSISTANT

## 2022-12-23 PROCEDURE — 1160F PR REVIEW ALL MEDS BY PRESCRIBER/CLIN PHARMACIST DOCUMENTED: ICD-10-PCS | Mod: CPTII,95,, | Performed by: PHYSICIAN ASSISTANT

## 2022-12-23 PROCEDURE — 1160F RVW MEDS BY RX/DR IN RCRD: CPT | Mod: CPTII,95,, | Performed by: PHYSICIAN ASSISTANT

## 2022-12-23 NOTE — PROGRESS NOTES
The patient location is: home  The chief complaint leading to consultation is: psoriatic arthritis    Visit type: audiovisual    Face to Face time with patient: 10 minutes  15 minutes of total time spent on the encounter, which includes face to face time and non-face to face time preparing to see the patient (eg, review of tests), Obtaining and/or reviewing separately obtained history, Documenting clinical information in the electronic or other health record, Independently interpreting results (not separately reported) and communicating results to the patient/family/caregiver, or Care coordination (not separately reported)  Each patient to whom he or she provides medical services by telemedicine is:  (1) informed of the relationship between the physician and patient and the respective role of any other health care provider with respect to management of the patient; and (2) notified that he or she may decline to receive medical services by telemedicine and may withdraw from such care at any time.    Notes:     Subjective:       Patient ID: Clemencia Knight is a 62 y.o. female.    Chief Complaint: Disease Management    Mrs. Knight is a 62 year old female who presents to telemedicine virtual for follow up on psoriatic arthritis. She is doing fairly well overall on cosentyx 300 mg monthly. She denies joint pain or stiffness. Psoriasis remains active on her scalp, low back, and buttock, but it is manageable. She complains of fatigue and shortness of breath with exertion. EKG consistent with ischemia. Cardiology eval scheduled in 2 weeks. She is not sleeping well and has restless legs. PCP added atarax which helps minimally. Tizanidine was d/carey due to constipation. Raynauds is stable with conservative measures.    No new labs to review.    Current tx:  1. cosentyx 300    Prior tx:  1. Otezla  2. Stelara  3. MTX  4. Humira    Review of Systems   Constitutional:  Positive for activity change and fatigue. Negative for  appetite change, chills, fever and unexpected weight change.   HENT:  Negative for mouth sores and trouble swallowing.    Eyes:  Negative for redness and visual disturbance.   Respiratory:  Positive for shortness of breath. Negative for cough.    Cardiovascular:  Negative for chest pain, palpitations and leg swelling.   Gastrointestinal:  Negative for abdominal pain, constipation, diarrhea, nausea and vomiting.   Genitourinary:  Negative for dysuria, genital sores and pelvic pain.   Musculoskeletal:  Positive for arthralgias, joint swelling, myalgias and neck pain.   Skin:  Positive for rash.   Allergic/Immunologic: Positive for immunocompromised state.   Neurological:  Positive for headaches. Negative for dizziness, weakness and light-headedness.   Hematological:  Does not bruise/bleed easily.       Objective:     There were no vitals filed for this visit.      Past Medical History:   Diagnosis Date    Allergy     Brain tumor     Drug-induced lupus erythematosus     General anesthetics causing adverse effect in therapeutic use     pt. somewhat aware of extubation with one of her surgeries    GERD (gastroesophageal reflux disease)     Hearing loss     Migraines     Psoriatic arthritis     Raynaud's syndrome without gangrene     Restless leg syndrome     Sciatica     Trigeminal neuralgia      Past Surgical History:   Procedure Laterality Date    AUGMENTATION OF BREAST      BELT ABDOMINOPLASTY      breast implants      CERVICAL FUSION      COLONOSCOPY  10/2012    Dr. Paul; internal hemorrhoid; repeat in 10 years    COLONOSCOPY N/A 11/25/2020    Procedure: COLONOSCOPY;  Surgeon: Kiran Paul MD;  Location: [facility];  Service: Endoscopy;  Laterality: N/A; hemorrhoids; Repeat colonoscopy in 10 years for screening; random biopsy: WNL    DEXA      WNL    EPIDURAL STEROID INJECTION INTO CERVICAL SPINE N/A 09/24/2018    Procedure: Injection-steroid-epidural-cervical;  Surgeon: Myles Rocha MD;  Location: [facility] OR;   Service: Pain Management;  Laterality: N/A;  C7-T1    EPIDURAL STEROID INJECTION INTO CERVICAL SPINE N/A 11/20/2018    Procedure: Injection-steroid-epidural-cervical;  Surgeon: Myles Rocha MD;  Location: [facility] OR;  Service: Pain Management;  Laterality: N/A;  C7-T1    ESOPHAGOGASTRODUODENOSCOPY N/A 12/10/2020    Procedure: EGD (ESOPHAGOGASTRODUODENOSCOPY);  Surgeon: Kiran Paul MD;  Location: [facility];  Service: Endoscopy;  Laterality: N/A; unremarkable; biopsy: duodenum WNL, stomach-minimal chronic inflammation, negative h pylori    ESOPHAGOGASTRODUODENOSCOPY N/A 4/12/2022    Procedure: EGD (ESOPHAGOGASTRODUODENOSCOPY);  Surgeon: Kiran Paul MD;  Location: [facility];  Service: Endoscopy;  Laterality: N/A;    HYSTERECTOMY  2000    OOPHORECTOMY  07/16/2013    laparotomy BSO for benign 10cm tubal cyst    SALPINGOOPHORECTOMY  2013    with removal of fallopian cyst    SHOULDER SURGERY      right    TLH  2000    ovaries remain, benign    TONSILLECTOMY, ADENOIDECTOMY, BILATERAL MYRINGOTOMY AND TUBES            Physical Exam   Constitutional: She is oriented to person, place, and time.   Neurological: She is oriented to person, place, and time.       Labs reviewed:  Component      Latest Ref Rng & Units 8/28/2022   WBC      3.90 - 12.70 K/uL 7.64   RBC      4.00 - 5.40 M/uL 4.57   HEMOGLOBIN      12.0 - 16.0 g/dL 13.7   HEMATOCRIT      37.0 - 48.5 % 41.4   MCV      82 - 98 fL 91   MCH      27.0 - 31.0 pg 30.0   MCHC      32.0 - 36.0 g/dL 33.1   RDW      11.5 - 14.5 % 12.8   Platelets      150 - 450 K/uL 339   MPV      9.2 - 12.9 fL 9.6   Immature Granulocytes      0.0 - 0.5 % 0.1   Gran # (ANC)      1.8 - 7.7 K/uL 3.3   Immature Grans (Abs)      0.00 - 0.04 K/uL 0.01   Lymph #      1.0 - 4.8 K/uL 3.3   Mono #      0.3 - 1.0 K/uL 0.7   Eos #      0.0 - 0.5 K/uL 0.3   Baso #      0.00 - 0.20 K/uL 0.03   nRBC      0 /100 WBC 0   Gran %      38.0 - 73.0 % 43.2   Lymph %      18.0 - 48.0 % 43.2   Mono %      4.0  - 15.0 % 9.2   Eosinophil %      0.0 - 8.0 % 3.9   Basophil %      0.0 - 1.9 % 0.4   Differential Method       Automated   Sodium      136 - 145 mmol/L 141   Potassium      3.5 - 5.1 mmol/L 4.1   Chloride      95 - 110 mmol/L 102   CO2      22 - 31 mmol/L 29   Glucose      70 - 110 mg/dL 112 (H)   BUN      7 - 18 mg/dL 18   Creatinine      0.50 - 1.40 mg/dL 0.74   Calcium      8.4 - 10.2 mg/dL 9.3   PROTEIN TOTAL      6.0 - 8.4 g/dL 8.0   Albumin      3.5 - 5.2 g/dL 4.3   BILIRUBIN TOTAL      0.2 - 1.3 mg/dL 0.4   Alkaline Phosphatase      38 - 145 U/L 76   AST      14 - 36 U/L 27   ALT      0 - 35 U/L 19   ANION GAP      8 - 16 mmol/L 10   eGFR      >60 mL/min/1.73 m:2 >60     Assessment:       1. Psoriatic arthritis    2. Inverse psoriasis    3. Raynaud's phenomenon without gangrene    4. Immunocompromised state due to drug therapy              Plan:       Psoriatic arthritis    Inverse psoriasis    Raynaud's phenomenon without gangrene    Immunocompromised state due to drug therapy          Assessment:  62 year old female with  Psoriatic arthritis, +histone antibody, +Sm/RNP antibody, negative GENE, elevated CRP (normalized), Raynauds  --inverse psoriasis  --chronic migraine  --hx of small bowel obstruction  --hx of cervical fusion    Plan:  1. Cosentyx 300 mg monthly  2. Cymbalta 60 mg daily  3. Consider sleep study    Follow up:  4 mo w/ Dr. Becerril

## 2023-01-04 ENCOUNTER — LAB VISIT (OUTPATIENT)
Dept: LAB | Facility: HOSPITAL | Age: 63
End: 2023-01-04
Attending: INTERNAL MEDICINE
Payer: COMMERCIAL

## 2023-01-04 ENCOUNTER — OFFICE VISIT (OUTPATIENT)
Dept: CARDIOLOGY | Facility: CLINIC | Age: 63
End: 2023-01-04
Payer: COMMERCIAL

## 2023-01-04 VITALS
HEIGHT: 64 IN | SYSTOLIC BLOOD PRESSURE: 142 MMHG | WEIGHT: 179.25 LBS | HEART RATE: 73 BPM | DIASTOLIC BLOOD PRESSURE: 83 MMHG | BODY MASS INDEX: 30.6 KG/M2

## 2023-01-04 DIAGNOSIS — R94.31 ABNORMAL EKG: ICD-10-CM

## 2023-01-04 DIAGNOSIS — R00.2 PALPITATIONS: ICD-10-CM

## 2023-01-04 DIAGNOSIS — E78.00 HYPERCHOLESTEROLEMIA: ICD-10-CM

## 2023-01-04 DIAGNOSIS — R94.31 ABNORMAL EKG: Primary | ICD-10-CM

## 2023-01-04 LAB
CHOLEST SERPL-MCNC: 223 MG/DL (ref 120–199)
CHOLEST/HDLC SERPL: 5.2 {RATIO} (ref 2–5)
HDLC SERPL-MCNC: 43 MG/DL (ref 40–75)
HDLC SERPL: 19.3 % (ref 20–50)
LDLC SERPL CALC-MCNC: 141.8 MG/DL (ref 63–159)
NONHDLC SERPL-MCNC: 180 MG/DL
TRIGL SERPL-MCNC: 191 MG/DL (ref 30–150)

## 2023-01-04 PROCEDURE — 3077F PR MOST RECENT SYSTOLIC BLOOD PRESSURE >= 140 MM HG: ICD-10-PCS | Mod: CPTII,S$GLB,, | Performed by: INTERNAL MEDICINE

## 2023-01-04 PROCEDURE — 99999 PR PBB SHADOW E&M-EST. PATIENT-LVL III: ICD-10-PCS | Mod: PBBFAC,,, | Performed by: INTERNAL MEDICINE

## 2023-01-04 PROCEDURE — 3008F PR BODY MASS INDEX (BMI) DOCUMENTED: ICD-10-PCS | Mod: CPTII,S$GLB,, | Performed by: INTERNAL MEDICINE

## 2023-01-04 PROCEDURE — 3077F SYST BP >= 140 MM HG: CPT | Mod: CPTII,S$GLB,, | Performed by: INTERNAL MEDICINE

## 2023-01-04 PROCEDURE — 99204 PR OFFICE/OUTPT VISIT, NEW, LEVL IV, 45-59 MIN: ICD-10-PCS | Mod: S$GLB,,, | Performed by: INTERNAL MEDICINE

## 2023-01-04 PROCEDURE — 36415 COLL VENOUS BLD VENIPUNCTURE: CPT | Mod: PO | Performed by: INTERNAL MEDICINE

## 2023-01-04 PROCEDURE — 1159F MED LIST DOCD IN RCRD: CPT | Mod: CPTII,S$GLB,, | Performed by: INTERNAL MEDICINE

## 2023-01-04 PROCEDURE — 3079F PR MOST RECENT DIASTOLIC BLOOD PRESSURE 80-89 MM HG: ICD-10-PCS | Mod: CPTII,S$GLB,, | Performed by: INTERNAL MEDICINE

## 2023-01-04 PROCEDURE — 3008F BODY MASS INDEX DOCD: CPT | Mod: CPTII,S$GLB,, | Performed by: INTERNAL MEDICINE

## 2023-01-04 PROCEDURE — 80061 LIPID PANEL: CPT | Performed by: INTERNAL MEDICINE

## 2023-01-04 PROCEDURE — 3079F DIAST BP 80-89 MM HG: CPT | Mod: CPTII,S$GLB,, | Performed by: INTERNAL MEDICINE

## 2023-01-04 PROCEDURE — 99999 PR PBB SHADOW E&M-EST. PATIENT-LVL III: CPT | Mod: PBBFAC,,, | Performed by: INTERNAL MEDICINE

## 2023-01-04 PROCEDURE — 99204 OFFICE O/P NEW MOD 45 MIN: CPT | Mod: S$GLB,,, | Performed by: INTERNAL MEDICINE

## 2023-01-04 PROCEDURE — 1159F PR MEDICATION LIST DOCUMENTED IN MEDICAL RECORD: ICD-10-PCS | Mod: CPTII,S$GLB,, | Performed by: INTERNAL MEDICINE

## 2023-01-04 NOTE — PATIENT INSTRUCTIONS
Check your blood pressure at home at least 3 times a week - bring the numbers with you  Fasting lipid panel - 12 hrs of fasting  Stress test   Heart monitor for 3 days  Return in 2 months

## 2023-01-04 NOTE — PROGRESS NOTES
Cardiology Clinic Note      Patient: Clemencia Knight, 1960, [id number]  Primary Care Provider: Dallas Higgins MD     Chief Complaint/Reason for Referral: abnormal EKG     Subjective:       Clemencia Knight is a 62 y.o. female who presents for abnormal EKG. She is accompanied by her     No real exercise. Just moderate activity because her joints. Works in the yard. Gets SOB working in yard in last couple of years compared to before. Palpitations for 5 minutes  intermittently once every few weeks. No syncope, no presyncope, no edema, no orthopnea, no PND.     Focused Past History includes:  Psoriatic arthritis on secukinumab ([place]-17A antagonist) - 7-8 years  Right parietal meningioma (1cm) - stable as of 2/2022  Drug-induced lupus from Humira   GERD  Raynaud phenomenon  Allergy to IV iodinated contrast - hives per patient   Never smoker; social EtOH; no recreational drugs  No family history of premature ASCVD    Review of Systems  Constitutional: negative for fevers, night sweats, and weight loss  Eyes: negative for diplopia. Occasional blurred vision in both eyes.   Respiratory: negative for cough, hemoptysis, sputum, and wheezing  Cardiovascular: see HPI  Gastrointestinal: negative for abdominal pain, bright red blood per rectum, change in bowel habits, dysphagia, melena, and reflux symptoms  Genitourinary:negative for dysuria, frequency, and hematuria  Hematologic/lymphatic: negative for bleeding, easy bruising, and lymphadenopathy  Musculoskeletal:positive for arthralgia and left hip pain  Neurological: negative for gait problems, paresthesia, speech problems, vertigo, and weakness  Behavioral/Psych: negative for excessive alcohol consumption, illegal drug usage, and sleep disturbance    ----------------------------  Allergy  Brain tumor  Drug-induced lupus erythematosus  General anesthetics causing adverse effect in therapeutic use      Comment:  pt. somewhat aware of extubation with one of her                 surgeries  GERD (gastroesophageal reflux disease)  Hearing loss  Migraines  Psoriatic arthritis  Raynaud's syndrome without gangrene  Restless leg syndrome  Sciatica  Trigeminal neuralgia  ----------------------------  Augmentation of breast  Belt abdominoplasty  Breast implants  Cervical fusion  Colonoscopy      Comment:  Dr. Paul; internal hemorrhoid; repeat in 10 years  Colonoscopy      Comment:  Procedure: COLONOSCOPY;  Surgeon: Kiran Paul MD;  Location: [facility];  Service: Endoscopy;                 Laterality: N/A; hemorrhoids; Repeat colonoscopy in 10                years for screening; random biopsy: WNL  Dexa      Comment:  WNL  Epidural steroid injection into cervical spine      Comment:  Procedure: Injection-steroid-epidural-cervical;                 Surgeon: Mlyes Rocha MD;  Location: [facility];  Service:                Pain Management;  Laterality: N/A;  C7-T1  Epidural steroid injection into cervical spine      Comment:  Procedure: Injection-steroid-epidural-cervical;                 Surgeon: Myles Rocha MD;  Location: [facility] OR;  Service:                Pain Management;  Laterality: N/A;  C7-T1  Esophagogastroduodenoscopy      Comment:  Procedure: EGD (ESOPHAGOGASTRODUODENOSCOPY);  Surgeon:                Kiran Paul MD;  Location: [facility];  Service:                Endoscopy;  Laterality: N/A; unremarkable; biopsy:                duodenum WNL, stomach-minimal chronic inflammation,                negative h pylori  Esophagogastroduodenoscopy      Comment:  Procedure: EGD (ESOPHAGOGASTRODUODENOSCOPY);  Surgeon:                Kiran Paul MD;  Location: [facility];  Service:                Endoscopy;  Laterality: N/A;  Hysterectomy  Oophorectomy      Comment:  laparotomy BSO for benign 10cm tubal cyst  Salpingoophorectomy      Comment:  with removal of fallopian cyst  Shoulder surgery      Comment:  right  Tlh      Comment:  ovaries remain,  "benign  Tonsillectomy, adenoidectomy, bilateral myringotomy and tubes     Family History   Problem Relation Age of Onset    Cancer Father         bladder    Diabetes Father     Heart disease Father     Diabetes Mother     Melanoma Brother     Charcot-Karla-Tooth disease Brother     Breast cancer Neg Hx     Ovarian cancer Neg Hx     Anesthesia problems Neg Hx     Colon cancer Neg Hx     Crohn's disease Neg Hx     Esophageal cancer Neg Hx     Ulcerative colitis Neg Hx     Stomach cancer Neg Hx     Celiac disease Neg Hx      Social History     Tobacco Use    Smoking status: Never    Smokeless tobacco: Never   Substance Use Topics    Alcohol use: Yes     Comment: rarely    Drug use: No       Current Outpatient Medications   Medication Sig Dispense Refill    DULoxetine (CYMBALTA) 60 MG capsule Take 1 capsule (60 mg total) by mouth once daily. 90 capsule 6    galcanezumab-gnlm 120 mg/mL PnIj Inject 1 pen (120 mg total) into the skin every 28 days. maintenance dose 1 mL 11    hydrOXYzine HCL (ATARAX) 25 MG tablet Take 1 tablet (25 mg total) by mouth nightly as needed (anxiety, sleep). 30 tablet 3    LIDOcaine (LIDODERM) 5 % Place 1 patch onto the skin once daily. Remove & Discard patch within 12 hours or as directed by MD 30 patch 0    naratriptan (AMERGE) 2.5 MG tablet Take 1 tablet (2.5 mg) by mouth at onset of headache. May repeat in 4 hours, if needed. Max 2 tablets/day. 9 tablet 11    secukinumab (COSENTYX PEN, 2 PENS,) 150 mg/mL PnIj Inject 300 mg into the skin every 30 days. 2 mL 5     No current facility-administered medications for this visit.        Objective:      Physical Exam  BP (!) 142/83 (BP Location: Left arm, Patient Position: Sitting, BP Method: Medium (Automatic))   Pulse 73   Ht 5' 4" (1.626 m)   Wt 81.3 kg (179 lb 3.7 oz)   BMI 30.77 kg/m²   Body surface area is 1.92 meters squared.  Body mass index is 30.77 kg/m².    General appearance: alert, appears stated age, cooperative, and no " distress  Head: Normocephalic, without obvious abnormality, atraumatic  Neck: no carotid bruit, no JVD, and supple, symmetrical, trachea midline  Lungs: clear to auscultation bilaterally  Heart: regular rate and rhythm; S1, S2 normal, no murmur, click, rub or gallop  Abdomen: soft, non-tender, no distended  Extremities: extremities atraumatic, no pitting edema  Skin: warm, no cyanosis, no pathologic ecchymosis in exposed portions  Neurologic: Grossly normal. A&O x3      Lab Review   Lab Results   Component Value Date    WBC 7.64 08/28/2022    HGB 13.7 08/28/2022    HCT 41.4 08/28/2022    MCV 91 08/28/2022     08/28/2022         BMP  Lab Results   Component Value Date     08/28/2022    K 4.1 08/28/2022     08/28/2022    CO2 29 08/28/2022    BUN 18 08/28/2022    CREATININE 0.74 08/28/2022    CALCIUM 9.3 08/28/2022    ANIONGAP 10 08/28/2022    ESTGFRAFRICA >60.0 06/20/2022    EGFRNONAA >60.0 06/20/2022       Lab Results   Component Value Date    ALBUMIN 4.3 08/28/2022       Lab Results   Component Value Date    ALT 19 08/28/2022    AST 27 08/28/2022    ALKPHOS 76 08/28/2022    BILITOT 0.4 08/28/2022       Lab Results   Component Value Date    TSH 1.314 09/28/2020       Lab Results   Component Value Date    CHOL 214 (H) 10/14/2021    CHOL 238 (H) 08/09/2018    CHOL 222 (H) 04/07/2017     Lab Results   Component Value Date    HDL 48 10/14/2021    HDL 56 08/09/2018    HDL 46 04/07/2017     Lab Results   Component Value Date    LDLCALC 144.0 10/14/2021    LDLCALC 162.6 (H) 08/09/2018    LDLCALC 137.0 04/07/2017     Lab Results   Component Value Date    TRIG 110 10/14/2021    TRIG 97 08/09/2018    TRIG 195 (H) 04/07/2017     Lab Results   Component Value Date    CHOLHDL 22.4 10/14/2021    CHOLHDL 23.5 08/09/2018    CHOLHDL 20.7 04/07/2017       EKG 12/16/22:  NSR with anterior TWIs     Assessment & Plan:      This is a 62 y.o. very pleasant  female with psoriatic arthritis on immunosuppressive  therapy.  She has chronic fatigue and some dyspnea on exertion but no angina per se.  She had an abnormal EKG with anterior precordial T-wave inversions.  Her risk factors include autoimmune disorder and hypercholesterolemia and borderline blood pressure.  She also has palpitations and I heard a few ectopics during exam.  We discussed the correlation between autoimmune disorders and accelerated development of ASCVD     1. Abnormal EKG  Ambulatory referral/consult to Cardiology    Lipid Panel    Nuclear Stress - Cardiology Interpreted      2. Hypercholesterolemia  Lipid Panel      3. Palpitations  Holter monitor - 72 hour           Fasting lipid panel  48 hour Holter  Exercise stress SPECT MPI    Emphasized the importance of modifying lifestyle related risk factors including limiting alcohol intake, exercise, diet most resembling a Mediterranean diet.    I appreciate the opportunity to participate in Clemencia Knight 's care today.  Please follow up with me in 2 months.      Boyd Hayden MD, FACC  Interventional Cardiology/Structural Heart Disease  [facility]  Office: [phone]

## 2023-01-05 NOTE — PROGRESS NOTES
10-yr risk of stroke/MI/vascular death is about 5.2% which is low. However this does not account for inflammatory disease (psoriatic arthritis). Will await the results of the stress test; if negative then will use a hs-CRP to further classify her risk and whether or not she would benefit from a statin.

## 2023-01-06 ENCOUNTER — TELEPHONE (OUTPATIENT)
Dept: CARDIOLOGY | Facility: CLINIC | Age: 63
End: 2023-01-06
Payer: COMMERCIAL

## 2023-01-06 NOTE — TELEPHONE ENCOUNTER
Spoke with patient and informed that Dr. Hayden has reviewed labs and will make recommendations after Stress test is done. Patient verbalized understanding.

## 2023-01-06 NOTE — TELEPHONE ENCOUNTER
----- Message from Boyd Hayden MD sent at 1/5/2023  3:17 PM CST -----  10-yr risk of stroke/MI/vascular death is about 5.2% which is low. However this does not account for inflammatory disease (psoriatic arthritis). Will await the results of the stress test; if negative then will use a hs-CRP to further classify her ris  k and whether or not she would benefit from a statin.

## 2023-01-09 ENCOUNTER — PATIENT MESSAGE (OUTPATIENT)
Dept: OBSTETRICS AND GYNECOLOGY | Facility: CLINIC | Age: 63
End: 2023-01-09
Payer: COMMERCIAL

## 2023-01-09 DIAGNOSIS — Z12.31 ENCOUNTER FOR SCREENING MAMMOGRAM FOR BREAST CANCER: Primary | ICD-10-CM

## 2023-01-13 ENCOUNTER — HOSPITAL ENCOUNTER (OUTPATIENT)
Dept: RADIOLOGY | Facility: HOSPITAL | Age: 63
Discharge: HOME | End: 2023-01-13
Attending: INTERNAL MEDICINE
Payer: COMMERCIAL

## 2023-01-13 ENCOUNTER — CLINICAL SUPPORT (OUTPATIENT)
Dept: CARDIOLOGY | Facility: HOSPITAL | Age: 63
End: 2023-01-13
Attending: INTERNAL MEDICINE
Payer: COMMERCIAL

## 2023-01-13 VITALS — HEIGHT: 64 IN | WEIGHT: 179 LBS | BODY MASS INDEX: 30.56 KG/M2

## 2023-01-13 DIAGNOSIS — R94.31 ABNORMAL EKG: ICD-10-CM

## 2023-01-13 LAB
CV STRESS BASE HR: 81 BPM
DIASTOLIC BLOOD PRESSURE: 77 MMHG
NUC STRESS EJECTION FRACTION: 74 %
OHS CV CPX 1 MINUTE RECOVERY HEART RATE: 114 BPM
OHS CV CPX 85 PERCENT MAX PREDICTED HEART RATE MALE: 130
OHS CV CPX ESTIMATED METS: 11
OHS CV CPX MAX PREDICTED HEART RATE: 153
OHS CV CPX PATIENT IS FEMALE: 1
OHS CV CPX PATIENT IS MALE: 0
OHS CV CPX PEAK DIASTOLIC BLOOD PRESSURE: 71 MMHG
OHS CV CPX PEAK HEAR RATE: 136 BPM
OHS CV CPX PEAK RATE PRESSURE PRODUCT: NORMAL
OHS CV CPX PEAK SYSTOLIC BLOOD PRESSURE: 229 MMHG
OHS CV CPX PERCENT MAX PREDICTED HEART RATE ACHIEVED: 89
OHS CV CPX RATE PRESSURE PRODUCT PRESENTING: NORMAL
STRESS ECHO POST EXERCISE DUR MIN: 6 MINUTES
STRESS ECHO POST EXERCISE DUR SEC: 35 SECONDS
SYSTOLIC BLOOD PRESSURE: 179 MMHG

## 2023-01-13 PROCEDURE — 78452 HT MUSCLE IMAGE SPECT MULT: CPT | Mod: PO

## 2023-01-13 PROCEDURE — 93018 PR CARDIAC STRESS TST,INTERP/REPT ONLY: ICD-10-PCS | Mod: ,,, | Performed by: INTERNAL MEDICINE

## 2023-01-13 PROCEDURE — 93018 CV STRESS TEST I&R ONLY: CPT | Mod: ,,, | Performed by: INTERNAL MEDICINE

## 2023-01-13 PROCEDURE — 93016 NUCLEAR STRESS - CARDIOLOGY INTERPRETED (CUPID ONLY): ICD-10-PCS | Mod: ,,, | Performed by: INTERNAL MEDICINE

## 2023-01-13 PROCEDURE — 78452 HT MUSCLE IMAGE SPECT MULT: CPT | Mod: 26,,, | Performed by: INTERNAL MEDICINE

## 2023-01-13 PROCEDURE — 93016 CV STRESS TEST SUPVJ ONLY: CPT | Mod: ,,, | Performed by: INTERNAL MEDICINE

## 2023-01-13 PROCEDURE — 93017 CV STRESS TEST TRACING ONLY: CPT | Mod: PO

## 2023-01-13 PROCEDURE — 78452 NUCLEAR STRESS - CARDIOLOGY INTERPRETED (CUPID ONLY): ICD-10-PCS | Mod: 26,,, | Performed by: INTERNAL MEDICINE

## 2023-01-13 PROCEDURE — A9502 TC99M TETROFOSMIN: HCPCS | Mod: PO

## 2023-01-17 ENCOUNTER — PATIENT MESSAGE (OUTPATIENT)
Dept: RHEUMATOLOGY | Facility: CLINIC | Age: 63
End: 2023-01-17
Payer: COMMERCIAL

## 2023-01-19 ENCOUNTER — PATIENT MESSAGE (OUTPATIENT)
Dept: RHEUMATOLOGY | Facility: CLINIC | Age: 63
End: 2023-01-19
Payer: COMMERCIAL

## 2023-01-19 DIAGNOSIS — G47.00 INSOMNIA, UNSPECIFIED TYPE: Primary | ICD-10-CM

## 2023-01-20 RX ORDER — TRAZODONE HYDROCHLORIDE 50 MG/1
50 TABLET ORAL NIGHTLY
Qty: 30 TABLET | Refills: 3 | Status: SHIPPED | OUTPATIENT
Start: 2023-01-20 | End: 2023-05-07

## 2023-01-23 ENCOUNTER — HOSPITAL ENCOUNTER (OUTPATIENT)
Dept: RADIOLOGY | Facility: HOSPITAL | Age: 63
Discharge: HOME | End: 2023-01-23
Attending: OBSTETRICS & GYNECOLOGY
Payer: COMMERCIAL

## 2023-01-23 DIAGNOSIS — Z12.31 ENCOUNTER FOR SCREENING MAMMOGRAM FOR BREAST CANCER: ICD-10-CM

## 2023-01-23 PROCEDURE — 77067 SCR MAMMO BI INCL CAD: CPT | Mod: TC,PN

## 2023-01-23 PROCEDURE — 77067 SCR MAMMO BI INCL CAD: CPT | Mod: 26,,, | Performed by: RADIOLOGY

## 2023-01-23 PROCEDURE — 77063 MAMMO DIGITAL SCREENING BILAT WITH TOMO: ICD-10-PCS | Mod: 26,,, | Performed by: RADIOLOGY

## 2023-01-23 PROCEDURE — 77067 MAMMO DIGITAL SCREENING BILAT WITH TOMO: ICD-10-PCS | Mod: 26,,, | Performed by: RADIOLOGY

## 2023-01-23 PROCEDURE — 77063 BREAST TOMOSYNTHESIS BI: CPT | Mod: 26,,, | Performed by: RADIOLOGY

## 2023-01-26 ENCOUNTER — PATIENT MESSAGE (OUTPATIENT)
Dept: RHEUMATOLOGY | Facility: CLINIC | Age: 63
End: 2023-01-26
Payer: COMMERCIAL

## 2023-01-26 DIAGNOSIS — L40.9 PSORIASIS: Primary | ICD-10-CM

## 2023-01-27 RX ORDER — HYDROCORTISONE 25 MG/G
CREAM TOPICAL 2 TIMES DAILY
Qty: 453.6 G | Refills: 1 | Status: SHIPPED | OUTPATIENT
Start: 2023-01-27

## 2023-02-03 ENCOUNTER — PATIENT MESSAGE (OUTPATIENT)
Dept: RHEUMATOLOGY | Facility: CLINIC | Age: 63
End: 2023-02-03
Payer: COMMERCIAL

## 2023-02-10 ENCOUNTER — TELEPHONE (OUTPATIENT)
Dept: PAIN MEDICINE | Facility: CLINIC | Age: 63
End: 2023-02-10

## 2023-02-10 ENCOUNTER — OFFICE VISIT (OUTPATIENT)
Dept: PAIN MEDICINE | Facility: CLINIC | Age: 63
End: 2023-02-10
Payer: COMMERCIAL

## 2023-02-10 VITALS
HEIGHT: 64 IN | BODY MASS INDEX: 31.07 KG/M2 | DIASTOLIC BLOOD PRESSURE: 84 MMHG | HEART RATE: 69 BPM | WEIGHT: 182 LBS | SYSTOLIC BLOOD PRESSURE: 143 MMHG

## 2023-02-10 DIAGNOSIS — M54.16 LUMBAR RADICULOPATHY, CHRONIC: Primary | ICD-10-CM

## 2023-02-10 DIAGNOSIS — M54.9 DORSALGIA, UNSPECIFIED: ICD-10-CM

## 2023-02-10 PROCEDURE — 1159F MED LIST DOCD IN RCRD: CPT | Mod: CPTII,S$GLB,, | Performed by: PHYSICIAN ASSISTANT

## 2023-02-10 PROCEDURE — 99999 PR PBB SHADOW E&M-EST. PATIENT-LVL V: CPT | Mod: PBBFAC,,, | Performed by: PHYSICIAN ASSISTANT

## 2023-02-10 PROCEDURE — 99999 PR PBB SHADOW E&M-EST. PATIENT-LVL V: ICD-10-PCS | Mod: PBBFAC,,, | Performed by: PHYSICIAN ASSISTANT

## 2023-02-10 PROCEDURE — 99213 PR OFFICE/OUTPT VISIT, EST, LEVL III, 20-29 MIN: ICD-10-PCS | Mod: S$GLB,,, | Performed by: PHYSICIAN ASSISTANT

## 2023-02-10 PROCEDURE — 3079F PR MOST RECENT DIASTOLIC BLOOD PRESSURE 80-89 MM HG: ICD-10-PCS | Mod: CPTII,S$GLB,, | Performed by: PHYSICIAN ASSISTANT

## 2023-02-10 PROCEDURE — 3077F PR MOST RECENT SYSTOLIC BLOOD PRESSURE >= 140 MM HG: ICD-10-PCS | Mod: CPTII,S$GLB,, | Performed by: PHYSICIAN ASSISTANT

## 2023-02-10 PROCEDURE — 3008F PR BODY MASS INDEX (BMI) DOCUMENTED: ICD-10-PCS | Mod: CPTII,S$GLB,, | Performed by: PHYSICIAN ASSISTANT

## 2023-02-10 PROCEDURE — 3079F DIAST BP 80-89 MM HG: CPT | Mod: CPTII,S$GLB,, | Performed by: PHYSICIAN ASSISTANT

## 2023-02-10 PROCEDURE — 3077F SYST BP >= 140 MM HG: CPT | Mod: CPTII,S$GLB,, | Performed by: PHYSICIAN ASSISTANT

## 2023-02-10 PROCEDURE — 1159F PR MEDICATION LIST DOCUMENTED IN MEDICAL RECORD: ICD-10-PCS | Mod: CPTII,S$GLB,, | Performed by: PHYSICIAN ASSISTANT

## 2023-02-10 PROCEDURE — 1160F PR REVIEW ALL MEDS BY PRESCRIBER/CLIN PHARMACIST DOCUMENTED: ICD-10-PCS | Mod: CPTII,S$GLB,, | Performed by: PHYSICIAN ASSISTANT

## 2023-02-10 PROCEDURE — 1160F RVW MEDS BY RX/DR IN RCRD: CPT | Mod: CPTII,S$GLB,, | Performed by: PHYSICIAN ASSISTANT

## 2023-02-10 PROCEDURE — 3008F BODY MASS INDEX DOCD: CPT | Mod: CPTII,S$GLB,, | Performed by: PHYSICIAN ASSISTANT

## 2023-02-10 PROCEDURE — 99213 OFFICE O/P EST LOW 20 MIN: CPT | Mod: S$GLB,,, | Performed by: PHYSICIAN ASSISTANT

## 2023-02-10 RX ORDER — ACETAMINOPHEN, DIPHENHYDRAMINE HCL, PHENYLEPHRINE HCL 325; 25; 5 MG/1; MG/1; MG/1
TABLET ORAL DAILY
COMMUNITY

## 2023-02-10 RX ORDER — MULTIVITAMIN
1 TABLET ORAL DAILY
COMMUNITY

## 2023-02-10 RX ORDER — TIZANIDINE 4 MG/1
4 TABLET ORAL NIGHTLY PRN
Qty: 40 TABLET | Refills: 0 | Status: SHIPPED | OUTPATIENT
Start: 2023-02-10 | End: 2023-03-20

## 2023-02-10 NOTE — PROGRESS NOTES
[facility] Established Patient      Referred by: Lauro Salinas    PCP:   Dallas Higgins    CC:   Chief Complaint   Patient presents with    Low-back Pain     Pain radiates from the neck to the lower back and down the left hip.      Last 3 PDI Scores 12/17/2018 9/10/2018   Pain Disability Index (PDI) 28 48         HPI:   Clemencia Knight is a 62 y.o. female with history of GERD, RLS, cervical spine surgery and chronic pain presents for further assessment of lower back pain.  She was seen in September 2022 for lower back and thigh pain; treted with medications and PT.  PT did not help.  Pain has persisted.  She has pain in the lower lumbar region to the mid thoraic area and into the left hip, anterior thigh crossing over to the medial left knee.  Pain increases with standing.  She is having to rely on her cane more    Initial HPI:  62 year old female with GERD, migraine, RLS, history of cervical spine surgery with some chronic neck pain is referred by alpa Rojo for evaluation of lumbar back pain.  Pain started 8/27/22 with no focal injury.  She stood up from a seated position and felt a pinching pain in the left lower back.  Pain progressed since then becoming so intense she was seen in the ER 8/28/22.  She has pain in the left lower back with radiation to the anterior thigh to the inner knee.  She denies any numbness/ tingling.  In the ER she was given IM toradol and IM steroid; she was prescribed flexeril, lidoderm and mobic and has been taking the medications.  She has tried home exercise with a therapy ball.  No other treatments.  She uses the therapy/ exercise ball regularly over the leve gluteal region.      Past and current medications:  Antineuropathics:  NSAIDs:l mobic  (past IM toradol)  Antidepressants:  Muscle relaxers: (past - flexeril)  Opioids:  Antiplatelets/Anticoagulants:  Other medications:  (past lidoderm patch and IM steroid)    Physical therapy/ Chiropractic care:  PT  5 visits  9/27/22 through 11/18/22    Pain Intervention History:  none    Past Spine Surgical History:  Cervical spine fusion C6/7 in 2009      History:    Current Outpatient Medications:     cyanocobalamin, vitamin B-12, (VITAMIN B-12) 5,000 mcg Subl, Place under the tongue Daily., Disp: , Rfl:     DULoxetine (CYMBALTA) 60 MG capsule, Take 1 capsule (60 mg total) by mouth once daily., Disp: 90 capsule, Rfl: 6    galcanezumab-gnlm 120 mg/mL PnIj, Inject 1 pen (120 mg total) into the skin every 28 days. maintenance dose, Disp: 1 mL, Rfl: 11    hydrocortisone 2.5 % cream, Apply topically 2 (two) times daily., Disp: 453.6 g, Rfl: 1    LIDOcaine (LIDODERM) 5 %, Place 1 patch onto the skin once daily. Remove & Discard patch within 12 hours or as directed by MD, Disp: 30 patch, Rfl: 0    meloxicam (MOBIC) 15 MG tablet, Take 1 tablet (15 mg total) by mouth once daily., Disp: 30 tablet, Rfl: 3    multivitamin (ONE DAILY MULTIVITAMIN) per tablet, Take 1 tablet by mouth once daily., Disp: , Rfl:     naratriptan (AMERGE) 2.5 MG tablet, Take 1 tablet (2.5 mg) by mouth at onset of headache. May repeat in 4 hours, if needed. Max 2 tablets/day., Disp: 9 tablet, Rfl: 11    secukinumab (COSENTYX PEN, 2 PENS,) 150 mg/mL PnIj, Inject 300 mg into the skin every 30 days., Disp: 2 mL, Rfl: 5    traZODone (DESYREL) 50 MG tablet, Take 1 tablet (50 mg total) by mouth every evening., Disp: 30 tablet, Rfl: 3    hydrOXYzine HCL (ATARAX) 25 MG tablet, Take 1 tablet (25 mg total) by mouth nightly as needed (anxiety, sleep)., Disp: 30 tablet, Rfl: 3    tiZANidine (ZANAFLEX) 4 MG tablet, Take 1 tablet (4 mg total) by mouth nightly as needed (muscle spasms/ pain)., Disp: 40 tablet, Rfl: 0    Past Medical History:   Diagnosis Date    Allergy     Brain tumor     Drug-induced lupus erythematosus     General anesthetics causing adverse effect in therapeutic use     pt. somewhat aware of extubation with one of her surgeries    GERD (gastroesophageal reflux  disease)     Hearing loss     Migraines     Psoriatic arthritis     Raynaud's syndrome without gangrene     Restless leg syndrome     Sciatica     Trigeminal neuralgia        Past Surgical History:   Procedure Laterality Date    AUGMENTATION OF BREAST      BELT ABDOMINOPLASTY      breast implants      CERVICAL FUSION      COLONOSCOPY  10/2012    Dr. Paul; internal hemorrhoid; repeat in 10 years    COLONOSCOPY N/A 11/25/2020    Procedure: COLONOSCOPY;  Surgeon: Kiran Paul MD;  Location: [facility];  Service: Endoscopy;  Laterality: N/A; hemorrhoids; Repeat colonoscopy in 10 years for screening; random biopsy: WNL    DEXA      WNL    EPIDURAL STEROID INJECTION INTO CERVICAL SPINE N/A 09/24/2018    Procedure: Injection-steroid-epidural-cervical;  Surgeon: Myles Rocha MD;  Location: [facility];  Service: Pain Management;  Laterality: N/A;  C7-T1    EPIDURAL STEROID INJECTION INTO CERVICAL SPINE N/A 11/20/2018    Procedure: Injection-steroid-epidural-cervical;  Surgeon: Myles Rocha MD;  Location: [facility];  Service: Pain Management;  Laterality: N/A;  C7-T1    ESOPHAGOGASTRODUODENOSCOPY N/A 12/10/2020    Procedure: EGD (ESOPHAGOGASTRODUODENOSCOPY);  Surgeon: Kiran Paul MD;  Location: [facility];  Service: Endoscopy;  Laterality: N/A; unremarkable; biopsy: duodenum WNL, stomach-minimal chronic inflammation, negative h pylori    ESOPHAGOGASTRODUODENOSCOPY N/A 4/12/2022    Procedure: EGD (ESOPHAGOGASTRODUODENOSCOPY);  Surgeon: Kiran Paul MD;  Location: [facility];  Service: Endoscopy;  Laterality: N/A;    HYSTERECTOMY  2000    OOPHORECTOMY  07/16/2013    laparotomy BSO for benign 10cm tubal cyst    SALPINGOOPHORECTOMY  2013    with removal of fallopian cyst    SHOULDER SURGERY      right    [facility]  2000    ovaries remain, benign    TONSILLECTOMY, ADENOIDECTOMY, BILATERAL MYRINGOTOMY AND TUBES         Family History   Problem Relation Age of Onset    Cancer Father         bladder    Diabetes Father      Heart disease Father     Diabetes Mother     Melanoma Brother     Charcot-Karla-Tooth disease Brother     Breast cancer Neg Hx     Ovarian cancer Neg Hx     Anesthesia problems Neg Hx     Colon cancer Neg Hx     Crohn's disease Neg Hx     Esophageal cancer Neg Hx     Ulcerative colitis Neg Hx     Stomach cancer Neg Hx     Celiac disease Neg Hx        Social History     Socioeconomic History    Marital status:    Occupational History     Employer: OTHER   Tobacco Use    Smoking status: Never    Smokeless tobacco: Never   Substance and Sexual Activity    Alcohol use: Yes     Comment: rarely    Drug use: No    Sexual activity: Yes     Partners: Male     Birth control/protection: Surgical   Other Topics Concern    Are you pregnant or think you may be? No     Social Determinants of Health     Financial Resource Strain: Medium Risk    Difficulty of Paying Living Expenses: Somewhat hard   Food Insecurity: No Food Insecurity    Worried About Running Out of Food in the Last Year: Never true    Ran Out of Food in the Last Year: Never true   Transportation Needs: No Transportation Needs    Lack of Transportation (Medical): No    Lack of Transportation (Non-Medical): No   Physical Activity: Inactive    Days of Exercise per Week: 0 days    Minutes of Exercise per Session: 0 min   Stress: No Stress Concern Present    Feeling of Stress : Not at all   Social Connections: Unknown    Frequency of Communication with Friends and Family: Three times a week    Frequency of Social Gatherings with Friends and Family: Three times a week    Active Member of Clubs or Organizations: Yes    Attends Club or Organization Meetings: More than 4 times per year    Marital Status:    Housing Stability: Unknown    Unable to Pay for Housing in the Last Year: No    Unstable Housing in the Last Year: No       Review of patient's allergies indicates:   Allergen Reactions    Contrast media Swelling     Swollen eyes and face    Hydrocodone  "Itching    Plaquenil [hydroxychloroquine]      Worsening psoriasis    Topiramate      Hair loss, cognitive side effects        Review of Systems:  Low back pain, left leg pain.  Balance of review of systems is negative.    Physical Exam:  Vitals:    02/10/23 1055   BP: (!) 143/84   Pulse: 69   Weight: 82.6 kg (181 lb 15.8 oz)   Height: 5' 4" (1.626 m)   PainSc:   6   PainLoc: Back     Body mass index is 31.24 kg/m².    Gen: NAD  Psych: mood appropriate for given condition  HEENT: eyes anicteric   CV: RRR, 2+ radial pulse  HEENT: anicteric   Respiratory: non-labored, no signs of respiratory distress  Abd: non-distended  Skin: warm, dry and intact.  Gait: Able to heel walk, toe walk. No antalgic gait.     Coordination:   Romberg: negative  Finger to nose coordination: normal  Heel to shin coordination: normal  Tandem walking coordination: normal    Cervical spine:   ROM is full in flexion, extension and lateral rotation without increased pain.  Spurling's maneuver causes no neck pain to either side.  Myofascial exam: No Tenderness to palpation across cervical paraspinous region bilaterally.    Lumbar spine:   ROM is full with flexion extension and oblique extension with no increased pain.    Silvio's test causes no increased pain on either side.    Supine straight leg raise is negative bilaterally.    Internal and external rotation of the hip causes no increased pain on either side.  Myofascial exam: No tenderness to palpation across lumbar paraspinous muscles. No tenderness to palpation over the bilateral greater trochanters and bilateral SI joint    Sensory:  Intact and symmetrical to light touch in C4-T1 dermatomes bilaterally. Intact and symmetrical to light touch in L1-S1 dermatomes bilaterally.    Motor:    Right Left   C4 Shoulder Abduction  5  5   C5 Elbow Flexion    5  5   C6 Wrist Extension  5  5   C7 Elbow Extension   5  5   C8/T1 Hand Intrinsics   5  5        Right Left   L2/3 Iliacus Hip flexion  5  5 "   L3/4 Qudratus Femoris Knee Extension  5  5   L4/5 Tib Anterior Ankle Dorsiflexion   5  5   L5/S1 Extensor Hallicus Longus Great toe extension  5  5   S1/S2 Gastroc/Soleus Plantar Flexion  5  5      Right Left   Triceps DTR 2+ 2+   Biceps DTR 2+ 2+   Brachioradialis DTR 2+ 2+   Patellar DTR 2+ 2+   Achilles DTR 2+ 2+   Reyes Absent  Absent   Clonus Absent Absent   Babinski Absent Absent     Imaging:    MRI lumbar spine 9-11-18:  minimal degenerative changes with no significant central canal or foraminal narrowing.    CT renal stone study 8/28/22:  No suspicious osseous lesions.  Spondylotic changes are identified.  Diffuse soft tissue stranding is identified involving the subcutaneous tissue of the left extremity with extension towards the gluteus joe without evidence for underlying myositis or drainable fluid collection.  Possible cellulitic changes, but most likely from soft tissue trauma from repeated use of therapy/ exercise ball over this same region.     Xray lumbar spine from 9/12/22:  mild degenerative changes.    Labs:  No results found for: LABA1C, HGBA1C    Lab Results   Component Value Date    WBC 7.64 08/28/2022    HGB 13.7 08/28/2022    HCT 41.4 08/28/2022    MCV 91 08/28/2022     08/28/2022           Assessment:     Ms. Khanna has chronic left lower back pain and thigh pain.  Pain may be myfascial given no significant issues seen on prior imaging.  No improvement with medications, PT and last MR imaging was in 2018.  I recommend updating xrays and MRI lumbar spine.  Will also obtain hip xrays to rule out underlying hip pathology.  Follow up in clinic after imaging. Zanaflex for spasms and help with comfort at night.  Continue with mobic in the morning.      Problem List Items Addressed This Visit    None  Visit Diagnoses       Lumbar radiculopathy, chronic    -  Primary    Relevant Medications    tiZANidine (ZANAFLEX) 4 MG tablet    Other Relevant Orders    MRI Lumbar Spine Without  Contrast    X-Ray Lumbar Complete Including Flex And Ext    X-Ray Hips Bilateral 2 View Incl AP Pelvis    Dorsalgia, unspecified                  Follow Up: RTC after imaging.    : Not applicable          Cecille Hester PA-C  [facility]      This note was completed with dictation software and grammatical errors may exist.

## 2023-02-10 NOTE — TELEPHONE ENCOUNTER
----- Message from Arlen Franklin sent at 2/10/2023 12:15 PM CST -----  Contact: patient  Type:  Needs Medical Advice    Who Called:  patient       Would the patient rather a call back or a response via [facility]? Call     Best Call Back Number: [phone] (work)     Additional Information:  Patient would like to speak with the nurse in regards to discussing something and patient just left the clinic.     Please call to advise

## 2023-02-13 ENCOUNTER — TELEPHONE (OUTPATIENT)
Dept: PAIN MEDICINE | Facility: CLINIC | Age: 63
End: 2023-02-13
Payer: COMMERCIAL

## 2023-02-13 NOTE — TELEPHONE ENCOUNTER
Spoke with . They are upset that their MRI was cancelled at [facility]. I advised him that he said that he wanted this scheduled at [facility]. We cannot schedule for [facility]. The auth for [facility] has been approved. Mr Knight said that he does not want to go to [facility] he wants to go to the [facility]. I scheduled the MRI at the [facility] and sent a message to Karely Matthew in preservice to get the auth updated to reflect the appt at [facility].

## 2023-02-13 NOTE — TELEPHONE ENCOUNTER
----- Message from Lucina Suggs sent at 2/13/2023  3:42 PM CST -----  Regarding: advise  Contact:   Type: Needs Medical Advice  Who Called:    Symptoms (please be specific):  appt  How long has patient had these symptoms:    Pharmacy name and phone #:    Best Call Back Number:  [phone] (work)    Additional Information: Please call them to schedule the MRI it looks like it was canceled by patient on there end but it was not they need it to be reschedule. Thanks!

## 2023-02-14 PROBLEM — H53.8 BLURRED VISION, RIGHT EYE: Status: ACTIVE | Noted: 2023-02-14

## 2023-02-14 PROBLEM — R94.39 ABNORMAL THALLIUM STRESS TEST: Status: ACTIVE | Noted: 2023-02-14

## 2023-02-14 PROBLEM — R53.83 FATIGUE: Status: ACTIVE | Noted: 2023-02-14

## 2023-02-14 PROBLEM — I73.00 RAYNAUD'S PHENOMENON WITHOUT GANGRENE: Status: ACTIVE | Noted: 2023-02-14

## 2023-02-14 PROBLEM — R41.0 CONFUSION: Status: ACTIVE | Noted: 2023-02-14

## 2023-02-14 PROBLEM — R06.09 DOE (DYSPNEA ON EXERTION): Status: ACTIVE | Noted: 2023-02-14

## 2023-02-14 PROBLEM — R00.2 PALPITATIONS: Status: ACTIVE | Noted: 2023-02-14

## 2023-02-15 ENCOUNTER — LAB VISIT (OUTPATIENT)
Dept: LAB | Facility: HOSPITAL | Age: 63
End: 2023-02-15
Attending: INTERNAL MEDICINE
Payer: COMMERCIAL

## 2023-02-15 ENCOUNTER — TELEPHONE (OUTPATIENT)
Dept: FAMILY MEDICINE | Facility: CLINIC | Age: 63
End: 2023-02-15

## 2023-02-15 ENCOUNTER — TELEPHONE (OUTPATIENT)
Dept: PAIN MEDICINE | Facility: CLINIC | Age: 63
End: 2023-02-15
Payer: COMMERCIAL

## 2023-02-15 ENCOUNTER — PATIENT MESSAGE (OUTPATIENT)
Dept: PAIN MEDICINE | Facility: CLINIC | Age: 63
End: 2023-02-15
Payer: COMMERCIAL

## 2023-02-15 ENCOUNTER — OFFICE VISIT (OUTPATIENT)
Dept: FAMILY MEDICINE | Facility: CLINIC | Age: 63
End: 2023-02-15
Payer: COMMERCIAL

## 2023-02-15 VITALS
HEIGHT: 64 IN | BODY MASS INDEX: 30.9 KG/M2 | SYSTOLIC BLOOD PRESSURE: 122 MMHG | HEART RATE: 86 BPM | DIASTOLIC BLOOD PRESSURE: 80 MMHG | WEIGHT: 181 LBS | OXYGEN SATURATION: 96 % | TEMPERATURE: 98 F

## 2023-02-15 DIAGNOSIS — R30.0 DYSURIA: Primary | ICD-10-CM

## 2023-02-15 DIAGNOSIS — R79.82 ELEVATED C-REACTIVE PROTEIN (CRP): ICD-10-CM

## 2023-02-15 DIAGNOSIS — I73.00 RAYNAUD'S PHENOMENON WITHOUT GANGRENE: ICD-10-CM

## 2023-02-15 DIAGNOSIS — M47.817 LUMBAR AND SACRAL OSTEOARTHRITIS: ICD-10-CM

## 2023-02-15 DIAGNOSIS — M19.90 OSTEOARTHRITIS, UNSPECIFIED OSTEOARTHRITIS TYPE, UNSPECIFIED SITE: ICD-10-CM

## 2023-02-15 DIAGNOSIS — L40.50 PSORIATIC ARTHRITIS: ICD-10-CM

## 2023-02-15 LAB
ALBUMIN SERPL BCP-MCNC: 4.2 G/DL (ref 3.5–5.2)
ALP SERPL-CCNC: 72 U/L (ref 55–135)
ALT SERPL W/O P-5'-P-CCNC: 15 U/L (ref 10–44)
ANION GAP SERPL CALC-SCNC: 7 MMOL/L (ref 8–16)
AST SERPL-CCNC: 20 U/L (ref 10–40)
BASOPHILS # BLD AUTO: 0.03 K/UL (ref 0–0.2)
BASOPHILS NFR BLD: 0.5 % (ref 0–1.9)
BILIRUB SERPL-MCNC: 0.6 MG/DL (ref 0.1–1)
BILIRUB SERPL-MCNC: ABNORMAL MG/DL
BLOOD, POC UA: ABNORMAL
BUN SERPL-MCNC: 21 MG/DL (ref 6–20)
CALCIUM SERPL-MCNC: 9.6 MG/DL (ref 8.7–10.5)
CHLORIDE SERPL-SCNC: 103 MMOL/L (ref 95–110)
CO2 SERPL-SCNC: 28 MMOL/L (ref 23–29)
CREAT SERPL-MCNC: 0.9 MG/DL (ref 0.5–1.4)
CRP SERPL-MCNC: 5.6 MG/L (ref 0–8.2)
DIFFERENTIAL METHOD: NORMAL
EOSINOPHIL # BLD AUTO: 0.2 K/UL (ref 0–0.5)
EOSINOPHIL NFR BLD: 2.8 % (ref 0–8)
ERYTHROCYTE [DISTWIDTH] IN BLOOD BY AUTOMATED COUNT: 12.6 % (ref 11.5–14.5)
ERYTHROCYTE [SEDIMENTATION RATE] IN BLOOD BY PHOTOMETRIC METHOD: 9 MM/HR (ref 0–36)
EST. GFR  (NO RACE VARIABLE): >60 ML/MIN/1.73 M^2
ESTIMATED AVG GLUCOSE: 108 MG/DL (ref 68–131)
GLUCOSE SERPL-MCNC: 86 MG/DL (ref 70–110)
GLUCOSE UR QL STRIP: ABNORMAL
HBA1C MFR BLD: 5.4 % (ref 4–5.6)
HBV CORE IGM SERPL QL IA: NORMAL
HBV SURFACE AB SER-ACNC: <3 MIU/ML
HBV SURFACE AB SER-ACNC: NORMAL M[IU]/ML
HBV SURFACE AG SERPL QL IA: NORMAL
HCT VFR BLD AUTO: 40.7 % (ref 37–48.5)
HGB BLD-MCNC: 13.2 G/DL (ref 12–16)
IMM GRANULOCYTES # BLD AUTO: 0.02 K/UL (ref 0–0.04)
IMM GRANULOCYTES NFR BLD AUTO: 0.3 % (ref 0–0.5)
KETONES UR QL STRIP: ABNORMAL
LEUKOCYTE ESTERASE URINE, POC: ABNORMAL
LYMPHOCYTES # BLD AUTO: 2.1 K/UL (ref 1–4.8)
LYMPHOCYTES NFR BLD: 32 % (ref 18–48)
MCH RBC QN AUTO: 29.7 PG (ref 27–31)
MCHC RBC AUTO-ENTMCNC: 32.4 G/DL (ref 32–36)
MCV RBC AUTO: 92 FL (ref 82–98)
MONOCYTES # BLD AUTO: 0.5 K/UL (ref 0.3–1)
MONOCYTES NFR BLD: 7.6 % (ref 4–15)
NEUTROPHILS # BLD AUTO: 3.7 K/UL (ref 1.8–7.7)
NEUTROPHILS NFR BLD: 56.8 % (ref 38–73)
NITRITE, POC UA: ABNORMAL
NRBC BLD-RTO: 0 /100 WBC
PH, POC UA: 5.5
PLATELET # BLD AUTO: 343 K/UL (ref 150–450)
PMV BLD AUTO: 9.9 FL (ref 9.2–12.9)
POTASSIUM SERPL-SCNC: 4.2 MMOL/L (ref 3.5–5.1)
PROT SERPL-MCNC: 7.5 G/DL (ref 6–8.4)
PROTEIN, POC: ABNORMAL
RBC # BLD AUTO: 4.44 M/UL (ref 4–5.4)
SODIUM SERPL-SCNC: 138 MMOL/L (ref 136–145)
SPECIFIC GRAVITY, POC UA: 1.03
UROBILINOGEN, POC UA: 1
WBC # BLD AUTO: 6.47 K/UL (ref 3.9–12.7)

## 2023-02-15 PROCEDURE — 1160F PR REVIEW ALL MEDS BY PRESCRIBER/CLIN PHARMACIST DOCUMENTED: ICD-10-PCS | Mod: CPTII,S$GLB,, | Performed by: STUDENT IN AN ORGANIZED HEALTH CARE EDUCATION/TRAINING PROGRAM

## 2023-02-15 PROCEDURE — 86480 TB TEST CELL IMMUN MEASURE: CPT | Performed by: INTERNAL MEDICINE

## 2023-02-15 PROCEDURE — 81003 POCT URINALYSIS: ICD-10-PCS | Mod: QW,S$GLB,, | Performed by: STUDENT IN AN ORGANIZED HEALTH CARE EDUCATION/TRAINING PROGRAM

## 2023-02-15 PROCEDURE — 99213 OFFICE O/P EST LOW 20 MIN: CPT | Mod: S$GLB,,, | Performed by: STUDENT IN AN ORGANIZED HEALTH CARE EDUCATION/TRAINING PROGRAM

## 2023-02-15 PROCEDURE — 36415 COLL VENOUS BLD VENIPUNCTURE: CPT | Mod: PO | Performed by: INTERNAL MEDICINE

## 2023-02-15 PROCEDURE — 85652 RBC SED RATE AUTOMATED: CPT | Performed by: INTERNAL MEDICINE

## 2023-02-15 PROCEDURE — 86705 HEP B CORE ANTIBODY IGM: CPT | Performed by: INTERNAL MEDICINE

## 2023-02-15 PROCEDURE — 83036 HEMOGLOBIN GLYCOSYLATED A1C: CPT | Performed by: STUDENT IN AN ORGANIZED HEALTH CARE EDUCATION/TRAINING PROGRAM

## 2023-02-15 PROCEDURE — 87086 URINE CULTURE/COLONY COUNT: CPT | Performed by: STUDENT IN AN ORGANIZED HEALTH CARE EDUCATION/TRAINING PROGRAM

## 2023-02-15 PROCEDURE — 3008F BODY MASS INDEX DOCD: CPT | Mod: CPTII,S$GLB,, | Performed by: STUDENT IN AN ORGANIZED HEALTH CARE EDUCATION/TRAINING PROGRAM

## 2023-02-15 PROCEDURE — 99999 PR PBB SHADOW E&M-EST. PATIENT-LVL IV: ICD-10-PCS | Mod: PBBFAC,,, | Performed by: STUDENT IN AN ORGANIZED HEALTH CARE EDUCATION/TRAINING PROGRAM

## 2023-02-15 PROCEDURE — 1159F PR MEDICATION LIST DOCUMENTED IN MEDICAL RECORD: ICD-10-PCS | Mod: CPTII,S$GLB,, | Performed by: STUDENT IN AN ORGANIZED HEALTH CARE EDUCATION/TRAINING PROGRAM

## 2023-02-15 PROCEDURE — 81003 URINALYSIS AUTO W/O SCOPE: CPT | Mod: QW,S$GLB,, | Performed by: STUDENT IN AN ORGANIZED HEALTH CARE EDUCATION/TRAINING PROGRAM

## 2023-02-15 PROCEDURE — 87340 HEPATITIS B SURFACE AG IA: CPT | Performed by: INTERNAL MEDICINE

## 2023-02-15 PROCEDURE — 1159F MED LIST DOCD IN RCRD: CPT | Mod: CPTII,S$GLB,, | Performed by: STUDENT IN AN ORGANIZED HEALTH CARE EDUCATION/TRAINING PROGRAM

## 2023-02-15 PROCEDURE — 3074F PR MOST RECENT SYSTOLIC BLOOD PRESSURE < 130 MM HG: ICD-10-PCS | Mod: CPTII,S$GLB,, | Performed by: STUDENT IN AN ORGANIZED HEALTH CARE EDUCATION/TRAINING PROGRAM

## 2023-02-15 PROCEDURE — 99999 PR PBB SHADOW E&M-EST. PATIENT-LVL IV: CPT | Mod: PBBFAC,,, | Performed by: STUDENT IN AN ORGANIZED HEALTH CARE EDUCATION/TRAINING PROGRAM

## 2023-02-15 PROCEDURE — 85025 COMPLETE CBC W/AUTO DIFF WBC: CPT | Performed by: INTERNAL MEDICINE

## 2023-02-15 PROCEDURE — 86706 HEP B SURFACE ANTIBODY: CPT | Mod: 91 | Performed by: INTERNAL MEDICINE

## 2023-02-15 PROCEDURE — 80053 COMPREHEN METABOLIC PANEL: CPT | Performed by: INTERNAL MEDICINE

## 2023-02-15 PROCEDURE — 99213 PR OFFICE/OUTPT VISIT, EST, LEVL III, 20-29 MIN: ICD-10-PCS | Mod: S$GLB,,, | Performed by: STUDENT IN AN ORGANIZED HEALTH CARE EDUCATION/TRAINING PROGRAM

## 2023-02-15 PROCEDURE — 3079F PR MOST RECENT DIASTOLIC BLOOD PRESSURE 80-89 MM HG: ICD-10-PCS | Mod: CPTII,S$GLB,, | Performed by: STUDENT IN AN ORGANIZED HEALTH CARE EDUCATION/TRAINING PROGRAM

## 2023-02-15 PROCEDURE — 3079F DIAST BP 80-89 MM HG: CPT | Mod: CPTII,S$GLB,, | Performed by: STUDENT IN AN ORGANIZED HEALTH CARE EDUCATION/TRAINING PROGRAM

## 2023-02-15 PROCEDURE — 3074F SYST BP LT 130 MM HG: CPT | Mod: CPTII,S$GLB,, | Performed by: STUDENT IN AN ORGANIZED HEALTH CARE EDUCATION/TRAINING PROGRAM

## 2023-02-15 PROCEDURE — 86140 C-REACTIVE PROTEIN: CPT | Performed by: INTERNAL MEDICINE

## 2023-02-15 PROCEDURE — 1160F RVW MEDS BY RX/DR IN RCRD: CPT | Mod: CPTII,S$GLB,, | Performed by: STUDENT IN AN ORGANIZED HEALTH CARE EDUCATION/TRAINING PROGRAM

## 2023-02-15 PROCEDURE — 3008F PR BODY MASS INDEX (BMI) DOCUMENTED: ICD-10-PCS | Mod: CPTII,S$GLB,, | Performed by: STUDENT IN AN ORGANIZED HEALTH CARE EDUCATION/TRAINING PROGRAM

## 2023-02-15 NOTE — PROGRESS NOTES
"Subjective:       Patient ID: Clemencia Knight is a 62 y.o. female.    Chief Complaint: Urinary Tract Infection    Seen in clinic 2 months ago, reported nonadherence to cymbalta x 3 months and acute worsening of bilateral hand pain and daytime depressed mood, patient had restarted cymbalta 60mg x 2 weeks, this provider recommended patient have labs performed, encouraged oral hydration and to continue medication pending renal function results, patient nonadherent to labs, continues to take medication,     Now reports irritative voiding, worsening of low back pain, and 1 episode of orthostatic symptoms in last week now resolved, otherwise denies LUTS, vaginal discharge, pruritus    Review of Systems   Constitutional:  Negative for chills, fever, malaise/fatigue and weight loss.   HENT:  Negative for congestion and sore throat.    Respiratory:  Negative for cough, shortness of breath and wheezing.    Cardiovascular:  Negative for chest pain and palpitations.        Dyspnea on exertion,    Gastrointestinal:  Negative for abdominal pain, constipation, diarrhea, heartburn, nausea and vomiting.   Genitourinary:  Positive for dysuria. Negative for frequency, hematuria and urgency.   Skin:  Negative for rash.   Neurological:  Negative for weakness and headaches.        No focal neurological changes      Objective:      Vitals:    02/15/23 1323   BP: 122/80   BP Location: Right arm   Patient Position: Sitting   BP Method: Large (Manual)   Pulse: 86   Temp: 97.9 °F (36.6 °C)   SpO2: 96%   Weight: 82.1 kg (181 lb)   Height: 5' 4" (1.626 m)      Physical Exam  Constitutional:       General: She is not in acute distress.  Cardiovascular:      Rate and Rhythm: Normal rate and regular rhythm.      Pulses: Normal pulses.   Pulmonary:      Comments: Respirations symmetric and not labored, Lungs are clear to auscultation.  Abdominal:      General: There is no distension.      Palpations: Abdomen is soft.      Tenderness: There is no " abdominal tenderness. There is no right CVA tenderness or left CVA tenderness.   Musculoskeletal:      Cervical back: Neck supple. No tenderness.      Right lower leg: No edema.      Left lower leg: No edema.   Skin:     General: Skin is warm and dry.      Capillary Refill: Capillary refill takes more than 3 seconds.      Findings: No lesion or rash.   Neurological:      General: No focal deficit present.      Mental Status: She is alert.   Psychiatric:         Mood and Affect: Mood normal.         Behavior: Behavior normal.        Assessment:       1. Dysuria          Plan:       Dysuria  -     POCT Urinalysis  -     Urine culture  Udip consistent with significant dehydration  will follow up labs previously not performed and labs for rheumatology  encouraged oral rehydration, will reduce cymbalta if sign of ALVARO,   ED, medication and dehydration precautions, counseling provided    Risks, benefits, alternatives discussed with patient, asked if there were any questions, patient said no  Precautions, counseling and education provided, patient verbalized understanding

## 2023-02-15 NOTE — TELEPHONE ENCOUNTER
Sent [employer] message to pt regarding her xray results. She is already scheduled for the MRI follow up. Advised to reach out if any additional questions, otherwise Ms Hester will go over these results during the MRI f/u visit

## 2023-02-16 LAB
GAMMA INTERFERON BACKGROUND BLD IA-ACNC: 0.08 IU/ML
M TB IFN-G CD4+ BCKGRND COR BLD-ACNC: -0.01 IU/ML
MITOGEN IGNF BCKGRD COR BLD-ACNC: 9.92 IU/ML
TB GOLD PLUS: NEGATIVE
TB2 - NIL: -0.01 IU/ML

## 2023-02-16 NOTE — TELEPHONE ENCOUNTER
----- Message from Kristen Oconnor sent at 2/15/2023  1:53 PM CST -----  Regarding: orders  Pt has no orders, can they be put in please

## 2023-02-17 ENCOUNTER — PATIENT MESSAGE (OUTPATIENT)
Dept: FAMILY MEDICINE | Facility: CLINIC | Age: 63
End: 2023-02-17
Payer: COMMERCIAL

## 2023-02-17 ENCOUNTER — OFFICE VISIT (OUTPATIENT)
Dept: PAIN MEDICINE | Facility: CLINIC | Age: 63
End: 2023-02-17
Payer: COMMERCIAL

## 2023-02-17 VITALS
HEART RATE: 76 BPM | HEIGHT: 64 IN | DIASTOLIC BLOOD PRESSURE: 75 MMHG | BODY MASS INDEX: 31.03 KG/M2 | WEIGHT: 181.75 LBS | SYSTOLIC BLOOD PRESSURE: 129 MMHG

## 2023-02-17 DIAGNOSIS — M47.816 LUMBAR SPONDYLOSIS: ICD-10-CM

## 2023-02-17 DIAGNOSIS — F33.1 MODERATE EPISODE OF RECURRENT MAJOR DEPRESSIVE DISORDER: Primary | ICD-10-CM

## 2023-02-17 DIAGNOSIS — M54.16 LUMBAR RADICULOPATHY, CHRONIC: Primary | ICD-10-CM

## 2023-02-17 LAB — BACTERIA UR CULT: NORMAL

## 2023-02-17 PROCEDURE — 3074F SYST BP LT 130 MM HG: CPT | Mod: CPTII,S$GLB,, | Performed by: PHYSICIAN ASSISTANT

## 2023-02-17 PROCEDURE — 1159F MED LIST DOCD IN RCRD: CPT | Mod: CPTII,S$GLB,, | Performed by: PHYSICIAN ASSISTANT

## 2023-02-17 PROCEDURE — 99999 PR PBB SHADOW E&M-EST. PATIENT-LVL III: ICD-10-PCS | Mod: PBBFAC,,, | Performed by: PHYSICIAN ASSISTANT

## 2023-02-17 PROCEDURE — 3008F BODY MASS INDEX DOCD: CPT | Mod: CPTII,S$GLB,, | Performed by: PHYSICIAN ASSISTANT

## 2023-02-17 PROCEDURE — 99214 OFFICE O/P EST MOD 30 MIN: CPT | Mod: S$GLB,,, | Performed by: PHYSICIAN ASSISTANT

## 2023-02-17 PROCEDURE — 3078F PR MOST RECENT DIASTOLIC BLOOD PRESSURE < 80 MM HG: ICD-10-PCS | Mod: CPTII,S$GLB,, | Performed by: PHYSICIAN ASSISTANT

## 2023-02-17 PROCEDURE — 3044F HG A1C LEVEL LT 7.0%: CPT | Mod: CPTII,S$GLB,, | Performed by: PHYSICIAN ASSISTANT

## 2023-02-17 PROCEDURE — 3008F PR BODY MASS INDEX (BMI) DOCUMENTED: ICD-10-PCS | Mod: CPTII,S$GLB,, | Performed by: PHYSICIAN ASSISTANT

## 2023-02-17 PROCEDURE — 3078F DIAST BP <80 MM HG: CPT | Mod: CPTII,S$GLB,, | Performed by: PHYSICIAN ASSISTANT

## 2023-02-17 PROCEDURE — 1159F PR MEDICATION LIST DOCUMENTED IN MEDICAL RECORD: ICD-10-PCS | Mod: CPTII,S$GLB,, | Performed by: PHYSICIAN ASSISTANT

## 2023-02-17 PROCEDURE — 3074F PR MOST RECENT SYSTOLIC BLOOD PRESSURE < 130 MM HG: ICD-10-PCS | Mod: CPTII,S$GLB,, | Performed by: PHYSICIAN ASSISTANT

## 2023-02-17 PROCEDURE — 99214 PR OFFICE/OUTPT VISIT, EST, LEVL IV, 30-39 MIN: ICD-10-PCS | Mod: S$GLB,,, | Performed by: PHYSICIAN ASSISTANT

## 2023-02-17 PROCEDURE — 3044F PR MOST RECENT HEMOGLOBIN A1C LEVEL <7.0%: ICD-10-PCS | Mod: CPTII,S$GLB,, | Performed by: PHYSICIAN ASSISTANT

## 2023-02-17 PROCEDURE — 99999 PR PBB SHADOW E&M-EST. PATIENT-LVL III: CPT | Mod: PBBFAC,,, | Performed by: PHYSICIAN ASSISTANT

## 2023-02-17 NOTE — PROGRESS NOTES
[facility] Established Patient      PCP:   Dallas Higgins    CC:   Chief Complaint   Patient presents with    Low-back Pain     C/o pain in lower back     Hip Pain     C/o pain in left hip       Last 3 PDI Scores 12/17/2018 9/10/2018   Pain Disability Index (PDI) 28 48         HPI:   Clemencia Knight is a 62 y.o. female with history of GERD, RLS, cervical spine surgery and chronic pain presents for further assessment of lower back pain after undergoing imaging.  She was seen in September 2022 for lower back and thigh pain; treted with medications and PT.  PT did not help.  Pain has persisted.  She continues with pain in the lower lumbar region to the mid thoraic area and into the left hip, anterior thigh crossing over to the medial left knee.  Pain increases with standing.  She is having to rely on her cane more.  She had recent hip and lower back imaging to review.    Initial HPI:  62 year old female with GERD, migraine, RLS, history of cervical spine surgery with some chronic neck pain is referred by alpa Rojo for evaluation of lumbar back pain.  Pain started 8/27/22 with no focal injury.  She stood up from a seated position and felt a pinching pain in the left lower back.  Pain progressed since then becoming so intense she was seen in the ER 8/28/22.  She has pain in the left lower back with radiation to the anterior thigh to the inner knee.  She denies any numbness/ tingling.  In the ER she was given IM toradol and IM steroid; she was prescribed flexeril, lidoderm and mobic and has been taking the medications.  She has tried home exercise with a therapy ball.  No other treatments.  She uses the therapy/ exercise ball regularly over the leve gluteal region.      Past and current medications:  Antineuropathics:  NSAIDs:l mobic  (past IM toradol)  Antidepressants:  Muscle relaxers: (past - flexeril)  Opioids:  Antiplatelets/Anticoagulants:  Other medications:  (past lidoderm patch and IM  steroid)    Physical therapy/ Chiropractic care:  PT  5 visits 9/27/22 through 11/18/22    Pain Intervention History:  none    Past Spine Surgical History:  Cervical spine fusion C6/7 in 2009      History:    Current Outpatient Medications:     cyanocobalamin, vitamin B-12, 5,000 mcg Subl, Place under the tongue Daily., Disp: , Rfl:     DULoxetine (CYMBALTA) 60 MG capsule, Take 1 capsule (60 mg total) by mouth once daily., Disp: 90 capsule, Rfl: 6    galcanezumab-gnlm 120 mg/mL PnIj, Inject 1 pen (120 mg total) into the skin every 28 days. maintenance dose, Disp: 1 mL, Rfl: 11    hydrocortisone 2.5 % cream, Apply topically 2 (two) times daily., Disp: 453.6 g, Rfl: 1    LIDOcaine (LIDODERM) 5 %, Place 1 patch onto the skin once daily. Remove & Discard patch within 12 hours or as directed by MD, Disp: 30 patch, Rfl: 0    meloxicam (MOBIC) 15 MG tablet, Take 1 tablet (15 mg total) by mouth once daily., Disp: 30 tablet, Rfl: 3    multivitamin (THERAGRAN) per tablet, Take 1 tablet by mouth once daily., Disp: , Rfl:     naratriptan (AMERGE) 2.5 MG tablet, Take 1 tablet (2.5 mg) by mouth at onset of headache. May repeat in 4 hours, if needed. Max 2 tablets/day., Disp: 9 tablet, Rfl: 11    secukinumab (COSENTYX PEN, 2 PENS,) 150 mg/mL PnIj, Inject 300 mg into the skin every 30 days., Disp: 2 mL, Rfl: 5    tiZANidine (ZANAFLEX) 4 MG tablet, Take 1 tablet (4 mg total) by mouth nightly as needed (muscle spasms/ pain)., Disp: 40 tablet, Rfl: 0    traZODone (DESYREL) 50 MG tablet, Take 1 tablet (50 mg total) by mouth every evening., Disp: 30 tablet, Rfl: 3    Past Medical History:   Diagnosis Date    Allergy     Brain tumor     Drug-induced lupus erythematosus     General anesthetics causing adverse effect in therapeutic use     pt. somewhat aware of extubation with one of her surgeries    GERD (gastroesophageal reflux disease)     Hearing loss     Migraines     Psoriatic arthritis     Raynaud's syndrome without gangrene      Restless leg syndrome     Sciatica     Trigeminal neuralgia        Past Surgical History:   Procedure Laterality Date    AUGMENTATION OF BREAST      BELT ABDOMINOPLASTY      breast implants      CERVICAL FUSION      COLONOSCOPY  10/2012    Dr. Paul; internal hemorrhoid; repeat in 10 years    COLONOSCOPY N/A 11/25/2020    Procedure: COLONOSCOPY;  Surgeon: Kiran Paul MD;  Location: [facility];  Service: Endoscopy;  Laterality: N/A; hemorrhoids; Repeat colonoscopy in 10 years for screening; random biopsy: WNL    DEXA      WNL    EPIDURAL STEROID INJECTION INTO CERVICAL SPINE N/A 09/24/2018    Procedure: Injection-steroid-epidural-cervical;  Surgeon: Myles Rocha MD;  Location: [facility];  Service: Pain Management;  Laterality: N/A;  C7-T1    EPIDURAL STEROID INJECTION INTO CERVICAL SPINE N/A 11/20/2018    Procedure: Injection-steroid-epidural-cervical;  Surgeon: Myles Rocha MD;  [facility];  Service: Pain Management;  Laterality: N/A;  C7-T1    ESOPHAGOGASTRODUODENOSCOPY N/A 12/10/2020    Procedure: EGD (ESOPHAGOGASTRODUODENOSCOPY);  Surgeon: Kiran Paul MD;  Location: [facility];  Service: Endoscopy;  Laterality: N/A; unremarkable; biopsy: duodenum WNL, stomach-minimal chronic inflammation, negative h pylori    ESOPHAGOGASTRODUODENOSCOPY N/A 4/12/2022    Procedure: EGD (ESOPHAGOGASTRODUODENOSCOPY);  Surgeon: Kiran Paul MD;  Location: [facility];  Service: Endoscopy;  Laterality: N/A;    HYSTERECTOMY  2000    OOPHORECTOMY  07/16/2013    laparotomy BSO for benign 10cm tubal cyst    SALPINGOOPHORECTOMY  2013    with removal of fallopian cyst    SHOULDER SURGERY      right    TLH  2000    ovaries remain, benign    TONSILLECTOMY, ADENOIDECTOMY, BILATERAL MYRINGOTOMY AND TUBES         Family History   Problem Relation Age of Onset    Cancer Father         bladder    Diabetes Father     Heart disease Father     Diabetes Mother     Melanoma Brother     Charcot-Karla-Tooth disease Brother      Breast cancer Neg Hx     Ovarian cancer Neg Hx     Anesthesia problems Neg Hx     Colon cancer Neg Hx     Crohn's disease Neg Hx     Esophageal cancer Neg Hx     Ulcerative colitis Neg Hx     Stomach cancer Neg Hx     Celiac disease Neg Hx        Social History     Socioeconomic History    Marital status:    Occupational History     Employer: OTHER   Tobacco Use    Smoking status: Never    Smokeless tobacco: Never   Substance and Sexual Activity    Alcohol use: Yes     Comment: rarely    Drug use: No    Sexual activity: Yes     Partners: Male     Birth control/protection: Surgical   Other Topics Concern    Are you pregnant or think you may be? No     Social Determinants of Health     Financial Resource Strain: Medium Risk    Difficulty of Paying Living Expenses: Somewhat hard   Food Insecurity: No Food Insecurity    Worried About Running Out of Food in the Last Year: Never true    Ran Out of Food in the Last Year: Never true   Transportation Needs: No Transportation Needs    Lack of Transportation (Medical): No    Lack of Transportation (Non-Medical): No   Physical Activity: Unknown    Days of Exercise per Week: Patient refused    Minutes of Exercise per Session: 0 min   Stress: No Stress Concern Present    Feeling of Stress : Not at all   Social Connections: Unknown    Frequency of Communication with Friends and Family: More than three times a week    Frequency of Social Gatherings with Friends and Family: More than three times a week    Active Member of Clubs or Organizations: Yes    Attends Club or Organization Meetings: More than 4 times per year    Marital Status: Patient refused   Housing Stability: Unknown    Unable to Pay for Housing in the Last Year: Patient refused    Number of Places Lived in the Last Year: 1    Unstable Housing in the Last Year: Patient refused       Review of patient's allergies indicates:   Allergen Reactions    Contrast media Swelling     Swollen eyes and face    Hydrocodone  "Itching    Plaquenil [hydroxychloroquine]      Worsening psoriasis    Topiramate      Hair loss, cognitive side effects        Review of Systems:  Low back pain, left leg pain.  Balance of review of systems is negative.    Physical Exam:  Vitals:    02/17/23 1450   BP: 129/75   Pulse: 76   Weight: 82.5 kg (181 lb 12.3 oz)   Height: 5' 4" (1.626 m)   PainSc:   5   PainLoc: Back       Body mass index is 31.2 kg/m².    Gen: NAD  Psych: mood appropriate for given condition  HEENT: eyes anicteric   CV: RRR, 2+ radial pulse  HEENT: anicteric   Respiratory: non-labored, no signs of respiratory distress  Abd: non-distended  Skin: warm, dry and intact.  Gait: Able to heel walk, toe walk. No antalgic gait.     Coordination:   Romberg: negative  Finger to nose coordination: normal  Heel to shin coordination: normal  Tandem walking coordination: normal    Cervical spine:   ROM is full in flexion, extension and lateral rotation without increased pain.  Spurling's maneuver causes no neck pain to either side.  Myofascial exam: No Tenderness to palpation across cervical paraspinous region bilaterally.    Lumbar spine:   ROM is full with flexion extension and oblique extension with no increased pain.    Silvio's test causes no increased pain on either side.    Supine straight leg raise is negative bilaterally.    Internal and external rotation of the hip causes no increased pain on either side.  Myofascial exam: No tenderness to palpation across lumbar paraspinous muscles. No tenderness to palpation over the bilateral greater trochanters and bilateral SI joint    Sensory:  Intact and symmetrical to light touch in C4-T1 dermatomes bilaterally. Intact and symmetrical to light touch in L1-S1 dermatomes bilaterally.    Motor:    Right Left   C4 Shoulder Abduction  5  5   C5 Elbow Flexion    5  5   C6 Wrist Extension  5  5   C7 Elbow Extension   5  5   C8/T1 Hand Intrinsics   5  5        Right Left   L2/3 Iliacus Hip flexion  5  5 "   L3/4 Qudratus Femoris Knee Extension  5  5   L4/5 Tib Anterior Ankle Dorsiflexion   5  5   L5/S1 Extensor Hallicus Longus Great toe extension  5  5   S1/S2 Gastroc/Soleus Plantar Flexion  5  5      Right Left   Triceps DTR 2+ 2+   Biceps DTR 2+ 2+   Brachioradialis DTR 2+ 2+   Patellar DTR 2+ 2+   Achilles DTR 2+ 2+   Reyes Absent  Absent   Clonus Absent Absent   Babinski Absent Absent     Imaging:    MRI lumbar spine 9-11-18:  minimal degenerative changes with no significant central canal or foraminal narrowing.    CT renal stone study 8/28/22:  No suspicious osseous lesions.  Spondylotic changes are identified.  Diffuse soft tissue stranding is identified involving the subcutaneous tissue of the left extremity with extension towards the gluteus joe without evidence for underlying myositis or drainable fluid collection.  Possible cellulitic changes, but most likely from soft tissue trauma from repeated use of therapy/ exercise ball over this same region.     Xray lumbar spine from 9/12/22:  mild degenerative changes.    Xray hip 2/11/23:  No displaced fracture, dislocation or osteonecrosis.  Minimal joint space narrowing is in both hips.  There is mild SI joint sclerosis bilaterally without joint space narrowing.  Mild degenerative changes are at the pubic symphysis.  No aggressive osseous lesion.    Dynamic xray lumbar spine 2/11/23 and MRI lumbar spine 2/17/23:  multilevel mild degenerative changes with no instability on flexion/ extension.   L3/4 facet hypertrophy and disk space narrowing that has progressed some since 2018 on the left side with mild forminal narrowing.  L4/5 facet hypertrophy with mild foraminal narrowing and no change from 2018.    Labs:  Lab Results   Component Value Date    HGBA1C 5.4 02/15/2023       Lab Results   Component Value Date    WBC 6.47 02/15/2023    HGB 13.2 02/15/2023    HCT 40.7 02/15/2023    MCV 92 02/15/2023     02/15/2023           Assessment:     Ms. Khanna  has chronic left lower back pain and thigh pain.  Pain may be myfascial or related to mild progression of L3/4 degenerative change/ facet hypertrophy to the left and mild left foraminal narrowing, considering this has progressed mildly since 2018.  We discussed further PT and trial of L3/4 left MAGGI.  She will think about the options and call if she wants to proceed.  Follow up in clinic as needed.  No significant hip abnormality.      Problem List Items Addressed This Visit    None  Visit Diagnoses       Lumbar radiculopathy, chronic    -  Primary    Lumbar spondylosis                    Follow Up: RTC as needed.    : Not applicable          Cecille Hester PA-C  [facility]      This note was completed with dictation software and grammatical errors may exist.

## 2023-02-18 ENCOUNTER — PATIENT MESSAGE (OUTPATIENT)
Dept: FAMILY MEDICINE | Facility: CLINIC | Age: 63
End: 2023-02-18
Payer: COMMERCIAL

## 2023-02-18 RX ORDER — DULOXETIN HYDROCHLORIDE 30 MG/1
30 CAPSULE, DELAYED RELEASE ORAL DAILY
Qty: 14 CAPSULE | Refills: 0 | Status: SHIPPED | OUTPATIENT
Start: 2023-02-18 | End: 2023-03-20

## 2023-02-19 ENCOUNTER — PATIENT MESSAGE (OUTPATIENT)
Dept: OBSTETRICS AND GYNECOLOGY | Facility: CLINIC | Age: 63
End: 2023-02-19
Payer: COMMERCIAL

## 2023-02-27 ENCOUNTER — PATIENT MESSAGE (OUTPATIENT)
Dept: RHEUMATOLOGY | Facility: CLINIC | Age: 63
End: 2023-02-27
Payer: COMMERCIAL

## 2023-02-27 ENCOUNTER — PATIENT MESSAGE (OUTPATIENT)
Dept: OBSTETRICS AND GYNECOLOGY | Facility: CLINIC | Age: 63
End: 2023-02-27
Payer: COMMERCIAL

## 2023-02-28 RX ORDER — NARATRIPTAN 2.5 MG/1
TABLET ORAL
Qty: 9 TABLET | Refills: 11 | OUTPATIENT
Start: 2023-02-28

## 2023-03-02 ENCOUNTER — OFFICE VISIT (OUTPATIENT)
Dept: OBSTETRICS AND GYNECOLOGY | Facility: CLINIC | Age: 63
End: 2023-03-02
Payer: COMMERCIAL

## 2023-03-02 VITALS
BODY MASS INDEX: 30.68 KG/M2 | HEIGHT: 64 IN | DIASTOLIC BLOOD PRESSURE: 80 MMHG | WEIGHT: 179.69 LBS | SYSTOLIC BLOOD PRESSURE: 126 MMHG

## 2023-03-02 DIAGNOSIS — M51.36 BULGING LUMBAR DISC: ICD-10-CM

## 2023-03-02 DIAGNOSIS — F33.1 MODERATE EPISODE OF RECURRENT MAJOR DEPRESSIVE DISORDER: ICD-10-CM

## 2023-03-02 DIAGNOSIS — R39.9 UTI SYMPTOMS: ICD-10-CM

## 2023-03-02 DIAGNOSIS — N90.89 LABIAL ADHESIONS: Primary | ICD-10-CM

## 2023-03-02 DIAGNOSIS — N89.8 VAGINAL ITCHING: ICD-10-CM

## 2023-03-02 DIAGNOSIS — N89.8 VAGINAL DRYNESS: ICD-10-CM

## 2023-03-02 LAB
BILIRUBIN, UA POC OHS: NEGATIVE
BLOOD, UA POC OHS: ABNORMAL
CLARITY, UA POC OHS: CLEAR
COLOR, UA POC OHS: YELLOW
GLUCOSE, UA POC OHS: NEGATIVE
KETONES, UA POC OHS: NEGATIVE
LEUKOCYTES, UA POC OHS: NEGATIVE
NITRITE, UA POC OHS: NEGATIVE
PH, UA POC OHS: 7
PROTEIN, UA POC OHS: NEGATIVE
SPECIFIC GRAVITY, UA POC OHS: 1.01
UROBILINOGEN, UA POC OHS: 0.2

## 2023-03-02 PROCEDURE — 81003 URINALYSIS AUTO W/O SCOPE: CPT | Mod: QW,S$GLB,, | Performed by: OBSTETRICS & GYNECOLOGY

## 2023-03-02 PROCEDURE — 3074F PR MOST RECENT SYSTOLIC BLOOD PRESSURE < 130 MM HG: ICD-10-PCS | Mod: CPTII,S$GLB,, | Performed by: OBSTETRICS & GYNECOLOGY

## 2023-03-02 PROCEDURE — 3044F HG A1C LEVEL LT 7.0%: CPT | Mod: CPTII,S$GLB,, | Performed by: OBSTETRICS & GYNECOLOGY

## 2023-03-02 PROCEDURE — 81003 POCT URINALYSIS(INSTRUMENT): ICD-10-PCS | Mod: QW,S$GLB,, | Performed by: OBSTETRICS & GYNECOLOGY

## 2023-03-02 PROCEDURE — 3008F PR BODY MASS INDEX (BMI) DOCUMENTED: ICD-10-PCS | Mod: CPTII,S$GLB,, | Performed by: OBSTETRICS & GYNECOLOGY

## 2023-03-02 PROCEDURE — 3074F SYST BP LT 130 MM HG: CPT | Mod: CPTII,S$GLB,, | Performed by: OBSTETRICS & GYNECOLOGY

## 2023-03-02 PROCEDURE — 99213 PR OFFICE/OUTPT VISIT, EST, LEVL III, 20-29 MIN: ICD-10-PCS | Mod: S$GLB,,, | Performed by: OBSTETRICS & GYNECOLOGY

## 2023-03-02 PROCEDURE — 99999 PR PBB SHADOW E&M-EST. PATIENT-LVL III: ICD-10-PCS | Mod: PBBFAC,,, | Performed by: OBSTETRICS & GYNECOLOGY

## 2023-03-02 PROCEDURE — 3079F PR MOST RECENT DIASTOLIC BLOOD PRESSURE 80-89 MM HG: ICD-10-PCS | Mod: CPTII,S$GLB,, | Performed by: OBSTETRICS & GYNECOLOGY

## 2023-03-02 PROCEDURE — 99213 OFFICE O/P EST LOW 20 MIN: CPT | Mod: S$GLB,,, | Performed by: OBSTETRICS & GYNECOLOGY

## 2023-03-02 PROCEDURE — 99999 PR PBB SHADOW E&M-EST. PATIENT-LVL III: CPT | Mod: PBBFAC,,, | Performed by: OBSTETRICS & GYNECOLOGY

## 2023-03-02 PROCEDURE — 1159F PR MEDICATION LIST DOCUMENTED IN MEDICAL RECORD: ICD-10-PCS | Mod: CPTII,S$GLB,, | Performed by: OBSTETRICS & GYNECOLOGY

## 2023-03-02 PROCEDURE — 3008F BODY MASS INDEX DOCD: CPT | Mod: CPTII,S$GLB,, | Performed by: OBSTETRICS & GYNECOLOGY

## 2023-03-02 PROCEDURE — 1159F MED LIST DOCD IN RCRD: CPT | Mod: CPTII,S$GLB,, | Performed by: OBSTETRICS & GYNECOLOGY

## 2023-03-02 PROCEDURE — 3044F PR MOST RECENT HEMOGLOBIN A1C LEVEL <7.0%: ICD-10-PCS | Mod: CPTII,S$GLB,, | Performed by: OBSTETRICS & GYNECOLOGY

## 2023-03-02 PROCEDURE — 3079F DIAST BP 80-89 MM HG: CPT | Mod: CPTII,S$GLB,, | Performed by: OBSTETRICS & GYNECOLOGY

## 2023-03-02 RX ORDER — DULOXETIN HYDROCHLORIDE 30 MG/1
30 CAPSULE, DELAYED RELEASE ORAL DAILY
Qty: 14 CAPSULE | Refills: 0 | OUTPATIENT
Start: 2023-03-02 | End: 2024-03-01

## 2023-03-02 RX ORDER — CLOBETASOL PROPIONATE 0.5 MG/G
CREAM TOPICAL NIGHTLY
Qty: 60 G | Refills: 0 | Status: SHIPPED | OUTPATIENT
Start: 2023-03-02 | End: 2023-08-17 | Stop reason: SDUPTHER

## 2023-03-02 NOTE — PROGRESS NOTES
Chief Complaint   Patient presents with    Vaginal Atrophy    PELVIC PRESSURE    PAINFUL URINATION       History of Present Illness: Clemencia Knight is a 62 y.o. female that presents today 3/2/2023 for   Chief Complaint   Patient presents with    Vaginal Atrophy    PELVIC PRESSURE    PAINFUL URINATION     She reports with urinating she is having left arm shooting pain.    She complains of vaginal dryness and pressure.     She is dealing with back and hip pain.          Past Medical History:   Diagnosis Date    Allergy     Brain tumor     Drug-induced lupus erythematosus     General anesthetics causing adverse effect in therapeutic use     pt. somewhat aware of extubation with one of her surgeries    GERD (gastroesophageal reflux disease)     Hearing loss     Migraines     Psoriatic arthritis     Raynaud's syndrome without gangrene     Restless leg syndrome     Sciatica     Trigeminal neuralgia        Past Surgical History:   Procedure Laterality Date    AUGMENTATION OF BREAST      BELT ABDOMINOPLASTY      breast implants      CERVICAL FUSION      COLONOSCOPY  10/2012    Dr. Paul; internal hemorrhoid; repeat in 10 years    COLONOSCOPY N/A 11/25/2020    Procedure: COLONOSCOPY;  Surgeon: Kiran Paul MD;  Location: [facility];  Service: Endoscopy;  Laterality: N/A; hemorrhoids; Repeat colonoscopy in 10 years for screening; random biopsy: WNL    DEXA      WNL    EPIDURAL STEROID INJECTION INTO CERVICAL SPINE N/A 09/24/2018    Procedure: Injection-steroid-epidural-cervical;  Surgeon: Myles Rocha MD;  [facility];  Service: Pain Management;  Laterality: N/A;  C7-T1    EPIDURAL STEROID INJECTION INTO CERVICAL SPINE N/A 11/20/2018    Procedure: Injection-steroid-epidural-cervical;  Surgeon: Myles Rocha MD;  [facility];  Service: Pain Management;  Laterality: N/A;  C7-T1    ESOPHAGOGASTRODUODENOSCOPY N/A 12/10/2020    Procedure: EGD (ESOPHAGOGASTRODUODENOSCOPY);  Surgeon: Kiran Paul MD;   Location: [facility] ENDO;  Service: Endoscopy;  Laterality: N/A; unremarkable; biopsy: duodenum WNL, stomach-minimal chronic inflammation, negative h pylori    ESOPHAGOGASTRODUODENOSCOPY N/A 4/12/2022    Procedure: EGD (ESOPHAGOGASTRODUODENOSCOPY);  Surgeon: Kiran Paul MD;  Location: [facility];  Service: Endoscopy;  Laterality: N/A;    HYSTERECTOMY  2000    OOPHORECTOMY  07/16/2013    laparotomy BSO for benign 10cm tubal cyst    SALPINGOOPHORECTOMY  2013    with removal of fallopian cyst    SHOULDER SURGERY      right    TLH  2000    ovaries remain, benign    TONSILLECTOMY, ADENOIDECTOMY, BILATERAL MYRINGOTOMY AND TUBES         Current Outpatient Medications   Medication Sig Dispense Refill    cyanocobalamin, vitamin B-12, 5,000 mcg Subl Place under the tongue Daily.      galcanezumab-gnlm 120 mg/mL PnIj Inject 1 pen (120 mg total) into the skin every 28 days. maintenance dose 1 mL 11    LIDOcaine (LIDODERM) 5 % Place 1 patch onto the skin once daily. Remove & Discard patch within 12 hours or as directed by MD 30 patch 0    multivitamin (THERAGRAN) per tablet Take 1 tablet by mouth once daily.      naratriptan (AMERGE) 2.5 MG tablet Take 1 tablet (2.5 mg) by mouth at onset of headache. May repeat in 4 hours, if needed. Max 2 tablets/day. 9 tablet 11    secukinumab (COSENTYX PEN, 2 PENS,) 150 mg/mL PnIj Inject 300 mg into the skin every 30 days. 2 mL 5    traZODone (DESYREL) 50 MG tablet Take 1 tablet (50 mg total) by mouth every evening. 30 tablet 3    clobetasoL (TEMOVATE) 0.05 % cream Apply topically to right labia every evening 60 g 0    DULoxetine (CYMBALTA) 30 MG capsule Take 1 capsule (30 mg total) by mouth once daily. (Patient not taking: Reported on 3/2/2023) 14 capsule 0    hydrocortisone 2.5 % cream Apply topically 2 (two) times daily. 453.6 g 1    meloxicam (MOBIC) 15 MG tablet Take 1 tablet (15 mg total) by mouth once daily. 30 tablet 3    tiZANidine (ZANAFLEX) 4 MG tablet Take 1 tablet (4 mg total)  "by mouth nightly as needed (muscle spasms/ pain). 40 tablet 0     No current facility-administered medications for this visit.       Review of patient's allergies indicates:   Allergen Reactions    Contrast media Swelling     Swollen eyes and face    Hydrocodone Itching    Plaquenil [hydroxychloroquine]      Worsening psoriasis    Topiramate      Hair loss, cognitive side effects        Family History   Problem Relation Age of Onset    Cancer Father         bladder    Diabetes Father     Heart disease Father     Diabetes Mother     Melanoma Brother     Charcot-Karla-Tooth disease Brother     Breast cancer Neg Hx     Ovarian cancer Neg Hx     Anesthesia problems Neg Hx     Colon cancer Neg Hx     Crohn's disease Neg Hx     Esophageal cancer Neg Hx     Ulcerative colitis Neg Hx     Stomach cancer Neg Hx     Celiac disease Neg Hx        Social History     Tobacco Use    Smoking status: Never    Smokeless tobacco: Never   Substance Use Topics    Alcohol use: Yes     Comment: rarely    Drug use: No       OB History    Para Term  AB Living   2 2 2     2   SAB IAB Ectopic Multiple Live Births           2      # Outcome Date GA Lbr Marc/2nd Weight Sex Delivery Anes PTL Lv   2 Term 86   3.544 kg (7 lb 13 oz) F Vag-Spont EPI N VARSHA   1 Term 85   3.572 kg (7 lb 14 oz) M Vag-Spont EPI N VARSHA           /80   Ht 5' 4" (1.626 m)   Wt 81.5 kg (179 lb 10.8 oz)   Physical Exam:  APPEARANCE: Well nourished, well developed, in no acute distress.  SKIN: Normal skin turgor, no lesions.  NECK: Neck symmetric without masses   RESPIRATORY: Normal respiratory effort with no retractions or use of accessory muscles  CARDIOVASCULAR: Peripheral vascular system with no swelling no varicosities and palpation of pulses normal  LYMPHATIC: No enlargements of the lymph nodes noted in the neck, axillae, or groin  ABDOMEN: Soft. No tenderness or masses. No hepatosplenomegaly. No hernias.  PELVIC: Normal external female " genitalia with ++ splitting adhesions on the right labia. Normal hair distribution. Adequate perineal body, normal urethral meatus. Urethra with no masses.  Bladder nontender. Vagina very dry and smooth no discharge.  No significant cystocele or rectocele. Adnexa without masses or tenderness. Urethra and bladder normal.  EXTREMITIES: No clubbing cyanosis or edema.    ASSESSMENT/PLAN:  Labial adhesions  -     clobetasoL (TEMOVATE) 0.05 % cream; Apply topically to right labia every evening  Dispense: 60 g; Refill: 0    UTI symptoms  -     POCT Urinalysis(Instrument)    Bulging lumbar disc    Vaginal dryness  Comments:  offered estrace and declines    Vaginal itching        20 minutes spent today preparing reviewing previous external notes, reviewing previous results, performing medical examination, orders tests or medications, counseling and documenting.

## 2023-03-09 ENCOUNTER — TELEPHONE (OUTPATIENT)
Dept: PAIN MEDICINE | Facility: CLINIC | Age: 63
End: 2023-03-09

## 2023-03-09 ENCOUNTER — OFFICE VISIT (OUTPATIENT)
Dept: NEUROSURGERY | Facility: CLINIC | Age: 63
End: 2023-03-09
Payer: COMMERCIAL

## 2023-03-09 VITALS
SYSTOLIC BLOOD PRESSURE: 132 MMHG | HEART RATE: 70 BPM | RESPIRATION RATE: 18 BRPM | BODY MASS INDEX: 30.56 KG/M2 | WEIGHT: 179 LBS | HEIGHT: 64 IN | DIASTOLIC BLOOD PRESSURE: 85 MMHG

## 2023-03-09 DIAGNOSIS — M54.16 LUMBAR RADICULOPATHY: Primary | ICD-10-CM

## 2023-03-09 PROCEDURE — 3075F SYST BP GE 130 - 139MM HG: CPT | Mod: CPTII,S$GLB,, | Performed by: STUDENT IN AN ORGANIZED HEALTH CARE EDUCATION/TRAINING PROGRAM

## 2023-03-09 PROCEDURE — 3079F DIAST BP 80-89 MM HG: CPT | Mod: CPTII,S$GLB,, | Performed by: STUDENT IN AN ORGANIZED HEALTH CARE EDUCATION/TRAINING PROGRAM

## 2023-03-09 PROCEDURE — 3075F PR MOST RECENT SYSTOLIC BLOOD PRESS GE 130-139MM HG: ICD-10-PCS | Mod: CPTII,S$GLB,, | Performed by: STUDENT IN AN ORGANIZED HEALTH CARE EDUCATION/TRAINING PROGRAM

## 2023-03-09 PROCEDURE — 3079F PR MOST RECENT DIASTOLIC BLOOD PRESSURE 80-89 MM HG: ICD-10-PCS | Mod: CPTII,S$GLB,, | Performed by: STUDENT IN AN ORGANIZED HEALTH CARE EDUCATION/TRAINING PROGRAM

## 2023-03-09 PROCEDURE — 99215 OFFICE O/P EST HI 40 MIN: CPT | Mod: S$GLB,,, | Performed by: STUDENT IN AN ORGANIZED HEALTH CARE EDUCATION/TRAINING PROGRAM

## 2023-03-09 PROCEDURE — 1159F MED LIST DOCD IN RCRD: CPT | Mod: CPTII,S$GLB,, | Performed by: STUDENT IN AN ORGANIZED HEALTH CARE EDUCATION/TRAINING PROGRAM

## 2023-03-09 PROCEDURE — 3008F BODY MASS INDEX DOCD: CPT | Mod: CPTII,S$GLB,, | Performed by: STUDENT IN AN ORGANIZED HEALTH CARE EDUCATION/TRAINING PROGRAM

## 2023-03-09 PROCEDURE — 99215 PR OFFICE/OUTPT VISIT, EST, LEVL V, 40-54 MIN: ICD-10-PCS | Mod: S$GLB,,, | Performed by: STUDENT IN AN ORGANIZED HEALTH CARE EDUCATION/TRAINING PROGRAM

## 2023-03-09 PROCEDURE — 1159F PR MEDICATION LIST DOCUMENTED IN MEDICAL RECORD: ICD-10-PCS | Mod: CPTII,S$GLB,, | Performed by: STUDENT IN AN ORGANIZED HEALTH CARE EDUCATION/TRAINING PROGRAM

## 2023-03-09 PROCEDURE — 3044F PR MOST RECENT HEMOGLOBIN A1C LEVEL <7.0%: ICD-10-PCS | Mod: CPTII,S$GLB,, | Performed by: STUDENT IN AN ORGANIZED HEALTH CARE EDUCATION/TRAINING PROGRAM

## 2023-03-09 PROCEDURE — 3008F PR BODY MASS INDEX (BMI) DOCUMENTED: ICD-10-PCS | Mod: CPTII,S$GLB,, | Performed by: STUDENT IN AN ORGANIZED HEALTH CARE EDUCATION/TRAINING PROGRAM

## 2023-03-09 PROCEDURE — 3044F HG A1C LEVEL LT 7.0%: CPT | Mod: CPTII,S$GLB,, | Performed by: STUDENT IN AN ORGANIZED HEALTH CARE EDUCATION/TRAINING PROGRAM

## 2023-03-09 NOTE — PROGRESS NOTES
[facility] Neurosurgery - [facility]  Clinic Consult     Consult Requested By: Lauro Salinas  PCP: Dallas Higgins MD    SUBJECTIVE:     Chief Complaint:   Lumbar spine pain  Chronic pain, RA ambulates with cane  Diffuse back pain      History of Present Illness:  Clemencia Knight is a 62 y.o. female with GERD, RLS, psoriatic arthritis who presents for evaluation of back pain. Patient was previously evaluated at the back and spine clinic. She attended PT over a year ago without relief of symptoms. Recent imaging revealed progression of degeneration. PT and MAGGI were recommended recently. She wished to consider her options. She was referred to our office for further evaluation.    The pain is present in the mid to low back and radiates into the left hip/buttock and anterior thigh. It is worse with standing. She uses a cane for assistance. She feels as though the lateral hip/buttock is swollen.     Pertinent and recent history, provider evaluations, imaging and data reviewed in EPIC        Past Medical History:   Diagnosis Date    Allergy     Brain tumor     Drug-induced lupus erythematosus     General anesthetics causing adverse effect in therapeutic use     pt. somewhat aware of extubation with one of her surgeries    GERD (gastroesophageal reflux disease)     Hearing loss     Migraines     Psoriatic arthritis     Raynaud's syndrome without gangrene     Restless leg syndrome     Sciatica     Trigeminal neuralgia      Past Surgical History:   Procedure Laterality Date    AUGMENTATION OF BREAST      BELT ABDOMINOPLASTY      breast implants      CERVICAL FUSION      COLONOSCOPY  10/2012    Dr. Paul; internal hemorrhoid; repeat in 10 years    COLONOSCOPY N/A 11/25/2020    Procedure: COLONOSCOPY;  Surgeon: Kiran Paul MD;  Location: [facility];  Service: Endoscopy;  Laterality: N/A; hemorrhoids; Repeat colonoscopy in 10 years for screening; random biopsy: WNL    DEXA      WNL     EPIDURAL STEROID INJECTION INTO CERVICAL SPINE N/A 09/24/2018    Procedure: Injection-steroid-epidural-cervical;  Surgeon: Myles Rocha MD;  Location: [facility] OR;  Service: Pain Management;  Laterality: N/A;  C7-T1    EPIDURAL STEROID INJECTION INTO CERVICAL SPINE N/A 11/20/2018    Procedure: Injection-steroid-epidural-cervical;  Surgeon: Myles Rocha MD;  Location: [facility] OR;  Service: Pain Management;  Laterality: N/A;  C7-T1    ESOPHAGOGASTRODUODENOSCOPY N/A 12/10/2020    Procedure: EGD (ESOPHAGOGASTRODUODENOSCOPY);  Surgeon: Kiran Paul MD;  Location: [facility] ENDO;  Service: Endoscopy;  Laterality: N/A; unremarkable; biopsy: duodenum WNL, stomach-minimal chronic inflammation, negative h pylori    ESOPHAGOGASTRODUODENOSCOPY N/A 4/12/2022    Procedure: EGD (ESOPHAGOGASTRODUODENOSCOPY);  Surgeon: Kiran Paul MD;  Location: [facility];  Service: Endoscopy;  Laterality: N/A;    HYSTERECTOMY  2000    OOPHORECTOMY  07/16/2013    laparotomy BSO for benign 10cm tubal cyst    SALPINGOOPHORECTOMY  2013    with removal of fallopian cyst    SHOULDER SURGERY      right    TLH  2000    ovaries remain, benign    TONSILLECTOMY, ADENOIDECTOMY, BILATERAL MYRINGOTOMY AND TUBES       Family History   Problem Relation Age of Onset    Cancer Father         bladder    Diabetes Father     Heart disease Father     Diabetes Mother     Melanoma Brother     Charcot-Karla-Tooth disease Brother     Breast cancer Neg Hx     Ovarian cancer Neg Hx     Anesthesia problems Neg Hx     Colon cancer Neg Hx     Crohn's disease Neg Hx     Esophageal cancer Neg Hx     Ulcerative colitis Neg Hx     Stomach cancer Neg Hx     Celiac disease Neg Hx      Social History     Tobacco Use    Smoking status: Never    Smokeless tobacco: Never   Substance Use Topics    Alcohol use: Yes     Comment: rarely    Drug use: No      Review of patient's allergies indicates:   Allergen Reactions    Contrast media Swelling     Swollen eyes and face    Hydrocodone  Itching    Plaquenil [hydroxychloroquine]      Worsening psoriasis    Topiramate      Hair loss, cognitive side effects        Current Outpatient Medications:     clobetasoL (TEMOVATE) 0.05 % cream, Apply topically to right labia every evening, Disp: 60 g, Rfl: 0    cyanocobalamin, vitamin B-12, 5,000 mcg Subl, Place under the tongue Daily., Disp: , Rfl:     galcanezumab-gnlm 120 mg/mL PnIj, Inject 1 pen (120 mg total) into the skin every 28 days. maintenance dose, Disp: 1 mL, Rfl: 11    multivitamin (THERAGRAN) per tablet, Take 1 tablet by mouth once daily., Disp: , Rfl:     naratriptan (AMERGE) 2.5 MG tablet, Take 1 tablet (2.5 mg) by mouth at onset of headache. May repeat in 4 hours, if needed. Max 2 tablets/day., Disp: 9 tablet, Rfl: 11    secukinumab (COSENTYX PEN, 2 PENS,) 150 mg/mL PnIj, Inject 300 mg into the skin every 30 days., Disp: 2 mL, Rfl: 5    traZODone (DESYREL) 50 MG tablet, Take 1 tablet (50 mg total) by mouth every evening., Disp: 30 tablet, Rfl: 3    DULoxetine (CYMBALTA) 30 MG capsule, Take 1 capsule (30 mg total) by mouth once daily. (Patient not taking: Reported on 3/2/2023), Disp: 14 capsule, Rfl: 0    hydrocortisone 2.5 % cream, Apply topically 2 (two) times daily., Disp: 453.6 g, Rfl: 1    LIDOcaine (LIDODERM) 5 %, Place 1 patch onto the skin once daily. Remove & Discard patch within 12 hours or as directed by MD (Patient not taking: Reported on 3/9/2023), Disp: 30 patch, Rfl: 0    meloxicam (MOBIC) 15 MG tablet, Take 1 tablet (15 mg total) by mouth once daily., Disp: 30 tablet, Rfl: 3    tiZANidine (ZANAFLEX) 4 MG tablet, Take 1 tablet (4 mg total) by mouth nightly as needed (muscle spasms/ pain)., Disp: 40 tablet, Rfl: 0    Review of Systems:   Constitutional: no fever, chills or night sweats. No changes in weight   Eyes: no visual changes   ENT: no nasal congestion or sore throat   Respiratory: no cough or shortness of breath   Cardiovascular: no chest pain or palpitations  "  Gastrointestinal: no nausea or vomiting   Genitourinary: no hematuria or dysuria   Integument/Breast: no rash or pruritis   Hematologic/Lymphatic: no easy bruising or lymphadenopathy   Musculoskeletal: +back pain   Neurological: no seizures or tremors   Behavioral/Psych: no auditory or visual hallucinations   Endocrine: no heat or cold intolerance         OBJECTIVE:     Vital Signs (Most Recent):  Pulse: 70 (03/09/23 0841)  Resp: 18 (03/09/23 0841)  BP: 132/85 (03/09/23 0841)  Estimated body mass index is 30.73 kg/m² as calculated from the following:    Height as of this encounter: 5' 4" (1.626 m).    Weight as of this encounter: 81.2 kg (179 lb).    Physical Exam:   General: well developed, well nourished, no distress   Neurologic: Alert and oriented. Thought content appropriate. GCS 15.   Head: normocephalic, atraumatic  Eyes: EOMI  Neck: trachea midline, no JVD   Cardiovascular: no LE edema  Pulmonary: normal respirations, no signs of respiratory distress  Abdomen: non-distended  Sensory: intact to light touch throughout  Skin: Skin is warm, dry and intact    Motor Strength: Moves all extremities spontaneously with good tone. No abnormal movements seen.       Deltoids Triceps Biceps Wrist Extension Wrist Flexion Hand    Upper: R 5/5 5/5 5/5 5/5 5/5 5/5    L 5/5 5/5 5/5 5/5 5/5 5/5     Iliopsoas Quadriceps Knee  Flexion Tibialis  anterior Gastro- cnemius EHL   Lower: R 5/5 5/5 5/5 5/5 5/5 5/5    L 5/5 5/5 5/5 5/5 5/5 5/5     DTR's: 2 + and symmetric in UE and LE  Clonus: absent    Gait: normal     Able to stand on heels & toes    Lumbar Spine: TTP paraspinal musculature around L3-5  TTP left SI joint  TTP left lateral hip/greater trochanter bursa   No pain with hip manipulation       Direct pain left gluteal muscle       Diagnostic Results:  I have independently reviewed the following imaging:  MRI lumbar spine     Impression:     1. Multilevel spondylosis, greatest at L3-4 where there is mild left and " minimal right narrowing of the lateral recesses without significant spinal canal stenosis, progressed from prior study on 09/11/2018.  2. Mild foraminal stenosis on the left at L3-4 and bilaterally at L4-5, progressed on the left at L3-4 compared to prior study.        Electronically signed by: Myles Pineda MD  Date:                                            02/17/2023  Time:                                           09:31    XR lumbar spine     FINDINGS:  Vertebral bodies are normal in height and alignment.  There is no subluxation with flexion or extension.  Mild disc space narrowing is at L3-4.  There is no aggressive osseous lesion.     Impression:     Normal alignment.  No subluxation with flexion or extension        Electronically signed by: Jaun Peña MD  Date:                                            02/11/2023  Time:                                           07:59    ASSESSMENT/PLAN:     Lumbar radiculopathy  -     Procedure Order to Pain Management; Future; Expected date: 03/09/2023        Clemencia Knight is a 62 y.o. female    Who presents with chronic cutting multifactorial etiology for pain and limited mobility.  She has back pain mainly lumbar and upper thoracic consistent with nonspecific and paraspinal musculature.  In addition chief complaint is left hip region and gluteal pain.  She is had this chronically she is to lay on ball in this area which would lead to bruising stiffness she is tender in this area is really in the gluteal and hip region.  Differential diagnosis include myofascial strain hip injury or potentially piriformis syndrome,  She is rheumatoid arthritis diffuse pain.  This gluteal pain is a major limitation for her and causes weight-bearing pain.  She uses cane assist  She has lumbar spondylosis from 3-1 at 3 4 some mild degenerative disc disease facet arthropathy moderate foraminal stenosis.  She is no severe neural impingement.  She is no central stenosis.  She does have  some intermittent pain that radiates along the left anterior thigh.  Unsure if this is referred pain from this gluteal region strain potentially a radiculitis    Because of this knee uncertainty.  She will be referred for epidural steroid injection around L3-4 in this region diagnostic therapeutic.  She will follow up with pain management there is no clear surgical target I think where the benefits clearly outweighs the risk  She is multiple problems.  She will follow up with Rheumatology.  Pain management.  We discussed physical therapy she will see what the recommendations are for pain management how she responds.  She may benefit from orthopedic eval for the left hip she fails to improve                      Patient verbalized understanding of plan. Encouraged to call with any questions or concerns.     This note was partially dictated using voice recognition software, so please excuse any errors that were not corrected.

## 2023-03-09 NOTE — H&P (VIEW-ONLY)
[facility] Neurosurgery - [facility]  Clinic Consult     Consult Requested By: Lauro Salinas  PCP: Dallas Higgins MD    SUBJECTIVE:     Chief Complaint:   Lumbar spine pain  Chronic pain, RA ambulates with cane  Diffuse back pain      History of Present Illness:  Clemencia Knight is a 62 y.o. female with GERD, RLS, psoriatic arthritis who presents for evaluation of back pain. Patient was previously evaluated at the back and spine clinic. She attended PT over a year ago without relief of symptoms. Recent imaging revealed progression of degeneration. PT and MAGGI were recommended recently. She wished to consider her options. She was referred to our office for further evaluation.    The pain is present in the mid to low back and radiates into the left hip/buttock and anterior thigh. It is worse with standing. She uses a cane for assistance. She feels as though the lateral hip/buttock is swollen.     Pertinent and recent history, provider evaluations, imaging and data reviewed in EPIC        Past Medical History:   Diagnosis Date    Allergy     Brain tumor     Drug-induced lupus erythematosus     General anesthetics causing adverse effect in therapeutic use     pt. somewhat aware of extubation with one of her surgeries    GERD (gastroesophageal reflux disease)     Hearing loss     Migraines     Psoriatic arthritis     Raynaud's syndrome without gangrene     Restless leg syndrome     Sciatica     Trigeminal neuralgia      Past Surgical History:   Procedure Laterality Date    AUGMENTATION OF BREAST      BELT ABDOMINOPLASTY      breast implants      CERVICAL FUSION      COLONOSCOPY  10/2012    Dr. Paul; internal hemorrhoid; repeat in 10 years    COLONOSCOPY N/A 11/25/2020    Procedure: COLONOSCOPY;  Surgeon: Kiran Paul MD;  Location: [facility];  Service: Endoscopy;  Laterality: N/A; hemorrhoids; Repeat colonoscopy in 10 years for screening; random biopsy: WNL    DEXA      WNL     EPIDURAL STEROID INJECTION INTO CERVICAL SPINE N/A 09/24/2018    Procedure: Injection-steroid-epidural-cervical;  Surgeon: Myles Rocha MD;  Location: [facility] OR;  Service: Pain Management;  Laterality: N/A;  C7-T1    EPIDURAL STEROID INJECTION INTO CERVICAL SPINE N/A 11/20/2018    Procedure: Injection-steroid-epidural-cervical;  Surgeon: Myles Rocha MD;  Location: [facility] OR;  Service: Pain Management;  Laterality: N/A;  C7-T1    ESOPHAGOGASTRODUODENOSCOPY N/A 12/10/2020    Procedure: EGD (ESOPHAGOGASTRODUODENOSCOPY);  Surgeon: Kirna Paul MD;  Location: [facility] ENDO;  Service: Endoscopy;  Laterality: N/A; unremarkable; biopsy: duodenum WNL, stomach-minimal chronic inflammation, negative h pylori    ESOPHAGOGASTRODUODENOSCOPY N/A 4/12/2022    Procedure: EGD (ESOPHAGOGASTRODUODENOSCOPY);  Surgeon: Kiran Paul MD;  Location: [facility];  Service: Endoscopy;  Laterality: N/A;    HYSTERECTOMY  2000    OOPHORECTOMY  07/16/2013    laparotomy BSO for benign 10cm tubal cyst    SALPINGOOPHORECTOMY  2013    with removal of fallopian cyst    SHOULDER SURGERY      right    TLH  2000    ovaries remain, benign    TONSILLECTOMY, ADENOIDECTOMY, BILATERAL MYRINGOTOMY AND TUBES       Family History   Problem Relation Age of Onset    Cancer Father         bladder    Diabetes Father     Heart disease Father     Diabetes Mother     Melanoma Brother     Charcot-Karla-Tooth disease Brother     Breast cancer Neg Hx     Ovarian cancer Neg Hx     Anesthesia problems Neg Hx     Colon cancer Neg Hx     Crohn's disease Neg Hx     Esophageal cancer Neg Hx     Ulcerative colitis Neg Hx     Stomach cancer Neg Hx     Celiac disease Neg Hx      Social History     Tobacco Use    Smoking status: Never    Smokeless tobacco: Never   Substance Use Topics    Alcohol use: Yes     Comment: rarely    Drug use: No      Review of patient's allergies indicates:   Allergen Reactions    Contrast media Swelling     Swollen eyes and face    Hydrocodone  Itching    Plaquenil [hydroxychloroquine]      Worsening psoriasis    Topiramate      Hair loss, cognitive side effects        Current Outpatient Medications:     clobetasoL (TEMOVATE) 0.05 % cream, Apply topically to right labia every evening, Disp: 60 g, Rfl: 0    cyanocobalamin, vitamin B-12, 5,000 mcg Subl, Place under the tongue Daily., Disp: , Rfl:     galcanezumab-gnlm 120 mg/mL PnIj, Inject 1 pen (120 mg total) into the skin every 28 days. maintenance dose, Disp: 1 mL, Rfl: 11    multivitamin (THERAGRAN) per tablet, Take 1 tablet by mouth once daily., Disp: , Rfl:     naratriptan (AMERGE) 2.5 MG tablet, Take 1 tablet (2.5 mg) by mouth at onset of headache. May repeat in 4 hours, if needed. Max 2 tablets/day., Disp: 9 tablet, Rfl: 11    secukinumab (COSENTYX PEN, 2 PENS,) 150 mg/mL PnIj, Inject 300 mg into the skin every 30 days., Disp: 2 mL, Rfl: 5    traZODone (DESYREL) 50 MG tablet, Take 1 tablet (50 mg total) by mouth every evening., Disp: 30 tablet, Rfl: 3    DULoxetine (CYMBALTA) 30 MG capsule, Take 1 capsule (30 mg total) by mouth once daily. (Patient not taking: Reported on 3/2/2023), Disp: 14 capsule, Rfl: 0    hydrocortisone 2.5 % cream, Apply topically 2 (two) times daily., Disp: 453.6 g, Rfl: 1    LIDOcaine (LIDODERM) 5 %, Place 1 patch onto the skin once daily. Remove & Discard patch within 12 hours or as directed by MD (Patient not taking: Reported on 3/9/2023), Disp: 30 patch, Rfl: 0    meloxicam (MOBIC) 15 MG tablet, Take 1 tablet (15 mg total) by mouth once daily., Disp: 30 tablet, Rfl: 3    tiZANidine (ZANAFLEX) 4 MG tablet, Take 1 tablet (4 mg total) by mouth nightly as needed (muscle spasms/ pain)., Disp: 40 tablet, Rfl: 0    Review of Systems:   Constitutional: no fever, chills or night sweats. No changes in weight   Eyes: no visual changes   ENT: no nasal congestion or sore throat   Respiratory: no cough or shortness of breath   Cardiovascular: no chest pain or palpitations  "  Gastrointestinal: no nausea or vomiting   Genitourinary: no hematuria or dysuria   Integument/Breast: no rash or pruritis   Hematologic/Lymphatic: no easy bruising or lymphadenopathy   Musculoskeletal: +back pain   Neurological: no seizures or tremors   Behavioral/Psych: no auditory or visual hallucinations   Endocrine: no heat or cold intolerance         OBJECTIVE:     Vital Signs (Most Recent):  Pulse: 70 (03/09/23 0841)  Resp: 18 (03/09/23 0841)  BP: 132/85 (03/09/23 0841)  Estimated body mass index is 30.73 kg/m² as calculated from the following:    Height as of this encounter: 5' 4" (1.626 m).    Weight as of this encounter: 81.2 kg (179 lb).    Physical Exam:   General: well developed, well nourished, no distress   Neurologic: Alert and oriented. Thought content appropriate. GCS 15.   Head: normocephalic, atraumatic  Eyes: EOMI  Neck: trachea midline, no JVD   Cardiovascular: no LE edema  Pulmonary: normal respirations, no signs of respiratory distress  Abdomen: non-distended  Sensory: intact to light touch throughout  Skin: Skin is warm, dry and intact    Motor Strength: Moves all extremities spontaneously with good tone. No abnormal movements seen.       Deltoids Triceps Biceps Wrist Extension Wrist Flexion Hand    Upper: R 5/5 5/5 5/5 5/5 5/5 5/5    L 5/5 5/5 5/5 5/5 5/5 5/5     Iliopsoas Quadriceps Knee  Flexion Tibialis  anterior Gastro- cnemius EHL   Lower: R 5/5 5/5 5/5 5/5 5/5 5/5    L 5/5 5/5 5/5 5/5 5/5 5/5     DTR's: 2 + and symmetric in UE and LE  Clonus: absent    Gait: normal     Able to stand on heels & toes    Lumbar Spine: TTP paraspinal musculature around L3-5  TTP left SI joint  TTP left lateral hip/greater trochanter bursa   No pain with hip manipulation       Direct pain left gluteal muscle       Diagnostic Results:  I have independently reviewed the following imaging:  MRI lumbar spine     Impression:     1. Multilevel spondylosis, greatest at L3-4 where there is mild left and " minimal right narrowing of the lateral recesses without significant spinal canal stenosis, progressed from prior study on 09/11/2018.  2. Mild foraminal stenosis on the left at L3-4 and bilaterally at L4-5, progressed on the left at L3-4 compared to prior study.        Electronically signed by: Myles Pineda MD  Date:                                            02/17/2023  Time:                                           09:31    XR lumbar spine     FINDINGS:  Vertebral bodies are normal in height and alignment.  There is no subluxation with flexion or extension.  Mild disc space narrowing is at L3-4.  There is no aggressive osseous lesion.     Impression:     Normal alignment.  No subluxation with flexion or extension        Electronically signed by: Jaun Peña MD  Date:                                            02/11/2023  Time:                                           07:59    ASSESSMENT/PLAN:     Lumbar radiculopathy  -     Procedure Order to Pain Management; Future; Expected date: 03/09/2023        Clemencia Knight is a 62 y.o. female    Who presents with chronic cutting multifactorial etiology for pain and limited mobility.  She has back pain mainly lumbar and upper thoracic consistent with nonspecific and paraspinal musculature.  In addition chief complaint is left hip region and gluteal pain.  She is had this chronically she is to lay on ball in this area which would lead to bruising stiffness she is tender in this area is really in the gluteal and hip region.  Differential diagnosis include myofascial strain hip injury or potentially piriformis syndrome,  She is rheumatoid arthritis diffuse pain.  This gluteal pain is a major limitation for her and causes weight-bearing pain.  She uses cane assist  She has lumbar spondylosis from 3-1 at 3 4 some mild degenerative disc disease facet arthropathy moderate foraminal stenosis.  She is no severe neural impingement.  She is no central stenosis.  She does have  some intermittent pain that radiates along the left anterior thigh.  Unsure if this is referred pain from this gluteal region strain potentially a radiculitis    Because of this knee uncertainty.  She will be referred for epidural steroid injection around L3-4 in this region diagnostic therapeutic.  She will follow up with pain management there is no clear surgical target I think where the benefits clearly outweighs the risk  She is multiple problems.  She will follow up with Rheumatology.  Pain management.  We discussed physical therapy she will see what the recommendations are for pain management how she responds.  She may benefit from orthopedic eval for the left hip she fails to improve                      Patient verbalized understanding of plan. Encouraged to call with any questions or concerns.     This note was partially dictated using voice recognition software, so please excuse any errors that were not corrected.

## 2023-03-09 NOTE — TELEPHONE ENCOUNTER
Physician - Dr Rogers    Type of Procedure/Injection - Lumbar Transforaminal Epidural  L3/4           Laterality - Left      Type of Sedation - RNIV      Need to hold medication - no      N/A      Clearance needed - no      Follow up - 2 week      [facility] Follow Up Note    Obed Haley   74 y.o. male    Date of Visit: 10/6/2017    Chief Complaint   Patient presents with     Follow-up     from last week     INR Check     Subjective  This is a 74-year-old patient who recently underwent mitral valve surgery.  At that time he also had some cardiac rhythm disturbances and had a pacemaker implanted.  I saw him last week for his first post hospital visit and would refer to my notes for the details of that visit.  He is now on Coumadin at the recommendation of the surgeon who would like to leave him on for a total of 3 months.  He is now enrolled in the anticoagulation program and INRs are being monitored.  I did ask him to come back after 1 week just to make sure that everything was still stable.  He is doing well.  No significant chest pain and no shortness of breath.  He did have a pacemaker check which was good.  He says that each day he feels a little better and is beginning to regain his strength.  He did develop some muscle spasm and strain in the right posterior neck region that is radiating into the anterior right chest which is happened to him in the past.  He does have a chiropractor who was worked on this in the past and can probably work on this again as long as it is on the right side and he is working on the neck.  He offers no other specific complaints today.    ROS A comprehensive review of systems was performed and was otherwise negative    Medications, allergies, and problem list were reviewed and updated    Exam  General Appearance:   On examination his blood pressure was 108/60.  Weight is 208 pounds and height is 74 inches.  BMI is 26.89.    Lungs are clear.    Heart is in a sinus rhythm with a rate of 102 and no ectopy.    No peripheral edema.    There is a little bit of tenderness in the upper right anterior chest wall and in the posterior right region of the neck.    The patient is alert and oriented  ×3.      Assessment/Plan  1. Tachycardia-bradycardia syndrome     2. S/P MVR (mitral valve repair)       The patient continues to do well following mitral valve surgery and pacemaker implantation.  He will follow-up with cardiology as previously recommended.  INRs will continue to be monitored.  He will otherwise continue his current medication.  I would like to see him back in 1 month for follow-up.  The following high BMI interventions were performed this visit: weight monitoring    Franko Franz MD      Current Outpatient Prescriptions on File Prior to Visit   Medication Sig     acetaminophen (TYLENOL) 325 MG tablet Take 2 tablets (650 mg total) by mouth every 6 (six) hours as needed.     aspirin 81 MG EC tablet Take 1 tablet (81 mg total) by mouth daily.     b complex vitamins tablet Take 1 tablet by mouth daily.     cholecalciferol, vitamin D3, 1,000 unit tablet Take 1,000 Units by mouth daily.     EPINEPHrine (EPIPEN) 0.3 mg/0.3 mL atIn Inject 0.3 mg into the shoulder, thigh, or buttocks once as needed (allergic reaction).     MULTIVIT &MINERALS/FERROUS FUM (MULTI VITAMIN ORAL) Take 1 tablet by mouth every other day.     OMEGA-3/DHA/EPA/FISH OIL (FISH OIL-OMEGA-3 FATTY ACIDS) 300-1,000 mg capsule Take 2 g by mouth daily.     oxyCODONE (ROXICODONE) 5 MG immediate release tablet Take 1-2 tablets (5-10 mg total) by mouth every 6 (six) hours as needed (5 mg for mild to moderate paint (pain score 3-4) or 10 mg for moderate pain (pain score 5-6)).     polyethylene glycol (MIRALAX) 17 gram packet Take 1 packet (17 g total) by mouth daily as needed.     warfarin (COUMADIN) 5 MG tablet Take One 5 mg tablet by mouth daily. Adjust dose based on INR results as directed.     amoxicillin (AMOXIL) 500 MG capsule Take 2,000 mg by mouth as needed (1 hour prior to dentist.).      furosemide (LASIX) 40 MG tablet Take 1 tablet (40 mg total) by mouth daily for 5 days.     No current facility-administered medications on file  prior to visit.      Allergies   Allergen Reactions     Bee Venom Protein (Honey Bee) Anaphylaxis     Social History   Substance Use Topics     Smoking status: Former Smoker     Quit date: 1/14/2007     Smokeless tobacco: Never Used     Alcohol use No

## 2023-03-10 DIAGNOSIS — M54.16 LUMBAR RADICULOPATHY: Primary | ICD-10-CM

## 2023-03-10 RX ORDER — SODIUM CHLORIDE, SODIUM LACTATE, POTASSIUM CHLORIDE, CALCIUM CHLORIDE 600; 310; 30; 20 MG/100ML; MG/100ML; MG/100ML; MG/100ML
INJECTION, SOLUTION INTRAVENOUS CONTINUOUS
Status: CANCELLED | OUTPATIENT
Start: 2023-03-10

## 2023-03-15 DIAGNOSIS — L40.50 PSORIATIC ARTHRITIS: ICD-10-CM

## 2023-03-15 RX ORDER — SECUKINUMAB 150 MG/ML
300 INJECTION SUBCUTANEOUS
Qty: 2 ML | Refills: 5 | Status: SHIPPED | OUTPATIENT
Start: 2023-03-15 | End: 2023-03-31

## 2023-03-17 ENCOUNTER — PATIENT MESSAGE (OUTPATIENT)
Dept: OBSTETRICS AND GYNECOLOGY | Facility: CLINIC | Age: 63
End: 2023-03-17
Payer: COMMERCIAL

## 2023-03-20 ENCOUNTER — OFFICE VISIT (OUTPATIENT)
Dept: UROLOGY | Facility: CLINIC | Age: 63
End: 2023-03-20
Payer: COMMERCIAL

## 2023-03-20 VITALS — WEIGHT: 179 LBS | HEIGHT: 64 IN | BODY MASS INDEX: 30.56 KG/M2

## 2023-03-20 DIAGNOSIS — R31.29 MICROHEMATURIA: Primary | ICD-10-CM

## 2023-03-20 LAB
BILIRUBIN, UA POC OHS: NEGATIVE
BLOOD, UA POC OHS: ABNORMAL
CLARITY, UA POC OHS: CLEAR
COLOR, UA POC OHS: YELLOW
GLUCOSE, UA POC OHS: NEGATIVE
KETONES, UA POC OHS: NEGATIVE
LEUKOCYTES, UA POC OHS: NEGATIVE
NITRITE, UA POC OHS: NEGATIVE
PH, UA POC OHS: 6
PROTEIN, UA POC OHS: NEGATIVE
SPECIFIC GRAVITY, UA POC OHS: 1.02
UROBILINOGEN, UA POC OHS: 0.2

## 2023-03-20 PROCEDURE — 99999 PR PBB SHADOW E&M-EST. PATIENT-LVL III: ICD-10-PCS | Mod: PBBFAC,,, | Performed by: UROLOGY

## 2023-03-20 PROCEDURE — 3008F PR BODY MASS INDEX (BMI) DOCUMENTED: ICD-10-PCS | Mod: CPTII,S$GLB,, | Performed by: UROLOGY

## 2023-03-20 PROCEDURE — 99203 PR OFFICE/OUTPT VISIT, NEW, LEVL III, 30-44 MIN: ICD-10-PCS | Mod: S$GLB,,, | Performed by: UROLOGY

## 2023-03-20 PROCEDURE — 3008F BODY MASS INDEX DOCD: CPT | Mod: CPTII,S$GLB,, | Performed by: UROLOGY

## 2023-03-20 PROCEDURE — 99203 OFFICE O/P NEW LOW 30 MIN: CPT | Mod: S$GLB,,, | Performed by: UROLOGY

## 2023-03-20 PROCEDURE — 3044F HG A1C LEVEL LT 7.0%: CPT | Mod: CPTII,S$GLB,, | Performed by: UROLOGY

## 2023-03-20 PROCEDURE — 3044F PR MOST RECENT HEMOGLOBIN A1C LEVEL <7.0%: ICD-10-PCS | Mod: CPTII,S$GLB,, | Performed by: UROLOGY

## 2023-03-20 PROCEDURE — 81003 URINALYSIS AUTO W/O SCOPE: CPT | Mod: QW,S$GLB,, | Performed by: UROLOGY

## 2023-03-20 PROCEDURE — 99999 PR PBB SHADOW E&M-EST. PATIENT-LVL III: CPT | Mod: PBBFAC,,, | Performed by: UROLOGY

## 2023-03-20 PROCEDURE — 1159F PR MEDICATION LIST DOCUMENTED IN MEDICAL RECORD: ICD-10-PCS | Mod: CPTII,S$GLB,, | Performed by: UROLOGY

## 2023-03-20 PROCEDURE — 81003 POCT URINALYSIS(INSTRUMENT): ICD-10-PCS | Mod: QW,S$GLB,, | Performed by: UROLOGY

## 2023-03-20 PROCEDURE — 1159F MED LIST DOCD IN RCRD: CPT | Mod: CPTII,S$GLB,, | Performed by: UROLOGY

## 2023-03-20 NOTE — PROGRESS NOTES
Subjective:       Patient ID: Clemencia Knight is a 62 y.o. female.    Chief Complaint: Hematuria (micro)    HPI    62-year-old with microscopic hematuria on several occasions.  Her primary complaint is back pain.  She has known bulging disc in her back.  She has pain in the midline around her bra strap.  She also has swelling and pain in her hip.  She is scheduled for spinal epidural injections next month.  She denies hematuria and dysuria.  Her urinalysis does note microscopic hematuria today.  She has no history of kidney stones.  She had a noncontrast CT scan last year and I reviewed those images and there were no stones or obstruction seen at that time.  She does have a family history of bladder cancer in her father.  She has never been a cigarette smoker.    Urine dipstick shows negative for nitrites, leukocytes, glucose, protein, positive for 2+blood  Micro exam: 3-5 RBC's per HPF.      Past Medical History:   Diagnosis Date    Allergy     Brain tumor     Drug-induced lupus erythematosus     General anesthetics causing adverse effect in therapeutic use     pt. somewhat aware of extubation with one of her surgeries    GERD (gastroesophageal reflux disease)     Hearing loss     Migraines     Psoriatic arthritis     Raynaud's syndrome without gangrene     Restless leg syndrome     Sciatica     Trigeminal neuralgia      Past Surgical History:   Procedure Laterality Date    AUGMENTATION OF BREAST      BELT ABDOMINOPLASTY      breast implants      CERVICAL FUSION      COLONOSCOPY  10/2012    Dr. Paul; internal hemorrhoid; repeat in 10 years    COLONOSCOPY N/A 11/25/2020    Procedure: COLONOSCOPY;  Surgeon: Kiran Paul MD;  Location: [facility];  Service: Endoscopy;  Laterality: N/A; hemorrhoids; Repeat colonoscopy in 10 years for screening; random biopsy: WNL    DEXA      WNL    EPIDURAL STEROID INJECTION INTO CERVICAL SPINE N/A 09/24/2018    Procedure: Injection-steroid-epidural-cervical;  Surgeon: Myles  ROQUE Rocha MD;  Location: [facility] OR;  Service: Pain Management;  Laterality: N/A;  C7-T1    EPIDURAL STEROID INJECTION INTO CERVICAL SPINE N/A 11/20/2018    Procedure: Injection-steroid-epidural-cervical;  Surgeon: Myles Rocha MD;  Location: [facility] OR;  Service: Pain Management;  Laterality: N/A;  C7-T1    ESOPHAGOGASTRODUODENOSCOPY N/A 12/10/2020    Procedure: EGD (ESOPHAGOGASTRODUODENOSCOPY);  Surgeon: Kiran Paul MD;  Location: [facility] ENDO;  Service: Endoscopy;  Laterality: N/A; unremarkable; biopsy: duodenum WNL, stomach-minimal chronic inflammation, negative h pylori    ESOPHAGOGASTRODUODENOSCOPY N/A 4/12/2022    Procedure: EGD (ESOPHAGOGASTRODUODENOSCOPY);  Surgeon: Kiran Paul MD;  Location: [facility] ENDO;  Service: Endoscopy;  Laterality: N/A;    HYSTERECTOMY  2000    OOPHORECTOMY  07/16/2013    laparotomy BSO for benign 10cm tubal cyst    SALPINGOOPHORECTOMY  2013    with removal of fallopian cyst    SHOULDER SURGERY      right    TLH  2000    ovaries remain, benign    TONSILLECTOMY, ADENOIDECTOMY, BILATERAL MYRINGOTOMY AND TUBES         Current Outpatient Medications:     clobetasoL (TEMOVATE) 0.05 % cream, Apply topically to right labia every evening, Disp: 60 g, Rfl: 0    cyanocobalamin, vitamin B-12, 5,000 mcg Subl, Place under the tongue Daily., Disp: , Rfl:     galcanezumab-gnlm 120 mg/mL PnIj, Inject 1 pen (120 mg total) into the skin every 28 days. maintenance dose, Disp: 1 mL, Rfl: 11    hydrocortisone 2.5 % cream, Apply topically 2 (two) times daily., Disp: 453.6 g, Rfl: 1    LIDOcaine (LIDODERM) 5 %, Place 1 patch onto the skin once daily. Remove & Discard patch within 12 hours or as directed by MD, Disp: 30 patch, Rfl: 0    multivitamin (THERAGRAN) per tablet, Take 1 tablet by mouth once daily., Disp: , Rfl:     naratriptan (AMERGE) 2.5 MG tablet, Take 1 tablet (2.5 mg) by mouth at onset of headache. May repeat in 4 hours, if needed. Max 2 tablets/day., Disp: 9 tablet, Rfl: 11    secukinumab  (COSENTYX PEN, 2 PENS,) 150 mg/mL PnIj, Inject 300 mg into the skin every 30 days., Disp: 2 mL, Rfl: 5    traZODone (DESYREL) 50 MG tablet, Take 1 tablet (50 mg total) by mouth every evening., Disp: 30 tablet, Rfl: 3    Review of Systems   Constitutional:  Negative for fever.   Eyes:  Negative for visual disturbance.   Respiratory:  Negative for shortness of breath.    Cardiovascular:  Negative for chest pain.   Gastrointestinal:  Negative for abdominal pain.   Genitourinary:  Negative for dysuria, flank pain and hematuria.   Musculoskeletal:  Positive for back pain.   Skin:  Negative for rash.   Neurological:  Negative for seizures.   Psychiatric/Behavioral:  Negative for confusion.      Objective:      Physical Exam  Vitals reviewed.   Constitutional:       Appearance: She is well-developed.   HENT:      Head: Normocephalic.   Eyes:      Conjunctiva/sclera: Conjunctivae normal.   Cardiovascular:      Rate and Rhythm: Normal rate.   Pulmonary:      Effort: Pulmonary effort is normal.   Abdominal:      Tenderness: There is no right CVA tenderness or left CVA tenderness.   Genitourinary:     Comments: Bladder non-tender and nondistended  No CVA tenderness  Musculoskeletal:      Cervical back: Normal range of motion.   Skin:     General: Skin is warm and dry.      Findings: No erythema or rash.   Neurological:      Mental Status: She is alert and oriented to person, place, and time.   Psychiatric:         Behavior: Behavior normal.       Assessment:       1. Microhematuria          Plan:       Microhematuria  -     POCT Urinalysis(Instrument)  -     Cystoscopy; Future  -     US Retroperitoneal Complete; Future; Expected date: 03/20/2023

## 2023-03-21 ENCOUNTER — HOSPITAL ENCOUNTER (OUTPATIENT)
Dept: RADIOLOGY | Facility: HOSPITAL | Age: 63
Discharge: HOME | End: 2023-03-21
Attending: UROLOGY
Payer: COMMERCIAL

## 2023-03-21 DIAGNOSIS — R31.29 MICROHEMATURIA: ICD-10-CM

## 2023-03-21 PROCEDURE — 76770 US EXAM ABDO BACK WALL COMP: CPT | Mod: 26,,, | Performed by: RADIOLOGY

## 2023-03-21 PROCEDURE — 76770 US EXAM ABDO BACK WALL COMP: CPT | Mod: TC,PO

## 2023-03-21 PROCEDURE — 76770 US RETROPERITONEAL COMPLETE: ICD-10-PCS | Mod: 26,,, | Performed by: RADIOLOGY

## 2023-03-21 RX ORDER — RIMEGEPANT SULFATE 75 MG/75MG
75 TABLET, ORALLY DISINTEGRATING ORAL ONCE AS NEEDED
Qty: 8 TABLET | Refills: 11 | Status: CANCELLED | OUTPATIENT
Start: 2023-03-21 | End: 2023-03-21

## 2023-03-21 NOTE — TELEPHONE ENCOUNTER
Received refill request from [facility]. Unable to complete refill since DR Kelly is no longer here and patient has not established care with another provider in the office. Notified pharmacy of this.

## 2023-03-23 ENCOUNTER — PATIENT MESSAGE (OUTPATIENT)
Dept: NEUROLOGY | Facility: CLINIC | Age: 63
End: 2023-03-23
Payer: COMMERCIAL

## 2023-03-23 ENCOUNTER — PATIENT MESSAGE (OUTPATIENT)
Dept: RHEUMATOLOGY | Facility: CLINIC | Age: 63
End: 2023-03-23
Payer: COMMERCIAL

## 2023-03-23 ENCOUNTER — PROCEDURE VISIT (OUTPATIENT)
Dept: UROLOGY | Facility: CLINIC | Age: 63
End: 2023-03-23
Payer: COMMERCIAL

## 2023-03-23 VITALS — HEIGHT: 64 IN | BODY MASS INDEX: 30.41 KG/M2 | WEIGHT: 178.13 LBS

## 2023-03-23 DIAGNOSIS — R10.9 ABDOMINAL PAIN, UNSPECIFIED ABDOMINAL LOCATION: ICD-10-CM

## 2023-03-23 DIAGNOSIS — G43.019 INTRACTABLE MIGRAINE WITHOUT AURA AND WITHOUT STATUS MIGRAINOSUS: Primary | ICD-10-CM

## 2023-03-23 DIAGNOSIS — L40.50 PSORIATIC ARTHRITIS: Primary | ICD-10-CM

## 2023-03-23 DIAGNOSIS — R31.29 MICROHEMATURIA: ICD-10-CM

## 2023-03-23 DIAGNOSIS — R19.5 LOOSE BOWEL MOVEMENTS: ICD-10-CM

## 2023-03-23 DIAGNOSIS — R11.0 NAUSEA: ICD-10-CM

## 2023-03-23 DIAGNOSIS — R11.10 ABDOMINAL PAIN WITH VOMITING: ICD-10-CM

## 2023-03-23 DIAGNOSIS — R10.9 ABDOMINAL PAIN WITH VOMITING: ICD-10-CM

## 2023-03-23 DIAGNOSIS — G43.019 INTRACTABLE MIGRAINE WITHOUT AURA AND WITHOUT STATUS MIGRAINOSUS: ICD-10-CM

## 2023-03-23 PROCEDURE — 52000 CYSTOURETHROSCOPY: CPT | Mod: S$GLB,,, | Performed by: UROLOGY

## 2023-03-23 PROCEDURE — 52000 CYSTOSCOPY: ICD-10-PCS | Mod: S$GLB,,, | Performed by: UROLOGY

## 2023-03-23 NOTE — PROCEDURES
Cystoscopy    Date/Time: 3/23/2023 3:30 PM  Performed by: BRENNA Lopez MD  Authorized by: BRENNA Lopez MD     Consent Done?:  Yes (Written)  Timeout: prior to procedure the correct patient, procedure, and site was verified    Prep: patient was prepped and draped in usual sterile fashion    Indications: hematuria    Position:  Other  Patient sedated?: No    Preparation: Patient was prepped and draped in usual sterile fashion    Scope type:  Flexible cystoscope   patient tolerated the procedure well with no immediate complications    Blood Loss:  None    The patients clinic history and physical were reviewed and there are no changes.    The patient was placed in the frog-leg position.  The genitals were prepped and draped in a sterile fashion.   Using a flexible scope, a careful cystoscopic exam was then performed.  The entire bladder mucosa was systematically visualized.  The mucosa appeared normal.  There were no lesions, masses foreign bodies or stones.   Each ureteral orifices were visualized and both had clear efflux of urine.  On retroflexion the bladder neck appeared normal.  The cystoscope was then removed and I examined the entire length of the urethra.  The urethra appeared normal.  She tolerated the procedure well.  There were no complications.    Impression:  Normal cystoscopy.

## 2023-03-23 NOTE — TELEPHONE ENCOUNTER
Contacted patient regarding previous my chart message. Patient is complaining of severe back pain, stomach pain, loose bowels, vomiting after eating, stomach pain after eating. Nurse discussed issues with Dr Becerril an abdominal ultra sound and urine test were ordered and scheduled for tomorrow. Patient aware of date and time.

## 2023-03-28 ENCOUNTER — HOSPITAL ENCOUNTER (OUTPATIENT)
Dept: RADIOLOGY | Facility: HOSPITAL | Age: 63
Discharge: HOME | End: 2023-03-28
Attending: PSYCHIATRY & NEUROLOGY
Payer: COMMERCIAL

## 2023-03-28 DIAGNOSIS — D32.9 MENINGIOMA: ICD-10-CM

## 2023-03-28 DIAGNOSIS — N90.89 LABIAL ADHESIONS: ICD-10-CM

## 2023-03-28 PROCEDURE — 70551 MRI BRAIN WITHOUT CONTRAST: ICD-10-PCS | Mod: 26,,, | Performed by: RADIOLOGY

## 2023-03-28 PROCEDURE — 70551 MRI BRAIN STEM W/O DYE: CPT | Mod: 26,,, | Performed by: RADIOLOGY

## 2023-03-28 PROCEDURE — 70551 MRI BRAIN STEM W/O DYE: CPT | Mod: TC,PO

## 2023-03-28 RX ORDER — CLOBETASOL PROPIONATE 0.5 MG/G
CREAM TOPICAL NIGHTLY
Qty: 60 G | Refills: 0 | OUTPATIENT
Start: 2023-03-28

## 2023-03-30 ENCOUNTER — HOSPITAL ENCOUNTER (OUTPATIENT)
Dept: RADIOLOGY | Facility: HOSPITAL | Age: 63
Discharge: HOME | End: 2023-03-30
Attending: ANESTHESIOLOGY | Admitting: ANESTHESIOLOGY
Payer: COMMERCIAL

## 2023-03-30 ENCOUNTER — HOSPITAL ENCOUNTER (OUTPATIENT)
Facility: HOSPITAL | Age: 63
Discharge: HOME | End: 2023-03-30
Attending: ANESTHESIOLOGY | Admitting: ANESTHESIOLOGY
Payer: COMMERCIAL

## 2023-03-30 ENCOUNTER — OFFICE VISIT (OUTPATIENT)
Dept: NEUROSURGERY | Facility: CLINIC | Age: 63
End: 2023-03-30
Payer: COMMERCIAL

## 2023-03-30 VITALS
DIASTOLIC BLOOD PRESSURE: 76 MMHG | BODY MASS INDEX: 29.7 KG/M2 | WEIGHT: 173.94 LBS | RESPIRATION RATE: 18 BRPM | HEART RATE: 65 BPM | SYSTOLIC BLOOD PRESSURE: 145 MMHG | HEIGHT: 64 IN

## 2023-03-30 DIAGNOSIS — M54.50 LOWER BACK PAIN: ICD-10-CM

## 2023-03-30 DIAGNOSIS — M54.16 LUMBAR RADICULOPATHY: ICD-10-CM

## 2023-03-30 DIAGNOSIS — G93.9 BRAIN LESION: Primary | ICD-10-CM

## 2023-03-30 PROCEDURE — 3008F PR BODY MASS INDEX (BMI) DOCUMENTED: ICD-10-PCS | Mod: CPTII,S$GLB,, | Performed by: NEUROLOGICAL SURGERY

## 2023-03-30 PROCEDURE — 3078F PR MOST RECENT DIASTOLIC BLOOD PRESSURE < 80 MM HG: ICD-10-PCS | Mod: CPTII,S$GLB,, | Performed by: NEUROLOGICAL SURGERY

## 2023-03-30 PROCEDURE — 1159F MED LIST DOCD IN RCRD: CPT | Mod: CPTII,S$GLB,, | Performed by: NEUROLOGICAL SURGERY

## 2023-03-30 PROCEDURE — 25000003 PHARM REV CODE 250: Mod: PO | Performed by: ANESTHESIOLOGY

## 2023-03-30 PROCEDURE — 64483 NJX AA&/STRD TFRM EPI L/S 1: CPT | Mod: PO,LT | Performed by: ANESTHESIOLOGY

## 2023-03-30 PROCEDURE — 64483 PR EPIDURAL INJ, ANES/STEROID, TRANSFORAMINAL, LUMB/SACR, SNGL LEVL: ICD-10-PCS | Mod: LT,,, | Performed by: ANESTHESIOLOGY

## 2023-03-30 PROCEDURE — 64483 NJX AA&/STRD TFRM EPI L/S 1: CPT | Mod: LT,,, | Performed by: ANESTHESIOLOGY

## 2023-03-30 PROCEDURE — 3077F SYST BP >= 140 MM HG: CPT | Mod: CPTII,S$GLB,, | Performed by: NEUROLOGICAL SURGERY

## 2023-03-30 PROCEDURE — 3077F PR MOST RECENT SYSTOLIC BLOOD PRESSURE >= 140 MM HG: ICD-10-PCS | Mod: CPTII,S$GLB,, | Performed by: NEUROLOGICAL SURGERY

## 2023-03-30 PROCEDURE — 3008F BODY MASS INDEX DOCD: CPT | Mod: CPTII,S$GLB,, | Performed by: NEUROLOGICAL SURGERY

## 2023-03-30 PROCEDURE — 1159F PR MEDICATION LIST DOCUMENTED IN MEDICAL RECORD: ICD-10-PCS | Mod: CPTII,S$GLB,, | Performed by: NEUROLOGICAL SURGERY

## 2023-03-30 PROCEDURE — 76000 FLUOROSCOPY <1 HR PHYS/QHP: CPT | Mod: TC,PO

## 2023-03-30 PROCEDURE — 99215 OFFICE O/P EST HI 40 MIN: CPT | Mod: S$GLB,,, | Performed by: NEUROLOGICAL SURGERY

## 2023-03-30 PROCEDURE — 99215 PR OFFICE/OUTPT VISIT, EST, LEVL V, 40-54 MIN: ICD-10-PCS | Mod: S$GLB,,, | Performed by: NEUROLOGICAL SURGERY

## 2023-03-30 PROCEDURE — 3078F DIAST BP <80 MM HG: CPT | Mod: CPTII,S$GLB,, | Performed by: NEUROLOGICAL SURGERY

## 2023-03-30 PROCEDURE — 3044F HG A1C LEVEL LT 7.0%: CPT | Mod: CPTII,S$GLB,, | Performed by: NEUROLOGICAL SURGERY

## 2023-03-30 PROCEDURE — 3044F PR MOST RECENT HEMOGLOBIN A1C LEVEL <7.0%: ICD-10-PCS | Mod: CPTII,S$GLB,, | Performed by: NEUROLOGICAL SURGERY

## 2023-03-30 PROCEDURE — 63600175 PHARM REV CODE 636 W HCPCS: Mod: PO | Performed by: ANESTHESIOLOGY

## 2023-03-30 RX ORDER — BUPIVACAINE HYDROCHLORIDE 2.5 MG/ML
INJECTION, SOLUTION EPIDURAL; INFILTRATION; INTRACAUDAL
Status: DISCONTINUED | OUTPATIENT
Start: 2023-03-30 | End: 2023-03-30 | Stop reason: HOSPADM

## 2023-03-30 RX ORDER — METHYLPREDNISOLONE ACETATE 40 MG/ML
INJECTION, SUSPENSION INTRA-ARTICULAR; INTRALESIONAL; INTRAMUSCULAR; SOFT TISSUE
Status: DISCONTINUED | OUTPATIENT
Start: 2023-03-30 | End: 2023-03-30 | Stop reason: HOSPADM

## 2023-03-30 RX ORDER — SODIUM CHLORIDE, SODIUM LACTATE, POTASSIUM CHLORIDE, CALCIUM CHLORIDE 600; 310; 30; 20 MG/100ML; MG/100ML; MG/100ML; MG/100ML
INJECTION, SOLUTION INTRAVENOUS CONTINUOUS
Status: DISCONTINUED | OUTPATIENT
Start: 2023-03-30 | End: 2023-03-30 | Stop reason: HOSPADM

## 2023-03-30 RX ORDER — LIDOCAINE HYDROCHLORIDE 10 MG/ML
INJECTION, SOLUTION EPIDURAL; INFILTRATION; INTRACAUDAL; PERINEURAL
Status: DISCONTINUED | OUTPATIENT
Start: 2023-03-30 | End: 2023-03-30 | Stop reason: HOSPADM

## 2023-03-30 RX ORDER — MIDAZOLAM HYDROCHLORIDE 2 MG/2ML
INJECTION, SOLUTION INTRAMUSCULAR; INTRAVENOUS
Status: DISCONTINUED | OUTPATIENT
Start: 2023-03-30 | End: 2023-03-30 | Stop reason: HOSPADM

## 2023-03-30 RX ADMIN — SODIUM CHLORIDE, SODIUM LACTATE, POTASSIUM CHLORIDE, AND CALCIUM CHLORIDE: .6; .31; .03; .02 INJECTION, SOLUTION INTRAVENOUS at 01:03

## 2023-03-30 NOTE — DISCHARGE SUMMARY
[facility] - Surgery  Discharge Note  Short Stay    Procedure(s) (LRB):  Injection,steroid,epidural,transforaminal approach L3/4 (Left)      OUTCOME: Patient tolerated treatment/procedure well without complication and is now ready for discharge.    DISPOSITION: Home or Self Care    FINAL DIAGNOSIS:  Lumbar radiculopathy    FOLLOWUP: In clinic    DISCHARGE INSTRUCTIONS:    Discharge Procedure Orders   Diet Adult Regular     No dressing needed     Notify your health care provider if you experience any of the following:  temperature >100.4     Activity as tolerated

## 2023-03-30 NOTE — PROGRESS NOTES
Neurosurgery History & Physical    Patient ID: Clemencia nKight is a 62 y.o. female.    Chief Complaint   Patient presents with    Meningioma      Patient reports to clinic with a meningioma.  Reports headaches that come and go with a fullness in the head.  Feels off balance when walking.  Reports ringing in the ears that comes and goes.         HPI:  62-year-old female with history of migraine headaches followed by Dr. Magaña in the past and now transitioning to Dr. Viveros.  She has a known right parietal meningioma.  She had a recent MRI of the brain and due to the presence of the meningioma she wanted Neurosurgery follow-up.  She denies significant changes in her neurologic status except for a mild change in some increased confusion and short-term memory loss.  She is still able to more than adequately carry out her activities of daily living.    She states that she has had trouble getting refills of her migraine medication since Dr. Magaña has left.        Review of Systems   Constitutional:  Negative for activity change and fever.   HENT:  Negative for rhinorrhea, tinnitus, trouble swallowing and voice change.    Eyes:  Negative for visual disturbance.   Respiratory:  Negative for shortness of breath.    Cardiovascular:  Negative for chest pain.   Gastrointestinal:  Negative for nausea and vomiting.   Endocrine: Negative for cold intolerance, heat intolerance, polydipsia, polyphagia and polyuria.   Genitourinary:  Negative for decreased urine volume, frequency and urgency.   Musculoskeletal:  Negative for back pain, neck pain and neck stiffness.   Neurological:  Positive for headaches. Negative for dizziness, tremors, seizures, syncope, facial asymmetry, speech difficulty, weakness, light-headedness and numbness.   Psychiatric/Behavioral:  Negative for confusion.      Past Medical History:   Diagnosis Date    Allergy     Brain tumor     Drug-induced lupus erythematosus     General anesthetics causing adverse  effect in therapeutic use     pt. somewhat aware of extubation with one of her surgeries    GERD (gastroesophageal reflux disease)     Hearing loss     Migraines     Psoriatic arthritis     Raynaud's syndrome without gangrene     Restless leg syndrome     Sciatica     Trigeminal neuralgia      Social History     Socioeconomic History    Marital status:    Occupational History     Employer: OTHER   Tobacco Use    Smoking status: Never    Smokeless tobacco: Never   Substance and Sexual Activity    Alcohol use: Yes     Comment: rarely    Drug use: No    Sexual activity: Yes     Partners: Male     Birth control/protection: Surgical   Other Topics Concern    Are you pregnant or think you may be? No     Social Determinants of Health     Financial Resource Strain: Unknown    Difficulty of Paying Living Expenses: Patient refused   Food Insecurity: Unknown    Worried About Running Out of Food in the Last Year: Patient refused    Ran Out of Food in the Last Year: Patient refused   Transportation Needs: No Transportation Needs    Lack of Transportation (Medical): No    Lack of Transportation (Non-Medical): No   Physical Activity: Inactive    Days of Exercise per Week: 0 days    Minutes of Exercise per Session: 0 min   Stress: No Stress Concern Present    Feeling of Stress : Not at all   Social Connections: Unknown    Frequency of Communication with Friends and Family: Twice a week    Frequency of Social Gatherings with Friends and Family: Once a week    Active Member of Clubs or Organizations: Yes    Attends Club or Organization Meetings: More than 4 times per year    Marital Status:    Housing Stability: Unknown    Unable to Pay for Housing in the Last Year: Patient refused    Number of Places Lived in the Last Year: 1    Unstable Housing in the Last Year: Patient refused     Family History   Problem Relation Age of Onset    Cancer Father         bladder    Diabetes Father     Heart disease Father     Diabetes  "Mother     Melanoma Brother     Charcot-Karla-Tooth disease Brother     Breast cancer Neg Hx     Ovarian cancer Neg Hx     Anesthesia problems Neg Hx     Colon cancer Neg Hx     Crohn's disease Neg Hx     Esophageal cancer Neg Hx     Ulcerative colitis Neg Hx     Stomach cancer Neg Hx     Celiac disease Neg Hx      Review of patient's allergies indicates:   Allergen Reactions    Contrast media Swelling     Swollen eyes and face    Hydrocodone Itching    Plaquenil [hydroxychloroquine]      Worsening psoriasis    Topiramate      Hair loss, cognitive side effects        Current Outpatient Medications:     clobetasoL (TEMOVATE) 0.05 % cream, Apply topically to right labia every evening, Disp: 60 g, Rfl: 0    cyanocobalamin, vitamin B-12, 5,000 mcg Subl, Place under the tongue Daily., Disp: , Rfl:     galcanezumab-gnlm 120 mg/mL PnIj, Inject 1 pen (120 mg total) into the skin every 28 days. maintenance dose, Disp: 1 mL, Rfl: 11    hydrocortisone 2.5 % cream, Apply topically 2 (two) times daily., Disp: 453.6 g, Rfl: 1    LIDOcaine (LIDODERM) 5 %, Place 1 patch onto the skin once daily. Remove & Discard patch within 12 hours or as directed by MD, Disp: 30 patch, Rfl: 0    multivitamin (THERAGRAN) per tablet, Take 1 tablet by mouth once daily., Disp: , Rfl:     naratriptan (AMERGE) 2.5 MG tablet, Take 1 tablet (2.5 mg) by mouth at onset of headache. May repeat in 4 hours, if needed. Max 2 tablets/day., Disp: 9 tablet, Rfl: 11    secukinumab (COSENTYX PEN, 2 PENS,) 150 mg/mL PnIj, Inject 300 mg into the skin every 30 days., Disp: 2 mL, Rfl: 5    traZODone (DESYREL) 50 MG tablet, Take 1 tablet (50 mg total) by mouth every evening., Disp: 30 tablet, Rfl: 3  Blood pressure (!) 145/76, pulse 65, resp. rate 18, height 5' 4" (1.626 m), weight 78.9 kg (173 lb 15.1 oz).      Neurologic Exam     Mental Status   Oriented to person, place, and time.   Attention: normal.   Speech: speech is normal   Level of consciousness: " alert  Knowledge: good.     Cranial Nerves     CN II   Visual fields full to confrontation.     CN III, IV, VI   Pupils are equal, round, and reactive to light.  Extraocular motions are normal.     CN V   Facial sensation intact.     CN VII   Facial expression full, symmetric.     CN VIII   CN VIII normal.     CN XI   CN XI normal.     Motor Exam   Muscle bulk: normal  Overall muscle tone: normal    Strength   Strength 5/5 throughout.     Sensory Exam   Light touch normal.     Gait, Coordination, and Reflexes     Gait  Gait: normal    Physical Exam  Eyes:      Extraocular Movements: EOM normal.      Pupils: Pupils are equal, round, and reactive to light.   Neurological:      Mental Status: She is oriented to person, place, and time.      Motor: Motor strength is normal.      Gait: Gait is intact.   Psychiatric:         Speech: Speech normal.       Imaging:  MRI of the brain dated 03/28/2023 is personally reviewed and discussed with the patient.  This is compared to MRI of the brain dated 03/07/2022 in 04/01/2022.  There is a subtle area of T1 and T2 isointensity that measures approximately 9 mm in width over the right parietal convexity.  This appears stable to prior imaging.  There is no contrast on this exam however this appears consistent with meningioma.    Assessment/Plan:   62 year old female with history of migraine headaches and incidental right parietal meningioma noted on MRI of the brain going back to 2021.  This finding is stable on current imaging allowing for differences in technique between MRI scans.    Our plan will be to follow up in 2 years with a repeat MRI of the brain with and without contrast to monitor this lesion.  The patient has a headache appointment with Dr. Viveros in May of 2023.      I discussed that if she runs out of her migraine medication prescribed by Dr. Magaña that we can assist with refilling these specific medications in anticipation for her appointment with   Felice.    The patient is instructed to call the neurosurgery department or report to the emergency room if she develops any new weakness or numbness of 1 side of the body, new headaches, vision changes, or any other problems questions or concerns.

## 2023-03-30 NOTE — OP NOTE
PROCEDURE DATE: 3/30/2023    PROCEDURE: Left L3/4 transforaminal epidural steroid injection under fluoroscopy    DIAGNOSIS: Lumbar  Radiculopathy    Post op diagnosis: Same    PHYSICIAN: Gabriel Rogers MD    MEDICATIONS INJECTED:  Methylprednisolone 40mg (1ml) and 1ml 0.25% bupivicaine at each nerve root.     LOCAL ANESTHETIC INJECTED:  Lidocaine 1%. 4 ml per site.    SEDATION MEDICATIONS: 4mg versed    ESTIMATED BLOOD LOSS:  none    COMPLICATIONS:  none    TECHNIQUE:   A time-out was taken to identify patient and procedure side prior to starting the procedure. The patient was placed in a prone position, prepped and draped in the usual sterile fashion using ChloraPrep and sterile towels.  The area to be injected was determined under fluoroscopic guidance in AP and oblique view.  Local anesthetic was given by raising a wheal and going down to the hub of a 25-gauge 1.5 inch needle.  In oblique view, a 3.5 inch 22-gauge bent-tip spinal needle was introduced towards 6 oclock position of the pedicle of each above named nerve root level.  The needle was walked medially then hinged into the neural foramen and position was confirmed in AP and lateral views.  Omniscan contrast dye was injected to confirm appropriate placement and that there was no vascular uptake.  After negative aspiration for blood or CSF, the medication was then injected. This was performed at the left L3/4 level(s). The patient tolerated the procedure well.    The patient was monitored after the procedure.  Patient was given post procedure and discharge instructions to follow at home. The patient was discharged in a stable condition.    Event Time In   In Facility 1232   In Pre-Procedure 1317   Physician Available    Anesthesia Available    Pre-Op: Bedside Procedure Start    Pre-Op: Bedside Procedure Stop    Pre-Procedure Complete 1343   Out of Pre-Procedure    Anesthesia Start    Anesthesia Start Data Collection    Setup Start    Setup Complete    In  Room 1406   Prep Start    Procedure Prep Complete    Procedure Start 1413   Procedure Closing    Emergence    Procedure Finish 1416   Sedation Start 1405   Scope In    Extent Reached    Scope Out    Sedation End 1418   Out of Room 1418   Cleanup Start    Cleanup Complete    Cosmetic Start    Cosmetic Stop    Pain Mgmt In Room    Pain Mgmt Out Room    In Recovery    Anesthesia Finish    Bedside Procedure Start    Bedside Procedure Stop    Recovery Care Complete    Out of Recovery    To Phase II    In Phase II    Pain Mgmt Recovery Start    Pain Mgmt Recovery Stop    Obs Rec Start    Obs Rec Stop    Phase II Care Complete    Out of Phase II    Procedural Care Complete    Discharge    Pain Follow Up Needed    Pain Follow Up Complete      Moderate sedation was achieved with midazolam 4 mg.  Continuous monitoring of EKG, blood pressure and pulse oximetry was provided by a registered nurse during the entire course of the procedure under my supervision and recorded in the patient's medical record.   Total time for sedation was 13 minutes.

## 2023-03-31 ENCOUNTER — OFFICE VISIT (OUTPATIENT)
Dept: RHEUMATOLOGY | Facility: CLINIC | Age: 63
End: 2023-03-31
Payer: COMMERCIAL

## 2023-03-31 VITALS
WEIGHT: 177.94 LBS | HEART RATE: 63 BPM | WEIGHT: 174 LBS | RESPIRATION RATE: 17 BRPM | DIASTOLIC BLOOD PRESSURE: 59 MMHG | HEART RATE: 69 BPM | BODY MASS INDEX: 30.38 KG/M2 | HEIGHT: 64 IN | BODY MASS INDEX: 29.71 KG/M2 | SYSTOLIC BLOOD PRESSURE: 161 MMHG | OXYGEN SATURATION: 97 % | HEIGHT: 64 IN | SYSTOLIC BLOOD PRESSURE: 128 MMHG | DIASTOLIC BLOOD PRESSURE: 85 MMHG | TEMPERATURE: 97 F

## 2023-03-31 DIAGNOSIS — R09.81 SINUS CONGESTION: ICD-10-CM

## 2023-03-31 DIAGNOSIS — K21.9 GASTROESOPHAGEAL REFLUX DISEASE, UNSPECIFIED WHETHER ESOPHAGITIS PRESENT: ICD-10-CM

## 2023-03-31 DIAGNOSIS — L40.50 PSORIATIC ARTHRITIS: Primary | ICD-10-CM

## 2023-03-31 DIAGNOSIS — D84.821 IMMUNOCOMPROMISED STATE DUE TO DRUG THERAPY: ICD-10-CM

## 2023-03-31 DIAGNOSIS — Z79.899 IMMUNOCOMPROMISED STATE DUE TO DRUG THERAPY: ICD-10-CM

## 2023-03-31 PROBLEM — G50.0 TRIGEMINAL NEURALGIA: Status: ACTIVE | Noted: 2023-03-31

## 2023-03-31 PROCEDURE — 3008F BODY MASS INDEX DOCD: CPT | Mod: CPTII,S$GLB,, | Performed by: INTERNAL MEDICINE

## 2023-03-31 PROCEDURE — 99999 PR PBB SHADOW E&M-EST. PATIENT-LVL III: CPT | Mod: PBBFAC,,, | Performed by: INTERNAL MEDICINE

## 2023-03-31 PROCEDURE — 1159F PR MEDICATION LIST DOCUMENTED IN MEDICAL RECORD: ICD-10-PCS | Mod: CPTII,S$GLB,, | Performed by: INTERNAL MEDICINE

## 2023-03-31 PROCEDURE — 99214 PR OFFICE/OUTPT VISIT, EST, LEVL IV, 30-39 MIN: ICD-10-PCS | Mod: S$GLB,,, | Performed by: INTERNAL MEDICINE

## 2023-03-31 PROCEDURE — 3079F PR MOST RECENT DIASTOLIC BLOOD PRESSURE 80-89 MM HG: ICD-10-PCS | Mod: CPTII,S$GLB,, | Performed by: INTERNAL MEDICINE

## 2023-03-31 PROCEDURE — 3077F SYST BP >= 140 MM HG: CPT | Mod: CPTII,S$GLB,, | Performed by: INTERNAL MEDICINE

## 2023-03-31 PROCEDURE — 1160F PR REVIEW ALL MEDS BY PRESCRIBER/CLIN PHARMACIST DOCUMENTED: ICD-10-PCS | Mod: CPTII,S$GLB,, | Performed by: INTERNAL MEDICINE

## 2023-03-31 PROCEDURE — 1160F RVW MEDS BY RX/DR IN RCRD: CPT | Mod: CPTII,S$GLB,, | Performed by: INTERNAL MEDICINE

## 2023-03-31 PROCEDURE — 99214 OFFICE O/P EST MOD 30 MIN: CPT | Mod: S$GLB,,, | Performed by: INTERNAL MEDICINE

## 2023-03-31 PROCEDURE — 3079F DIAST BP 80-89 MM HG: CPT | Mod: CPTII,S$GLB,, | Performed by: INTERNAL MEDICINE

## 2023-03-31 PROCEDURE — 99999 PR PBB SHADOW E&M-EST. PATIENT-LVL III: ICD-10-PCS | Mod: PBBFAC,,, | Performed by: INTERNAL MEDICINE

## 2023-03-31 PROCEDURE — 3044F HG A1C LEVEL LT 7.0%: CPT | Mod: CPTII,S$GLB,, | Performed by: INTERNAL MEDICINE

## 2023-03-31 PROCEDURE — 3044F PR MOST RECENT HEMOGLOBIN A1C LEVEL <7.0%: ICD-10-PCS | Mod: CPTII,S$GLB,, | Performed by: INTERNAL MEDICINE

## 2023-03-31 PROCEDURE — 1159F MED LIST DOCD IN RCRD: CPT | Mod: CPTII,S$GLB,, | Performed by: INTERNAL MEDICINE

## 2023-03-31 PROCEDURE — 3008F PR BODY MASS INDEX (BMI) DOCUMENTED: ICD-10-PCS | Mod: CPTII,S$GLB,, | Performed by: INTERNAL MEDICINE

## 2023-03-31 PROCEDURE — 3077F PR MOST RECENT SYSTOLIC BLOOD PRESSURE >= 140 MM HG: ICD-10-PCS | Mod: CPTII,S$GLB,, | Performed by: INTERNAL MEDICINE

## 2023-03-31 RX ORDER — GUSELKUMAB 100 MG/ML
100 INJECTION SUBCUTANEOUS
Qty: 1 ML | Refills: 6 | Status: SHIPPED | OUTPATIENT
Start: 2023-03-31 | End: 2023-04-18 | Stop reason: SDUPTHER

## 2023-03-31 RX ORDER — GUSELKUMAB 100 MG/ML
100 INJECTION SUBCUTANEOUS
Qty: 1 ML | Refills: 1 | Status: SHIPPED | OUTPATIENT
Start: 2023-03-31 | End: 2023-04-18 | Stop reason: SDUPTHER

## 2023-03-31 RX ORDER — FAMOTIDINE 40 MG/1
40 TABLET, FILM COATED ORAL DAILY
Qty: 30 TABLET | Refills: 11 | Status: SHIPPED | OUTPATIENT
Start: 2023-03-31 | End: 2023-07-26 | Stop reason: SDUPTHER

## 2023-03-31 RX ORDER — LIDOCAINE AND PRILOCAINE 25; 25 MG/G; MG/G
CREAM TOPICAL
Qty: 30 G | Refills: 3 | Status: SHIPPED | OUTPATIENT
Start: 2023-03-31

## 2023-03-31 RX ORDER — BENZONATATE 200 MG/1
200 CAPSULE ORAL 3 TIMES DAILY PRN
Qty: 45 CAPSULE | Refills: 5 | Status: SHIPPED | OUTPATIENT
Start: 2023-03-31 | End: 2023-04-30

## 2023-03-31 RX ORDER — MONTELUKAST SODIUM 10 MG/1
10 TABLET ORAL NIGHTLY
Qty: 30 TABLET | Refills: 5 | Status: SHIPPED | OUTPATIENT
Start: 2023-03-31 | End: 2023-07-26 | Stop reason: SDUPTHER

## 2023-03-31 NOTE — PROGRESS NOTES
Subjective:       Patient ID: Clemencia Knight is a 62 y.o. female.    Chief Complaint: Disease Management    Follow up: 62 year old female  psoriatic arthritis. She is doing fairly well overall on cosentyx 300 mg monthly. She has had a cough She denies joint pain or stiffness. Psoriasis remains active on her scalp, low back, and buttock, but it is manageable. She complains of fatigue and shortness of breath with exertion. EKG consistent with ischemia. Cardiology eval scheduled in 2 weeks. She is not sleeping well and has restless legs. PCP added atarax which helps minimally. Tizanidine was d/carey due to constipation. Raynauds is stable with conservative measures.    No new labs to review.    Current tx:  1. cosentyx 300    Prior tx:  1. Otezla  2. Stelara  3. MTX  4. Humira    Review of Systems   Constitutional:  Positive for activity change and fatigue. Negative for appetite change, chills and unexpected weight change.   HENT:  Negative for mouth sores.    Eyes:  Negative for redness and visual disturbance.   Respiratory:  Positive for shortness of breath. Negative for cough.    Cardiovascular:  Negative for chest pain, palpitations and leg swelling.   Gastrointestinal:  Negative for abdominal pain, constipation, diarrhea, nausea and vomiting.   Genitourinary:  Negative for genital sores and pelvic pain.   Musculoskeletal:  Positive for arthralgias and neck pain.   Skin:  Positive for rash.   Allergic/Immunologic: Positive for immunocompromised state.   Neurological:  Negative for dizziness, weakness and light-headedness.   Hematological:  Does not bruise/bleed easily.       Objective:     Vitals:    03/31/23 1252   BP: (!) 161/85   Pulse: 63         Past Medical History:   Diagnosis Date    Allergy     Brain tumor     Drug-induced lupus erythematosus     General anesthetics causing adverse effect in therapeutic use     pt. somewhat aware of extubation with one of her surgeries    GERD (gastroesophageal reflux  disease)     Hearing loss     Migraines     Psoriatic arthritis     Raynaud's syndrome without gangrene     Restless leg syndrome     Sciatica      Past Surgical History:   Procedure Laterality Date    AUGMENTATION OF BREAST      BELT ABDOMINOPLASTY      breast implants      CERVICAL FUSION      COLONOSCOPY  10/2012    Dr. Paul; internal hemorrhoid; repeat in 10 years    COLONOSCOPY N/A 11/25/2020    Procedure: COLONOSCOPY;  Surgeon: Kiran Paul MD;  Location: [facility];  Service: Endoscopy;  Laterality: N/A; hemorrhoids; Repeat colonoscopy in 10 years for screening; random biopsy: WNL    DEXA      WNL    EPIDURAL STEROID INJECTION INTO CERVICAL SPINE N/A 09/24/2018    Procedure: Injection-steroid-epidural-cervical;  Surgeon: Myles Rocha MD;  Location: [facility];  Service: Pain Management;  Laterality: N/A;  C7-T1    EPIDURAL STEROID INJECTION INTO CERVICAL SPINE N/A 11/20/2018    Procedure: Injection-steroid-epidural-cervical;  Surgeon: Myles Rocha MD;  Location: [facility];  Service: Pain Management;  Laterality: N/A;  C7-T1    ESOPHAGOGASTRODUODENOSCOPY N/A 12/10/2020    Procedure: EGD (ESOPHAGOGASTRODUODENOSCOPY);  Surgeon: Kiran Paul MD;  Location: [facility];  Service: Endoscopy;  Laterality: N/A; unremarkable; biopsy: duodenum WNL, stomach-minimal chronic inflammation, negative h pylori    ESOPHAGOGASTRODUODENOSCOPY N/A 4/12/2022    Procedure: EGD (ESOPHAGOGASTRODUODENOSCOPY);  Surgeon: Kiran Paul MD;  Location: [facility];  Service: Endoscopy;  Laterality: N/A;    HYSTERECTOMY  2000    OOPHORECTOMY  07/16/2013    laparotomy BSO for benign 10cm tubal cyst    SALPINGOOPHORECTOMY  2013    with removal of fallopian cyst    SHOULDER SURGERY      right    [facility]  2000    ovaries remain, benign    TONSILLECTOMY, ADENOIDECTOMY, BILATERAL MYRINGOTOMY AND TUBES      TRANSFORAMINAL EPIDURAL INJECTION OF STEROID Left 3/30/2023    Procedure: Injection,steroid,epidural,transforaminal approach L3/4;   Surgeon: Gabriel Rogers MD;  Location: [facility];  Service: Pain Management;  Laterality: Left;          Physical Exam   Constitutional: She is oriented to person, place, and time.   Neurological: She is oriented to person, place, and time.       Results for orders placed or performed in visit on 03/20/23   POCT Urinalysis(Instrument)   Result Value Ref Range    Color, POC UA Yellow Yellow, Straw, Colorless    Clarity, POC UA Clear Clear    Glucose, POC UA Negative Negative    Bilirubin, POC UA Negative Negative    Ketones, POC UA Negative Negative    Spec Grav POC UA 1.025 1.005 - 1.030    Blood, POC UA Moderate (A) Negative    pH, POC UA 6.0 5.0 - 8.0    Protein, POC UA Negative Negative    Urobilinogen, POC UA 0.2 <=1.0    Nitrite, POC UA Negative Negative    WBC, POC UA Negative Negative     *Note: Due to a large number of results and/or encounters for the requested time period, some results have not been displayed. A complete set of results can be found in Results Review.     Collected Updated Procedure      Urinalysis [[id number]]   Urine    Component Value   No component results          02/15/2023 1415 02/16/2023 1609 Quantiferon Gold TB [[id number]]   Blood    Component Value Units   NIL 0.05140  IU/mL   TB1 - Nil -0.012 IU/mL   TB2 - Nil -0.006 IU/mL   Mitogen - Nil 9.923 IU/mL   TB Gold Plus Negative            02/15/2023 1415 02/15/2023 2202 Hepatitis B Surface Antigen [[id number]]   Blood    Component Value   Hepatitis B Surface Ag Non-reactive          02/15/2023 1415 02/15/2023 2202 Hepatitis B Surface Ab, Qualitative [[id number]]   Blood    Component Value Units   Hep B S Ab <3.00 mIU/mL   Hep B S Ab Non-reactive            02/15/2023 1415 02/15/2023 2202 Hepatitis B Core Antibody, IgM [[id number]]   Blood    Component Value   Hep B C IgM Non-reactive          02/15/2023 1415 02/15/2023 1954 C-Reactive Protein [[id number]]   Blood    Component Value Units   CRP 5.6 mg/L          02/15/2023 1415  02/15/2023 2153 Sedimentation rate [[id number]]   Blood    Component Value Units   Sed Rate 9 mm/Hr          02/15/2023 1415 02/15/2023 1954 Comprehensive Metabolic Panel [[id number]]   (Abnormal)   Blood    Component Value Units   Sodium 138 mmol/L   Potassium 4.2 mmol/L   Chloride 103 mmol/L   CO2 28 mmol/L   Glucose 86 mg/dL   BUN 21 High  mg/dL   Creatinine 0.9 mg/dL   Calcium 9.6 mg/dL   Total Protein 7.5 g/dL   Albumin 4.2 g/dL   Total Bilirubin 0.6  mg/dL   Alkaline Phosphatase 72 U/L   AST 20 U/L   ALT 15 U/L   Anion Gap 7 Low  mmol/L   eGFR >60.0 mL/min/1.73 m^2              Assessment:       1. Psoriatic arthritis    2. Gastroesophageal reflux disease, unspecified whether esophagitis present    3. Sinus congestion    4. Immunocompromised state due to drug therapy                Plan:       Psoriatic arthritis  -     guselkumab (TREMFYA) 100 mg/mL AtIn; Inject 100 mg into the skin every 28 days.  Dispense: 1 mL; Refill: 1  -     guselkumab (TREMFYA) 100 mg/mL AtIn; Inject 100 mg into the skin every 8 weeks.  Dispense: 1 mL; Refill: 6  -     LIDOcaine-prilocaine (EMLA) cream; Apply topically as needed (pain).  Dispense: 30 g; Refill: 3  -     famotidine (PEPCID) 40 MG tablet; Take 1 tablet (40 mg total) by mouth once daily.  Dispense: 30 tablet; Refill: 11  -     montelukast (SINGULAIR) 10 mg tablet; Take 1 tablet (10 mg total) by mouth every evening.  Dispense: 30 tablet; Refill: 5  -     benzonatate (TESSALON) 200 MG capsule; Take 1 capsule (200 mg total) by mouth 3 (three) times daily as needed for Cough.  Dispense: 45 capsule; Refill: 5    Gastroesophageal reflux disease, unspecified whether esophagitis present  -     LIDOcaine-prilocaine (EMLA) cream; Apply topically as needed (pain).  Dispense: 30 g; Refill: 3  -     famotidine (PEPCID) 40 MG tablet; Take 1 tablet (40 mg total) by mouth once daily.  Dispense: 30 tablet; Refill: 11  -     montelukast (SINGULAIR) 10 mg tablet; Take 1 tablet (10 mg total)  by mouth every evening.  Dispense: 30 tablet; Refill: 5  -     benzonatate (TESSALON) 200 MG capsule; Take 1 capsule (200 mg total) by mouth 3 (three) times daily as needed for Cough.  Dispense: 45 capsule; Refill: 5    Sinus congestion  -     LIDOcaine-prilocaine (EMLA) cream; Apply topically as needed (pain).  Dispense: 30 g; Refill: 3  -     famotidine (PEPCID) 40 MG tablet; Take 1 tablet (40 mg total) by mouth once daily.  Dispense: 30 tablet; Refill: 11  -     montelukast (SINGULAIR) 10 mg tablet; Take 1 tablet (10 mg total) by mouth every evening.  Dispense: 30 tablet; Refill: 5  -     benzonatate (TESSALON) 200 MG capsule; Take 1 capsule (200 mg total) by mouth 3 (three) times daily as needed for Cough.  Dispense: 45 capsule; Refill: 5    Immunocompromised state due to drug therapy            Assessment:  62 year old female with  Psoriatic arthritis, +histone antibody, +Sm/RNP antibody, negative GENE, elevated CRP (normalized), Raynauds  --inverse psoriasis  --chronic migraine  --hx of cervical fusion    Plan:  1. Dc  Cosentyx due to diarrhea add tremfya  2. DC cymbalta  3. Hold off on additional tx.    More than 50% of the  30 minute encounter was spent face to face counseling the patient regarding current status and future plan of care as well as side effects  of the medications. All questions were answered to patient's satisfaction also includes  non-face to face time preparing to see the patient (eg, review of tests), Obtaining and/or reviewing separately obtained history, Documenting clinical information in the electronic or other health record, Independently interpreting results

## 2023-04-05 ENCOUNTER — SPECIALTY PHARMACY (OUTPATIENT)
Dept: PHARMACY | Facility: CLINIC | Age: 63
End: 2023-04-05
Payer: COMMERCIAL

## 2023-04-05 DIAGNOSIS — L40.50 PSA (PSORIATIC ARTHRITIS): Primary | ICD-10-CM

## 2023-04-12 ENCOUNTER — PATIENT MESSAGE (OUTPATIENT)
Dept: PHARMACY | Facility: CLINIC | Age: 63
End: 2023-04-12
Payer: COMMERCIAL

## 2023-04-13 ENCOUNTER — OFFICE VISIT (OUTPATIENT)
Dept: PAIN MEDICINE | Facility: CLINIC | Age: 63
End: 2023-04-13
Payer: COMMERCIAL

## 2023-04-13 VITALS
DIASTOLIC BLOOD PRESSURE: 69 MMHG | HEIGHT: 64 IN | BODY MASS INDEX: 29.84 KG/M2 | WEIGHT: 174.81 LBS | HEART RATE: 77 BPM | SYSTOLIC BLOOD PRESSURE: 148 MMHG

## 2023-04-13 DIAGNOSIS — M54.9 DORSALGIA, UNSPECIFIED: ICD-10-CM

## 2023-04-13 DIAGNOSIS — M54.16 LUMBAR RADICULOPATHY: Primary | ICD-10-CM

## 2023-04-13 DIAGNOSIS — M47.816 LUMBAR SPONDYLOSIS: ICD-10-CM

## 2023-04-13 PROCEDURE — 3044F HG A1C LEVEL LT 7.0%: CPT | Mod: CPTII,S$GLB,,

## 2023-04-13 PROCEDURE — 99214 PR OFFICE/OUTPT VISIT, EST, LEVL IV, 30-39 MIN: ICD-10-PCS | Mod: S$GLB,,,

## 2023-04-13 PROCEDURE — 3044F PR MOST RECENT HEMOGLOBIN A1C LEVEL <7.0%: ICD-10-PCS | Mod: CPTII,S$GLB,,

## 2023-04-13 PROCEDURE — 99999 PR PBB SHADOW E&M-EST. PATIENT-LVL V: CPT | Mod: PBBFAC,,,

## 2023-04-13 PROCEDURE — 3077F PR MOST RECENT SYSTOLIC BLOOD PRESSURE >= 140 MM HG: ICD-10-PCS | Mod: CPTII,S$GLB,,

## 2023-04-13 PROCEDURE — 3078F DIAST BP <80 MM HG: CPT | Mod: CPTII,S$GLB,,

## 2023-04-13 PROCEDURE — 3078F PR MOST RECENT DIASTOLIC BLOOD PRESSURE < 80 MM HG: ICD-10-PCS | Mod: CPTII,S$GLB,,

## 2023-04-13 PROCEDURE — 3077F SYST BP >= 140 MM HG: CPT | Mod: CPTII,S$GLB,,

## 2023-04-13 PROCEDURE — 1159F PR MEDICATION LIST DOCUMENTED IN MEDICAL RECORD: ICD-10-PCS | Mod: CPTII,S$GLB,,

## 2023-04-13 PROCEDURE — 3008F BODY MASS INDEX DOCD: CPT | Mod: CPTII,S$GLB,,

## 2023-04-13 PROCEDURE — 3008F PR BODY MASS INDEX (BMI) DOCUMENTED: ICD-10-PCS | Mod: CPTII,S$GLB,,

## 2023-04-13 PROCEDURE — 99999 PR PBB SHADOW E&M-EST. PATIENT-LVL V: ICD-10-PCS | Mod: PBBFAC,,,

## 2023-04-13 PROCEDURE — 1159F MED LIST DOCD IN RCRD: CPT | Mod: CPTII,S$GLB,,

## 2023-04-13 PROCEDURE — 99214 OFFICE O/P EST MOD 30 MIN: CPT | Mod: S$GLB,,,

## 2023-04-13 RX ORDER — ALPRAZOLAM 0.5 MG/1
0.5 TABLET, ORALLY DISINTEGRATING ORAL ONCE AS NEEDED
Status: CANCELLED | OUTPATIENT
Start: 2023-04-13 | End: 2034-09-09

## 2023-04-13 NOTE — PROGRESS NOTES
[facility] Established Patient      PCP:   Dallas Higgins    CC:   Chief Complaint   Patient presents with    Hip Pain     Not hurting at the moment but can become swollen and lead to pain in the 7's to 8's      Last 3 PDI Scores 12/17/2018 9/10/2018   Pain Disability Index (PDI) 28 48     Interval HPI 4/13/2023: Clemencia Knight returns to the office for follow up.  She is status post left L3/4 transforaminal MAGGI on 03/30/2023 with about 30-40% relief.  Today she is reporting continued left-sided lower back pain, 7/10, constant, worse with activities, with radiation into left lateral hip and down into left groin and left anterior thigh.  She denies any further radiation down her knee.  She denies any associated numbness, tingling or any weakness.  She is also reporting pain across her lower back, dull, burning worsened with standing, cooking, cleaning.  She has been maintaining her at-home PT directed exercises without significant relief of her pain.      HPI:   Clemencia Knight is a 62 y.o. female with history of GERD, RLS, cervical spine surgery and chronic pain presents for further assessment of lower back pain after undergoing imaging.  She was seen in September 2022 for lower back and thigh pain; treted with medications and PT.  PT did not help.  Pain has persisted.  She continues with pain in the lower lumbar region to the mid thoraic area and into the left hip, anterior thigh crossing over to the medial left knee.  Pain increases with standing.  She is having to rely on her cane more.  She had recent hip and lower back imaging to review.    Initial HPI:  62 year old female with GERD, migraine, RLS, history of cervical spine surgery with some chronic neck pain is referred by alpa Rojo for evaluation of lumbar back pain.  Pain started 8/27/22 with no focal injury.  She stood up from a seated position and felt a pinching pain in the left lower back.  Pain progressed since then becoming so intense she  was seen in the ER 8/28/22.  She has pain in the left lower back with radiation to the anterior thigh to the inner knee.  She denies any numbness/ tingling.  In the ER she was given IM toradol and IM steroid; she was prescribed flexeril, lidoderm and mobic and has been taking the medications.  She has tried home exercise with a therapy ball.  No other treatments.  She uses the therapy/ exercise ball regularly over the leve gluteal region.      Past and current medications:  Antineuropathics:  NSAIDs:l mobic  (past IM toradol)  Antidepressants:  Muscle relaxers: (past - flexeril)  Opioids:  Antiplatelets/Anticoagulants:  Other medications:  (past lidoderm patch and IM steroid)    Physical therapy/ Chiropractic care:  PT  5 visits 9/27/22 through 11/18/22    Pain Intervention History:  - She is status post left L3/4 transforaminal MAGGI on 03/30/2023 with about 30-40% relief.     Past Spine Surgical History:  Cervical spine fusion C6/7 in 2009      History:    Current Outpatient Medications:     benzonatate (TESSALON) 200 MG capsule, Take 1 capsule (200 mg total) by mouth 3 (three) times daily as needed for Cough., Disp: 45 capsule, Rfl: 5    clobetasoL (TEMOVATE) 0.05 % cream, Apply topically to right labia every evening, Disp: 60 g, Rfl: 0    cyanocobalamin, vitamin B-12, 5,000 mcg Subl, Place under the tongue Daily., Disp: , Rfl:     famotidine (PEPCID) 40 MG tablet, Take 1 tablet (40 mg total) by mouth once daily., Disp: 30 tablet, Rfl: 11    galcanezumab-gnlm 120 mg/mL PnIj, Inject 1 pen (120 mg total) into the skin every 28 days. maintenance dose, Disp: 1 mL, Rfl: 11    guselkumab (TREMFYA) 100 mg/mL AtIn, Inject 100 mg into the skin every 28 days., Disp: 1 mL, Rfl: 1    guselkumab (TREMFYA) 100 mg/mL AtIn, Inject 100 mg into the skin every 8 weeks., Disp: 1 mL, Rfl: 6    hydrocortisone 2.5 % cream, Apply topically 2 (two) times daily., Disp: 453.6 g, Rfl: 1    LIDOcaine (LIDODERM) 5 %, Place 1 patch onto the skin  once daily. Remove & Discard patch within 12 hours or as directed by MD, Disp: 30 patch, Rfl: 0    LIDOcaine-prilocaine (EMLA) cream, Apply topically as needed (pain)., Disp: 30 g, Rfl: 3    montelukast (SINGULAIR) 10 mg tablet, Take 1 tablet (10 mg total) by mouth every evening., Disp: 30 tablet, Rfl: 5    multivitamin (THERAGRAN) per tablet, Take 1 tablet by mouth once daily., Disp: , Rfl:     naratriptan (AMERGE) 2.5 MG tablet, Take 1 tablet (2.5 mg) by mouth at onset of headache. May repeat in 4 hours, if needed. Max 2 tablets/day., Disp: 9 tablet, Rfl: 11    traZODone (DESYREL) 50 MG tablet, Take 1 tablet (50 mg total) by mouth every evening., Disp: 30 tablet, Rfl: 3    Past Medical History:   Diagnosis Date    Allergy     Brain tumor     Drug-induced lupus erythematosus     General anesthetics causing adverse effect in therapeutic use     pt. somewhat aware of extubation with one of her surgeries    GERD (gastroesophageal reflux disease)     Hearing loss     Migraines     Psoriatic arthritis     Raynaud's syndrome without gangrene     Restless leg syndrome     Sciatica        Past Surgical History:   Procedure Laterality Date    AUGMENTATION OF BREAST      BELT ABDOMINOPLASTY      breast implants      CERVICAL FUSION      COLONOSCOPY  10/2012    Dr. Paul; internal hemorrhoid; repeat in 10 years    COLONOSCOPY N/A 11/25/2020    Procedure: COLONOSCOPY;  Surgeon: Kiran Paul MD;  Location: [facility];  Service: Endoscopy;  Laterality: N/A; hemorrhoids; Repeat colonoscopy in 10 years for screening; random biopsy: WNL    DEXA      WNL    EPIDURAL STEROID INJECTION INTO CERVICAL SPINE N/A 09/24/2018    Procedure: Injection-steroid-epidural-cervical;  Surgeon: Myles Rocha MD;  Location: [facility];  Service: Pain Management;  Laterality: N/A;  C7-T1    EPIDURAL STEROID INJECTION INTO CERVICAL SPINE N/A 11/20/2018    Procedure: Injection-steroid-epidural-cervical;  Surgeon: Myles Rocha MD;  Location: [facility] OR;   Service: Pain Management;  Laterality: N/A;  C7-T1    ESOPHAGOGASTRODUODENOSCOPY N/A 12/10/2020    Procedure: EGD (ESOPHAGOGASTRODUODENOSCOPY);  Surgeon: Kiran Paul MD;  Location: [facility] ENDO;  Service: Endoscopy;  Laterality: N/A; unremarkable; biopsy: duodenum WNL, stomach-minimal chronic inflammation, negative h pylori    ESOPHAGOGASTRODUODENOSCOPY N/A 4/12/2022    Procedure: EGD (ESOPHAGOGASTRODUODENOSCOPY);  Surgeon: Kiran Paul MD;  Location: [facility] ENDO;  Service: Endoscopy;  Laterality: N/A;    HYSTERECTOMY  2000    OOPHORECTOMY  07/16/2013    laparotomy BSO for benign 10cm tubal cyst    SALPINGOOPHORECTOMY  2013    with removal of fallopian cyst    SHOULDER SURGERY      right    TLH  2000    ovaries remain, benign    TONSILLECTOMY, ADENOIDECTOMY, BILATERAL MYRINGOTOMY AND TUBES      TRANSFORAMINAL EPIDURAL INJECTION OF STEROID Left 3/30/2023    Procedure: Injection,steroid,epidural,transforaminal approach L3/4;  Surgeon: Gabriel Rogers MD;  Location: [facility] OR;  Service: Pain Management;  Laterality: Left;       Family History   Problem Relation Age of Onset    Cancer Father         bladder    Diabetes Father     Heart disease Father     Diabetes Mother     Melanoma Brother     Charcot-Karla-Tooth disease Brother     Breast cancer Neg Hx     Ovarian cancer Neg Hx     Anesthesia problems Neg Hx     Colon cancer Neg Hx     Crohn's disease Neg Hx     Esophageal cancer Neg Hx     Ulcerative colitis Neg Hx     Stomach cancer Neg Hx     Celiac disease Neg Hx        Social History     Socioeconomic History    Marital status:    Occupational History     Employer: OTHER   Tobacco Use    Smoking status: Never    Smokeless tobacco: Never   Substance and Sexual Activity    Alcohol use: Yes     Comment: rarely    Drug use: No    Sexual activity: Yes     Partners: Male     Birth control/protection: Surgical   Other Topics Concern    Are you pregnant or think you may be? No     Social Determinants  "of Health     Financial Resource Strain: Unknown    Difficulty of Paying Living Expenses: Patient refused   Food Insecurity: Unknown    Worried About Running Out of Food in the Last Year: Patient refused    Ran Out of Food in the Last Year: Patient refused   Transportation Needs: No Transportation Needs    Lack of Transportation (Medical): No    Lack of Transportation (Non-Medical): No   Physical Activity: Inactive    Days of Exercise per Week: 0 days    Minutes of Exercise per Session: 0 min   Stress: No Stress Concern Present    Feeling of Stress : Not at all   Social Connections: Unknown    Frequency of Communication with Friends and Family: Twice a week    Frequency of Social Gatherings with Friends and Family: Once a week    Active Member of Clubs or Organizations: Yes    Attends Club or Organization Meetings: More than 4 times per year    Marital Status:    Housing Stability: Unknown    Unable to Pay for Housing in the Last Year: Patient refused    Number of Places Lived in the Last Year: 1    Unstable Housing in the Last Year: Patient refused       Review of patient's allergies indicates:   Allergen Reactions    Contrast media Swelling     Swollen eyes and face    Hydrocodone Itching    Plaquenil [hydroxychloroquine]      Worsening psoriasis    Topiramate      Hair loss, cognitive side effects        Review of Systems:  Low back pain, left leg pain.  Balance of review of systems is negative.    Physical Exam:  Vitals:    04/13/23 1031   BP: (!) 148/69   Pulse: 77   Weight: 79.3 kg (174 lb 13.2 oz)   Height: 5' 4" (1.626 m)   PainSc:   7   PainLoc: Hip       Body mass index is 30.01 kg/m².    Gen: NAD  Psych: mood appropriate for given condition  HEENT: eyes anicteric   CV: RRR, 2+ radial pulse  HEENT: anicteric   Respiratory: non-labored, no signs of respiratory distress  Abd: non-distended  Skin: warm, dry and intact.  Gait: Able to heel walk, toe walk. No antalgic gait.       Lumbar spine:   ROM is " full with flexion extension and oblique extension with no increased pain.    Silvio's test causes no increased pain on either side.    Supine straight leg raise is negative bilaterally.    Internal and external rotation of the hip causes no increased pain on either side.  Myofascial exam: No tenderness to palpation across lumbar paraspinous muscles. No tenderness to palpation over the bilateral greater trochanters and bilateral SI joint    Sensory:  Intact and symmetrical to light touch in C4-T1 dermatomes bilaterally. Intact and symmetrical to light touch in L1-S1 dermatomes bilaterally.    Motor:       Right Left   L2/3 Iliacus Hip flexion  5  5   L3/4 Qudratus Femoris Knee Extension  5  5   L4/5 Tib Anterior Ankle Dorsiflexion   5  5   L5/S1 Extensor Hallicus Longus Great toe extension  5  5   S1/S2 Gastroc/Soleus Plantar Flexion  5  5      Right Left   Triceps DTR     Biceps DTR     Brachioradialis DTR     Patellar DTR 0+ 0+   Achilles DTR 0+ 0+   Reyes Absent  Absent   Clonus Absent Absent   Babinski Absent Absent     Imaging:    MRI lumbar spine 9-11-18:  minimal degenerative changes with no significant central canal or foraminal narrowing.    CT renal stone study 8/28/22:  No suspicious osseous lesions.  Spondylotic changes are identified.  Diffuse soft tissue stranding is identified involving the subcutaneous tissue of the left extremity with extension towards the gluteus joe without evidence for underlying myositis or drainable fluid collection.  Possible cellulitic changes, but most likely from soft tissue trauma from repeated use of therapy/ exercise ball over this same region.     Xray lumbar spine from 9/12/22:  mild degenerative changes.    Xray hip 2/11/23:  No displaced fracture, dislocation or osteonecrosis.  Minimal joint space narrowing is in both hips.  There is mild SI joint sclerosis bilaterally without joint space narrowing.  Mild degenerative changes are at the pubic symphysis.  No  aggressive osseous lesion.    Dynamic xray lumbar spine 2/11/23 and MRI lumbar spine 2/17/23:  multilevel mild degenerative changes with no instability on flexion/ extension.   L3/4 facet hypertrophy and disk space narrowing that has progressed some since 2018 on the left side with mild forminal narrowing.  L4/5 facet hypertrophy with mild foraminal narrowing and no change from 2018.      MRI lumbar spine 02/17/2023  T12-L1: No disc herniation or significant posterior osteophytic ridging.  No significant spinal canal or foraminal stenosis.  L1-L2: No disc herniation or significant posterior osteophytic ridging.  No significant spinal canal or foraminal stenosis.  L2-L3: Minimal disc bulge.  Mild bilateral facet hypertrophy.  Ligamentum flavum thickening.  Minimal narrowing of the bilateral lateral recesses, slightly progressed from prior study.  No significant spinal canal or foraminal stenosis.  L3-L4: Mild disc bulge and osteophytic ridging with tiny superimposed left subarticular protrusion.  Disc height loss.  Mild-moderate bilateral facet hypertrophy.  Ligamentum flavum thickening.  Mild left and minimal right narrowing of the lateral recesses.  This is progressed on the left compared to prior study.  No significant spinal canal stenosis.  Mild left and minimal right foraminal stenosis, progressed on the left compared to prior study.  L4-L5: Mild disc bulge.  Mild-moderate bilateral facet hypertrophy.  Minimal narrowing of the bilateral lateral recesses, unchanged.  No significant spinal canal stenosis.  Mild bilateral foraminal stenosis, unchanged.  L5-S1: Mild disc bulge.  Tiny central protrusion.  Mild bilateral facet hypertrophy.  No significant spinal canal or foraminal stenosis.      Labs:  Lab Results   Component Value Date    HGBA1C 5.4 02/15/2023       Lab Results   Component Value Date    WBC 6.47 02/15/2023    HGB 13.2 02/15/2023    HCT 40.7 02/15/2023    MCV 92 02/15/2023     02/15/2023            Assessment:     Ms. Khanna has chronic left lower back pain and thigh pain.  Pain may be myfascial or related to mild progression of L3/4 degenerative change/ facet hypertrophy to the left and mild left foraminal narrowing, considering this has progressed mildly since 2018.  We discussed further PT and trial of L3/4 left MAGGI.  She will think about the options and call if she wants to proceed.  Follow up in clinic as needed.  No significant hip abnormality.      Problem List Items Addressed This Visit          Neuro    Lumbar radiculopathy - Primary    Relevant Orders    Case Request Operating Room: Injection-steroid-epidural-lumbar L3/4 (Completed)     Other Visit Diagnoses       Lumbar spondylosis        Dorsalgia, unspecified                  She found some relief with the left-sided L3/4 transforaminal MAGGI however not complete.  I believe her pain is still originating from her lumbar spine.   I think she is having a combination of lumbar radicular pain from her narrowing at L3/4 and facet mediated pain.  Her most problematic pain is her lumbar radicular pain.  Do not believe any of this pain is originating from her left hip joint.  For her continued left-sided radicular pain I would like to schedule her for an L3/4 MAGGI with spread to left.  Follow-up 4 weeks post procedure or sooner if needed.  If she gets relief of her radicular pain but her chronic low back pain persists can consider diagnostic medial branch blocks.      : Not applicable          This note was completed with dictation software and grammatical errors may exist.

## 2023-04-13 NOTE — H&P (VIEW-ONLY)
[facility] Established Patient      PCP:   Dallas Higgins    CC:   Chief Complaint   Patient presents with    Hip Pain     Not hurting at the moment but can become swollen and lead to pain in the 7's to 8's      Last 3 PDI Scores 12/17/2018 9/10/2018   Pain Disability Index (PDI) 28 48     Interval HPI 4/13/2023: Clemencia Knight returns to the office for follow up.  She is status post left L3/4 transforaminal MAGGI on 03/30/2023 with about 30-40% relief.  Today she is reporting continued left-sided lower back pain, 7/10, constant, worse with activities, with radiation into left lateral hip and down into left groin and left anterior thigh.  She denies any further radiation down her knee.  She denies any associated numbness, tingling or any weakness.  She is also reporting pain across her lower back, dull, burning worsened with standing, cooking, cleaning.  She has been maintaining her at-home PT directed exercises without significant relief of her pain.      HPI:   Clemencia Knight is a 62 y.o. female with history of GERD, RLS, cervical spine surgery and chronic pain presents for further assessment of lower back pain after undergoing imaging.  She was seen in September 2022 for lower back and thigh pain; treted with medications and PT.  PT did not help.  Pain has persisted.  She continues with pain in the lower lumbar region to the mid thoraic area and into the left hip, anterior thigh crossing over to the medial left knee.  Pain increases with standing.  She is having to rely on her cane more.  She had recent hip and lower back imaging to review.    Initial HPI:  62 year old female with GERD, migraine, RLS, history of cervical spine surgery with some chronic neck pain is referred by alpa Rojo for evaluation of lumbar back pain.  Pain started 8/27/22 with no focal injury.  She stood up from a seated position and felt a pinching pain in the left lower back.  Pain progressed since then becoming so intense she  was seen in the ER 8/28/22.  She has pain in the left lower back with radiation to the anterior thigh to the inner knee.  She denies any numbness/ tingling.  In the ER she was given IM toradol and IM steroid; she was prescribed flexeril, lidoderm and mobic and has been taking the medications.  She has tried home exercise with a therapy ball.  No other treatments.  She uses the therapy/ exercise ball regularly over the leve gluteal region.      Past and current medications:  Antineuropathics:  NSAIDs:l mobic  (past IM toradol)  Antidepressants:  Muscle relaxers: (past - flexeril)  Opioids:  Antiplatelets/Anticoagulants:  Other medications:  (past lidoderm patch and IM steroid)    Physical therapy/ Chiropractic care:  PT  5 visits 9/27/22 through 11/18/22    Pain Intervention History:  - She is status post left L3/4 transforaminal MAGGI on 03/30/2023 with about 30-40% relief.     Past Spine Surgical History:  Cervical spine fusion C6/7 in 2009      History:    Current Outpatient Medications:     benzonatate (TESSALON) 200 MG capsule, Take 1 capsule (200 mg total) by mouth 3 (three) times daily as needed for Cough., Disp: 45 capsule, Rfl: 5    clobetasoL (TEMOVATE) 0.05 % cream, Apply topically to right labia every evening, Disp: 60 g, Rfl: 0    cyanocobalamin, vitamin B-12, 5,000 mcg Subl, Place under the tongue Daily., Disp: , Rfl:     famotidine (PEPCID) 40 MG tablet, Take 1 tablet (40 mg total) by mouth once daily., Disp: 30 tablet, Rfl: 11    galcanezumab-gnlm 120 mg/mL PnIj, Inject 1 pen (120 mg total) into the skin every 28 days. maintenance dose, Disp: 1 mL, Rfl: 11    guselkumab (TREMFYA) 100 mg/mL AtIn, Inject 100 mg into the skin every 28 days., Disp: 1 mL, Rfl: 1    guselkumab (TREMFYA) 100 mg/mL AtIn, Inject 100 mg into the skin every 8 weeks., Disp: 1 mL, Rfl: 6    hydrocortisone 2.5 % cream, Apply topically 2 (two) times daily., Disp: 453.6 g, Rfl: 1    LIDOcaine (LIDODERM) 5 %, Place 1 patch onto the skin  once daily. Remove & Discard patch within 12 hours or as directed by MD, Disp: 30 patch, Rfl: 0    LIDOcaine-prilocaine (EMLA) cream, Apply topically as needed (pain)., Disp: 30 g, Rfl: 3    montelukast (SINGULAIR) 10 mg tablet, Take 1 tablet (10 mg total) by mouth every evening., Disp: 30 tablet, Rfl: 5    multivitamin (THERAGRAN) per tablet, Take 1 tablet by mouth once daily., Disp: , Rfl:     naratriptan (AMERGE) 2.5 MG tablet, Take 1 tablet (2.5 mg) by mouth at onset of headache. May repeat in 4 hours, if needed. Max 2 tablets/day., Disp: 9 tablet, Rfl: 11    traZODone (DESYREL) 50 MG tablet, Take 1 tablet (50 mg total) by mouth every evening., Disp: 30 tablet, Rfl: 3    Past Medical History:   Diagnosis Date    Allergy     Brain tumor     Drug-induced lupus erythematosus     General anesthetics causing adverse effect in therapeutic use     pt. somewhat aware of extubation with one of her surgeries    GERD (gastroesophageal reflux disease)     Hearing loss     Migraines     Psoriatic arthritis     Raynaud's syndrome without gangrene     Restless leg syndrome     Sciatica        Past Surgical History:   Procedure Laterality Date    AUGMENTATION OF BREAST      BELT ABDOMINOPLASTY      breast implants      CERVICAL FUSION      COLONOSCOPY  10/2012    Dr. Paul; internal hemorrhoid; repeat in 10 years    COLONOSCOPY N/A 11/25/2020    Procedure: COLONOSCOPY;  Surgeon: Kiran Paul MD;  Location: [facility];  Service: Endoscopy;  Laterality: N/A; hemorrhoids; Repeat colonoscopy in 10 years for screening; random biopsy: WNL    DEXA      WNL    EPIDURAL STEROID INJECTION INTO CERVICAL SPINE N/A 09/24/2018    Procedure: Injection-steroid-epidural-cervical;  Surgeon: Myles Rocha MD;  Location: [facility];  Service: Pain Management;  Laterality: N/A;  C7-T1    EPIDURAL STEROID INJECTION INTO CERVICAL SPINE N/A 11/20/2018    Procedure: Injection-steroid-epidural-cervical;  Surgeon: Myles Rocha MD;  [facility]: [facility];   Service: Pain Management;  Laterality: N/A;  C7-T1    ESOPHAGOGASTRODUODENOSCOPY N/A 12/10/2020    Procedure: EGD (ESOPHAGOGASTRODUODENOSCOPY);  Surgeon: Kiran Paul MD;  Location: [facility] ENDO;  Service: Endoscopy;  Laterality: N/A; unremarkable; biopsy: duodenum WNL, stomach-minimal chronic inflammation, negative h pylori    ESOPHAGOGASTRODUODENOSCOPY N/A 4/12/2022    Procedure: EGD (ESOPHAGOGASTRODUODENOSCOPY);  Surgeon: Kiran Paul MD;  Location: [facility] ENDO;  Service: Endoscopy;  Laterality: N/A;    HYSTERECTOMY  2000    OOPHORECTOMY  07/16/2013    laparotomy BSO for benign 10cm tubal cyst    SALPINGOOPHORECTOMY  2013    with removal of fallopian cyst    SHOULDER SURGERY      right    [facility]H  2000    ovaries remain, benign    TONSILLECTOMY, ADENOIDECTOMY, BILATERAL MYRINGOTOMY AND TUBES      TRANSFORAMINAL EPIDURAL INJECTION OF STEROID Left 3/30/2023    Procedure: Injection,steroid,epidural,transforaminal approach L3/4;  Surgeon: Gabriel Rogers MD;  Location: [facility] OR;  Service: Pain Management;  Laterality: Left;       Family History   Problem Relation Age of Onset    Cancer Father         bladder    Diabetes Father     Heart disease Father     Diabetes Mother     Melanoma Brother     Charcot-Karla-Tooth disease Brother     Breast cancer Neg Hx     Ovarian cancer Neg Hx     Anesthesia problems Neg Hx     Colon cancer Neg Hx     Crohn's disease Neg Hx     Esophageal cancer Neg Hx     Ulcerative colitis Neg Hx     Stomach cancer Neg Hx     Celiac disease Neg Hx        Social History     Socioeconomic History    Marital status:    Occupational History     Employer: OTHER   Tobacco Use    Smoking status: Never    Smokeless tobacco: Never   Substance and Sexual Activity    Alcohol use: Yes     Comment: rarely    Drug use: No    Sexual activity: Yes     Partners: Male     Birth control/protection: Surgical   Other Topics Concern    Are you pregnant or think you may be? No     Social Determinants  "of Health     Financial Resource Strain: Unknown    Difficulty of Paying Living Expenses: Patient refused   Food Insecurity: Unknown    Worried About Running Out of Food in the Last Year: Patient refused    Ran Out of Food in the Last Year: Patient refused   Transportation Needs: No Transportation Needs    Lack of Transportation (Medical): No    Lack of Transportation (Non-Medical): No   Physical Activity: Inactive    Days of Exercise per Week: 0 days    Minutes of Exercise per Session: 0 min   Stress: No Stress Concern Present    Feeling of Stress : Not at all   Social Connections: Unknown    Frequency of Communication with Friends and Family: Twice a week    Frequency of Social Gatherings with Friends and Family: Once a week    Active Member of Clubs or Organizations: Yes    Attends Club or Organization Meetings: More than 4 times per year    Marital Status:    Housing Stability: Unknown    Unable to Pay for Housing in the Last Year: Patient refused    Number of Places Lived in the Last Year: 1    Unstable Housing in the Last Year: Patient refused       Review of patient's allergies indicates:   Allergen Reactions    Contrast media Swelling     Swollen eyes and face    Hydrocodone Itching    Plaquenil [hydroxychloroquine]      Worsening psoriasis    Topiramate      Hair loss, cognitive side effects        Review of Systems:  Low back pain, left leg pain.  Balance of review of systems is negative.    Physical Exam:  Vitals:    04/13/23 1031   BP: (!) 148/69   Pulse: 77   Weight: 79.3 kg (174 lb 13.2 oz)   Height: 5' 4" (1.626 m)   PainSc:   7   PainLoc: Hip       Body mass index is 30.01 kg/m².    Gen: NAD  Psych: mood appropriate for given condition  HEENT: eyes anicteric   CV: RRR, 2+ radial pulse  HEENT: anicteric   Respiratory: non-labored, no signs of respiratory distress  Abd: non-distended  Skin: warm, dry and intact.  Gait: Able to heel walk, toe walk. No antalgic gait.       Lumbar spine:   ROM is " full with flexion extension and oblique extension with no increased pain.    Silvio's test causes no increased pain on either side.    Supine straight leg raise is negative bilaterally.    Internal and external rotation of the hip causes no increased pain on either side.  Myofascial exam: No tenderness to palpation across lumbar paraspinous muscles. No tenderness to palpation over the bilateral greater trochanters and bilateral SI joint    Sensory:  Intact and symmetrical to light touch in C4-T1 dermatomes bilaterally. Intact and symmetrical to light touch in L1-S1 dermatomes bilaterally.    Motor:       Right Left   L2/3 Iliacus Hip flexion  5  5   L3/4 Qudratus Femoris Knee Extension  5  5   L4/5 Tib Anterior Ankle Dorsiflexion   5  5   L5/S1 Extensor Hallicus Longus Great toe extension  5  5   S1/S2 Gastroc/Soleus Plantar Flexion  5  5      Right Left   Triceps DTR     Biceps DTR     Brachioradialis DTR     Patellar DTR 0+ 0+   Achilles DTR 0+ 0+   Reyes Absent  Absent   Clonus Absent Absent   Babinski Absent Absent     Imaging:    MRI lumbar spine 9-11-18:  minimal degenerative changes with no significant central canal or foraminal narrowing.    CT renal stone study 8/28/22:  No suspicious osseous lesions.  Spondylotic changes are identified.  Diffuse soft tissue stranding is identified involving the subcutaneous tissue of the left extremity with extension towards the gluteus joe without evidence for underlying myositis or drainable fluid collection.  Possible cellulitic changes, but most likely from soft tissue trauma from repeated use of therapy/ exercise ball over this same region.     Xray lumbar spine from 9/12/22:  mild degenerative changes.    Xray hip 2/11/23:  No displaced fracture, dislocation or osteonecrosis.  Minimal joint space narrowing is in both hips.  There is mild SI joint sclerosis bilaterally without joint space narrowing.  Mild degenerative changes are at the pubic symphysis.  No  aggressive osseous lesion.    Dynamic xray lumbar spine 2/11/23 and MRI lumbar spine 2/17/23:  multilevel mild degenerative changes with no instability on flexion/ extension.   L3/4 facet hypertrophy and disk space narrowing that has progressed some since 2018 on the left side with mild forminal narrowing.  L4/5 facet hypertrophy with mild foraminal narrowing and no change from 2018.      MRI lumbar spine 02/17/2023  T12-L1: No disc herniation or significant posterior osteophytic ridging.  No significant spinal canal or foraminal stenosis.  L1-L2: No disc herniation or significant posterior osteophytic ridging.  No significant spinal canal or foraminal stenosis.  L2-L3: Minimal disc bulge.  Mild bilateral facet hypertrophy.  Ligamentum flavum thickening.  Minimal narrowing of the bilateral lateral recesses, slightly progressed from prior study.  No significant spinal canal or foraminal stenosis.  L3-L4: Mild disc bulge and osteophytic ridging with tiny superimposed left subarticular protrusion.  Disc height loss.  Mild-moderate bilateral facet hypertrophy.  Ligamentum flavum thickening.  Mild left and minimal right narrowing of the lateral recesses.  This is progressed on the left compared to prior study.  No significant spinal canal stenosis.  Mild left and minimal right foraminal stenosis, progressed on the left compared to prior study.  L4-L5: Mild disc bulge.  Mild-moderate bilateral facet hypertrophy.  Minimal narrowing of the bilateral lateral recesses, unchanged.  No significant spinal canal stenosis.  Mild bilateral foraminal stenosis, unchanged.  L5-S1: Mild disc bulge.  Tiny central protrusion.  Mild bilateral facet hypertrophy.  No significant spinal canal or foraminal stenosis.      Labs:  Lab Results   Component Value Date    HGBA1C 5.4 02/15/2023       Lab Results   Component Value Date    WBC 6.47 02/15/2023    HGB 13.2 02/15/2023    HCT 40.7 02/15/2023    MCV 92 02/15/2023     02/15/2023            Assessment:     Ms. Khanna has chronic left lower back pain and thigh pain.  Pain may be myfascial or related to mild progression of L3/4 degenerative change/ facet hypertrophy to the left and mild left foraminal narrowing, considering this has progressed mildly since 2018.  We discussed further PT and trial of L3/4 left MAGGI.  She will think about the options and call if she wants to proceed.  Follow up in clinic as needed.  No significant hip abnormality.      Problem List Items Addressed This Visit          Neuro    Lumbar radiculopathy - Primary    Relevant Orders    Case Request Operating Room: Injection-steroid-epidural-lumbar L3/4 (Completed)     Other Visit Diagnoses       Lumbar spondylosis        Dorsalgia, unspecified                  She found some relief with the left-sided L3/4 transforaminal MAGGI however not complete.  I believe her pain is still originating from her lumbar spine.   I think she is having a combination of lumbar radicular pain from her narrowing at L3/4 and facet mediated pain.  Her most problematic pain is her lumbar radicular pain.  Do not believe any of this pain is originating from her left hip joint.  For her continued left-sided radicular pain I would like to schedule her for an L3/4 MAGGI with spread to left.  Follow-up 4 weeks post procedure or sooner if needed.  If she gets relief of her radicular pain but her chronic low back pain persists can consider diagnostic medial branch blocks.      : Not applicable          This note was completed with dictation software and grammatical errors may exist.

## 2023-04-18 DIAGNOSIS — L40.50 PSORIATIC ARTHRITIS: ICD-10-CM

## 2023-04-18 RX ORDER — GUSELKUMAB 100 MG/ML
100 INJECTION SUBCUTANEOUS
Qty: 1 ML | Refills: 6 | Status: SHIPPED | OUTPATIENT
Start: 2023-04-18

## 2023-04-18 RX ORDER — GUSELKUMAB 100 MG/ML
100 INJECTION SUBCUTANEOUS
Qty: 1 ML | Refills: 1 | Status: SHIPPED | OUTPATIENT
Start: 2023-04-18 | End: 2023-08-14

## 2023-04-18 NOTE — PROGRESS NOTES
Received med clarification request from [facility] inquiring about if they should dispense Tremfya pens or syringe. Resent rxs clarifying to dispense pens

## 2023-04-25 DIAGNOSIS — G47.00 INSOMNIA, UNSPECIFIED TYPE: ICD-10-CM

## 2023-05-01 ENCOUNTER — HOSPITAL ENCOUNTER (OUTPATIENT)
Facility: HOSPITAL | Age: 63
Discharge: HOME | End: 2023-05-01
Attending: ANESTHESIOLOGY | Admitting: ANESTHESIOLOGY
Payer: COMMERCIAL

## 2023-05-01 ENCOUNTER — HOSPITAL ENCOUNTER (OUTPATIENT)
Dept: RADIOLOGY | Facility: HOSPITAL | Age: 63
Discharge: HOME | End: 2023-05-01
Attending: ANESTHESIOLOGY | Admitting: ANESTHESIOLOGY
Payer: COMMERCIAL

## 2023-05-01 VITALS
HEIGHT: 64 IN | TEMPERATURE: 98 F | SYSTOLIC BLOOD PRESSURE: 129 MMHG | OXYGEN SATURATION: 100 % | BODY MASS INDEX: 29.71 KG/M2 | HEART RATE: 60 BPM | RESPIRATION RATE: 14 BRPM | WEIGHT: 174 LBS

## 2023-05-01 DIAGNOSIS — M54.50 LOWER BACK PAIN: ICD-10-CM

## 2023-05-01 DIAGNOSIS — M54.16 LUMBAR RADICULOPATHY: ICD-10-CM

## 2023-05-01 PROCEDURE — 62323 PR INJ LUMBAR/SACRAL, W/IMAGING GUIDANCE: ICD-10-PCS | Mod: ,,, | Performed by: ANESTHESIOLOGY

## 2023-05-01 PROCEDURE — 76000 FLUOROSCOPY <1 HR PHYS/QHP: CPT | Mod: TC,PO

## 2023-05-01 PROCEDURE — 63600175 PHARM REV CODE 636 W HCPCS: Mod: PO | Performed by: ANESTHESIOLOGY

## 2023-05-01 PROCEDURE — 62323 NJX INTERLAMINAR LMBR/SAC: CPT | Mod: PO | Performed by: ANESTHESIOLOGY

## 2023-05-01 PROCEDURE — 25000003 PHARM REV CODE 250: Mod: PO | Performed by: ANESTHESIOLOGY

## 2023-05-01 PROCEDURE — 62323 NJX INTERLAMINAR LMBR/SAC: CPT | Mod: ,,, | Performed by: ANESTHESIOLOGY

## 2023-05-01 PROCEDURE — 25000003 PHARM REV CODE 250: Mod: PO

## 2023-05-01 PROCEDURE — A4216 STERILE WATER/SALINE, 10 ML: HCPCS | Mod: PO | Performed by: ANESTHESIOLOGY

## 2023-05-01 RX ORDER — ALPRAZOLAM 0.5 MG/1
0.5 TABLET, ORALLY DISINTEGRATING ORAL ONCE AS NEEDED
Status: COMPLETED | OUTPATIENT
Start: 2023-05-01 | End: 2023-05-01

## 2023-05-01 RX ORDER — SODIUM CHLORIDE 9 MG/ML
INJECTION, SOLUTION INTRAMUSCULAR; INTRAVENOUS; SUBCUTANEOUS
Status: DISCONTINUED | OUTPATIENT
Start: 2023-05-01 | End: 2023-05-01 | Stop reason: HOSPADM

## 2023-05-01 RX ORDER — LIDOCAINE HYDROCHLORIDE 10 MG/ML
INJECTION, SOLUTION EPIDURAL; INFILTRATION; INTRACAUDAL; PERINEURAL
Status: DISCONTINUED | OUTPATIENT
Start: 2023-05-01 | End: 2023-05-01 | Stop reason: HOSPADM

## 2023-05-01 RX ORDER — METHYLPREDNISOLONE ACETATE 80 MG/ML
INJECTION, SUSPENSION INTRA-ARTICULAR; INTRALESIONAL; INTRAMUSCULAR; SOFT TISSUE
Status: DISCONTINUED | OUTPATIENT
Start: 2023-05-01 | End: 2023-05-01 | Stop reason: HOSPADM

## 2023-05-01 RX ADMIN — ALPRAZOLAM 0.5 MG: 0.5 TABLET, ORALLY DISINTEGRATING ORAL at 02:05

## 2023-05-01 NOTE — DISCHARGE SUMMARY
[facility] - Surgery  Discharge Note  Short Stay    Procedure(s) (LRB):  Injection-steroid-epidural-lumbar L3/4 (Left)      OUTCOME: Patient tolerated treatment/procedure well without complication and is now ready for discharge.    DISPOSITION: Home or Self Care    FINAL DIAGNOSIS:  Lumbar radiculopathy    FOLLOWUP: In clinic    DISCHARGE INSTRUCTIONS:    Discharge Procedure Orders   Diet Adult Regular     No dressing needed     Notify your health care provider if you experience any of the following:  temperature >100.4     Activity as tolerated

## 2023-05-01 NOTE — OP NOTE
PROCEDURE DATE: 5/1/2023    Lumbar Interlaminar Epidural Steroid Injection under Fluoroscopic Guidance, AP Approach.    Procedure:   Interlaminar epidural steroid injection at L3/4 under fluoroscopic guidance.    Diagnosis: lUMBAR Radiculopathy    pOSTOP DIAGNOSIS: sAME    Physician: Gabriel Rogers M.D.    Medications injected:80 mg methylprednisone with 4 ml of preservative free NaCl    Local anesthetic injected:    Lidocaine 1% 4 ml total    Sedation Medications: none    Estimated blood loss:  none    Complications:  none    Technique:  Time-out taken to identify patient and procedure prior to starting the procedure.  With the patient laying in a prone position, the area was prepped and draped in the usual sterile fashion using ChloraPrep and a fenestrated drape.  After determining the target level with an AP fluoroscopic view, local anesthetic was given using a 25-gauge 1.5 inch needle by raising a wheal and then infiltrating toward the interlaminar entry space.  A 3.5inch 20-gauge Touhy needle was introduced under AP fluoroscopic guidance to the interlaminar space of L3/4. Once the trajectory was established, the needle was visualized in the lateral view and advanced using loss of resistance technique. Once in the desired position, no contrast was injected due to allergy.  The medication was then injected slowly. The patient tolerated the procedure well.      The patient was monitored after the procedure.   They were given post-procedure and discharge instructions to follow at home.  The patient was discharged in a stable condition.

## 2023-05-07 RX ORDER — TRAZODONE HYDROCHLORIDE 50 MG/1
50 TABLET ORAL NIGHTLY
Qty: 90 TABLET | Refills: 1 | Status: SHIPPED | OUTPATIENT
Start: 2023-05-07 | End: 2023-10-17

## 2023-05-11 ENCOUNTER — TELEPHONE (OUTPATIENT)
Dept: RHEUMATOLOGY | Facility: CLINIC | Age: 63
End: 2023-05-11
Payer: COMMERCIAL

## 2023-05-11 ENCOUNTER — PATIENT MESSAGE (OUTPATIENT)
Dept: RHEUMATOLOGY | Facility: CLINIC | Age: 63
End: 2023-05-11
Payer: COMMERCIAL

## 2023-05-11 NOTE — TELEPHONE ENCOUNTER
Reached out to [facility]. Pt should receive shipment of Tremfya today. Shipped out on 5/10/23. Sent msg to pt to see if she would like to make an appt for injection education and training

## 2023-05-17 ENCOUNTER — CLINICAL SUPPORT (OUTPATIENT)
Dept: RHEUMATOLOGY | Facility: CLINIC | Age: 63
End: 2023-05-17
Payer: COMMERCIAL

## 2023-05-17 ENCOUNTER — PATIENT MESSAGE (OUTPATIENT)
Dept: RHEUMATOLOGY | Facility: CLINIC | Age: 63
End: 2023-05-17

## 2023-05-17 VITALS
BODY MASS INDEX: 29.81 KG/M2 | DIASTOLIC BLOOD PRESSURE: 79 MMHG | SYSTOLIC BLOOD PRESSURE: 117 MMHG | HEART RATE: 68 BPM | HEIGHT: 64 IN | WEIGHT: 174.63 LBS

## 2023-05-17 DIAGNOSIS — Z79.899 IMMUNOCOMPROMISED STATE DUE TO DRUG THERAPY: ICD-10-CM

## 2023-05-17 DIAGNOSIS — D84.821 IMMUNOCOMPROMISED STATE DUE TO DRUG THERAPY: ICD-10-CM

## 2023-05-17 DIAGNOSIS — L40.50 PSORIATIC ARTHRITIS: Primary | ICD-10-CM

## 2023-05-17 DIAGNOSIS — L40.9 PSORIASIS: ICD-10-CM

## 2023-05-17 PROCEDURE — 99999 PR PBB SHADOW E&M-EST. PATIENT-LVL IV: ICD-10-PCS | Mod: PBBFAC,,,

## 2023-05-17 PROCEDURE — 99999 PR PBB SHADOW E&M-EST. PATIENT-LVL IV: CPT | Mod: PBBFAC,,,

## 2023-05-17 RX ORDER — HEPATITIS B VACCINE (RECOMBINANT) ADJUVANTED 20 UG/.5ML
0.5 INJECTION, SOLUTION INTRAMUSCULAR
Qty: 0.5 ML | Refills: 1 | Status: SHIPPED | OUTPATIENT
Start: 2023-05-17 | End: 2023-08-17

## 2023-05-17 RX ORDER — ZOSTER VACCINE RECOMBINANT, ADJUVANTED 50 MCG/0.5
0.5 KIT INTRAMUSCULAR ONCE
Qty: 1 EACH | Refills: 1 | Status: SHIPPED | OUTPATIENT
Start: 2023-05-17 | End: 2023-05-17

## 2023-05-17 RX ORDER — PNEUMOCOCCAL 20-VALENT CONJUGATE VACCINE 2.2; 2.2; 2.2; 2.2; 2.2; 2.2; 2.2; 2.2; 2.2; 2.2; 2.2; 2.2; 2.2; 2.2; 2.2; 2.2; 4.4; 2.2; 2.2; 2.2 UG/.5ML; UG/.5ML; UG/.5ML; UG/.5ML; UG/.5ML; UG/.5ML; UG/.5ML; UG/.5ML; UG/.5ML; UG/.5ML; UG/.5ML; UG/.5ML; UG/.5ML; UG/.5ML; UG/.5ML; UG/.5ML; UG/.5ML; UG/.5ML; UG/.5ML; UG/.5ML
0.5 INJECTION, SUSPENSION INTRAMUSCULAR ONCE
Qty: 0.5 ML | Refills: 0 | Status: SHIPPED | OUTPATIENT
Start: 2023-05-17 | End: 2023-06-09

## 2023-05-17 NOTE — PROGRESS NOTES
Subjective    Reason for visit: Biologic Education, Training, and Observation    Rheumatology treatment to be initiate: Tremfya 100 mg at week 0, week 4, then every 8 weeks    Rheumatology Provider: Dr. Becerril    HPI: Clemencia Knight is a 62 y.o. female who presents to the clinic for injection training and education. She is a new patient to me but has been following with Dr. Becerril and TERESITA Justin for management of her rheumatic disease. Pt is presenting with raynaud's syndrome, GERD, migraines, and PsA.     Prior Rheum Therapies:   Otezla  Stelara  MTX  Humira  Cosentyx 300 mg    Pertinent History:  Rheum Disease: PsA  Dispensing pharmacy: [facility]     Denies s/sx of infection (current or past week)  Recent Antibiotics use in the last 30 days: No    Cough since January; hasn't seen ENT or PCP about it yet but mentioned to other HCP  Stomach always hurting and some diarrhea; has followed up with GI but they did not find abnormalities  Advised pt to keep a diary to help pinpoint possible foods/drinks, events. Emotions, etc that could be contributing to upset stomach    Vaccinations:  Immunization History   Administered Date(s) Administered    Adenovirus, Type 4 04/06/2017    COVID-19, MRNA, LN-S, PF (Pfizer) (Purple Cap) 08/20/2021, 09/10/2021    Hepatitis A, Adult 04/06/2017    Hepatitis B, Adult 04/06/2017    Influenza 11/06/2007, 10/06/2009, 09/24/2012, 11/04/2013, 12/09/2016    Influenza - Intranasal - Trivalent 09/24/2012    Influenza - Quadrivalent 10/16/2014, 11/03/2015    Influenza - Quadrivalent - PF *Preferred* (6 months and older) 10/23/2018, 10/03/2019, 10/14/2021    Influenza - Trivalent (ADULT) 12/09/2016    Influenza Split 11/06/2007, 10/06/2009, 11/04/2013    Tdap 04/10/2017, 08/14/2018    Typhoid - ViCPs 04/06/2017     Influenza: Discuss and recommend annually. Due next flu season starting around Sept./Oct   PCV 20: Discuss and recommend 1 dose. Due now  Tetanus (TdaP): Discuss and recommend booster  "every 10 years. Due 8/2028  Hepatitis B: Not immune. Discuss and recommend. Heplisav B - 2 doses at least 1 month apart. Due now   Shingrix: Discuss and recommend. 2 dose series 2-6 months apart. Due now  Covid: CDC recommends 2 bivalent doses at least 2 months apart in immunocompromised pts. Due now for 1st bivalent COVID dose. Not interested.      Reviewed allergies and all current medications including OTC, herbals and supplements.     Objective    Answers submitted by the patient for this visit:  Rheumatology Questionnaire (Submitted on 5/15/2023)  fever: No  eye redness: No  mouth sores: No  headaches: No  shortness of breath: No  chest pain: No  trouble swallowing: No  diarrhea: No  constipation: No  unexpected weight change: No  genital sore: No  dysuria: No  During the last 3 days, have you had a skin rash?: No  Bruises or bleeds easily: No  cough: Yes    Vitals:    05/17/23 1411   BP: 117/79   Pulse: 68   Weight: 79.2 kg (174 lb 9.7 oz)   Height: 5' 4.02" (1.626 m)   PainSc: 0-No pain     BP Readings from Last 4 Encounters:   05/18/23 120/74   05/17/23 117/79   05/01/23 (!) 129/0   04/13/23 (!) 148/69     Lab Results   Component Value Date    HEPBSAG Non-reactive 02/15/2023    HEPBCAB Negative 09/28/2021    HEPBSAB <3.00 02/15/2023    HEPBSAB Non-reactive 02/15/2023    HEPBIGM Non-reactive 02/15/2023     Lab Results   Component Value Date    HEPCAB Negative 09/28/2021     Lab Results   Component Value Date    TBGOLDPLUS Negative 02/15/2023     Lab Results   Component Value Date    WBC 6.47 02/15/2023    RBC 4.44 02/15/2023    HGB 13.2 02/15/2023    HCT 40.7 02/15/2023    MCV 92 02/15/2023    MCH 29.7 02/15/2023    MCHC 32.4 02/15/2023    RDW 12.6 02/15/2023     02/15/2023      Lab Results   Component Value Date     02/15/2023    K 4.2 02/15/2023     02/15/2023    CO2 28 02/15/2023    GLU 86 02/15/2023    BUN 21 (H) 02/15/2023    CREATININE 0.9 02/15/2023    CALCIUM 9.6 02/15/2023    PROT " 7.5 02/15/2023    ALBUMIN 4.2 02/15/2023    BILITOT 0.6 02/15/2023    ALKPHOS 72 02/15/2023    AST 20 02/15/2023    ALT 15 02/15/2023    ANIONGAP 7 (L) 02/15/2023    EGFRNORACEVR >60.0 02/15/2023     Lab Results   Component Value Date    SEDRATE 9 02/15/2023    CRP 5.6 02/15/2023      Lab Results   Component Value Date    CHOL 223 (H) 01/04/2023    HDL 43 01/04/2023     Assessment/Plan    1. Injection Training/Education: New start Tremfya for psoriatic arthritis. Pt was referred to the Rheumatology pharmacist for injection education. [facility] sent biologic to pt's home and was brought in for injection. Provided pt with education (MOA, frequency, route of administration, onset of action, injection instructions and safety profile) on Tremfya. Educated patient to inject 100 mg SC at week 4, then every 8 weeks.   Pt injected herself in the right thigh appropriately. Checked injection site after 20 mins and no reaction observed. Pt left office in stable condition.   Recommend pt to observe injection site for the next few days and call the office if they notice any injection site issues or side effects   ND: 6/14/23 then start maintenance on 8/9/23  Encouraged pt to practice proper hand hygiene considering increased risk of infection with biologics and to avoid raw seafood/live vaccines.   Pt to make us aware if they ever experiences s/sx of an infection including fever, chills, URI symptoms or UTI symptoms.  Pt verbalized understanding instructions  Provided pt with handouts on education about their biologic and how to administer it     2. Vaccinations: Explained importance of vaccines considering the increased risk of infections.    Vaccines needed: PCV 20, two COVID bivalent doses, Shingles, and Hep B   Pt agreed to complete vaccines except COVID vaccines. Sent requested rxs for vaccines to pharmacy in case needed  Advised pt to wait at least 7 days before getting any immunizations after receiving first dose of current  biologic     3. Medication Rec: Medication list reconciled per patient and EHR. Indications and dosages reviewed with patient.     Follow-up scheduled on 7/31/23 with TERESITA Hoffmann, PharmD, [facility]  Rheumatology Clinical Pharmacist  [facility]

## 2023-05-17 NOTE — PATIENT INSTRUCTIONS
It was a pleasure meeting you today. As a reminder, my name is Dr. Taya Forrester. I'm the new clinical pharmacist in the Rheumatology office. If you have any questions or need any assistance, please reach out to the office.     Next Tremfya injection on 6/14/23 then start maintenance dosing (100 mg every 8 weeks) on 8/9/23    Please wait at least 7 days after receiving your first Tremfya injection before receiving any vaccines  - Vaccines:   Pneumonia (PCV 20): 1 dose - Primary care provider's (PCP's) office or local pharmacy  Shingles (Shingrix): 2 doses 2-6 months apart - PCP's office or local pharmacy  Hepatitis B (Heplisav B): 2 doses at least 1 month apart - Local pharmacy

## 2023-05-18 ENCOUNTER — PATIENT MESSAGE (OUTPATIENT)
Dept: RHEUMATOLOGY | Facility: CLINIC | Age: 63
End: 2023-05-18
Payer: COMMERCIAL

## 2023-05-31 ENCOUNTER — OFFICE VISIT (OUTPATIENT)
Dept: NEUROLOGY | Facility: CLINIC | Age: 63
End: 2023-05-31
Payer: COMMERCIAL

## 2023-05-31 VITALS
WEIGHT: 175.5 LBS | HEIGHT: 64 IN | DIASTOLIC BLOOD PRESSURE: 85 MMHG | HEART RATE: 59 BPM | SYSTOLIC BLOOD PRESSURE: 137 MMHG | RESPIRATION RATE: 17 BRPM | BODY MASS INDEX: 29.96 KG/M2

## 2023-05-31 DIAGNOSIS — G43.019 INTRACTABLE MIGRAINE WITHOUT AURA AND WITHOUT STATUS MIGRAINOSUS: Primary | ICD-10-CM

## 2023-05-31 DIAGNOSIS — D32.9 MENINGIOMA: ICD-10-CM

## 2023-05-31 DIAGNOSIS — M79.18 CERVICAL MYOFASCIAL PAIN SYNDROME: ICD-10-CM

## 2023-05-31 DIAGNOSIS — R51.9 RIGHT FACIAL PAIN: ICD-10-CM

## 2023-05-31 PROCEDURE — 1160F RVW MEDS BY RX/DR IN RCRD: CPT | Mod: CPTII,S$GLB,, | Performed by: NURSE PRACTITIONER

## 2023-05-31 PROCEDURE — 99215 PR OFFICE/OUTPT VISIT, EST, LEVL V, 40-54 MIN: ICD-10-PCS | Mod: S$GLB,,, | Performed by: NURSE PRACTITIONER

## 2023-05-31 PROCEDURE — 3075F PR MOST RECENT SYSTOLIC BLOOD PRESS GE 130-139MM HG: ICD-10-PCS | Mod: CPTII,S$GLB,, | Performed by: NURSE PRACTITIONER

## 2023-05-31 PROCEDURE — 99999 PR PBB SHADOW E&M-EST. PATIENT-LVL V: ICD-10-PCS | Mod: PBBFAC,,, | Performed by: NURSE PRACTITIONER

## 2023-05-31 PROCEDURE — 99999 PR PBB SHADOW E&M-EST. PATIENT-LVL V: CPT | Mod: PBBFAC,,, | Performed by: NURSE PRACTITIONER

## 2023-05-31 PROCEDURE — 3008F PR BODY MASS INDEX (BMI) DOCUMENTED: ICD-10-PCS | Mod: CPTII,S$GLB,, | Performed by: NURSE PRACTITIONER

## 2023-05-31 PROCEDURE — 99215 OFFICE O/P EST HI 40 MIN: CPT | Mod: S$GLB,,, | Performed by: NURSE PRACTITIONER

## 2023-05-31 PROCEDURE — 3044F HG A1C LEVEL LT 7.0%: CPT | Mod: CPTII,S$GLB,, | Performed by: NURSE PRACTITIONER

## 2023-05-31 PROCEDURE — 3008F BODY MASS INDEX DOCD: CPT | Mod: CPTII,S$GLB,, | Performed by: NURSE PRACTITIONER

## 2023-05-31 PROCEDURE — 1159F MED LIST DOCD IN RCRD: CPT | Mod: CPTII,S$GLB,, | Performed by: NURSE PRACTITIONER

## 2023-05-31 PROCEDURE — 1160F PR REVIEW ALL MEDS BY PRESCRIBER/CLIN PHARMACIST DOCUMENTED: ICD-10-PCS | Mod: CPTII,S$GLB,, | Performed by: NURSE PRACTITIONER

## 2023-05-31 PROCEDURE — 3079F PR MOST RECENT DIASTOLIC BLOOD PRESSURE 80-89 MM HG: ICD-10-PCS | Mod: CPTII,S$GLB,, | Performed by: NURSE PRACTITIONER

## 2023-05-31 PROCEDURE — 3075F SYST BP GE 130 - 139MM HG: CPT | Mod: CPTII,S$GLB,, | Performed by: NURSE PRACTITIONER

## 2023-05-31 PROCEDURE — 3044F PR MOST RECENT HEMOGLOBIN A1C LEVEL <7.0%: ICD-10-PCS | Mod: CPTII,S$GLB,, | Performed by: NURSE PRACTITIONER

## 2023-05-31 PROCEDURE — 3079F DIAST BP 80-89 MM HG: CPT | Mod: CPTII,S$GLB,, | Performed by: NURSE PRACTITIONER

## 2023-05-31 PROCEDURE — 1159F PR MEDICATION LIST DOCUMENTED IN MEDICAL RECORD: ICD-10-PCS | Mod: CPTII,S$GLB,, | Performed by: NURSE PRACTITIONER

## 2023-05-31 RX ORDER — TIZANIDINE 4 MG/1
TABLET ORAL
Qty: 30 TABLET | Refills: 11 | Status: SHIPPED | OUTPATIENT
Start: 2023-05-31

## 2023-05-31 RX ORDER — NARATRIPTAN 2.5 MG/1
TABLET ORAL
Qty: 9 TABLET | Refills: 11 | Status: SHIPPED | OUTPATIENT
Start: 2023-05-31

## 2023-05-31 RX ORDER — RIMEGEPANT SULFATE 75 MG/75MG
75 TABLET, ORALLY DISINTEGRATING ORAL DAILY PRN
Qty: 16 TABLET | Refills: 11 | Status: SHIPPED | OUTPATIENT
Start: 2023-05-31

## 2023-05-31 NOTE — PATIENT INSTRUCTIONS
Please call our clinic at [phone] or send a message on the [facility] portal if there are any changes to the plan described below, for example,if you are not contacted for the requested tests, referral(s) within one week, if you are unable to receive the medications prescribed, or if you feel you need to change the treatment course for any reason.     TESTING:  -- none. Recommended re imaging in 2 years.    REFERRALS:  -- none    PREVENTION (use daily regardless of headache):  -- start magnesium in ONE of the following preparations -               1. Magnesium oxide 800mg daily (the most common over the counter kind, may causes loose stools)              2. Magnesium citrate 400-500mg daily (harder to find, but more neutral on the bowels)              3. Magnesium glycinate 400mg daily (hardest to find, look online, but most bowel-neutral, best absorbed)     AS-NEEDED TREATMENT (use total no more than 10 days per month unless otherwise stated):  -- continue Nurtec  -- continue naratriptan   -- START tizanidine at night. This is a muscle relaxer and it will also make you potentially sleepy. Start with half a tablet to see how you respond but can take up to a whole tablet if needed

## 2023-05-31 NOTE — PROGRESS NOTES
Date of service: 5/31/2023  Referring provider: No ref. provider found    Subjective:      Chief complaint: Headache       Patient ID: Clemencia Knight is a 62 y.o. female with cervical radiculopathy, fatigue, GERD, Raynaud's, trigeminal neuralgia who presents for new patient evaluation of headache     She was previously followed by Dr. Kelly.    History of Present Illness    ORIGINAL HEADACHE HISTORY - 5/31/23  Age at onset and course over time: many years ago    She has a right parietal meningioma and followed by Dr Mi. No change on MRI in September 2022. Recommended follow up in 2 years.     Family history of migraines - siblings x2, niece  Family history of aneurysm - none  Last eye exam - 6 months ago    Location: behind eyes   Quality:  [] pressure [] tight [x] throbbing [] sharp [] stabbing   Severity:  current 0 with range 0-10  Duration: hours    Frequency: 2 days per month  Headaches awaken at night?: no    Worst time of day: evening   Associated with: [x] photophobia [x]  phonophobia [] osmophobia [] blurred vision  [] double vision [] loss of appetite [x] nausea [] vomiting [] dizziness [] vertigo  [] tinnitus [] irritability [] sinus pressure [] problems with concentration   [] neck tightness   Alleviated by:  [x] sleep [x] darkness [] massage [] heat [] ice [x] medication  Exacerbated by:  [] fatigue [x] light [x] noise [] smells [] coughing [] sneezing  [] bending over [] ovulation [] menses [] alcohol [x] change in weather [x]  stress  Ipsilateral autonomic: [] nasal congestion [] lacrimation [] ptosis [] injection [] edema [] foreign body sensation [] ear fullness   ICP:  [] transient visual obscurations  [x] tinnitus low pitched   [] positional headache  [x] non-positional   Sleep habits: good  Caffeine intake: 2 cups coffee  Gyn status (if female): hysterectomy   HIT 6 - 53    Current acute treatment:  Nurtec - twice per month  Naratriptan - twice per month     Current  prevention:  Trazodone    Previously tried/failed acute treatment:  Rizatriptan 10mg - worked once, but has not been resolving all attacks  Ketorolac 10mg- helps the intensity     Previously tried/failed preventative treatment:  Topiramate from 8/21- 11/21: not much improvement and caused hair loss/cognitive side effects   Cymbalta 60mg daily - no improvement in headache   Tegretol XR 100mg bid - has used PRN when has had attacks of the electrical pain in the Rt V2 distribution   Gabapentin  - in past no benefit   Magnesium - no benefit   Tizanidine 4mg - no help   Emgality - stopped due to pain of injection     Review of patient's allergies indicates:   Allergen Reactions    Contrast media Swelling     Swollen eyes and face    Hydrocodone Itching    Plaquenil [hydroxychloroquine]      Worsening psoriasis    Topiramate      Hair loss, cognitive side effects      Current Outpatient Medications   Medication Sig Dispense Refill    clobetasoL (TEMOVATE) 0.05 % cream Apply topically to right labia every evening 60 g 0    cyanocobalamin, vitamin B-12, 5,000 mcg Subl Place under the tongue Daily.      famotidine (PEPCID) 40 MG tablet Take 1 tablet (40 mg total) by mouth once daily. 30 tablet 11    guselkumab (TREMFYA) 100 mg/mL AtIn Inject 100 mg into the skin every 8 weeks. 1 mL 6    guselkumab (TREMFYA) 100 mg/mL AtIn Inject 100 mg into the skin every 28 days. 1 mL 1    hepatitis B vacc-CpG 1018, PF, (HEPLISAV-B, PF,) 20 mcg/0.5 mL Syrg Inject 0.5 mLs (20 mcg total) into the muscle every 28 days. 0.5 mL 1    hydrocortisone 2.5 % cream Apply topically 2 (two) times daily. 453.6 g 1    Lactobacillus rhamnosus GG (CULTURELLE ORAL) Take 1 capsule by mouth 3 (three) times daily.      LIDOcaine-prilocaine (EMLA) cream Apply topically as needed (pain). 30 g 3    montelukast (SINGULAIR) 10 mg tablet Take 1 tablet (10 mg total) by mouth every evening. 30 tablet 5    multivitamin (THERAGRAN) per tablet Take 1 tablet by mouth once  daily.      traZODone (DESYREL) 50 MG tablet Take 1 tablet (50 mg total) by mouth every evening. 90 tablet 1    naratriptan (AMERGE) 2.5 MG tablet Take 1 tablet (2.5 mg) by mouth at onset of headache. May repeat in 4 hours, if needed. Max 2 tablets/day. 9 tablet 11    rimegepant (NURTEC) 75 mg odt Take 1 tablet (75 mg total) by mouth daily as needed for Migraine. Place ODT tablet on the tongue; alternatively the ODT tablet may be placed under the tongue 16 tablet 11    tiZANidine (ZANAFLEX) 4 MG tablet Take half to 1 tablet by mouth at night as needed for muscle spasm 30 tablet 11    varicella-zoster gE-AS01B, PF, (SHINGRIX, PF,) 50 mcg/0.5 mL injection Inject 0.5 mLs into the muscle once. for 1 dose 1 each 1     No current facility-administered medications for this visit.       Past Medical History  Past Medical History:   Diagnosis Date    Allergy     Brain tumor     Drug-induced lupus erythematosus     General anesthetics causing adverse effect in therapeutic use     pt. somewhat aware of extubation with one of her surgeries    GERD (gastroesophageal reflux disease)     Hearing loss     Migraines     Psoriatic arthritis     Raynaud's syndrome without gangrene     Restless leg syndrome     Sciatica        Past Surgical History  Past Surgical History:   Procedure Laterality Date    AUGMENTATION OF BREAST      BELT ABDOMINOPLASTY      breast implants      CERVICAL FUSION      COLONOSCOPY  10/2012    Dr. Paul; internal hemorrhoid; repeat in 10 years    COLONOSCOPY N/A 11/25/2020    Procedure: COLONOSCOPY;  Surgeon: Kiran Paul MD;  Location: [facility];  Service: Endoscopy;  Laterality: N/A; hemorrhoids; Repeat colonoscopy in 10 years for screening; random biopsy: WNL    DEXA      WNL    EPIDURAL STEROID INJECTION INTO CERVICAL SPINE N/A 09/24/2018    Procedure: Injection-steroid-epidural-cervical;  Surgeon: Myles Rocha MD;  Location: [facility] OR;  Service: Pain Management;  Laterality: N/A;  C7-T1    EPIDURAL  STEROID INJECTION INTO CERVICAL SPINE N/A 11/20/2018    Procedure: Injection-steroid-epidural-cervical;  Surgeon: Myles Rocha MD;  Location: [facility] OR;  Service: Pain Management;  Laterality: N/A;  C7-T1    EPIDURAL STEROID INJECTION INTO LUMBAR SPINE Left 5/1/2023    Procedure: Injection-steroid-epidural-lumbar L3/4;  Surgeon: Gabriel Rogers MD;  Location: [facility] OR;  Service: Pain Management;  Laterality: Left;    ESOPHAGOGASTRODUODENOSCOPY N/A 12/10/2020    Procedure: EGD (ESOPHAGOGASTRODUODENOSCOPY);  Surgeon: Kiran Paul MD;  Location: [facility] ENDO;  Service: Endoscopy;  Laterality: N/A; unremarkable; biopsy: duodenum WNL, stomach-minimal chronic inflammation, negative h pylori    ESOPHAGOGASTRODUODENOSCOPY N/A 4/12/2022    Procedure: EGD (ESOPHAGOGASTRODUODENOSCOPY);  Surgeon: Kiran Paul MD;  Location: [facility] ENDO;  Service: Endoscopy;  Laterality: N/A;    HYSTERECTOMY  2000    OOPHORECTOMY  07/16/2013    laparotomy BSO for benign 10cm tubal cyst    SALPINGOOPHORECTOMY  2013    with removal of fallopian cyst    SHOULDER SURGERY      right    TLH  2000    ovaries remain, benign    TONSILLECTOMY, ADENOIDECTOMY, BILATERAL MYRINGOTOMY AND TUBES      TRANSFORAMINAL EPIDURAL INJECTION OF STEROID Left 3/30/2023    Procedure: Injection,steroid,epidural,transforaminal approach L3/4;  Surgeon: Gabriel Rogers MD;  Location: [facility] OR;  Service: Pain Management;  Laterality: Left;       Family History  Family History   Problem Relation Age of Onset    Cancer Father         bladder    Diabetes Father     Heart disease Father     Diabetes Mother     Melanoma Brother     Charcot-Karla-Tooth disease Brother     Breast cancer Neg Hx     Ovarian cancer Neg Hx     Anesthesia problems Neg Hx     Colon cancer Neg Hx     Crohn's disease Neg Hx     Esophageal cancer Neg Hx     Ulcerative colitis Neg Hx     Stomach cancer Neg Hx     Celiac disease Neg Hx        Social History  Social History     Socioeconomic History     Marital status:    Occupational History     Employer: OTHER   Tobacco Use    Smoking status: Never    Smokeless tobacco: Never   Substance and Sexual Activity    Alcohol use: Yes     Comment: rarely    Drug use: No    Sexual activity: Yes     Partners: Male     Birth control/protection: Surgical   Other Topics Concern    Are you pregnant or think you may be? No     Social Determinants of Health     Financial Resource Strain: Unknown    Difficulty of Paying Living Expenses: Patient refused   Food Insecurity: Unknown    Worried About Running Out of Food in the Last Year: Patient refused    Ran Out of Food in the Last Year: Patient refused   Transportation Needs: Unknown    Lack of Transportation (Medical): No    Lack of Transportation (Non-Medical): Patient refused   Physical Activity: Unknown    Days of Exercise per Week: Patient refused    Minutes of Exercise per Session: 0 min   Stress: No Stress Concern Present    Feeling of Stress : Not at all   Social Connections: Unknown    Frequency of Communication with Friends and Family: Once a week    Frequency of Social Gatherings with Friends and Family: Once a week    Active Member of Clubs or Organizations: Patient refused    Attends Club or Organization Meetings: More than 4 times per year    Marital Status: Patient refused   Housing Stability: Unknown    Unable to Pay for Housing in the Last Year: Patient refused    Number of Places Lived in the Last Year: 1    Unstable Housing in the Last Year: Patient refused        Review of Systems  14-point review of systems as follows:   No check elias indicates NEGATIVE response   Constitutional: [] weight loss, [] change to appetite   Eyes: [] change in vision, [] double vision   Ears, nose, mouth, throat: [] frequent nose bleeds, [x] ringing in the ears   Respiratory: [x] cough, [] wheezing   Cardiovascular: [] chest pain, [] palpitations   Gastrointestinal: [] jaundice, [] nausea/vomiting   Genitourinary: []  incontinence, [] burning with urination   Hematologic/lymphatic: [] easy bruising/bleeding, [] night sweats   Neurological: [] numbness, [] weakness   Endocrine: [] fatigue, [] heat/cold intolerance   Allergy/Immunologic: [] fevers, [] chills   Musculoskeletal: [] muscle pain, [] joint pain   Psychiatric: [] thoughts of harming self/others, [] depression   Integumentary: [] rashes, [] sores that do not heal     Objective:        Vitals:    05/31/23 1102   BP: 137/85   Pulse: (!) 59   Resp: 17     Body mass index is 30.12 kg/m².    5/31/23  Constitutional: appears in no acute distress, well-developed, well-nourished     Eyes: normal conjunctiva, PERRLA    Ears, nose, mouth, throat: external appearance of ears and nose normal, hearing intact     Cardiovascular: regular rate and rhythm, no murmurs appreciated    Respiratory: unlabored respirations, breath sounds normal bilaterally    Gastrointestinal: no visible abdominal masses, no guarding, no visible hernia    Musculoskeletal: normal tone in all four extremities. No abnormal movements. No pronator drift. No orbit. Symmetric finger tapping. Normal station. Normal regular gait. Normal tandem gait.      Spine:   CERVICAL SPINE:  ROM: normal   MUSCLE SPASM: no   FACET LOADING: no   SPURLING: no  QUIN / UMBERTO tender: no     Psychiatric: normal judgment and insight. Oriented to person, place, and time.     Neurologic:   Cortical functions: recent and remote memory intact, normal attention span and concentration, speech fluent, adequate fund of knowledge   Cranial nerves: visual fields full, PERRLA, EOMI, symmetric facial strength, hearing intact, palate elevates symmetrically, shoulder shrug 5/5, tongue protrudes midline   Reflexes: 2+ in the upper and lower extremities, no Reyes  Sensation: intact to temperature throughout   Coordination: normal finger to nose, heel to shin, tandem gait     Data Review:     I have personally reviewed the referring provider's notes, labs,  & imaging made available to me today.      RADIOLOGY STUDIES:  I have personally reviewed the pertinent images performed.       Results for orders placed or performed during the hospital encounter of 03/28/23   MRI Brain Without Contrast    Narrative    EXAMINATION:  MRI BRAIN WITHOUT CONTRAST    CLINICAL HISTORY:  meningioma;.  Benign neoplasm of meninges, unspecified    TECHNIQUE:  Multiplanar multisequence MR imaging of the brain was performed without the administration of intravenous contrast.    COMPARISON:  MRI brain 03/07/2022, 04/01/2021    FINDINGS:  Stable appearance of a 0.9 cm T1/T2 isointense extra-axial dural-based lesion overlying the right parietal convexity favored to represent a meningioma.  Minimal mass effect on the subadjacent cortex without parenchymal edema.    Ventricles and sulci are normal in size for age without evidence of hydrocephalus.    Otherwise, the brain parenchyma appears within normal limits for the patient's age.   Diffusion-weighted images demonstrate no evidence of an acute infarct.   Susceptibility weighted images demonstrate no evidence of acute or chronic hemorrhage. No mass effect or midline shift.    Normal vascular flow voids are preserved.    Bone marrow signal intensity is normal. The paranasal sinuses are normal.  Partial dependent right mastoid air cell opacification orbits are unremarkable.      Impression    1. No evidence of an acute intracranial abnormality or significant interval change as compared to the prior study.  2. Stable right parietal convexity probable meningioma.      Electronically signed by: Oni Preston  Date:    03/28/2023  Time:    08:06   Results for orders placed or performed during the hospital encounter of 04/01/21   MRI Brain W WO Contrast    Narrative    EXAMINATION:  MRI BRAIN W WO CONTRAST    CLINICAL HISTORY:  new Rt V2 facial pain, new left V1-v3 pain, electrical V1 pain and stabbing pain left posterior tongue pain, detailed Trigeminal  and glossopharyngeal evaluation.;.  Trigeminal neuralgia    TECHNIQUE:  Multiplanar multisequence MR imaging of the brain was performed before and after the administration of 7 mL Gadavist  intravenous contrast.  Additional thin section imaging of the skullbase and trigeminal nerve course was performed    COMPARISON:  MRI IAC protocol 09/11/2018    FINDINGS:  Trigeminal nerves: The right superior cerebellar artery loops inferiorly and contacts the medial aspect of the proximal cisternal segment of the right trigeminal nerve near the nerve root entry zone, without significant deformity of the nerve (series 11, images 37-38) (series 1101, image 315) (series 1102, images 348-357).  No abnormal signal or enhancement.  There is no significant mass effect or evidence of abnormal signal or enhancement along the cisternal or ganglionic portions of the left trigeminal nerve.  Normal fluid signal filling the Meckel's cave bilaterally.    Ventricles and sulci are normal in size for age without evidence of hydrocephalus. No extra-axial blood or fluid collections.    The brain appears within normal limits for age, noting only a few scattered punctate foci of T2/FLAIR signal hyperintensity in the supratentorial white matter. Diffusion-weighted images demonstrate no evidence of an acute infarct.   Susceptibility weighted images demonstrate no evidence of acute or chronic hemorrhage.    Note is made of a homogeneously enhancing, extra-axial, dural-based lesion overlying the right parietal lobe, measuring approximately 1.0 x 4.6 x 1.1 cm in AP by TV by cc dimensions, most consistent with a small meningioma.  No significant mass effect or underlying vasogenic edema.  No abnormal intracranial enhancement elsewhere.    Normal vascular flow voids are preserved.    Bone marrow signal intensity is normal. Small right mastoid effusion.  Left mastoid air cells are clear.  Scattered minimal mucosal thickening within the paranasal sinuses.   Orbits are unremarkable.      Impression    1. Right superior cerebellar artery contacts the proximal cisternal segment of the right trigeminal nerve near the nerve root entry zone.  No significant deformity of the nerve or associated signal abnormality or enhancement.  Findings likely account for the patient's reported right-sided trigeminal neuralgia.  2. Incidental small right parietal meningioma, measuring 1.1 cm.  Otherwise, no acute intracranial abnormality or abnormal enhancement elsewhere.  3. Small right mastoid effusion.      Electronically signed by: Oni Preston  Date:    04/01/2021  Time:    15:23   Results for orders placed or performed during the hospital encounter of 04/01/21   MRA Brain without contrast    Narrative    EXAMINATION:  MRA BRAIN WITHOUT CONTRAST    CLINICAL HISTORY:  trigeminal pain, glossopharyngeal pain; Trigeminal neuralgia.    TECHNIQUE:  Non-contrast 3-D time-of-flight intracranial MR angiography was performed through the [place] [facility] [place] with MIP reformatting.    COMPARISON:  MRI IAC protocol 09/11/2018    FINDINGS:  The internal carotid arteries are of normal caliber. The middle cerebral arteries are normal.  Hypoplastic A1 segment of the right anterior cerebral artery, a normal developmental variant.  Anterior communicating artery is present.  Otherwise, the anterior cerebral arteries are patent.  The vertebral arteries are patent. The basilar artery is normal.  The P1 segment of the right posterior cerebral artery is hypoplastic, with a P2 segment supplied by dominant posterior communicating artery, a normal developmental variant.  Otherwise, the posterior cerebral arteries are patent.  No evidence of high-grade stenosis or large vessel occlusion.  There is no aneurysm or vascular malformation identified.  Although MRA is a screening examination, catheter angiography remains the definitive study for small aneurysms, vasculitis, and other vascular abnormalities.      Impression     No evidence of intracranial high-grade stenosis, large vessel occlusion, or aneurysm..      Electronically signed by: Oni Preston  Date:    04/01/2021  Time:    14:24     *Note: Due to a large number of results and/or encounters for the requested time period, some results have not been displayed. A complete set of results can be found in Results Review.       Lab Results   Component Value Date     02/15/2023    K 4.2 02/15/2023     02/15/2023    CO2 28 02/15/2023    BUN 21 (H) 02/15/2023    CREATININE 0.9 02/15/2023    GLU 86 02/15/2023    HGBA1C 5.4 02/15/2023    AST 20 02/15/2023    ALT 15 02/15/2023    ALBUMIN 4.2 02/15/2023    PROT 7.5 02/15/2023    BILITOT 0.6 02/15/2023    CHOL 223 (H) 01/04/2023    HDL 43 01/04/2023    LDLCALC 141.8 01/04/2023    TRIG 191 (H) 01/04/2023       Lab Results   Component Value Date    WBC 6.47 02/15/2023    HGB 13.2 02/15/2023    HCT 40.7 02/15/2023    MCV 92 02/15/2023     02/15/2023       Lab Results   Component Value Date    TSH 1.314 09/28/2020           Assessment & Plan:       Problem List Items Addressed This Visit          Neuro    Intractable migraine without aura and without status migrainosus - Primary    Overview     Headaches are typically unilateral, moderate to severe in intensity, worsen with activity, pounding in quality and associated with sensitivity to light and sound.     Add magnesium.     Episodic frequency well controlled with Nurtec and naratriptan.            Relevant Medications    naratriptan (AMERGE) 2.5 MG tablet    rimegepant (NURTEC) 75 mg odt    Right facial pain    Overview     sharp/electrical pain lasting about 30 minutes x 3 times at times thought to be provoked by some maneuvers like flossing/brushing teeth lasting 30 minutes of the severe pain without any migrainous or autonomic features about 1 time per month and then resolving until next month. This pain is not consistent with TN which pain lasts second up to 2  minutes. There were no recurrent paroxysms it was once pain which was sharp 30 minutes of continuous pain. This is not consistent with trigeminal neuralgia, not consistent with a trigeminal autonomic cephalalgia or migraine. 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 Neuroimaging showing contact between Right SCA and Rt CN V she has remained pain free without medication. No surgical intervention. No recurrence.           Other Visit Diagnoses       Cervical myofascial pain syndrome        Can add PT at any time     Relevant Medications    tiZANidine (ZANAFLEX) 4 MG tablet    Meningioma        Stable on 1 year follow up. Also followed by Dr. Mi neurosurgery. Recommended next imaging in 2 years                 Please call our clinic at [phone] or send a message on the [facility] portal if there are any changes to the plan described below, for example,if you are not contacted for the requested tests, referral(s) within one week, if you are unable to receive the medications prescribed, or if you feel you need to change the treatment course for any reason.     TESTING:  -- none. Recommended re imaging in 2 years.    REFERRALS:  -- none    PREVENTION (use daily regardless of headache):  -- start magnesium in ONE of the following preparations -               1. Magnesium oxide 800mg daily (the most common over the counter kind, may causes loose stools)              2. Magnesium citrate 400-500mg daily (harder to find, but more neutral on the bowels)              3. Magnesium glycinate 400mg daily (hardest to find, look online, but most bowel-neutral, best absorbed)     AS-NEEDED TREATMENT (use total no more than 10 days per month unless otherwise stated):  -- continue Nurtec  -- continue naratriptan   -- START tizanidine at night. This is a muscle relaxer and it will also make you potentially sleepy. Start with half a tablet to see how you respond but can take up to a whole tablet if needed    Follow up in  about 6 months (around 11/30/2023) for follow up with [facility].    Face to Face time with patient: 30  45 minutes of total time spent on the encounter, which includes face to face time and non-face to face time on day of visit preparing to see the patient (eg, review of tests), Obtaining and/or reviewing separately obtained history, Documenting clinical information in the electronic or other health record, Independently interpreting results (not separately reported) and communicating results to the patient/family/caregiver, or Care coordination (not separately reported).     Rosalba Rivera, NP

## 2023-06-02 ENCOUNTER — PATIENT MESSAGE (OUTPATIENT)
Dept: RHEUMATOLOGY | Facility: CLINIC | Age: 63
End: 2023-06-02
Payer: COMMERCIAL

## 2023-06-05 ENCOUNTER — OFFICE VISIT (OUTPATIENT)
Dept: PAIN MEDICINE | Facility: CLINIC | Age: 63
End: 2023-06-05
Payer: COMMERCIAL

## 2023-06-05 VITALS
DIASTOLIC BLOOD PRESSURE: 60 MMHG | WEIGHT: 173.75 LBS | SYSTOLIC BLOOD PRESSURE: 110 MMHG | BODY MASS INDEX: 29.66 KG/M2 | HEART RATE: 65 BPM | HEIGHT: 64 IN

## 2023-06-05 DIAGNOSIS — M54.16 LUMBAR RADICULOPATHY: Primary | ICD-10-CM

## 2023-06-05 PROCEDURE — 1159F MED LIST DOCD IN RCRD: CPT | Mod: CPTII,S$GLB,, | Performed by: ANESTHESIOLOGY

## 2023-06-05 PROCEDURE — 3078F DIAST BP <80 MM HG: CPT | Mod: CPTII,S$GLB,, | Performed by: ANESTHESIOLOGY

## 2023-06-05 PROCEDURE — 3008F BODY MASS INDEX DOCD: CPT | Mod: CPTII,S$GLB,, | Performed by: ANESTHESIOLOGY

## 2023-06-05 PROCEDURE — 3044F PR MOST RECENT HEMOGLOBIN A1C LEVEL <7.0%: ICD-10-PCS | Mod: CPTII,S$GLB,, | Performed by: ANESTHESIOLOGY

## 2023-06-05 PROCEDURE — 3074F PR MOST RECENT SYSTOLIC BLOOD PRESSURE < 130 MM HG: ICD-10-PCS | Mod: CPTII,S$GLB,, | Performed by: ANESTHESIOLOGY

## 2023-06-05 PROCEDURE — 3074F SYST BP LT 130 MM HG: CPT | Mod: CPTII,S$GLB,, | Performed by: ANESTHESIOLOGY

## 2023-06-05 PROCEDURE — 99999 PR PBB SHADOW E&M-EST. PATIENT-LVL IV: ICD-10-PCS | Mod: PBBFAC,,, | Performed by: ANESTHESIOLOGY

## 2023-06-05 PROCEDURE — 99213 PR OFFICE/OUTPT VISIT, EST, LEVL III, 20-29 MIN: ICD-10-PCS | Mod: S$GLB,,, | Performed by: ANESTHESIOLOGY

## 2023-06-05 PROCEDURE — 1159F PR MEDICATION LIST DOCUMENTED IN MEDICAL RECORD: ICD-10-PCS | Mod: CPTII,S$GLB,, | Performed by: ANESTHESIOLOGY

## 2023-06-05 PROCEDURE — 3008F PR BODY MASS INDEX (BMI) DOCUMENTED: ICD-10-PCS | Mod: CPTII,S$GLB,, | Performed by: ANESTHESIOLOGY

## 2023-06-05 PROCEDURE — 3078F PR MOST RECENT DIASTOLIC BLOOD PRESSURE < 80 MM HG: ICD-10-PCS | Mod: CPTII,S$GLB,, | Performed by: ANESTHESIOLOGY

## 2023-06-05 PROCEDURE — 99999 PR PBB SHADOW E&M-EST. PATIENT-LVL IV: CPT | Mod: PBBFAC,,, | Performed by: ANESTHESIOLOGY

## 2023-06-05 PROCEDURE — 3044F HG A1C LEVEL LT 7.0%: CPT | Mod: CPTII,S$GLB,, | Performed by: ANESTHESIOLOGY

## 2023-06-05 PROCEDURE — 99213 OFFICE O/P EST LOW 20 MIN: CPT | Mod: S$GLB,,, | Performed by: ANESTHESIOLOGY

## 2023-06-05 NOTE — PROGRESS NOTES
[facility]      PCP:   Dallas Higgins    CC:   Chief Complaint   Patient presents with    Hip Pain        HPI:   Clemencia Knight is a 62 y.o. female with history of GERD, RLS, cervical spine surgery and chronic pain presents for further assessment of lower back pain after undergoing imaging.    She is status post interlaminar MAGGI at L3/4 on 05/01/2023 with what sounds like greater than 70% relief.  She states that she is very pleased with the results, is doing much better, has some mild aching but nothing severe.  She feels it in her left lateral hip, buttock but it does not stop her from doing activities.  Previously she states that she was having a hard time doing any household activities, now this does not stop her.  She denies any pain radiating further down the leg.          Initial HPI:  62 year old female with GERD, migraine, RLS, history of cervical spine surgery with some chronic neck pain is referred by alpa Rojo for evaluation of lumbar back pain.  Pain started 8/27/22 with no focal injury.  She stood up from a seated position and felt a pinching pain in the left lower back.  Pain progressed since then becoming so intense she was seen in the ER 8/28/22.  She has pain in the left lower back with radiation to the anterior thigh to the inner knee.  She denies any numbness/ tingling.  In the ER she was given IM toradol and IM steroid; she was prescribed flexeril, lidoderm and mobic and has been taking the medications.  She has tried home exercise with a therapy ball.  No other treatments.  She uses the therapy/ exercise ball regularly over the leve gluteal region.      Past and current medications:  Antineuropathics:  NSAIDs:l mobic  (past IM toradol)  Antidepressants:  Muscle relaxers: (past - flexeril)  Opioids:  Antiplatelets/Anticoagulants:  Other medications:  (past lidoderm patch and IM steroid)    Physical therapy/ Chiropractic care:  PT  5 visits 9/27/22 through  11/18/22    Pain Intervention History:  - She is status post left L3/4 transforaminal MAGGI on 03/30/2023 with about 30-40% relief.   She is status post interlaminar MAGGI at L3/4 on 05/01/2023 with what sounds like greater than 70% relief.      Past Spine Surgical History:  Cervical spine fusion C6/7 in 2009      History:    Current Outpatient Medications:     clobetasoL (TEMOVATE) 0.05 % cream, Apply topically to right labia every evening, Disp: 60 g, Rfl: 0    cyanocobalamin, vitamin B-12, 5,000 mcg Subl, Place under the tongue Daily., Disp: , Rfl:     famotidine (PEPCID) 40 MG tablet, Take 1 tablet (40 mg total) by mouth once daily., Disp: 30 tablet, Rfl: 11    guselkumab (TREMFYA) 100 mg/mL AtIn, Inject 100 mg into the skin every 8 weeks., Disp: 1 mL, Rfl: 6    guselkumab (TREMFYA) 100 mg/mL AtIn, Inject 100 mg into the skin every 28 days., Disp: 1 mL, Rfl: 1    hepatitis B vacc-CpG 1018, PF, (HEPLISAV-B, PF,) 20 mcg/0.5 mL Syrg, Inject 0.5 mLs (20 mcg total) into the muscle every 28 days., Disp: 0.5 mL, Rfl: 1    hydrocortisone 2.5 % cream, Apply topically 2 (two) times daily., Disp: 453.6 g, Rfl: 1    Lactobacillus rhamnosus GG (CULTURELLE ORAL), Take 1 capsule by mouth 3 (three) times daily., Disp: , Rfl:     LIDOcaine-prilocaine (EMLA) cream, Apply topically as needed (pain)., Disp: 30 g, Rfl: 3    montelukast (SINGULAIR) 10 mg tablet, Take 1 tablet (10 mg total) by mouth every evening., Disp: 30 tablet, Rfl: 5    multivitamin (THERAGRAN) per tablet, Take 1 tablet by mouth once daily., Disp: , Rfl:     naratriptan (AMERGE) 2.5 MG tablet, Take 1 tablet (2.5 mg) by mouth at onset of headache. May repeat in 4 hours, if needed. Max 2 tablets/day., Disp: 9 tablet, Rfl: 11    rimegepant (NURTEC) 75 mg odt, Take 1 tablet (75 mg total) by mouth daily as needed for Migraine. Place ODT tablet on the tongue; alternatively the ODT tablet may be placed under the tongue, Disp: 16 tablet, Rfl: 11    tiZANidine (ZANAFLEX) 4 MG  tablet, Take half to 1 tablet by mouth at night as needed for muscle spasm, Disp: 30 tablet, Rfl: 11    traZODone (DESYREL) 50 MG tablet, Take 1 tablet (50 mg total) by mouth every evening., Disp: 90 tablet, Rfl: 1    Past Medical History:   Diagnosis Date    Allergy     Brain tumor     Drug-induced lupus erythematosus     General anesthetics causing adverse effect in therapeutic use     pt. somewhat aware of extubation with one of her surgeries    GERD (gastroesophageal reflux disease)     Hearing loss     Migraines     Psoriatic arthritis     Raynaud's syndrome without gangrene     Restless leg syndrome     Sciatica        Past Surgical History:   Procedure Laterality Date    AUGMENTATION OF BREAST      BELT ABDOMINOPLASTY      breast implants      CERVICAL FUSION      COLONOSCOPY  10/2012    Dr. Paul; internal hemorrhoid; repeat in 10 years    COLONOSCOPY N/A 11/25/2020    Procedure: COLONOSCOPY;  Surgeon: Kiran Paul MD;  Location: [facility];  Service: Endoscopy;  Laterality: N/A; hemorrhoids; Repeat colonoscopy in 10 years for screening; random biopsy: WNL    DEXA      WNL    EPIDURAL STEROID INJECTION INTO CERVICAL SPINE N/A 09/24/2018    Procedure: Injection-steroid-epidural-cervical;  Surgeon: Myles Rocha MD;  Location: [facility] OR;  Service: Pain Management;  Laterality: N/A;  C7-T1    EPIDURAL STEROID INJECTION INTO CERVICAL SPINE N/A 11/20/2018    Procedure: Injection-steroid-epidural-cervical;  Surgeon: Myles Rocha MD;  Location: [facility] OR;  Service: Pain Management;  Laterality: N/A;  C7-T1    EPIDURAL STEROID INJECTION INTO LUMBAR SPINE Left 5/1/2023    Procedure: Injection-steroid-epidural-lumbar L3/4;  Surgeon: Gabriel Rogers MD;  Location: [facility] OR;  Service: Pain Management;  Laterality: Left;    ESOPHAGOGASTRODUODENOSCOPY N/A 12/10/2020    Procedure: EGD (ESOPHAGOGASTRODUODENOSCOPY);  Surgeon: Kiran Paul MD;  Location: [facility];  Service: Endoscopy;  Laterality: N/A;  unremarkable; biopsy: duodenum WNL, stomach-minimal chronic inflammation, negative h pylori    ESOPHAGOGASTRODUODENOSCOPY N/A 4/12/2022    Procedure: EGD (ESOPHAGOGASTRODUODENOSCOPY);  Surgeon: Kiran Paul MD;  Location: [facility] ENDO;  Service: Endoscopy;  Laterality: N/A;    HYSTERECTOMY  2000    OOPHORECTOMY  07/16/2013    laparotomy BSO for benign 10cm tubal cyst    SALPINGOOPHORECTOMY  2013    with removal of fallopian cyst    SHOULDER SURGERY      right    [facility]  2000    ovaries remain, benign    TONSILLECTOMY, ADENOIDECTOMY, BILATERAL MYRINGOTOMY AND TUBES      TRANSFORAMINAL EPIDURAL INJECTION OF STEROID Left 3/30/2023    Procedure: Injection,steroid,epidural,transforaminal approach L3/4;  Surgeon: Gabriel Rogers MD;  Location: [facility] OR;  Service: Pain Management;  Laterality: Left;       Family History   Problem Relation Age of Onset    Cancer Father         bladder    Diabetes Father     Heart disease Father     Diabetes Mother     Melanoma Brother     Charcot-Karla-Tooth disease Brother     Breast cancer Neg Hx     Ovarian cancer Neg Hx     Anesthesia problems Neg Hx     Colon cancer Neg Hx     Crohn's disease Neg Hx     Esophageal cancer Neg Hx     Ulcerative colitis Neg Hx     Stomach cancer Neg Hx     Celiac disease Neg Hx        Social History     Socioeconomic History    Marital status:    Occupational History     Employer: OTHER   Tobacco Use    Smoking status: Never    Smokeless tobacco: Never   Substance and Sexual Activity    Alcohol use: Yes     Comment: rarely    Drug use: No    Sexual activity: Yes     Partners: Male     Birth control/protection: Surgical   Other Topics Concern    Are you pregnant or think you may be? No     Social Determinants of Health     Financial Resource Strain: Unknown    Difficulty of Paying Living Expenses: Patient refused   Food Insecurity: Unknown    Worried About Running Out of Food in the Last Year: Patient refused    Ran Out of Food in the Last Year:  "Patient refused   Transportation Needs: Unknown    Lack of Transportation (Medical): No    Lack of Transportation (Non-Medical): Patient refused   Physical Activity: Unknown    Days of Exercise per Week: Patient refused    Minutes of Exercise per Session: 0 min   Stress: No Stress Concern Present    Feeling of Stress : Not at all   Social Connections: Unknown    Frequency of Communication with Friends and Family: Once a week    Frequency of Social Gatherings with Friends and Family: Once a week    Active Member of Clubs or Organizations: Patient refused    Attends Club or Organization Meetings: More than 4 times per year    Marital Status: Patient refused   Housing Stability: Unknown    Unable to Pay for Housing in the Last Year: Patient refused    Number of Places Lived in the Last Year: 1    Unstable Housing in the Last Year: Patient refused       Review of patient's allergies indicates:   Allergen Reactions    Contrast media Swelling     Swollen eyes and face    Hydrocodone Itching    Plaquenil [hydroxychloroquine]      Worsening psoriasis    Topiramate      Hair loss, cognitive side effects        Review of Systems:  Low back pain, left leg pain.  Balance of review of systems is negative.    Physical Exam:  Vitals:    06/05/23 0912   BP: 110/60   Pulse: 65   Weight: 78.8 kg (173 lb 11.6 oz)   Height: 5' 4" (1.626 m)   PainSc:   2   PainLoc: Hip       Body mass index is 29.82 kg/m².    Gen: NAD  Psych: mood appropriate for given condition  HEENT: eyes anicteric   CV: RRR, 2+ radial pulse  HEENT: anicteric   Respiratory: non-labored, no signs of respiratory distress  Abd: non-distended  Skin: warm, dry and intact.  Gait: Able to heel walk, toe walk. No antalgic gait.       Lumbar spine:   ROM is full with flexion extension and oblique extension with no increased pain.    Silvio's test causes no increased pain on either side.    Supine straight leg raise is negative bilaterally.    Internal and external rotation " of the hip causes no increased pain on either side.  Myofascial exam: No tenderness to palpation across lumbar paraspinous muscles. No tenderness to palpation over the bilateral greater trochanters and bilateral SI joint    Sensory:  Intact and symmetrical to light touch in C4-T1 dermatomes bilaterally. Intact and symmetrical to light touch in L1-S1 dermatomes bilaterally.    Motor:       Right Left   L2/3 Iliacus Hip flexion  5  5   L3/4 Qudratus Femoris Knee Extension  5  5   L4/5 Tib Anterior Ankle Dorsiflexion   5  5   L5/S1 Extensor Hallicus Longus Great toe extension  5  5   S1/S2 Gastroc/Soleus Plantar Flexion  5  5      Right Left   Triceps DTR     Biceps DTR     Brachioradialis DTR     Patellar DTR 0+ 0+   Achilles DTR 0+ 0+   Reyes Absent  Absent   Clonus Absent Absent   Babinski Absent Absent     Imaging:    MRI lumbar spine 9-11-18:  minimal degenerative changes with no significant central canal or foraminal narrowing.    CT renal stone study 8/28/22:  No suspicious osseous lesions.  Spondylotic changes are identified.  Diffuse soft tissue stranding is identified involving the subcutaneous tissue of the left extremity with extension towards the gluteus joe without evidence for underlying myositis or drainable fluid collection.  Possible cellulitic changes, but most likely from soft tissue trauma from repeated use of therapy/ exercise ball over this same region.     Xray lumbar spine from 9/12/22:  mild degenerative changes.    Xray hip 2/11/23:  No displaced fracture, dislocation or osteonecrosis.  Minimal joint space narrowing is in both hips.  There is mild SI joint sclerosis bilaterally without joint space narrowing.  Mild degenerative changes are at the pubic symphysis.  No aggressive osseous lesion.    Dynamic xray lumbar spine 2/11/23 and MRI lumbar spine 2/17/23:  multilevel mild degenerative changes with no instability on flexion/ extension.   L3/4 facet hypertrophy and disk space narrowing  that has progressed some since 2018 on the left side with mild forminal narrowing.  L4/5 facet hypertrophy with mild foraminal narrowing and no change from 2018.      MRI lumbar spine 02/17/2023  T12-L1: No disc herniation or significant posterior osteophytic ridging.  No significant spinal canal or foraminal stenosis.  L1-L2: No disc herniation or significant posterior osteophytic ridging.  No significant spinal canal or foraminal stenosis.  L2-L3: Minimal disc bulge.  Mild bilateral facet hypertrophy.  Ligamentum flavum thickening.  Minimal narrowing of the bilateral lateral recesses, slightly progressed from prior study.  No significant spinal canal or foraminal stenosis.  L3-L4: Mild disc bulge and osteophytic ridging with tiny superimposed left subarticular protrusion.  Disc height loss.  Mild-moderate bilateral facet hypertrophy.  Ligamentum flavum thickening.  Mild left and minimal right narrowing of the lateral recesses.  This is progressed on the left compared to prior study.  No significant spinal canal stenosis.  Mild left and minimal right foraminal stenosis, progressed on the left compared to prior study.  L4-L5: Mild disc bulge.  Mild-moderate bilateral facet hypertrophy.  Minimal narrowing of the bilateral lateral recesses, unchanged.  No significant spinal canal stenosis.  Mild bilateral foraminal stenosis, unchanged.  L5-S1: Mild disc bulge.  Tiny central protrusion.  Mild bilateral facet hypertrophy.  No significant spinal canal or foraminal stenosis.      Labs:  Lab Results   Component Value Date    HGBA1C 5.4 02/15/2023       Lab Results   Component Value Date    WBC 6.47 02/15/2023    HGB 13.2 02/15/2023    HCT 40.7 02/15/2023    MCV 92 02/15/2023     02/15/2023           Assessment:   Clemencia Knight is a 62 y.o. female with history of GERD, RLS, cervical spine surgery and chronic pain presents for further assessment of lower back pain after undergoing imaging.     1. Lumbar radiculopathy             Plan:  1.  She is doing well, she will follow up as needed.

## 2023-06-28 ENCOUNTER — PATIENT MESSAGE (OUTPATIENT)
Dept: PAIN MEDICINE | Facility: CLINIC | Age: 63
End: 2023-06-28
Payer: COMMERCIAL

## 2023-07-03 ENCOUNTER — TELEPHONE (OUTPATIENT)
Dept: PAIN MEDICINE | Facility: CLINIC | Age: 63
End: 2023-07-03

## 2023-07-03 ENCOUNTER — OFFICE VISIT (OUTPATIENT)
Dept: PAIN MEDICINE | Facility: CLINIC | Age: 63
End: 2023-07-03
Payer: COMMERCIAL

## 2023-07-03 VITALS
SYSTOLIC BLOOD PRESSURE: 145 MMHG | HEIGHT: 64 IN | DIASTOLIC BLOOD PRESSURE: 73 MMHG | HEART RATE: 53 BPM | BODY MASS INDEX: 30.35 KG/M2 | WEIGHT: 177.81 LBS

## 2023-07-03 DIAGNOSIS — M54.9 DORSALGIA, UNSPECIFIED: ICD-10-CM

## 2023-07-03 DIAGNOSIS — M54.16 LUMBAR RADICULOPATHY: Primary | ICD-10-CM

## 2023-07-03 DIAGNOSIS — M54.16 LUMBAR RADICULOPATHY, CHRONIC: ICD-10-CM

## 2023-07-03 PROCEDURE — 99214 PR OFFICE/OUTPT VISIT, EST, LEVL IV, 30-39 MIN: ICD-10-PCS | Mod: S$GLB,,,

## 2023-07-03 PROCEDURE — 99999 PR PBB SHADOW E&M-EST. PATIENT-LVL III: CPT | Mod: PBBFAC,,,

## 2023-07-03 PROCEDURE — 3044F PR MOST RECENT HEMOGLOBIN A1C LEVEL <7.0%: ICD-10-PCS | Mod: CPTII,S$GLB,,

## 2023-07-03 PROCEDURE — 99999 PR PBB SHADOW E&M-EST. PATIENT-LVL III: ICD-10-PCS | Mod: PBBFAC,,,

## 2023-07-03 PROCEDURE — 3044F HG A1C LEVEL LT 7.0%: CPT | Mod: CPTII,S$GLB,,

## 2023-07-03 PROCEDURE — 3008F PR BODY MASS INDEX (BMI) DOCUMENTED: ICD-10-PCS | Mod: CPTII,S$GLB,,

## 2023-07-03 PROCEDURE — 3077F SYST BP >= 140 MM HG: CPT | Mod: CPTII,S$GLB,,

## 2023-07-03 PROCEDURE — 3078F PR MOST RECENT DIASTOLIC BLOOD PRESSURE < 80 MM HG: ICD-10-PCS | Mod: CPTII,S$GLB,,

## 2023-07-03 PROCEDURE — 3008F BODY MASS INDEX DOCD: CPT | Mod: CPTII,S$GLB,,

## 2023-07-03 PROCEDURE — 1159F PR MEDICATION LIST DOCUMENTED IN MEDICAL RECORD: ICD-10-PCS | Mod: CPTII,S$GLB,,

## 2023-07-03 PROCEDURE — 3077F PR MOST RECENT SYSTOLIC BLOOD PRESSURE >= 140 MM HG: ICD-10-PCS | Mod: CPTII,S$GLB,,

## 2023-07-03 PROCEDURE — 3078F DIAST BP <80 MM HG: CPT | Mod: CPTII,S$GLB,,

## 2023-07-03 PROCEDURE — 1159F MED LIST DOCD IN RCRD: CPT | Mod: CPTII,S$GLB,,

## 2023-07-03 PROCEDURE — 99214 OFFICE O/P EST MOD 30 MIN: CPT | Mod: S$GLB,,,

## 2023-07-03 NOTE — TELEPHONE ENCOUNTER
Please schedule patient for the following procedure.    Physician - Dr Rogers    Type of Procedure/Injection - Lumbar Epidural  L3/4  spread to left      Laterality - NA      Type of Sedation - Local      Need to hold medication - No      N/A      Clearance needed - No      Follow up - 4 week

## 2023-07-03 NOTE — PROGRESS NOTES
[facility] Established Patient      PCP:   Dallas Higgins    CC:   Chief Complaint   Patient presents with    Back Pain        HPI:   Clemencia Knight is a 62 y.o. female with history of GERD, RLS, cervical spine surgery and chronic pain presents for further assessment of lower back pain after undergoing imaging.  Today she is reporting return of her left-sided lower back pain, 8/10, with radiation into left leg, left anterior thigh, left lateral hip.  Pain is worsened with physical activities.  She is unable to sleep at night due to the severity of the pain.  Previous epidural provided her with significant relief up until recently.  She denies any new numbness, weakness or new changes to her bowel bladder function.      Initial HPI:  62 year old female with GERD, migraine, RLS, history of cervical spine surgery with some chronic neck pain is referred by alpa Rojo for evaluation of lumbar back pain.  Pain started 8/27/22 with no focal injury.  She stood up from a seated position and felt a pinching pain in the left lower back.  Pain progressed since then becoming so intense she was seen in the ER 8/28/22.  She has pain in the left lower back with radiation to the anterior thigh to the inner knee.  She denies any numbness/ tingling.  In the ER she was given IM toradol and IM steroid; she was prescribed flexeril, lidoderm and mobic and has been taking the medications.  She has tried home exercise with a therapy ball.  No other treatments.  She uses the therapy/ exercise ball regularly over the leve gluteal region.      Past and current medications:  Antineuropathics:  NSAIDs:l mobic  (past IM toradol)  Antidepressants:  Muscle relaxers: (past - flexeril)  Opioids:  Antiplatelets/Anticoagulants:  Other medications:  (past lidoderm patch and IM steroid)    Physical therapy/ Chiropractic care:  PT  5 visits 9/27/22 through 11/18/22    Pain Intervention History:  - She is status post left L3/4 transforaminal  MAGGI on 03/30/2023 with about 30-40% relief.   She is status post interlaminar MAGGI at L3/4 on 05/01/2023 with what sounds like greater than 70% relief.      Past Spine Surgical History:  Cervical spine fusion C6/7 in 2009      History:    Current Outpatient Medications:     clobetasoL (TEMOVATE) 0.05 % cream, Apply topically to right labia every evening, Disp: 60 g, Rfl: 0    cyanocobalamin, vitamin B-12, 5,000 mcg Subl, Place under the tongue Daily., Disp: , Rfl:     famotidine (PEPCID) 40 MG tablet, Take 1 tablet (40 mg total) by mouth once daily., Disp: 30 tablet, Rfl: 11    guselkumab (TREMFYA) 100 mg/mL AtIn, Inject 100 mg into the skin every 8 weeks., Disp: 1 mL, Rfl: 6    guselkumab (TREMFYA) 100 mg/mL AtIn, Inject 100 mg into the skin every 28 days., Disp: 1 mL, Rfl: 1    hepatitis B vacc-CpG 1018, PF, (HEPLISAV-B, PF,) 20 mcg/0.5 mL Syrg, Inject 0.5 mLs (20 mcg total) into the muscle every 28 days., Disp: 0.5 mL, Rfl: 1    hydrocortisone 2.5 % cream, Apply topically 2 (two) times daily., Disp: 453.6 g, Rfl: 1    Lactobacillus rhamnosus GG (CULTURELLE ORAL), Take 1 capsule by mouth 3 (three) times daily., Disp: , Rfl:     LIDOcaine-prilocaine (EMLA) cream, Apply topically as needed (pain)., Disp: 30 g, Rfl: 3    montelukast (SINGULAIR) 10 mg tablet, Take 1 tablet (10 mg total) by mouth every evening., Disp: 30 tablet, Rfl: 5    multivitamin (THERAGRAN) per tablet, Take 1 tablet by mouth once daily., Disp: , Rfl:     naratriptan (AMERGE) 2.5 MG tablet, Take 1 tablet (2.5 mg) by mouth at onset of headache. May repeat in 4 hours, if needed. Max 2 tablets/day., Disp: 9 tablet, Rfl: 11    rimegepant (NURTEC) 75 mg odt, Take 1 tablet (75 mg total) by mouth daily as needed for Migraine. Place ODT tablet on the tongue; alternatively the ODT tablet may be placed under the tongue, Disp: 16 tablet, Rfl: 11    tiZANidine (ZANAFLEX) 4 MG tablet, Take half to 1 tablet by mouth at night as needed for muscle spasm, Disp: 30  tablet, Rfl: 11    traZODone (DESYREL) 50 MG tablet, Take 1 tablet (50 mg total) by mouth every evening., Disp: 90 tablet, Rfl: 1    Past Medical History:   Diagnosis Date    Allergy     Brain tumor     Drug-induced lupus erythematosus     General anesthetics causing adverse effect in therapeutic use     pt. somewhat aware of extubation with one of her surgeries    GERD (gastroesophageal reflux disease)     Hearing loss     Migraines     Psoriatic arthritis     Raynaud's syndrome without gangrene     Restless leg syndrome     Sciatica        Past Surgical History:   Procedure Laterality Date    AUGMENTATION OF BREAST      BELT ABDOMINOPLASTY      breast implants      CERVICAL FUSION      COLONOSCOPY  10/2012    Dr. Paul; internal hemorrhoid; repeat in 10 years    COLONOSCOPY N/A 11/25/2020    Procedure: COLONOSCOPY;  Surgeon: Kiran Paul MD;  Location: [facility];  Service: Endoscopy;  Laterality: N/A; hemorrhoids; Repeat colonoscopy in 10 years for screening; random biopsy: WNL    DEXA      WNL    EPIDURAL STEROID INJECTION INTO CERVICAL SPINE N/A 09/24/2018    Procedure: Injection-steroid-epidural-cervical;  Surgeon: Myles Rocha MD;  Location: [facility] OR;  Service: Pain Management;  Laterality: N/A;  C7-T1    EPIDURAL STEROID INJECTION INTO CERVICAL SPINE N/A 11/20/2018    Procedure: Injection-steroid-epidural-cervical;  Surgeon: Myles Rocha MD;  Location: [facility] OR;  Service: Pain Management;  Laterality: N/A;  C7-T1    EPIDURAL STEROID INJECTION INTO LUMBAR SPINE Left 5/1/2023    Procedure: Injection-steroid-epidural-lumbar L3/4;  Surgeon: Gabriel Rogers MD;  Location: [facility] OR;  Service: Pain Management;  Laterality: Left;    ESOPHAGOGASTRODUODENOSCOPY N/A 12/10/2020    Procedure: EGD (ESOPHAGOGASTRODUODENOSCOPY);  Surgeon: Kiran Paul MD;  Location: [facility];  Service: Endoscopy;  Laterality: N/A; unremarkable; biopsy: duodenum WNL, stomach-minimal chronic inflammation, negative h pylori     ESOPHAGOGASTRODUODENOSCOPY N/A 4/12/2022    Procedure: EGD (ESOPHAGOGASTRODUODENOSCOPY);  Surgeon: Kiran Paul MD;  Location: [facility] ENDO;  Service: Endoscopy;  Laterality: N/A;    HYSTERECTOMY  2000    OOPHORECTOMY  07/16/2013    laparotomy BSO for benign 10cm tubal cyst    SALPINGOOPHORECTOMY  2013    with removal of fallopian cyst    SHOULDER SURGERY      right    TLH  2000    ovaries remain, benign    TONSILLECTOMY, ADENOIDECTOMY, BILATERAL MYRINGOTOMY AND TUBES      TRANSFORAMINAL EPIDURAL INJECTION OF STEROID Left 3/30/2023    Procedure: Injection,steroid,epidural,transforaminal approach L3/4;  Surgeon: Gabriel Rogers MD;  Location: [facility] OR;  Service: Pain Management;  Laterality: Left;       Family History   Problem Relation Age of Onset    Cancer Father         bladder    Diabetes Father     Heart disease Father     Diabetes Mother     Melanoma Brother     Charcot-Karla-Tooth disease Brother     Breast cancer Neg Hx     Ovarian cancer Neg Hx     Anesthesia problems Neg Hx     Colon cancer Neg Hx     Crohn's disease Neg Hx     Esophageal cancer Neg Hx     Ulcerative colitis Neg Hx     Stomach cancer Neg Hx     Celiac disease Neg Hx        Social History     Socioeconomic History    Marital status:    Occupational History     Employer: OTHER   Tobacco Use    Smoking status: Never    Smokeless tobacco: Never   Substance and Sexual Activity    Alcohol use: Yes     Comment: rarely    Drug use: No    Sexual activity: Yes     Partners: Male     Birth control/protection: Surgical   Other Topics Concern    Are you pregnant or think you may be? No     Social Determinants of Health     Financial Resource Strain: Unknown    Difficulty of Paying Living Expenses: Patient refused   Food Insecurity: Unknown    Worried About Running Out of Food in the Last Year: Patient refused    Ran Out of Food in the Last Year: Patient refused   Transportation Needs: Unknown    Lack of Transportation (Medical): No     "Lack of Transportation (Non-Medical): Patient refused   Physical Activity: Unknown    Days of Exercise per Week: Patient refused    Minutes of Exercise per Session: 0 min   Stress: No Stress Concern Present    Feeling of Stress : Not at all   Social Connections: Unknown    Frequency of Communication with Friends and Family: Once a week    Frequency of Social Gatherings with Friends and Family: Once a week    Active Member of Clubs or Organizations: Patient refused    Attends Club or Organization Meetings: More than 4 times per year    Marital Status: Patient refused   Housing Stability: Unknown    Unable to Pay for Housing in the Last Year: Patient refused    Number of Places Lived in the Last Year: 1    Unstable Housing in the Last Year: Patient refused       Review of patient's allergies indicates:   Allergen Reactions    Contrast media Swelling     Swollen eyes and face    Hydrocodone Itching    Plaquenil [hydroxychloroquine]      Worsening psoriasis    Topiramate      Hair loss, cognitive side effects        Review of Systems:  Low back pain, left leg pain.  Balance of review of systems is negative.    Physical Exam:  Vitals:    07/03/23 0827   BP: (!) 145/73   Pulse: (!) 53   Weight: 80.7 kg (177 lb 12.8 oz)   Height: 5' 4" (1.626 m)   PainSc:   8   PainLoc: Back         Body mass index is 30.52 kg/m².    Gen: NAD  Psych: mood appropriate for given condition  HEENT: eyes anicteric   CV: RRR, 2+ radial pulse  HEENT: anicteric   Respiratory: non-labored, no signs of respiratory distress  Abd: non-distended  Skin: warm, dry and intact.  Gait: Able to heel walk, toe walk. No antalgic gait.       Lumbar spine:   ROM is full with flexion extension and oblique extension with no increased pain.    Silvio's test causes no increased pain on either side.    Supine straight leg raise is negative bilaterally.    Internal and external rotation of the hip causes no increased pain on either side.  Myofascial exam: No " tenderness to palpation across lumbar paraspinous muscles. No tenderness to palpation over the bilateral greater trochanters and bilateral SI joint    Sensory:  Intact and symmetrical to light touch in C4-T1 dermatomes bilaterally. Intact and symmetrical to light touch in L1-S1 dermatomes bilaterally.    Motor:       Right Left   L2/3 Iliacus Hip flexion  5  5   L3/4 Qudratus Femoris Knee Extension  5  5   L4/5 Tib Anterior Ankle Dorsiflexion   5  5   L5/S1 Extensor Hallicus Longus Great toe extension  5  5   S1/S2 Gastroc/Soleus Plantar Flexion  5  5      Right Left   Triceps DTR     Biceps DTR     Brachioradialis DTR     Patellar DTR 0+ 0+   Achilles DTR 0+ 0+   Reyes Absent  Absent   Clonus Absent Absent   Babinski Absent Absent     Imaging:    MRI lumbar spine 9-11-18:  minimal degenerative changes with no significant central canal or foraminal narrowing.    CT renal stone study 8/28/22:  No suspicious osseous lesions.  Spondylotic changes are identified.  Diffuse soft tissue stranding is identified involving the subcutaneous tissue of the left extremity with extension towards the gluteus joe without evidence for underlying myositis or drainable fluid collection.  Possible cellulitic changes, but most likely from soft tissue trauma from repeated use of therapy/ exercise ball over this same region.     Xray lumbar spine from 9/12/22:  mild degenerative changes.    Xray hip 2/11/23:  No displaced fracture, dislocation or osteonecrosis.  Minimal joint space narrowing is in both hips.  There is mild SI joint sclerosis bilaterally without joint space narrowing.  Mild degenerative changes are at the pubic symphysis.  No aggressive osseous lesion.    Dynamic xray lumbar spine 2/11/23 and MRI lumbar spine 2/17/23:  multilevel mild degenerative changes with no instability on flexion/ extension.   L3/4 facet hypertrophy and disk space narrowing that has progressed some since 2018 on the left side with mild forminal  narrowing.  L4/5 facet hypertrophy with mild foraminal narrowing and no change from 2018.      MRI lumbar spine 02/17/2023  T12-L1: No disc herniation or significant posterior osteophytic ridging.  No significant spinal canal or foraminal stenosis.  L1-L2: No disc herniation or significant posterior osteophytic ridging.  No significant spinal canal or foraminal stenosis.  L2-L3: Minimal disc bulge.  Mild bilateral facet hypertrophy.  Ligamentum flavum thickening.  Minimal narrowing of the bilateral lateral recesses, slightly progressed from prior study.  No significant spinal canal or foraminal stenosis.  L3-L4: Mild disc bulge and osteophytic ridging with tiny superimposed left subarticular protrusion.  Disc height loss.  Mild-moderate bilateral facet hypertrophy.  Ligamentum flavum thickening.  Mild left and minimal right narrowing of the lateral recesses.  This is progressed on the left compared to prior study.  No significant spinal canal stenosis.  Mild left and minimal right foraminal stenosis, progressed on the left compared to prior study.  L4-L5: Mild disc bulge.  Mild-moderate bilateral facet hypertrophy.  Minimal narrowing of the bilateral lateral recesses, unchanged.  No significant spinal canal stenosis.  Mild bilateral foraminal stenosis, unchanged.  L5-S1: Mild disc bulge.  Tiny central protrusion.  Mild bilateral facet hypertrophy.  No significant spinal canal or foraminal stenosis.      Labs:  Lab Results   Component Value Date    HGBA1C 5.4 02/15/2023       Lab Results   Component Value Date    WBC 6.47 02/15/2023    HGB 13.2 02/15/2023    HCT 40.7 02/15/2023    MCV 92 02/15/2023     02/15/2023           Assessment:   Clemencia Knight is a 62 y.o. female with history of GERD, RLS, cervical spine surgery and chronic pain presents for further assessment of lower back pain after undergoing imaging.     1. Lumbar radiculopathy        2. Dorsalgia, unspecified        3. Lumbar radiculopathy,  chronic              Plan:  1. I believe her left-sided lumbar radicular pain has returned.  We discussed trial of formal physical therapy for her pain but she states it is too severe to restart formal physical therapy.  She is unable to sleep at night due to the severity of the pain.  2. I think it is reasonable to repeat epidural at this time.  Will schedule her for repeat L3/4 MAGGI with spread to the left.  3. However discuss with patient if pain returns sooner than later will likely refer her to Neurosurgery for further evaluation.  Can also consider restarting gabapentin.  She was previously taking gabapentin 300 mg t.i.d. with some relief in the past.  4. Following epidural will get her back in with physical therapy.  5. Follow-up 4 weeks post procedure or sooner if needed.     She is doing well, she will follow up as needed.

## 2023-07-05 DIAGNOSIS — M54.16 LUMBAR RADICULOPATHY: Primary | ICD-10-CM

## 2023-07-05 RX ORDER — SODIUM CHLORIDE, SODIUM LACTATE, POTASSIUM CHLORIDE, CALCIUM CHLORIDE 600; 310; 30; 20 MG/100ML; MG/100ML; MG/100ML; MG/100ML
INJECTION, SOLUTION INTRAVENOUS CONTINUOUS
Status: CANCELLED | OUTPATIENT
Start: 2023-07-05

## 2023-07-07 ENCOUNTER — PATIENT MESSAGE (OUTPATIENT)
Dept: RHEUMATOLOGY | Facility: CLINIC | Age: 63
End: 2023-07-07
Payer: COMMERCIAL

## 2023-07-14 ENCOUNTER — TELEPHONE (OUTPATIENT)
Dept: PAIN MEDICINE | Facility: CLINIC | Age: 63
End: 2023-07-14
Payer: COMMERCIAL

## 2023-07-16 ENCOUNTER — PATIENT MESSAGE (OUTPATIENT)
Dept: RHEUMATOLOGY | Facility: CLINIC | Age: 63
End: 2023-07-16
Payer: COMMERCIAL

## 2023-07-17 NOTE — TELEPHONE ENCOUNTER
Reviewed patient's medication list and did not identify medications that could cause double vision. Could possibly due to migraine or other manifestation of PsA. Routing Margoth Miranda PA-C for input. Glad patient is f/u with eye appointment. Recommend that patient also check BP when noticing double vision and BG if she has a glucometer at home

## 2023-07-26 ENCOUNTER — PATIENT MESSAGE (OUTPATIENT)
Dept: RHEUMATOLOGY | Facility: CLINIC | Age: 63
End: 2023-07-26
Payer: COMMERCIAL

## 2023-07-26 DIAGNOSIS — K21.9 GASTROESOPHAGEAL REFLUX DISEASE, UNSPECIFIED WHETHER ESOPHAGITIS PRESENT: ICD-10-CM

## 2023-07-26 DIAGNOSIS — L40.50 PSORIATIC ARTHRITIS: ICD-10-CM

## 2023-07-26 DIAGNOSIS — R09.81 SINUS CONGESTION: ICD-10-CM

## 2023-07-28 RX ORDER — MONTELUKAST SODIUM 10 MG/1
10 TABLET ORAL NIGHTLY
Qty: 30 TABLET | Refills: 5 | Status: SHIPPED | OUTPATIENT
Start: 2023-07-28 | End: 2023-08-17

## 2023-07-28 RX ORDER — FAMOTIDINE 40 MG/1
40 TABLET, FILM COATED ORAL DAILY
Qty: 30 TABLET | Refills: 11 | Status: SHIPPED | OUTPATIENT
Start: 2023-07-28 | End: 2023-08-17

## 2023-08-11 ENCOUNTER — TELEPHONE (OUTPATIENT)
Dept: OBSTETRICS AND GYNECOLOGY | Facility: CLINIC | Age: 63
End: 2023-08-11
Payer: COMMERCIAL

## 2023-08-14 ENCOUNTER — OFFICE VISIT (OUTPATIENT)
Dept: RHEUMATOLOGY | Facility: CLINIC | Age: 63
End: 2023-08-14
Payer: COMMERCIAL

## 2023-08-14 DIAGNOSIS — D84.821 IMMUNOCOMPROMISED STATE DUE TO DRUG THERAPY: ICD-10-CM

## 2023-08-14 DIAGNOSIS — L40.9 PSORIASIS: ICD-10-CM

## 2023-08-14 DIAGNOSIS — Z79.899 IMMUNOCOMPROMISED STATE DUE TO DRUG THERAPY: ICD-10-CM

## 2023-08-14 DIAGNOSIS — L40.50 PSORIATIC ARTHRITIS: Primary | ICD-10-CM

## 2023-08-14 DIAGNOSIS — G47.00 INSOMNIA, UNSPECIFIED TYPE: ICD-10-CM

## 2023-08-14 PROCEDURE — 1160F RVW MEDS BY RX/DR IN RCRD: CPT | Mod: CPTII,95,, | Performed by: PHYSICIAN ASSISTANT

## 2023-08-14 PROCEDURE — 99213 PR OFFICE/OUTPT VISIT, EST, LEVL III, 20-29 MIN: ICD-10-PCS | Mod: 95,,, | Performed by: PHYSICIAN ASSISTANT

## 2023-08-14 PROCEDURE — 1159F PR MEDICATION LIST DOCUMENTED IN MEDICAL RECORD: ICD-10-PCS | Mod: CPTII,95,, | Performed by: PHYSICIAN ASSISTANT

## 2023-08-14 PROCEDURE — 3044F PR MOST RECENT HEMOGLOBIN A1C LEVEL <7.0%: ICD-10-PCS | Mod: CPTII,95,, | Performed by: PHYSICIAN ASSISTANT

## 2023-08-14 PROCEDURE — 3044F HG A1C LEVEL LT 7.0%: CPT | Mod: CPTII,95,, | Performed by: PHYSICIAN ASSISTANT

## 2023-08-14 PROCEDURE — 99213 OFFICE O/P EST LOW 20 MIN: CPT | Mod: 95,,, | Performed by: PHYSICIAN ASSISTANT

## 2023-08-14 PROCEDURE — 1159F MED LIST DOCD IN RCRD: CPT | Mod: CPTII,95,, | Performed by: PHYSICIAN ASSISTANT

## 2023-08-14 PROCEDURE — 1160F PR REVIEW ALL MEDS BY PRESCRIBER/CLIN PHARMACIST DOCUMENTED: ICD-10-PCS | Mod: CPTII,95,, | Performed by: PHYSICIAN ASSISTANT

## 2023-08-14 NOTE — PROGRESS NOTES
The patient location is: home  The chief complaint leading to consultation is: psoriatic arthritis    Visit type: audiovisual    Face to Face time with patient: 10 minutes  15 minutes of total time spent on the encounter, which includes face to face time and non-face to face time preparing to see the patient (eg, review of tests), Obtaining and/or reviewing separately obtained history, Documenting clinical information in the electronic or other health record, Independently interpreting results (not separately reported) and communicating results to the patient/family/caregiver, or Care coordination (not separately reported)  Each patient to whom he or she provides medical services by telemedicine is:  (1) informed of the relationship between the physician and patient and the respective role of any other health care provider with respect to management of the patient; and (2) notified that he or she may decline to receive medical services by telemedicine and may withdraw from such care at any time.    Notes:     Subjective:       Patient ID: Clemencia Knight is a 62 y.o. female.    Chief Complaint: Disease Management    Mrs. Knight is a 62 year old female who presents to telemedicine virtual for follow up on psoriatic arthritis. Treatment was changed to Tremfya on 5/17/23.  She denies joint pain or stiffness. Psoriasis remains active on her scalp, low back, and buttock, but it is manageable with treatment as it flares. Raynauds is stable with conservative measures. She recently had lumbar spinal epidural which was helpful.     No new labs to review.    Current tx:  1. tremfya    Prior tx:  1. Otezla  2. Stelara  3. MTX  4. Humira  5. cosentyx      Review of Systems   Constitutional:  Positive for activity change and fatigue. Negative for appetite change, chills, fever and unexpected weight change.   HENT:  Negative for mouth sores and trouble swallowing.    Eyes:  Negative for redness and visual disturbance.    Respiratory:  Negative for cough and shortness of breath.    Cardiovascular:  Negative for chest pain, palpitations and leg swelling.   Gastrointestinal:  Negative for abdominal pain, constipation, diarrhea, nausea and vomiting.   Genitourinary:  Negative for dysuria, genital sores and pelvic pain.   Musculoskeletal:  Positive for back pain. Negative for arthralgias, joint swelling, myalgias and neck pain.   Skin:  Positive for rash.   Allergic/Immunologic: Positive for immunocompromised state.   Neurological:  Positive for headaches. Negative for dizziness, weakness and light-headedness.   Hematological:  Does not bruise/bleed easily.         Objective:     There were no vitals filed for this visit.      Past Medical History:   Diagnosis Date    Allergy     Brain tumor     Drug-induced lupus erythematosus     General anesthetics causing adverse effect in therapeutic use     pt. somewhat aware of extubation with one of her surgeries    GERD (gastroesophageal reflux disease)     Hearing loss     Migraines     Psoriatic arthritis     Raynaud's syndrome without gangrene     Restless leg syndrome     Sciatica      Past Surgical History:   Procedure Laterality Date    AUGMENTATION OF BREAST      BELT ABDOMINOPLASTY      breast implants      CERVICAL FUSION      COLONOSCOPY  10/2012    Dr. Paul; internal hemorrhoid; repeat in 10 years    COLONOSCOPY N/A 11/25/2020    Procedure: COLONOSCOPY;  Surgeon: Kiran Paul MD;  Location: [facility];  Service: Endoscopy;  Laterality: N/A; hemorrhoids; Repeat colonoscopy in 10 years for screening; random biopsy: WNL    DEXA      WNL    EPIDURAL STEROID INJECTION INTO CERVICAL SPINE N/A 09/24/2018    Procedure: Injection-steroid-epidural-cervical;  Surgeon: Myles Rocha MD;  [facility];  Service: Pain Management;  Laterality: N/A;  C7-T1    EPIDURAL STEROID INJECTION INTO CERVICAL SPINE N/A 11/20/2018    Procedure: Injection-steroid-epidural-cervical;  Surgeon: Myles  ROQUE Rocha MD;  Location: [facility] OR;  Service: Pain Management;  Laterality: N/A;  C7-T1    EPIDURAL STEROID INJECTION INTO LUMBAR SPINE Left 05/01/2023    Procedure: Injection-steroid-epidural-lumbar L3/4;  Surgeon: Gabriel Rogers MD;  Location: [facility];  Service: Pain Management;  Laterality: Left;    EPIDURAL STEROID INJECTION INTO LUMBAR SPINE N/A 8/8/2023    Procedure: Injection-steroid-epidural-lumbar L3/4;  Surgeon: Gabriel Rogers MD;  Location: [facility];  Service: Pain Management;  Laterality: N/A;    ESOPHAGOGASTRODUODENOSCOPY N/A 12/10/2020    Procedure: EGD (ESOPHAGOGASTRODUODENOSCOPY);  Surgeon: Kiran Paul MD;  Location: [facility];  Service: Endoscopy;  Laterality: N/A; unremarkable; biopsy: duodenum WNL, stomach-minimal chronic inflammation, negative h pylori    ESOPHAGOGASTRODUODENOSCOPY N/A 04/12/2022    Procedure: EGD (ESOPHAGOGASTRODUODENOSCOPY);  Surgeon: Kiran Paul MD;  Location: [facility];  Service: Endoscopy;  Laterality: N/A;    HYSTERECTOMY  2000    OOPHORECTOMY  07/16/2013    laparotomy BSO for benign 10cm tubal cyst    SALPINGOOPHORECTOMY  2013    with removal of fallopian cyst    SHOULDER SURGERY      right    TLH  2000    ovaries remain, benign    TONSILLECTOMY, ADENOIDECTOMY, BILATERAL MYRINGOTOMY AND TUBES      TRANSFORAMINAL EPIDURAL INJECTION OF STEROID Left 03/30/2023    Procedure: Injection,steroid,epidural,transforaminal approach L3/4;  Surgeon: Gabriel Rogers MD;  Location: [facility];  Service: Pain Management;  Laterality: Left;          Physical Exam   Constitutional: She is oriented to person, place, and time.   Neurological: She is oriented to person, place, and time.         Labs reviewed:  Component      Latest Ref Rng & Units 2/15/2023 2/15/2023 2/15/2023           2:15 PM  2:15 PM  2:15 PM   WBC      3.90 - 12.70 K/uL   6.47   RBC      4.00 - 5.40 M/uL   4.44   Hemoglobin      12.0 - 16.0 g/dL   13.2   Hematocrit      37.0 - 48.5 %   40.7   MCV       82 - 98 fL   92   MCH      27.0 - 31.0 pg   29.7   MCHC      32.0 - 36.0 g/dL   32.4   RDW      11.5 - 14.5 %   12.6   Platelets      150 - 450 K/uL   343   MPV      9.2 - 12.9 fL   9.9   Immature Granulocytes      0.0 - 0.5 %   0.3   Gran # (ANC)      1.8 - 7.7 K/uL   3.7   Immature Grans (Abs)      0.00 - 0.04 K/uL   0.02   Lymph #      1.0 - 4.8 K/uL   2.1   Mono #      0.3 - 1.0 K/uL   0.5   Eos #      0.0 - 0.5 K/uL   0.2   Baso #      0.00 - 0.20 K/uL   0.03   nRBC      0 /100 WBC   0   Gran %      38.0 - 73.0 %   56.8   Lymph %      18.0 - 48.0 %   32.0   Mono %      4.0 - 15.0 %   7.6   Eosinophil %      0.0 - 8.0 %   2.8   Basophil %      0.0 - 1.9 %   0.5   Differential Method         Automated   Sodium      136 - 145 mmol/L   138   Potassium      3.5 - 5.1 mmol/L   4.2   Chloride      95 - 110 mmol/L   103   CO2      23 - 29 mmol/L   28   Glucose      70 - 110 mg/dL   86   BUN      6 - 20 mg/dL   21 (H)   Creatinine      0.5 - 1.4 mg/dL   0.9   Calcium      8.7 - 10.5 mg/dL   9.6   PROTEIN TOTAL      6.0 - 8.4 g/dL   7.5   Albumin      3.5 - 5.2 g/dL   4.2   BILIRUBIN TOTAL      0.1 - 1.0 mg/dL   0.6   Alkaline Phosphatase      55 - 135 U/L   72   AST      10 - 40 U/L   20   ALT      10 - 44 U/L   15   Anion Gap      8 - 16 mmol/L   7 (L)   eGFR      >60 mL/min/1.73 m:2   >60.0   NIL      IU/mL   0.22066   TB1 - Nil      IU/mL   -0.012   TB2 - Nil      IU/mL   -0.006   Mitogen - Nil      IU/mL   9.923   TB Gold Plus      Negative   Negative   Hep B S Ab      mIU/mL Non-reactive <3.00    Hemoglobin A1C External      4.0 - 5.6 %   5.4   Estimated Avg Glucose      68 - 131 mg/dL   108   Sed Rate      0 - 36 mm/Hr   9   CRP      0.0 - 8.2 mg/L   5.6   Hep B C IgM      Non-reactive   Non-reactive   Hepatitis B Surface Ag      Non-reactive   Non-reactive     Assessment:       1. Psoriatic arthritis    2. Psoriasis    3. Immunocompromised state due to drug therapy    4. Insomnia, unspecified type                 Plan:       Psoriatic arthritis  -     CBC Auto Differential; Future; Expected date: 08/14/2023  -     Comprehensive Metabolic Panel; Future; Expected date: 08/14/2023  -     C-Reactive Protein; Future; Expected date: 08/14/2023  -     Sedimentation rate; Future; Expected date: 08/14/2023    Psoriasis    Immunocompromised state due to drug therapy    Insomnia, unspecified type            Assessment:  62 year old female with  Psoriatic arthritis, +histone antibody, +Sm/RNP antibody, negative GENE, elevated CRP (normalized), Raynauds  --inverse psoriasis  --chronic migraine  --hx of small bowel obstruction  --hx of cervical fusion    Plan:  1. Cont tremfya 100 mg every 8 weeks  2. Cont trazodone nightly    Follow up:  3 mo w/ Dr. Becerril w/labs prior

## 2023-08-15 ENCOUNTER — OFFICE VISIT (OUTPATIENT)
Dept: PAIN MEDICINE | Facility: CLINIC | Age: 63
End: 2023-08-15
Payer: COMMERCIAL

## 2023-08-15 VITALS
WEIGHT: 174.81 LBS | BODY MASS INDEX: 29.84 KG/M2 | SYSTOLIC BLOOD PRESSURE: 142 MMHG | DIASTOLIC BLOOD PRESSURE: 63 MMHG | HEART RATE: 67 BPM | HEIGHT: 64 IN

## 2023-08-15 DIAGNOSIS — M47.816 LUMBAR SPONDYLOSIS: ICD-10-CM

## 2023-08-15 DIAGNOSIS — M54.9 DORSALGIA, UNSPECIFIED: ICD-10-CM

## 2023-08-15 DIAGNOSIS — M54.16 LUMBAR RADICULOPATHY: Primary | ICD-10-CM

## 2023-08-15 PROCEDURE — 99213 PR OFFICE/OUTPT VISIT, EST, LEVL III, 20-29 MIN: ICD-10-PCS | Mod: S$GLB,,,

## 2023-08-15 PROCEDURE — 3077F SYST BP >= 140 MM HG: CPT | Mod: CPTII,S$GLB,,

## 2023-08-15 PROCEDURE — 3044F HG A1C LEVEL LT 7.0%: CPT | Mod: CPTII,S$GLB,,

## 2023-08-15 PROCEDURE — 3078F PR MOST RECENT DIASTOLIC BLOOD PRESSURE < 80 MM HG: ICD-10-PCS | Mod: CPTII,S$GLB,,

## 2023-08-15 PROCEDURE — 3077F PR MOST RECENT SYSTOLIC BLOOD PRESSURE >= 140 MM HG: ICD-10-PCS | Mod: CPTII,S$GLB,,

## 2023-08-15 PROCEDURE — 1159F PR MEDICATION LIST DOCUMENTED IN MEDICAL RECORD: ICD-10-PCS | Mod: CPTII,S$GLB,,

## 2023-08-15 PROCEDURE — 99213 OFFICE O/P EST LOW 20 MIN: CPT | Mod: S$GLB,,,

## 2023-08-15 PROCEDURE — 3008F PR BODY MASS INDEX (BMI) DOCUMENTED: ICD-10-PCS | Mod: CPTII,S$GLB,,

## 2023-08-15 PROCEDURE — 1159F MED LIST DOCD IN RCRD: CPT | Mod: CPTII,S$GLB,,

## 2023-08-15 PROCEDURE — 3078F DIAST BP <80 MM HG: CPT | Mod: CPTII,S$GLB,,

## 2023-08-15 PROCEDURE — 99999 PR PBB SHADOW E&M-EST. PATIENT-LVL III: ICD-10-PCS | Mod: PBBFAC,,,

## 2023-08-15 PROCEDURE — 99999 PR PBB SHADOW E&M-EST. PATIENT-LVL III: CPT | Mod: PBBFAC,,,

## 2023-08-15 PROCEDURE — 3008F BODY MASS INDEX DOCD: CPT | Mod: CPTII,S$GLB,,

## 2023-08-15 PROCEDURE — 3044F PR MOST RECENT HEMOGLOBIN A1C LEVEL <7.0%: ICD-10-PCS | Mod: CPTII,S$GLB,,

## 2023-08-15 NOTE — PROGRESS NOTES
[facility] Established Patient      PCP:   Dallas Higgins    CC:   Chief Complaint   Patient presents with    Back Pain    Hip Pain    Low-back Pain        HPI:   Clemencia Knight is a 62 y.o. female with history of GERD, RLS, cervical spine surgery and chronic pain presents for further assessment of lower back pain after undergoing imaging.   She is status post L3/4 interlaminar MAGGI on 08/08/2023 with 60% relief.  Previous pain radiating down her left leg has significantly improved.  She does report some remaining lower back pain, 4/10, across the lower back but overall tolerable.  She denies any significant remaining radicular pain, new numbness, weakness or any new changes to bowel or bladder function.    Initial HPI:  62 year old female with GERD, migraine, RLS, history of cervical spine surgery with some chronic neck pain is referred by alpa Rojo for evaluation of lumbar back pain.  Pain started 8/27/22 with no focal injury.  She stood up from a seated position and felt a pinching pain in the left lower back.  Pain progressed since then becoming so intense she was seen in the ER 8/28/22.  She has pain in the left lower back with radiation to the anterior thigh to the inner knee.  She denies any numbness/ tingling.  In the ER she was given IM toradol and IM steroid; she was prescribed flexeril, lidoderm and mobic and has been taking the medications.  She has tried home exercise with a therapy ball.  No other treatments.  She uses the therapy/ exercise ball regularly over the leve gluteal region.      Past and current medications:  Antineuropathics:  NSAIDs:l mobic  (past IM toradol)  Antidepressants:  Muscle relaxers: (past - flexeril)  Opioids:  Antiplatelets/Anticoagulants:  Other medications:  (past lidoderm patch and IM steroid)    Physical therapy/ Chiropractic care:  PT  5 visits 9/27/22 through 11/18/22    Pain Intervention History:  - She is status post left L3/4 transforaminal MAGGI on  03/30/2023 with about 30-40% relief.   - She is status post interlaminar MAGGI at L3/4 on 05/01/2023 with what sounds like greater than 70% relief.    - She is status post L3/4 interlaminar MAGGI on 08/08/2023 with 60% relief.    Past Spine Surgical History:  Cervical spine fusion C6/7 in 2009      History:    Current Outpatient Medications:     clobetasoL (TEMOVATE) 0.05 % cream, Apply topically to right labia every evening, Disp: 60 g, Rfl: 0    cyanocobalamin, vitamin B-12, 5,000 mcg Subl, Place under the tongue Daily., Disp: , Rfl:     famotidine (PEPCID) 40 MG tablet, Take 1 tablet (40 mg total) by mouth once daily., Disp: 30 tablet, Rfl: 11    guselkumab (TREMFYA) 100 mg/mL AtIn, Inject 100 mg into the skin every 8 weeks., Disp: 1 mL, Rfl: 6    hepatitis B vacc-CpG 1018, PF, (HEPLISAV-B, PF,) 20 mcg/0.5 mL Syrg, Inject 0.5 mLs (20 mcg total) into the muscle every 28 days., Disp: 0.5 mL, Rfl: 1    hydrocortisone 2.5 % cream, Apply topically 2 (two) times daily., Disp: 453.6 g, Rfl: 1    LIDOcaine-prilocaine (EMLA) cream, Apply topically as needed (pain)., Disp: 30 g, Rfl: 3    montelukast (SINGULAIR) 10 mg tablet, Take 1 tablet (10 mg total) by mouth every evening., Disp: 30 tablet, Rfl: 5    multivitamin (THERAGRAN) per tablet, Take 1 tablet by mouth once daily., Disp: , Rfl:     naratriptan (AMERGE) 2.5 MG tablet, Take 1 tablet (2.5 mg) by mouth at onset of headache. May repeat in 4 hours, if needed. Max 2 tablets/day., Disp: 9 tablet, Rfl: 11    rimegepant (NURTEC) 75 mg odt, Take 1 tablet (75 mg total) by mouth daily as needed for Migraine. Place ODT tablet on the tongue; alternatively the ODT tablet may be placed under the tongue, Disp: 16 tablet, Rfl: 11    tiZANidine (ZANAFLEX) 4 MG tablet, Take half to 1 tablet by mouth at night as needed for muscle spasm, Disp: 30 tablet, Rfl: 11    traZODone (DESYREL) 50 MG tablet, Take 1 tablet (50 mg total) by mouth every evening., Disp: 90 tablet, Rfl: 1    Past  Medical History:   Diagnosis Date    Allergy     Brain tumor     Drug-induced lupus erythematosus     General anesthetics causing adverse effect in therapeutic use     pt. somewhat aware of extubation with one of her surgeries    GERD (gastroesophageal reflux disease)     Hearing loss     Migraines     Psoriatic arthritis     Raynaud's syndrome without gangrene     Restless leg syndrome     Sciatica        Past Surgical History:   Procedure Laterality Date    AUGMENTATION OF BREAST      BELT ABDOMINOPLASTY      breast implants      CERVICAL FUSION      COLONOSCOPY  10/2012    Dr. Paul; internal hemorrhoid; repeat in 10 years    COLONOSCOPY N/A 11/25/2020    Procedure: COLONOSCOPY;  Surgeon: Kiran Paul MD;  Location: [facility];  Service: Endoscopy;  Laterality: N/A; hemorrhoids; Repeat colonoscopy in 10 years for screening; random biopsy: WNL    DEXA      WNL    EPIDURAL STEROID INJECTION INTO CERVICAL SPINE N/A 09/24/2018    Procedure: Injection-steroid-epidural-cervical;  Surgeon: Myles Rocha MD;  Location: [facility];  Service: Pain Management;  Laterality: N/A;  C7-T1    EPIDURAL STEROID INJECTION INTO CERVICAL SPINE N/A 11/20/2018    Procedure: Injection-steroid-epidural-cervical;  Surgeon: Myles Rocha MD;  Location: [facility];  Service: Pain Management;  Laterality: N/A;  C7-T1    EPIDURAL STEROID INJECTION INTO LUMBAR SPINE Left 05/01/2023    Procedure: Injection-steroid-epidural-lumbar L3/4;  Surgeon: Gabriel Rogers MD;  Location: [facility];  Service: Pain Management;  Laterality: Left;    EPIDURAL STEROID INJECTION INTO LUMBAR SPINE N/A 8/8/2023    Procedure: Injection-steroid-epidural-lumbar L3/4;  Surgeon: Gabriel Rogers MD;  Location: [facility];  Service: Pain Management;  Laterality: N/A;    ESOPHAGOGASTRODUODENOSCOPY N/A 12/10/2020    Procedure: EGD (ESOPHAGOGASTRODUODENOSCOPY);  Surgeon: Kiran Paul MD;  Location: [facility];  Service: Endoscopy;  Laterality: N/A; unremarkable;  biopsy: duodenum WNL, stomach-minimal chronic inflammation, negative h pylori    ESOPHAGOGASTRODUODENOSCOPY N/A 04/12/2022    Procedure: EGD (ESOPHAGOGASTRODUODENOSCOPY);  Surgeon: Kiran Paul MD;  Location: [facility] ENDO;  Service: Endoscopy;  Laterality: N/A;    HYSTERECTOMY  2000    OOPHORECTOMY  07/16/2013    laparotomy BSO for benign 10cm tubal cyst    SALPINGOOPHORECTOMY  2013    with removal of fallopian cyst    SHOULDER SURGERY      right    TLH  2000    ovaries remain, benign    TONSILLECTOMY, ADENOIDECTOMY, BILATERAL MYRINGOTOMY AND TUBES      TRANSFORAMINAL EPIDURAL INJECTION OF STEROID Left 03/30/2023    Procedure: Injection,steroid,epidural,transforaminal approach L3/4;  Surgeon: Gabriel Rogers MD;  Location: [facility] OR;  Service: Pain Management;  Laterality: Left;       Family History   Problem Relation Age of Onset    Cancer Father         bladder    Diabetes Father     Heart disease Father     Diabetes Mother     Melanoma Brother     Charcot-Karla-Tooth disease Brother     Breast cancer Neg Hx     Ovarian cancer Neg Hx     Anesthesia problems Neg Hx     Colon cancer Neg Hx     Crohn's disease Neg Hx     Esophageal cancer Neg Hx     Ulcerative colitis Neg Hx     Stomach cancer Neg Hx     Celiac disease Neg Hx        Social History     Socioeconomic History    Marital status:    Occupational History     Employer: OTHER   Tobacco Use    Smoking status: Never    Smokeless tobacco: Never   Substance and Sexual Activity    Alcohol use: Yes     Comment: rarely    Drug use: No    Sexual activity: Yes     Partners: Male     Birth control/protection: Surgical   Other Topics Concern    Are you pregnant or think you may be? No     Social Determinants of Health     Financial Resource Strain: Unknown (3/17/2023)    Overall Financial Resource Strain (CARDIA)     Difficulty of Paying Living Expenses: Patient refused   Recent Concern: Financial Resource Strain - Medium Risk (2/13/2023)    Overall  Financial Resource Strain (CARDIA)     Difficulty of Paying Living Expenses: Somewhat hard   Food Insecurity: Unknown (3/17/2023)    Hunger Vital Sign     Worried About Running Out of Food in the Last Year: Patient refused     Ran Out of Food in the Last Year: Patient refused   Transportation Needs: Unknown (5/18/2023)    PRAPARE - Transportation     Lack of Transportation (Medical): No     Lack of Transportation (Non-Medical): Patient refused   Physical Activity: Unknown (5/31/2023)    Exercise Vital Sign     Days of Exercise per Week: Patient refused     Minutes of Exercise per Session: 0 min   Recent Concern: Physical Activity - Inactive (5/18/2023)    Exercise Vital Sign     Days of Exercise per Week: 0 days     Minutes of Exercise per Session: 0 min   Stress: No Stress Concern Present (5/18/2023)    [employer] [place] [employer] - Occupational Stress Questionnaire     Feeling of Stress : Not at all   Social Connections: Unknown (5/31/2023)    Social Connection and Isolation Panel [NHANES]     Frequency of Communication with Friends and Family: Once a week     Frequency of Social Gatherings with Friends and Family: Once a week     Active Member of Clubs or Organizations: Patient refused     Attends Club or Organization Meetings: More than 4 times per year     Marital Status: Patient refused   Housing Stability: Unknown (5/18/2023)    Housing Stability Vital Sign     Unable to Pay for Housing in the Last Year: Patient refused     Number of Places Lived in the Last Year: 1     Unstable Housing in the Last Year: Patient refused       Review of patient's allergies indicates:   Allergen Reactions    Contrast media Swelling     Swollen eyes and face    Hydrocodone Itching    Plaquenil [hydroxychloroquine]      Worsening psoriasis    Topiramate      Hair loss, cognitive side effects        Review of Systems:  Low back pain, left leg pain.  Balance of review of systems is negative.    Physical Exam:  Vitals:  "   08/15/23 1023   BP: (!) 142/63   Pulse: 67   Weight: 79.3 kg (174 lb 13.2 oz)   Height: 5' 4" (1.626 m)   PainSc:   1   PainLoc: Back         Body mass index is 30.01 kg/m².    Gen: NAD  Psych: mood appropriate for given condition  HEENT: eyes anicteric   CV: RRR, 2+ radial pulse  HEENT: anicteric   Respiratory: non-labored, no signs of respiratory distress  Abd: non-distended  Skin: warm, dry and intact.  Gait: Able to heel walk, toe walk. No antalgic gait.       Lumbar spine:   ROM is full with flexion extension and oblique extension with no increased pain.    Silvio's test causes no increased pain on either side.    Supine straight leg raise is negative bilaterally.    Internal and external rotation of the hip causes no increased pain on either side.  Myofascial exam: No tenderness to palpation across lumbar paraspinous muscles. No tenderness to palpation over the bilateral greater trochanters and bilateral SI joint    Sensory:   Intact and symmetrical to light touch in L1-S1 dermatomes bilaterally.    Motor:       Right Left   L2/3 Iliacus Hip flexion  5  5   L3/4 Qudratus Femoris Knee Extension  5  5   L4/5 Tib Anterior Ankle Dorsiflexion   5  5   L5/S1 Extensor Hallicus Longus Great toe extension  5  5   S1/S2 Gastroc/Soleus Plantar Flexion  5  5      Right Left   Triceps DTR     Biceps DTR     Brachioradialis DTR     Patellar DTR 0+ 0+   Achilles DTR 0+ 0+   Reyes Absent  Absent   Clonus Absent Absent   Babinski Absent Absent     Imaging:    MRI lumbar spine 9-11-18:  minimal degenerative changes with no significant central canal or foraminal narrowing.    CT renal stone study 8/28/22:  No suspicious osseous lesions.  Spondylotic changes are identified.  Diffuse soft tissue stranding is identified involving the subcutaneous tissue of the left extremity with extension towards the gluteus joe without evidence for underlying myositis or drainable fluid collection.  Possible cellulitic changes, but most " likely from soft tissue trauma from repeated use of therapy/ exercise ball over this same region.     Xray lumbar spine from 9/12/22:  mild degenerative changes.    Xray hip 2/11/23:  No displaced fracture, dislocation or osteonecrosis.  Minimal joint space narrowing is in both hips.  There is mild SI joint sclerosis bilaterally without joint space narrowing.  Mild degenerative changes are at the pubic symphysis.  No aggressive osseous lesion.    Dynamic xray lumbar spine 2/11/23 and MRI lumbar spine 2/17/23:  multilevel mild degenerative changes with no instability on flexion/ extension.   L3/4 facet hypertrophy and disk space narrowing that has progressed some since 2018 on the left side with mild forminal narrowing.  L4/5 facet hypertrophy with mild foraminal narrowing and no change from 2018.      MRI lumbar spine 02/17/2023  T12-L1: No disc herniation or significant posterior osteophytic ridging.  No significant spinal canal or foraminal stenosis.  L1-L2: No disc herniation or significant posterior osteophytic ridging.  No significant spinal canal or foraminal stenosis.  L2-L3: Minimal disc bulge.  Mild bilateral facet hypertrophy.  Ligamentum flavum thickening.  Minimal narrowing of the bilateral lateral recesses, slightly progressed from prior study.  No significant spinal canal or foraminal stenosis.  L3-L4: Mild disc bulge and osteophytic ridging with tiny superimposed left subarticular protrusion.  Disc height loss.  Mild-moderate bilateral facet hypertrophy.  Ligamentum flavum thickening.  Mild left and minimal right narrowing of the lateral recesses.  This is progressed on the left compared to prior study.  No significant spinal canal stenosis.  Mild left and minimal right foraminal stenosis, progressed on the left compared to prior study.  L4-L5: Mild disc bulge.  Mild-moderate bilateral facet hypertrophy.  Minimal narrowing of the bilateral lateral recesses, unchanged.  No significant spinal canal  stenosis.  Mild bilateral foraminal stenosis, unchanged.  L5-S1: Mild disc bulge.  Tiny central protrusion.  Mild bilateral facet hypertrophy.  No significant spinal canal or foraminal stenosis.      Labs:  Lab Results   Component Value Date    HGBA1C 5.4 02/15/2023       Lab Results   Component Value Date    WBC 6.47 02/15/2023    HGB 13.2 02/15/2023    HCT 40.7 02/15/2023    MCV 92 02/15/2023     02/15/2023           Assessment:   Clemencia Knight is a 62 y.o. female with history of GERD, RLS, cervical spine surgery and chronic pain presents for further assessment of lower back pain after undergoing imaging.     1. Lumbar radiculopathy        2. Dorsalgia, unspecified        3. Lumbar spondylosis                Plan:    She responded well to the repeat lumbar epidural.  Previous left-sided radicular pain has nearly completely resolved.  I believe her remaining lower back pain is likely a combination of myofascial pain and potentially some axial facet mediated pain.  She did have significant worsening pain with bilateral axial facet loading.  At this time her pain is currently tolerable.  We will continue to maximize conservative therapy., follow up as needed.  If her back pain worsens can consider diagnostic medial branch blocks or repeat epidural for any radicular pain.  If radicular pain returns sooner than later will likely refer her to Neurosurgery.     She is doing well, she will follow up as needed.

## 2023-08-16 ENCOUNTER — PATIENT MESSAGE (OUTPATIENT)
Dept: NEUROSURGERY | Facility: CLINIC | Age: 63
End: 2023-08-16
Payer: COMMERCIAL

## 2023-08-16 ENCOUNTER — PATIENT MESSAGE (OUTPATIENT)
Dept: NEUROLOGY | Facility: CLINIC | Age: 63
End: 2023-08-16
Payer: COMMERCIAL

## 2023-08-16 NOTE — TELEPHONE ENCOUNTER
Pt reports having 2 recent episodes of double vision last for about an hour.  She visited with her eye doctor and was cleared with any problems from them.  They suggested she contact her cardiologist and neurologist to follow up.

## 2023-08-16 NOTE — TELEPHONE ENCOUNTER
She did not have any loss of vision. Advised for her to consult with her neurosurgeon Dr Mi, with history of Meningioma. Verbalized understanding

## 2023-08-17 ENCOUNTER — OFFICE VISIT (OUTPATIENT)
Dept: OBSTETRICS AND GYNECOLOGY | Facility: CLINIC | Age: 63
End: 2023-08-17
Payer: COMMERCIAL

## 2023-08-17 VITALS
SYSTOLIC BLOOD PRESSURE: 142 MMHG | WEIGHT: 175.69 LBS | HEIGHT: 64 IN | RESPIRATION RATE: 14 BRPM | DIASTOLIC BLOOD PRESSURE: 80 MMHG | BODY MASS INDEX: 29.99 KG/M2

## 2023-08-17 DIAGNOSIS — G93.9 BRAIN LESION: Primary | ICD-10-CM

## 2023-08-17 DIAGNOSIS — D84.821 IMMUNOCOMPROMISED STATE DUE TO DRUG THERAPY: ICD-10-CM

## 2023-08-17 DIAGNOSIS — Z79.899 IMMUNOCOMPROMISED STATE DUE TO DRUG THERAPY: ICD-10-CM

## 2023-08-17 DIAGNOSIS — L40.50 PSORIATIC ARTHRITIS: ICD-10-CM

## 2023-08-17 DIAGNOSIS — N89.8 VAGINAL ITCHING: Primary | ICD-10-CM

## 2023-08-17 DIAGNOSIS — L90.0 LICHEN SCLEROSUS ET ATROPHICUS: ICD-10-CM

## 2023-08-17 DIAGNOSIS — N89.8 VAGINAL DRYNESS: ICD-10-CM

## 2023-08-17 DIAGNOSIS — N90.89 LABIAL ADHESIONS: ICD-10-CM

## 2023-08-17 PROCEDURE — 3079F PR MOST RECENT DIASTOLIC BLOOD PRESSURE 80-89 MM HG: ICD-10-PCS | Mod: CPTII,S$GLB,, | Performed by: OBSTETRICS & GYNECOLOGY

## 2023-08-17 PROCEDURE — 3079F DIAST BP 80-89 MM HG: CPT | Mod: CPTII,S$GLB,, | Performed by: OBSTETRICS & GYNECOLOGY

## 2023-08-17 PROCEDURE — 3044F HG A1C LEVEL LT 7.0%: CPT | Mod: CPTII,S$GLB,, | Performed by: OBSTETRICS & GYNECOLOGY

## 2023-08-17 PROCEDURE — 99214 OFFICE O/P EST MOD 30 MIN: CPT | Mod: S$GLB,,, | Performed by: OBSTETRICS & GYNECOLOGY

## 2023-08-17 PROCEDURE — 99214 PR OFFICE/OUTPT VISIT, EST, LEVL IV, 30-39 MIN: ICD-10-PCS | Mod: S$GLB,,, | Performed by: OBSTETRICS & GYNECOLOGY

## 2023-08-17 PROCEDURE — 1159F PR MEDICATION LIST DOCUMENTED IN MEDICAL RECORD: ICD-10-PCS | Mod: CPTII,S$GLB,, | Performed by: OBSTETRICS & GYNECOLOGY

## 2023-08-17 PROCEDURE — 99999 PR PBB SHADOW E&M-EST. PATIENT-LVL IV: CPT | Mod: PBBFAC,,, | Performed by: OBSTETRICS & GYNECOLOGY

## 2023-08-17 PROCEDURE — 1159F MED LIST DOCD IN RCRD: CPT | Mod: CPTII,S$GLB,, | Performed by: OBSTETRICS & GYNECOLOGY

## 2023-08-17 PROCEDURE — 3077F SYST BP >= 140 MM HG: CPT | Mod: CPTII,S$GLB,, | Performed by: OBSTETRICS & GYNECOLOGY

## 2023-08-17 PROCEDURE — 3008F PR BODY MASS INDEX (BMI) DOCUMENTED: ICD-10-PCS | Mod: CPTII,S$GLB,, | Performed by: OBSTETRICS & GYNECOLOGY

## 2023-08-17 PROCEDURE — 3077F PR MOST RECENT SYSTOLIC BLOOD PRESSURE >= 140 MM HG: ICD-10-PCS | Mod: CPTII,S$GLB,, | Performed by: OBSTETRICS & GYNECOLOGY

## 2023-08-17 PROCEDURE — 3044F PR MOST RECENT HEMOGLOBIN A1C LEVEL <7.0%: ICD-10-PCS | Mod: CPTII,S$GLB,, | Performed by: OBSTETRICS & GYNECOLOGY

## 2023-08-17 PROCEDURE — 3008F BODY MASS INDEX DOCD: CPT | Mod: CPTII,S$GLB,, | Performed by: OBSTETRICS & GYNECOLOGY

## 2023-08-17 PROCEDURE — 99999 PR PBB SHADOW E&M-EST. PATIENT-LVL IV: ICD-10-PCS | Mod: PBBFAC,,, | Performed by: OBSTETRICS & GYNECOLOGY

## 2023-08-17 RX ORDER — CLOBETASOL PROPIONATE 0.5 MG/G
CREAM TOPICAL NIGHTLY
Qty: 60 G | Refills: 0 | Status: SHIPPED | OUTPATIENT
Start: 2023-08-17 | End: 2023-09-13 | Stop reason: SDUPTHER

## 2023-08-17 NOTE — PROGRESS NOTES
Chief Complaint   Patient presents with    Groin Swelling    Vaginal Itching     For about 3 weeks        History of Present Illness: Clemencia Knight is a 62 y.o. female that presents today 8/17/2023 for   Chief Complaint   Patient presents with    Groin Swelling    Vaginal Itching     For about 3 weeks      Psoriatic flair right now.     Past Medical History:   Diagnosis Date    Allergy     Brain tumor     Drug-induced lupus erythematosus     General anesthetics causing adverse effect in therapeutic use     pt. somewhat aware of extubation with one of her surgeries    GERD (gastroesophageal reflux disease)     Hearing loss     Migraines     Psoriatic arthritis     Raynaud's syndrome without gangrene     Restless leg syndrome     Sciatica        Past Surgical History:   Procedure Laterality Date    AUGMENTATION OF BREAST      BELT ABDOMINOPLASTY      breast implants      CERVICAL FUSION      COLONOSCOPY  10/2012    Dr. Paul; internal hemorrhoid; repeat in 10 years    COLONOSCOPY N/A 11/25/2020    Procedure: COLONOSCOPY;  Surgeon: Kiran Paul MD;  Location: [facility];  Service: Endoscopy;  Laterality: N/A; hemorrhoids; Repeat colonoscopy in 10 years for screening; random biopsy: WNL    DEXA      WNL    EPIDURAL STEROID INJECTION INTO CERVICAL SPINE N/A 09/24/2018    Procedure: Injection-steroid-epidural-cervical;  Surgeon: Myles Rocha MD;  Location: [facility] OR;  Service: Pain Management;  Laterality: N/A;  C7-T1    EPIDURAL STEROID INJECTION INTO CERVICAL SPINE N/A 11/20/2018    Procedure: Injection-steroid-epidural-cervical;  Surgeon: Myles Rocha MD;  Location: [facility] OR;  Service: Pain Management;  Laterality: N/A;  C7-T1    EPIDURAL STEROID INJECTION INTO LUMBAR SPINE Left 05/01/2023    Procedure: Injection-steroid-epidural-lumbar L3/4;  Surgeon: Gabriel Rogers MD;  Location: [facility] OR;  Service: Pain Management;  Laterality: Left;    EPIDURAL STEROID INJECTION INTO LUMBAR SPINE N/A 8/8/2023    Procedure:  Injection-steroid-epidural-lumbar L3/4;  Surgeon: Gabriel Rogers MD;  Location: [facility];  Service: Pain Management;  Laterality: N/A;    ESOPHAGOGASTRODUODENOSCOPY N/A 12/10/2020    Procedure: EGD (ESOPHAGOGASTRODUODENOSCOPY);  Surgeon: Kiran Paul MD;  Location: [facility] ENDO;  Service: Endoscopy;  Laterality: N/A; unremarkable; biopsy: duodenum WNL, stomach-minimal chronic inflammation, negative h pylori    ESOPHAGOGASTRODUODENOSCOPY N/A 04/12/2022    Procedure: EGD (ESOPHAGOGASTRODUODENOSCOPY);  Surgeon: Kiran Paul MD;  Location: [facility] ENDO;  Service: Endoscopy;  Laterality: N/A;    HYSTERECTOMY  2000    OOPHORECTOMY  07/16/2013    laparotomy BSO for benign 10cm tubal cyst    SALPINGOOPHORECTOMY  2013    with removal of fallopian cyst    SHOULDER SURGERY      right    TLH  2000    ovaries remain, benign    TONSILLECTOMY, ADENOIDECTOMY, BILATERAL MYRINGOTOMY AND TUBES      TRANSFORAMINAL EPIDURAL INJECTION OF STEROID Left 03/30/2023    Procedure: Injection,steroid,epidural,transforaminal approach L3/4;  Surgeon: Gabriel Rogers MD;  Location: [facility] OR;  Service: Pain Management;  Laterality: Left;       Outpatient Medications Prior to Visit   Medication Sig Dispense Refill    cyanocobalamin, vitamin B-12, 5,000 mcg Subl Place under the tongue Daily.      guselkumab (TREMFYA) 100 mg/mL AtIn Inject 100 mg into the skin every 8 weeks. 1 mL 6    hydrocortisone 2.5 % cream Apply topically 2 (two) times daily. 453.6 g 1    LIDOcaine-prilocaine (EMLA) cream Apply topically as needed (pain). 30 g 3    multivitamin (THERAGRAN) per tablet Take 1 tablet by mouth once daily.      naratriptan (AMERGE) 2.5 MG tablet Take 1 tablet (2.5 mg) by mouth at onset of headache. May repeat in 4 hours, if needed. Max 2 tablets/day. 9 tablet 11    rimegepant (NURTEC) 75 mg odt Take 1 tablet (75 mg total) by mouth daily as needed for Migraine. Place ODT tablet on the tongue; alternatively the ODT tablet may be placed  "under the tongue 16 tablet 11    tiZANidine (ZANAFLEX) 4 MG tablet Take half to 1 tablet by mouth at night as needed for muscle spasm 30 tablet 11    traZODone (DESYREL) 50 MG tablet Take 1 tablet (50 mg total) by mouth every evening. 90 tablet 1    clobetasoL (TEMOVATE) 0.05 % cream Apply topically to right labia every evening 60 g 0     No facility-administered medications prior to visit.       Review of patient's allergies indicates:   Allergen Reactions    Contrast media Swelling     Swollen eyes and face    Hydrocodone Itching    Plaquenil [hydroxychloroquine]      Worsening psoriasis    Topiramate      Hair loss, cognitive side effects        Family History   Problem Relation Age of Onset    Cancer Father         bladder    Diabetes Father     Heart disease Father     Diabetes Mother     Melanoma Brother     Charcot-Karla-Tooth disease Brother     Breast cancer Neg Hx     Ovarian cancer Neg Hx     Anesthesia problems Neg Hx     Colon cancer Neg Hx     Crohn's disease Neg Hx     Esophageal cancer Neg Hx     Ulcerative colitis Neg Hx     Stomach cancer Neg Hx     Celiac disease Neg Hx        Social History     Tobacco Use    Smoking status: Never    Smokeless tobacco: Never   Substance Use Topics    Alcohol use: Yes     Comment: rarely    Drug use: No       OB History    Para Term  AB Living   2 2 2     2   SAB IAB Ectopic Multiple Live Births           2      # Outcome Date GA Lbr Marc/2nd Weight Sex Delivery Anes PTL Lv   2 Term 86   3.544 kg (7 lb 13 oz) F Vag-Spont EPI N VARSHA   1 Term 85   3.572 kg (7 lb 14 oz) M Vag-Spont EPI N VARSHA           BP (!) 142/80   Resp 14   Ht 5' 4" (1.626 m)   Wt 79.7 kg (175 lb 11.3 oz)   Physical Exam:  APPEARANCE: Well nourished, well developed, in no acute distress.  SKIN: Normal skin turgor, no lesions.  NECK: Neck symmetric without masses   RESPIRATORY: Normal respiratory effort with no retractions or use of accessory muscles  CARDIOVASCULAR: " Peripheral vascular system with no swelling no varicosities and palpation of pulses normal  LYMPHATIC: No enlargements of the lymph nodes noted in the neck, axillae, or groin  ABDOMEN: Soft. No tenderness or masses. No hepatosplenomegaly. No hernias.  PELVIC: Normal external female genitalia with  perineum covered with white plaque ++. Normal hair distribution. Adequate perineal body, normal urethral meatus. Urethra with no masses.  Bladder nontender. Vagina moist and well rugated without lesions or discharge.  No significant cystocele or rectocele. Adnexa without masses or tenderness. Urethra and bladder normal.  EXTREMITIES: No clubbing cyanosis or edema.    ASSESSMENT/PLAN:  Vaginal itching  -     clobetasoL (TEMOVATE) 0.05 % cream; Apply topically every evening.  Dispense: 60 g; Refill: 0    Vaginal dryness    Immunocompromised state due to drug therapy    Psoriatic arthritis  -     clobetasoL (TEMOVATE) 0.05 % cream; Apply topically every evening.  Dispense: 60 g; Refill: 0    Labial adhesions  -     clobetasoL (TEMOVATE) 0.05 % cream; Apply topically every evening.  Dispense: 60 g; Refill: 0    RTC 4 months    clobetasol Space to weekly in 3 weeks      30 minutes spent today spent preparing reviewing previous external notes, reviewing previous results, performing medical examination, ordering tests or medications, counseling and documenting.

## 2023-08-26 ENCOUNTER — HOSPITAL ENCOUNTER (OUTPATIENT)
Dept: RADIOLOGY | Facility: HOSPITAL | Age: 63
Discharge: HOME | End: 2023-08-26
Attending: NEUROLOGICAL SURGERY
Payer: COMMERCIAL

## 2023-08-26 DIAGNOSIS — G93.9 BRAIN LESION: ICD-10-CM

## 2023-08-26 PROCEDURE — 70551 MRI BRAIN WITHOUT CONTRAST: ICD-10-PCS | Mod: 26,,, | Performed by: RADIOLOGY

## 2023-08-26 PROCEDURE — 70551 MRI BRAIN STEM W/O DYE: CPT | Mod: TC,PO

## 2023-08-26 PROCEDURE — 70551 MRI BRAIN STEM W/O DYE: CPT | Mod: 26,,, | Performed by: RADIOLOGY

## 2023-08-29 ENCOUNTER — PATIENT MESSAGE (OUTPATIENT)
Dept: NEUROSURGERY | Facility: CLINIC | Age: 63
End: 2023-08-29
Payer: COMMERCIAL

## 2023-09-13 DIAGNOSIS — N89.8 VAGINAL ITCHING: ICD-10-CM

## 2023-09-13 DIAGNOSIS — L40.50 PSORIATIC ARTHRITIS: ICD-10-CM

## 2023-09-13 DIAGNOSIS — N90.89 LABIAL ADHESIONS: ICD-10-CM

## 2023-09-13 DIAGNOSIS — L90.0 LICHEN SCLEROSUS ET ATROPHICUS: ICD-10-CM

## 2023-09-13 RX ORDER — CLOBETASOL PROPIONATE 0.5 MG/G
CREAM TOPICAL NIGHTLY
Qty: 60 G | Refills: 0 | Status: CANCELLED | OUTPATIENT
Start: 2023-09-13

## 2023-09-13 RX ORDER — CLOBETASOL PROPIONATE 0.5 MG/G
CREAM TOPICAL NIGHTLY
Qty: 60 G | Refills: 0 | Status: SHIPPED | OUTPATIENT
Start: 2023-09-13

## 2023-10-10 ENCOUNTER — OFFICE VISIT (OUTPATIENT)
Dept: DERMATOLOGY | Facility: CLINIC | Age: 63
End: 2023-10-10
Payer: COMMERCIAL

## 2023-10-10 DIAGNOSIS — L40.0 PSORIASIS VULGARIS: ICD-10-CM

## 2023-10-10 DIAGNOSIS — L73.8 SEBACEOUS HYPERPLASIA: ICD-10-CM

## 2023-10-10 DIAGNOSIS — L82.0 INFLAMED SEBORRHEIC KERATOSIS: ICD-10-CM

## 2023-10-10 DIAGNOSIS — D18.01 CHERRY ANGIOMA: Primary | ICD-10-CM

## 2023-10-10 PROCEDURE — 3044F HG A1C LEVEL LT 7.0%: CPT | Mod: CPTII,S$GLB,, | Performed by: DERMATOLOGY

## 2023-10-10 PROCEDURE — 99999 PR PBB SHADOW E&M-EST. PATIENT-LVL II: CPT | Mod: PBBFAC,,, | Performed by: DERMATOLOGY

## 2023-10-10 PROCEDURE — 1159F MED LIST DOCD IN RCRD: CPT | Mod: CPTII,S$GLB,, | Performed by: DERMATOLOGY

## 2023-10-10 PROCEDURE — 1160F PR REVIEW ALL MEDS BY PRESCRIBER/CLIN PHARMACIST DOCUMENTED: ICD-10-PCS | Mod: CPTII,S$GLB,, | Performed by: DERMATOLOGY

## 2023-10-10 PROCEDURE — 99213 OFFICE O/P EST LOW 20 MIN: CPT | Mod: S$GLB,,, | Performed by: DERMATOLOGY

## 2023-10-10 PROCEDURE — 1159F PR MEDICATION LIST DOCUMENTED IN MEDICAL RECORD: ICD-10-PCS | Mod: CPTII,S$GLB,, | Performed by: DERMATOLOGY

## 2023-10-10 PROCEDURE — 1160F RVW MEDS BY RX/DR IN RCRD: CPT | Mod: CPTII,S$GLB,, | Performed by: DERMATOLOGY

## 2023-10-10 PROCEDURE — 3044F PR MOST RECENT HEMOGLOBIN A1C LEVEL <7.0%: ICD-10-PCS | Mod: CPTII,S$GLB,, | Performed by: DERMATOLOGY

## 2023-10-10 PROCEDURE — 99213 PR OFFICE/OUTPT VISIT, EST, LEVL III, 20-29 MIN: ICD-10-PCS | Mod: S$GLB,,, | Performed by: DERMATOLOGY

## 2023-10-10 PROCEDURE — 99999 PR PBB SHADOW E&M-EST. PATIENT-LVL II: ICD-10-PCS | Mod: PBBFAC,,, | Performed by: DERMATOLOGY

## 2023-10-10 NOTE — PROGRESS NOTES
Subjective:      Patient ID:  Clemencia Knight is a 63 y.o. female who presents for   Chief Complaint   Patient presents with    Skin Check     Patient complains of dry patches left thigh and right shin      HPI  LOV 07/2020   Dx with psoriatic arthritis, previously with gluteal dermatitis. Not tx.   Tx humira, stelara, cosentyx, tremfya       no Phx of NMSC.  yes Fhx of melanoma.  + Brother     Past Medical History:  Allergy  General anesthetics causing adverse effect in therapeutic use      Comment:  pt. somewhat aware of extubation with one of her                surgeries  GERD (gastroesophageal reflux disease)  Hearing loss  Restless leg syndrome  Sciatica    ROS: Denies fever, chills, itching or rash.       Objective:   Physical Exam   Constitutional: She appears well-developed and well-nourished. No distress.   Eyes: Lids are normal.    Neurological: She is alert and oriented to person, place, and time. She is not disoriented.   Psychiatric: She has a normal mood and affect.   Skin:   Areas Examined (abnormalities noted in diagram):   Head / Face Inspection Performed  Neck Inspection Performed  Chest / Axilla Inspection Performed  Abdomen Inspection Performed  Back Inspection Performed  RUE Inspected  LUE Inspection Performed                 Diagram Legend     Erythematous scaling macule/papule c/w actinic keratosis       Vascular papule c/w angioma      Pigmented verrucoid papule/plaque c/w seborrheic keratosis      Yellow umbilicated papule c/w sebaceous hyperplasia      Irregularly shaped tan macule c/w lentigo     1-2 mm smooth white papules consistent with Milia      Movable subcutaneous cyst with punctum c/w epidermal inclusion cyst      Subcutaneous movable cyst c/w pilar cyst      Firm pink to brown papule c/w dermatofibroma      Pedunculated fleshy papule(s) c/w skin tag(s)      Evenly pigmented macule c/w junctional nevus     Mildly variegated pigmented, slightly irregular-bordered macule c/w mildly  atypical nevus      Flesh colored to evenly pigmented papule c/w intradermal nevus       Pink pearly papule/plaque c/w basal cell carcinoma      Erythematous hyperkeratotic cursted plaque c/w SCC      Surgical scar with no sign of skin cancer recurrence      Open and closed comedones      Inflammatory papules and pustules      Verrucoid papule consistent consistent with wart     Erythematous eczematous patches and plaques     Dystrophic onycholytic nail with subungual debris c/w onychomycosis     Umbilicated papule    Erythematous-base heme-crusted tan verrucoid plaque consistent with inflamed seborrheic keratosis     Erythematous Silvery Scaling Plaque c/w Psoriasis     See annotation      Assessment / Plan:            Cherry angioma  trunk and extremities  This is a benign vascular lesion. Reassurance given. No treatment required.     Sebaceous hyperplasia  Face  Reassurance given to patient. No treatment is necessary.   Treatment of benign, asymptomatic lesions may be considered cosmetic.      Inflamed seborrheic keratosis  Leg  Reassurance given to patient. No treatment is necessary.   Treatment of benign, asymptomatic lesions may be considered cosmetic.      Psoriasis vulgaris  Left thigh  States has Rx at home, declines tx today                No follow-ups on file.

## 2023-10-16 DIAGNOSIS — G47.00 INSOMNIA, UNSPECIFIED TYPE: ICD-10-CM

## 2023-10-17 RX ORDER — TRAZODONE HYDROCHLORIDE 50 MG/1
50 TABLET ORAL NIGHTLY
Qty: 90 TABLET | Refills: 3 | Status: SHIPPED | OUTPATIENT
Start: 2023-10-17

## 2023-12-03 NOTE — TELEPHONE ENCOUNTER
Spoke with pt who said she is doing fine with the hearing aid and does not need any adjustments. Told her the last appt was only necessary if she needed adjustments to the settings or if she needs to learn how to clean the aids. Told her to call the clinic PRN.     ----- Message from Pretty Tello sent at 10/26/2018  5:51 PM CDT -----  Contact: PT Portal Request    Appointment Request From: Clemencia Knight    With Provider: Adriane Torres    Preferred Date Range: 11/12/2018 - 11/13/2018    Preferred Times: Any time    Reason for visit: Follow up    Comments:  Follow up - hearing aid 3rd visit         odor  * Bleeding  * Increase in swelling  * Need for compression bandage changes due to slippage, breakthrough drainage. If you need medical attention outside of the business hours of the [address] [place] [address] please contact your PCP or go to the nearest emergency room. The information contained in the After Visit Summary has been reviewed with me, the patient and/or responsible adult, by my health care provider(s). I had the opportunity to ask questions regarding this information.  I have elected to receive;      []After Visit Summary  [x]Comprehensive Discharge Instruction        Patient signature______________________________________Date:________  Electronically signed by Deisi Liao RN on 12/5/2023 at 11:28 AM    Electronically signed by Joyce Miller DPM on 12/5/2023 at 10:42 AM

## 2024-01-31 DIAGNOSIS — Z12.31 OTHER SCREENING MAMMOGRAM: ICD-10-CM

## 2024-08-23 ENCOUNTER — OFFICE VISIT (OUTPATIENT)
Dept: FAMILY MEDICINE | Facility: CLINIC | Age: 64
End: 2024-08-23
Payer: COMMERCIAL

## 2024-08-23 ENCOUNTER — LAB VISIT (OUTPATIENT)
Dept: LAB | Facility: HOSPITAL | Age: 64
End: 2024-08-23
Attending: PHYSICIAN ASSISTANT
Payer: COMMERCIAL

## 2024-08-23 VITALS
SYSTOLIC BLOOD PRESSURE: 136 MMHG | HEIGHT: 64 IN | OXYGEN SATURATION: 97 % | DIASTOLIC BLOOD PRESSURE: 88 MMHG | HEART RATE: 73 BPM | WEIGHT: 186.06 LBS | BODY MASS INDEX: 31.77 KG/M2

## 2024-08-23 DIAGNOSIS — Z00.00 PREVENTATIVE HEALTH CARE: ICD-10-CM

## 2024-08-23 DIAGNOSIS — Z00.00 PREVENTATIVE HEALTH CARE: Primary | ICD-10-CM

## 2024-08-23 DIAGNOSIS — L40.50 PSORIATIC ARTHRITIS: ICD-10-CM

## 2024-08-23 DIAGNOSIS — D32.9 MENINGIOMA: ICD-10-CM

## 2024-08-23 PROBLEM — D84.821 IMMUNOCOMPROMISED STATE DUE TO DRUG THERAPY: Status: RESOLVED | Noted: 2023-03-31 | Resolved: 2024-08-23

## 2024-08-23 PROBLEM — Z79.899 IMMUNOCOMPROMISED STATE DUE TO DRUG THERAPY: Status: RESOLVED | Noted: 2023-03-31 | Resolved: 2024-08-23

## 2024-08-23 LAB
ALBUMIN SERPL BCP-MCNC: 3.9 G/DL (ref 3.5–5.2)
ALP SERPL-CCNC: 74 U/L (ref 55–135)
ALT SERPL W/O P-5'-P-CCNC: 25 U/L (ref 10–44)
ANION GAP SERPL CALC-SCNC: 8 MMOL/L (ref 8–16)
AST SERPL-CCNC: 28 U/L (ref 10–40)
BASOPHILS # BLD AUTO: 0.03 K/UL (ref 0–0.2)
BASOPHILS NFR BLD: 0.4 % (ref 0–1.9)
BILIRUB SERPL-MCNC: 0.7 MG/DL (ref 0.1–1)
BUN SERPL-MCNC: 12 MG/DL (ref 8–23)
CALCIUM SERPL-MCNC: 9.2 MG/DL (ref 8.7–10.5)
CHLORIDE SERPL-SCNC: 105 MMOL/L (ref 95–110)
CHOLEST SERPL-MCNC: 178 MG/DL (ref 120–199)
CHOLEST/HDLC SERPL: 5.2 {RATIO} (ref 2–5)
CO2 SERPL-SCNC: 27 MMOL/L (ref 23–29)
CREAT SERPL-MCNC: 0.8 MG/DL (ref 0.5–1.4)
CRP SERPL-MCNC: 8 MG/L (ref 0–8.2)
DIFFERENTIAL METHOD BLD: NORMAL
EOSINOPHIL # BLD AUTO: 0.3 K/UL (ref 0–0.5)
EOSINOPHIL NFR BLD: 3.8 % (ref 0–8)
ERYTHROCYTE [DISTWIDTH] IN BLOOD BY AUTOMATED COUNT: 12.8 % (ref 11.5–14.5)
ERYTHROCYTE [SEDIMENTATION RATE] IN BLOOD BY PHOTOMETRIC METHOD: 12 MM/HR (ref 0–36)
EST. GFR  (NO RACE VARIABLE): >60 ML/MIN/1.73 M^2
GLUCOSE SERPL-MCNC: 100 MG/DL (ref 70–110)
HCT VFR BLD AUTO: 39.5 % (ref 37–48.5)
HDLC SERPL-MCNC: 34 MG/DL (ref 40–75)
HDLC SERPL: 19.1 % (ref 20–50)
HGB BLD-MCNC: 13.4 G/DL (ref 12–16)
IMM GRANULOCYTES # BLD AUTO: 0.01 K/UL (ref 0–0.04)
IMM GRANULOCYTES NFR BLD AUTO: 0.1 % (ref 0–0.5)
LDLC SERPL CALC-MCNC: 92.6 MG/DL (ref 63–159)
LYMPHOCYTES # BLD AUTO: 2.7 K/UL (ref 1–4.8)
LYMPHOCYTES NFR BLD: 37.3 % (ref 18–48)
MCH RBC QN AUTO: 30.9 PG (ref 27–31)
MCHC RBC AUTO-ENTMCNC: 33.9 G/DL (ref 32–36)
MCV RBC AUTO: 91 FL (ref 82–98)
MONOCYTES # BLD AUTO: 0.5 K/UL (ref 0.3–1)
MONOCYTES NFR BLD: 6.8 % (ref 4–15)
NEUTROPHILS # BLD AUTO: 3.7 K/UL (ref 1.8–7.7)
NEUTROPHILS NFR BLD: 51.6 % (ref 38–73)
NONHDLC SERPL-MCNC: 144 MG/DL
NRBC BLD-RTO: 0 /100 WBC
PLATELET # BLD AUTO: 296 K/UL (ref 150–450)
PMV BLD AUTO: 10.5 FL (ref 9.2–12.9)
POTASSIUM SERPL-SCNC: 4.1 MMOL/L (ref 3.5–5.1)
PROT SERPL-MCNC: 7.1 G/DL (ref 6–8.4)
RBC # BLD AUTO: 4.33 M/UL (ref 4–5.4)
SODIUM SERPL-SCNC: 140 MMOL/L (ref 136–145)
TRIGL SERPL-MCNC: 257 MG/DL (ref 30–150)
TSH SERPL DL<=0.005 MIU/L-ACNC: 1.46 UIU/ML (ref 0.4–4)
WBC # BLD AUTO: 7.19 K/UL (ref 3.9–12.7)

## 2024-08-23 PROCEDURE — 36415 COLL VENOUS BLD VENIPUNCTURE: CPT | Mod: PO | Performed by: PHYSICIAN ASSISTANT

## 2024-08-23 PROCEDURE — 85025 COMPLETE CBC W/AUTO DIFF WBC: CPT | Performed by: PHYSICIAN ASSISTANT

## 2024-08-23 PROCEDURE — 86140 C-REACTIVE PROTEIN: CPT | Performed by: PHYSICIAN ASSISTANT

## 2024-08-23 PROCEDURE — 80061 LIPID PANEL: CPT | Performed by: PHYSICIAN ASSISTANT

## 2024-08-23 PROCEDURE — 99999 PR PBB SHADOW E&M-EST. PATIENT-LVL III: CPT | Mod: PBBFAC,,, | Performed by: PHYSICIAN ASSISTANT

## 2024-08-23 PROCEDURE — 99396 PREV VISIT EST AGE 40-64: CPT | Mod: S$GLB,,, | Performed by: PHYSICIAN ASSISTANT

## 2024-08-23 PROCEDURE — 3075F SYST BP GE 130 - 139MM HG: CPT | Mod: CPTII,S$GLB,, | Performed by: PHYSICIAN ASSISTANT

## 2024-08-23 PROCEDURE — 3008F BODY MASS INDEX DOCD: CPT | Mod: CPTII,S$GLB,, | Performed by: PHYSICIAN ASSISTANT

## 2024-08-23 PROCEDURE — 3079F DIAST BP 80-89 MM HG: CPT | Mod: CPTII,S$GLB,, | Performed by: PHYSICIAN ASSISTANT

## 2024-08-23 PROCEDURE — 80053 COMPREHEN METABOLIC PANEL: CPT | Performed by: PHYSICIAN ASSISTANT

## 2024-08-23 PROCEDURE — 85652 RBC SED RATE AUTOMATED: CPT | Performed by: PHYSICIAN ASSISTANT

## 2024-08-23 PROCEDURE — 84443 ASSAY THYROID STIM HORMONE: CPT | Performed by: PHYSICIAN ASSISTANT

## 2024-08-23 NOTE — PROGRESS NOTES
"Subjective:      Patient ID: Clemencia Knight is a 64 y.o. female.    Chief Complaint: Annual Exam    HPI  Patient has PMH of cervical radiculopathy, trigeminal neuralgia, GERD, and psoriatic arthritis.    Patient here for an annual exam.  No new health complaints today.    Exercising with yoga.  Psoriatic arthritis-no issues lately.  Meningioma-Followed by Dr. Mi.    Declines all vaccines.  Thinking about mammogram.    Review of Systems   Constitutional:  Negative for appetite change, chills and fever.   HENT:  Negative for ear pain and sore throat.    Eyes:  Negative for pain.   Respiratory:  Negative for cough and shortness of breath.    Cardiovascular:  Negative for chest pain.   Gastrointestinal:  Negative for abdominal pain, blood in stool, constipation, diarrhea, nausea and vomiting.   Genitourinary:  Negative for dysuria, frequency and hematuria.   Musculoskeletal:  Negative for arthralgias and myalgias.   Neurological:  Negative for dizziness, light-headedness and headaches.   Psychiatric/Behavioral:  Negative for sleep disturbance.        Objective:   /88   Pulse 73   Ht 5' 4" (1.626 m)   Wt 84.4 kg (186 lb 1.1 oz)   SpO2 97%   BMI 31.94 kg/m²     Physical Exam  Vitals reviewed.   Constitutional:       Appearance: Normal appearance. She is well-developed.   HENT:      Head: Normocephalic and atraumatic.      Right Ear: Tympanic membrane, ear canal and external ear normal.      Left Ear: Ear canal and external ear normal. There is impacted cerumen.      Nose: Nose normal.      Mouth/Throat:      Mouth: Mucous membranes are moist.      Pharynx: Oropharynx is clear.   Eyes:      Conjunctiva/sclera: Conjunctivae normal.   Cardiovascular:      Rate and Rhythm: Normal rate and regular rhythm.      Heart sounds: Normal heart sounds. No murmur heard.     No friction rub. No gallop.   Pulmonary:      Effort: Pulmonary effort is normal. No respiratory distress.      Breath sounds: Normal breath sounds. " No wheezing, rhonchi or rales.   Abdominal:      Palpations: Abdomen is soft.      Tenderness: There is no abdominal tenderness.   Musculoskeletal:         General: Normal range of motion.   Skin:     General: Skin is warm and dry.      Findings: No rash.   Neurological:      General: No focal deficit present.      Mental Status: She is alert and oriented to person, place, and time.   Psychiatric:         Mood and Affect: Mood normal.         Behavior: Behavior normal.         Judgment: Judgment normal.       Assessment:      1. Preventative health care    2. Meningioma    3. Psoriatic arthritis       Plan:   1. Preventative health care  Bloodwork today.  - TSH; Future  - Lipid Panel; Future    2. Meningioma  Dr. Mi manages this.    3. Psoriatic arthritis  Stable.    Follow up as needed.  Patient agreed with plan and expressed understanding.    Thank you for allowing me to serve you,

## 2024-08-28 ENCOUNTER — OFFICE VISIT (OUTPATIENT)
Dept: OTOLARYNGOLOGY | Facility: CLINIC | Age: 64
End: 2024-08-28
Payer: COMMERCIAL

## 2024-08-28 VITALS — BODY MASS INDEX: 31.12 KG/M2 | HEIGHT: 64 IN | WEIGHT: 182.31 LBS

## 2024-08-28 DIAGNOSIS — H61.23 BILATERAL IMPACTED CERUMEN: Primary | ICD-10-CM

## 2024-08-28 DIAGNOSIS — H61.21 HEARING LOSS OF RIGHT EAR DUE TO CERUMEN IMPACTION: ICD-10-CM

## 2024-08-28 DIAGNOSIS — J06.9 VIRAL UPPER RESPIRATORY TRACT INFECTION: ICD-10-CM

## 2024-08-28 PROCEDURE — 99999 PR PBB SHADOW E&M-EST. PATIENT-LVL III: CPT | Mod: PBBFAC,,, | Performed by: NURSE PRACTITIONER

## 2024-08-28 PROCEDURE — 69210 REMOVE IMPACTED EAR WAX UNI: CPT | Mod: 50,S$GLB,, | Performed by: NURSE PRACTITIONER

## 2024-08-28 PROCEDURE — 3008F BODY MASS INDEX DOCD: CPT | Mod: CPTII,S$GLB,, | Performed by: NURSE PRACTITIONER

## 2024-08-28 PROCEDURE — 1159F MED LIST DOCD IN RCRD: CPT | Mod: CPTII,S$GLB,, | Performed by: NURSE PRACTITIONER

## 2024-08-28 PROCEDURE — 99203 OFFICE O/P NEW LOW 30 MIN: CPT | Mod: 25,S$GLB,, | Performed by: NURSE PRACTITIONER

## 2024-08-28 NOTE — PROGRESS NOTES
Subjective:       Patient ID: Clemencia Knight is a 64 y.o. female.    Chief Complaint: No chief complaint on file.    Ear Drainage   Associated symptoms include headaches and hearing loss (right).      Patient last saw me nearly 5 years ago for chronic dermatitis/scaling of outer auditory meatus and ear cleaning. Patient returns today for same. Patient was recently informed by PCP that she has cerumen impactions. She started using OTC Debrox but now unable to hear out of her right ear. Also reports some PND and headache X 2 days.       Review of Systems   Constitutional: Negative.    HENT:  Positive for hearing loss (right) and postnasal drip.    Eyes: Negative.    Respiratory: Negative.     Cardiovascular: Negative.    Gastrointestinal: Negative.    Musculoskeletal: Negative.    Skin: Negative.    Neurological:  Positive for headaches.   Hematological: Negative.    Psychiatric/Behavioral: Negative.         Objective:      Physical Exam  Vitals and nursing note reviewed.   Constitutional:       General: She is not in acute distress.     Appearance: She is well-developed. She is not ill-appearing or diaphoretic.   HENT:      Head: Normocephalic and atraumatic.      Right Ear: Hearing, tympanic membrane, ear canal and external ear normal. No drainage, swelling or tenderness. No middle ear effusion. Tympanic membrane is not erythematous.      Left Ear: Hearing, tympanic membrane, ear canal and external ear normal. No drainage, swelling or tenderness.  No middle ear effusion. Tympanic membrane is not erythematous.      Nose: Nose normal. No congestion or rhinorrhea.      Mouth/Throat:      Pharynx: Oropharynx is clear. Uvula midline. No pharyngeal swelling, oropharyngeal exudate, posterior oropharyngeal erythema or uvula swelling.      Tonsils: 0 on the right. 0 on the left.   Eyes:      General: Lids are normal. No scleral icterus.        Right eye: No discharge.         Left eye: No discharge.   Neck:      Trachea:  Trachea normal. No tracheal deviation.   Cardiovascular:      Rate and Rhythm: Normal rate.   Pulmonary:      Effort: Pulmonary effort is normal. No respiratory distress.      Breath sounds: No stridor. No wheezing.   Musculoskeletal:         General: Normal range of motion.      Cervical back: Normal range of motion and neck supple.   Skin:     General: Skin is warm and dry.      Coloration: Skin is not pale.   Neurological:      Mental Status: She is alert and oriented to person, place, and time.      Coordination: Coordination normal.      Gait: Gait normal.   Psychiatric:         Speech: Speech normal.         Behavior: Behavior normal. Behavior is cooperative.         Thought Content: Thought content normal.         Judgment: Judgment normal.      SEPARATE PROCEDURE IN OFFICE:   Procedure: Removal of impacted cerumen, bilateral   Pre Procedure Diagnosis: Cerumen Impaction   Post Procedure Diagnosis: Cerumen Impaction   Verbal informed consent in regards to risk of trauma to ear canal, ear drum or hearing, discomfort during procedure and/or inability to remove cerumen impaction in one session or unforeseen events or complications.   No anesthesia.     Procedure in detail:   Ear canal visualized bilateral with appropriate size ear speculum utilizing Operating Head Binocular Otomicroscope   Utilizing the following:  Suction cannula was used AU. The impacted cerumen of the ear canals was removed atraumatically. The TM and EAC were then inspected and found to be clear of wax. See description of TMs/EACs in PE above.   Complications: No   Condition: Improved/Good    Assessment:     Bilateral cerumen impactions removed    Viral URI (headache, PND X 2 days)  Plan:      Debrox prn  Symptomatic care  Patient encouraged to return to clinic if symptoms worsen/persist and as needed for further ENT symptoms or concerns.

## 2024-09-20 ENCOUNTER — HOSPITAL ENCOUNTER (OUTPATIENT)
Dept: RADIOLOGY | Facility: HOSPITAL | Age: 64
Discharge: HOME | End: 2024-09-20
Attending: FAMILY MEDICINE
Payer: COMMERCIAL

## 2024-09-20 DIAGNOSIS — Z12.31 OTHER SCREENING MAMMOGRAM: ICD-10-CM

## 2024-09-20 PROCEDURE — 77067 SCR MAMMO BI INCL CAD: CPT | Mod: 26,,, | Performed by: RADIOLOGY

## 2024-09-20 PROCEDURE — 77067 SCR MAMMO BI INCL CAD: CPT | Mod: TC,PN

## 2024-09-20 PROCEDURE — 77063 BREAST TOMOSYNTHESIS BI: CPT | Mod: TC,PN

## 2024-09-20 PROCEDURE — 77063 BREAST TOMOSYNTHESIS BI: CPT | Mod: 26,,, | Performed by: RADIOLOGY

## 2025-05-27 ENCOUNTER — TELEPHONE (OUTPATIENT)
Dept: NEUROSURGERY | Facility: CLINIC | Age: 65
End: 2025-05-27
Payer: COMMERCIAL

## 2025-05-27 DIAGNOSIS — G93.9 BRAIN LESION: Primary | ICD-10-CM

## 2025-06-18 ENCOUNTER — HOSPITAL ENCOUNTER (OUTPATIENT)
Dept: RADIOLOGY | Facility: HOSPITAL | Age: 65
Discharge: HOME | End: 2025-06-18
Attending: NEUROLOGICAL SURGERY
Payer: COMMERCIAL

## 2025-06-18 ENCOUNTER — OFFICE VISIT (OUTPATIENT)
Dept: NEUROSURGERY | Facility: CLINIC | Age: 65
End: 2025-06-18
Attending: NEUROLOGICAL SURGERY
Payer: COMMERCIAL

## 2025-06-18 VITALS
BODY MASS INDEX: 31.12 KG/M2 | SYSTOLIC BLOOD PRESSURE: 145 MMHG | DIASTOLIC BLOOD PRESSURE: 78 MMHG | WEIGHT: 182.31 LBS | HEIGHT: 64 IN

## 2025-06-18 DIAGNOSIS — G93.9 BRAIN LESION: ICD-10-CM

## 2025-06-18 DIAGNOSIS — G93.9 BRAIN LESION: Primary | ICD-10-CM

## 2025-06-18 PROCEDURE — 99213 OFFICE O/P EST LOW 20 MIN: CPT | Mod: S$GLB,,,

## 2025-06-18 PROCEDURE — 3077F SYST BP >= 140 MM HG: CPT | Mod: CPTII,S$GLB,,

## 2025-06-18 PROCEDURE — 70553 MRI BRAIN STEM W/O & W/DYE: CPT | Mod: 26,,, | Performed by: RADIOLOGY

## 2025-06-18 PROCEDURE — 70553 MRI BRAIN STEM W/O & W/DYE: CPT | Mod: TC,PO

## 2025-06-18 PROCEDURE — 1159F MED LIST DOCD IN RCRD: CPT | Mod: CPTII,S$GLB,,

## 2025-06-18 PROCEDURE — 3008F BODY MASS INDEX DOCD: CPT | Mod: CPTII,S$GLB,,

## 2025-06-18 PROCEDURE — 3078F DIAST BP <80 MM HG: CPT | Mod: CPTII,S$GLB,,

## 2025-06-18 PROCEDURE — 25500020 PHARM REV CODE 255: Mod: PO | Performed by: NEUROLOGICAL SURGERY

## 2025-06-18 PROCEDURE — A9585 GADOBUTROL INJECTION: HCPCS | Mod: PO | Performed by: NEUROLOGICAL SURGERY

## 2025-06-18 RX ORDER — GADOBUTROL 604.72 MG/ML
8 INJECTION INTRAVENOUS
Status: COMPLETED | OUTPATIENT
Start: 2025-06-18 | End: 2025-06-18

## 2025-06-18 RX ADMIN — GADOBUTROL 8 ML: 604.72 INJECTION INTRAVENOUS at 09:06

## 2025-06-18 NOTE — PROGRESS NOTES
Neurosurgery History & Physical    Patient ID: Clemencia Knight is a 64 y.o. female.    No chief complaint on file.      Interval HPI 06/18/2025:  Ms. Knight returns today with her  for 2 year follow up with image review.    Since her last visit, she reports doing well.  She denies any neurologic symptoms or complaints.     HPI 03/30/2023:  62-year-old female with history of migraine headaches followed by Dr. Magaña in the past and now transitioning to Dr. Viveros.  She has a known right parietal meningioma.  She had a recent MRI of the brain and due to the presence of the meningioma she wanted Neurosurgery follow-up.  She denies significant changes in her neurologic status except for a mild change in some increased confusion and short-term memory loss.  She is still able to more than adequately carry out her activities of daily living.     She states that she has had trouble getting refills of her migraine medication since Dr. Magaña has left.      Review of Systems   Constitutional:  Negative for activity change and fever.   Eyes:  Negative for visual disturbance.   Respiratory:  Negative for shortness of breath.    Cardiovascular:  Negative for chest pain.   Gastrointestinal:  Negative for nausea and vomiting.   Endocrine: Negative for cold intolerance, heat intolerance, polydipsia, polyphagia and polyuria.   Genitourinary:  Negative for decreased urine volume, difficulty urinating and frequency.   Musculoskeletal:  Negative for back pain, gait problem and neck pain.   Neurological:  Negative for dizziness, tremors, seizures, syncope, facial asymmetry, speech difficulty, weakness, light-headedness, numbness and headaches.   Psychiatric/Behavioral:  Negative for confusion.        Past Medical History:   Diagnosis Date    Allergy     Brain tumor     Drug-induced lupus erythematosus     General anesthetics causing adverse effect in therapeutic use     pt. somewhat aware of extubation with one of her surgeries  "   GERD (gastroesophageal reflux disease)     Hearing loss     Migraines     Psoriatic arthritis     Raynaud's syndrome without gangrene     Restless leg syndrome     Sciatica      Social History[1]  Family History   Problem Relation Name Age of Onset    Cancer Father age 83         bladder    Diabetes Father age 83     Heart disease Father age 83     Diabetes Mother age 47     Melanoma Brother Hira     Charcot-Karla-Tooth disease Brother Hira     Breast cancer Neg Hx      Ovarian cancer Neg Hx      Anesthesia problems Neg Hx      Colon cancer Neg Hx      Crohn's disease Neg Hx      Esophageal cancer Neg Hx      Ulcerative colitis Neg Hx      Stomach cancer Neg Hx      Celiac disease Neg Hx       Review of patient's allergies indicates:   Allergen Reactions    Contrast media Swelling     Swollen eyes and face    Hydrocodone Itching    Plaquenil [hydroxychloroquine]      Worsening psoriasis    Topiramate      Hair loss, cognitive side effects      Current Medications[2]  Blood pressure (!) 145/78, height 5' 4" (1.626 m), weight 82.7 kg (182 lb 5.1 oz).      Neurological Exam  AAOx4, NAD  MAEW  MS intact in BUE & BLE  Gait fluid     Physical Exam    Imaging:  MRI brain with and without contrast dated 06/18/2025 personally reviewed and discussed with patient.  FINDINGS:  Stable appearance of an extra-axial, dural-based, homogeneously enhancing dome-shaped lesion overlying the right parietal convexity presumably representing a small meningioma, measuring 1.0 cm, without significant change in size since 2021.  Stable minimal regional mass effect without adjacent parenchymal signal abnormality.  No other new enhancing intracranial lesion.     Ventricles and sulci are normal in size for age without evidence of hydrocephalus. No extra-axial blood or fluid collections.     The brain parenchyma appears within normal limits.   Diffusion-weighted images demonstrate no evidence of an acute infarct.   Susceptibility weighted " images demonstrate no evidence of acute or chronic hemorrhage. No significant intracranial mass effect or midline shift.     No abnormal intracranial enhancement.     Normal vascular flow voids are present.     Bone marrow signal intensity is normal.  The paranasal sinuses are normal.  The visualized portions of the mastoids are unremarkable.     Orbits are unremarkable.     Impression:     1. Stable small right parietal convexity presumed meningioma, unchanged in size since 2021.  2. No acute intracranial abnormality or new enhancing intracranial lesion elsewhere.     Electronically signed by:Oni Preston  Date:                                            06/18/2025  Time:                                           11:48    Assessment/Plan:   Ms. Knight is a 65yo with known stable right parietal dural-based lesion, which is consistent with a meningioma, dating back to 2021.  We will plan to follow up again in 2 years with repeat MRI brain with and without contrast for continued image surveillance.  She is agreeable to the plan and will notify our office if she has any questions or concerns in the meantime.           [1]   Social History  Socioeconomic History    Marital status:    Occupational History     Employer: OTHER   Tobacco Use    Smoking status: Never    Smokeless tobacco: Never   Substance and Sexual Activity    Alcohol use: Yes     Comment: rarely    Drug use: No    Sexual activity: Yes     Partners: Male     Birth control/protection: Surgical   Other Topics Concern    Are you pregnant or think you may be? No     Social Drivers of Health     Financial Resource Strain: Patient Declined (8/17/2024)    Overall Financial Resource Strain (CARDIA)     Difficulty of Paying Living Expenses: Patient declined   Food Insecurity: Patient Declined (8/17/2024)    Hunger Vital Sign     Worried About Running Out of Food in the Last Year: Patient declined     Ran Out of Food in the Last Year: Patient declined    Transportation Needs: Unknown (5/18/2023)    PRAPARE - Transportation     Lack of Transportation (Non-Medical): Patient declined   Physical Activity: Unknown (8/17/2024)    Exercise Vital Sign     Days of Exercise per Week: 0 days   Stress: No Stress Concern Present (8/17/2024)    [employer] - Occupational Stress Questionnaire     Feeling of Stress : Not at all   Housing Stability: Unknown (8/17/2024)    Housing Stability Vital Sign     Unable to Pay for Housing in the Last Year: Patient declined   [2]   Current Outpatient Medications:     etodolac (LODINE) 400 MG tablet, Take 1 tablet by mouth twice daily as needed for pain, Disp: 30 tablet, Rfl: 2    multivitamin (THERAGRAN) per tablet, Take 1 tablet by mouth once daily., Disp: , Rfl:   No current facility-administered medications for this visit.

## 2025-08-01 ENCOUNTER — HOSPITAL ENCOUNTER (OUTPATIENT)
Dept: RADIOLOGY | Facility: HOSPITAL | Age: 65
Discharge: HOME | End: 2025-08-01
Attending: PODIATRIST
Payer: COMMERCIAL

## 2025-08-01 ENCOUNTER — OFFICE VISIT (OUTPATIENT)
Dept: PODIATRY | Facility: CLINIC | Age: 65
End: 2025-08-01
Payer: COMMERCIAL

## 2025-08-01 VITALS — HEIGHT: 64 IN | BODY MASS INDEX: 31.12 KG/M2 | WEIGHT: 182.31 LBS

## 2025-08-01 DIAGNOSIS — M77.41 METATARSALGIA OF RIGHT FOOT: ICD-10-CM

## 2025-08-01 DIAGNOSIS — M79.671 FOOT PAIN, RIGHT: Primary | ICD-10-CM

## 2025-08-01 DIAGNOSIS — M79.671 FOOT PAIN, RIGHT: ICD-10-CM

## 2025-08-01 DIAGNOSIS — M62.461 GASTROCNEMIUS EQUINUS OF RIGHT LOWER EXTREMITY: ICD-10-CM

## 2025-08-01 PROCEDURE — 3008F BODY MASS INDEX DOCD: CPT | Mod: CPTII,S$GLB,, | Performed by: PODIATRIST

## 2025-08-01 PROCEDURE — 73630 X-RAY EXAM OF FOOT: CPT | Mod: 26,RT,, | Performed by: RADIOLOGY

## 2025-08-01 PROCEDURE — 1159F MED LIST DOCD IN RCRD: CPT | Mod: CPTII,S$GLB,, | Performed by: PODIATRIST

## 2025-08-01 PROCEDURE — 73630 X-RAY EXAM OF FOOT: CPT | Mod: TC,PO,RT

## 2025-08-01 PROCEDURE — 99204 OFFICE O/P NEW MOD 45 MIN: CPT | Mod: S$GLB,,, | Performed by: PODIATRIST

## 2025-08-01 PROCEDURE — 99999 PR PBB SHADOW E&M-EST. PATIENT-LVL III: CPT | Mod: PBBFAC,,, | Performed by: PODIATRIST

## 2025-08-01 NOTE — PROGRESS NOTES
Subjective:     Patient ID: Clemencia Knight is a 64 y.o. female.    Chief Complaint: Toe Pain and Foot Pain    Clemencia is a 64 y.o. female with a past medical history of Allergy, Brain tumor, Drug-induced lupus erythematosus, General anesthetics causing adverse effect in therapeutic use, GERD (gastroesophageal reflux disease), Hearing loss, Migraines, Psoriatic arthritis, Raynaud's syndrome without gangrene, Restless leg syndrome, and Sciatica. The patient's chief complaint consists of Rt. Foot toe and ball of the foot pain with weight bearing.  States symptoms occur after periods of weight bearing and with pressing on the pedal while driving.  Describes as both sharp and throbbing symptoms that at times will radiate to the midfoot.  Rates pain as a 9/10.  Denies sustaining injury to the affected limb.  Denies any additional pedal complaints.      Past Medical History:   Diagnosis Date    Allergy     Brain tumor     Drug-induced lupus erythematosus     General anesthetics causing adverse effect in therapeutic use     pt. somewhat aware of extubation with one of her surgeries    GERD (gastroesophageal reflux disease)     Hearing loss     Migraines     Psoriatic arthritis     Raynaud's syndrome without gangrene     Restless leg syndrome     Sciatica        Past Surgical History:   Procedure Laterality Date    AUGMENTATION OF BREAST      BELT ABDOMINOPLASTY      breast implants      CERVICAL FUSION      COLONOSCOPY  10/2012    Dr. Paul; internal hemorrhoid; repeat in 10 years    COLONOSCOPY N/A 11/25/2020    Procedure: COLONOSCOPY;  Surgeon: Kiran Paul MD;  Location: [facility];  Service: Endoscopy;  Laterality: N/A; hemorrhoids; Repeat colonoscopy in 10 years for screening; random biopsy: WNL    DEXA      WNL    EPIDURAL STEROID INJECTION INTO CERVICAL SPINE N/A 09/24/2018    Procedure: Injection-steroid-epidural-cervical;  Surgeon: Myles Rocha MD;  Location: [facility];  Service: Pain Management;  Laterality:  N/A;  C7-T1    EPIDURAL STEROID INJECTION INTO CERVICAL SPINE N/A 11/20/2018    Procedure: Injection-steroid-epidural-cervical;  Surgeon: Myles Rocha MD;  Location: [facility] OR;  Service: Pain Management;  Laterality: N/A;  C7-T1    EPIDURAL STEROID INJECTION INTO LUMBAR SPINE Left 05/01/2023    Procedure: Injection-steroid-epidural-lumbar L3/4;  Surgeon: Gabriel Rogers MD;  Location: [facility];  Service: Pain Management;  Laterality: Left;    EPIDURAL STEROID INJECTION INTO LUMBAR SPINE N/A 8/8/2023    Procedure: Injection-steroid-epidural-lumbar L3/4;  Surgeon: Gabriel Rogers MD;  Location: [facility];  Service: Pain Management;  Laterality: N/A;    ESOPHAGOGASTRODUODENOSCOPY N/A 12/10/2020    Procedure: EGD (ESOPHAGOGASTRODUODENOSCOPY);  Surgeon: Kiran Paul MD;  Location: [facility];  Service: Endoscopy;  Laterality: N/A; unremarkable; biopsy: duodenum WNL, stomach-minimal chronic inflammation, negative h pylori    ESOPHAGOGASTRODUODENOSCOPY N/A 04/12/2022    Procedure: EGD (ESOPHAGOGASTRODUODENOSCOPY);  Surgeon: Kiran Paul MD;  Location: [facility];  Service: Endoscopy;  Laterality: N/A;    HYSTERECTOMY  2000    OOPHORECTOMY  07/16/2013    laparotomy BSO for benign 10cm tubal cyst    SALPINGOOPHORECTOMY  2013    with removal of fallopian cyst    SHOULDER SURGERY      right    [facility]  2000    ovaries remain, benign    TONSILLECTOMY, ADENOIDECTOMY, BILATERAL MYRINGOTOMY AND TUBES      TRANSFORAMINAL EPIDURAL INJECTION OF STEROID Left 03/30/2023    Procedure: Injection,steroid,epidural,transforaminal approach L3/4;  Surgeon: Gabriel Rogers MD;  Location: [facility];  Service: Pain Management;  Laterality: Left;       Family History   Problem Relation Name Age of Onset    Cancer Father age 83         bladder    Diabetes Father age 83     Heart disease Father age 83     Diabetes Mother age 47     Melanoma Brother Hira     Charcot-Karla-Tooth disease Brother Hira     Breast cancer Neg Hx       Ovarian cancer Neg Hx      Anesthesia problems Neg Hx      Colon cancer Neg Hx      Crohn's disease Neg Hx      Esophageal cancer Neg Hx      Ulcerative colitis Neg Hx      Stomach cancer Neg Hx      Celiac disease Neg Hx         Social History     Socioeconomic History    Marital status:    Occupational History     Employer: OTHER   Tobacco Use    Smoking status: Never    Smokeless tobacco: Never   Substance and Sexual Activity    Alcohol use: Yes     Comment: rarely    Drug use: No    Sexual activity: Yes     Partners: Male     Birth control/protection: Surgical   Other Topics Concern    Are you pregnant or think you may be? No     Social Drivers of Health     Financial Resource Strain: Patient Declined (8/17/2024)    Overall Financial Resource Strain (CARDIA)     Difficulty of Paying Living Expenses: Patient declined   Food Insecurity: Patient Declined (8/17/2024)    Hunger Vital Sign     Worried About Running Out of Food in the Last Year: Patient declined     Ran Out of Food in the Last Year: Patient declined   Transportation Needs: Unknown (5/18/2023)    PRAPARE - Transportation     Lack of Transportation (Non-Medical): Patient declined   Physical Activity: Unknown (8/17/2024)    Exercise Vital Sign     Days of Exercise per Week: 0 days   Stress: No Stress Concern Present (8/17/2024)    [employer] [place] [employer] - Occupational Stress Questionnaire     Feeling of Stress : Not at all   Housing Stability: Unknown (8/17/2024)    Housing Stability Vital Sign     Unable to Pay for Housing in the Last Year: Patient declined       Current Medications[1]    Review of patient's allergies indicates:   Allergen Reactions    Contrast media Swelling     Swollen eyes and face    Hydrocodone Itching    Plaquenil [hydroxychloroquine]      Worsening psoriasis    Topiramate      Hair loss, cognitive side effects       Hemoglobin A1C   Date Value Ref Range Status   02/15/2023 5.4 4.0 - 5.6 % Final      Comment:     ADA Screening Guidelines:  5.7-6.4%  Consistent with prediabetes  >or=6.5%  Consistent with diabetes    High levels of fetal hemoglobin interfere with the HbA1C  assay. Heterozygous hemoglobin variants (HbS, HgC, etc)do  not significantly interfere with this assay.   However, presence of multiple variants may affect accuracy.         Review of Systems   Constitutional: Negative for chills and fever.   Skin:  Negative for color change and nail changes.   Musculoskeletal:  Positive for joint pain. Negative for muscle cramps and muscle weakness.   Gastrointestinal:  Negative for nausea and vomiting.   Neurological:  Negative for numbness and paresthesias.        Objective:     Physical Exam  Constitutional:       Appearance: Normal appearance. She is not ill-appearing.   Cardiovascular:      Pulses:           Dorsalis pedis pulses are 2+ on the right side and 2+ on the left side.        Posterior tibial pulses are 2+ on the right side and 2+ on the left side.      Comments: CFT is < 3 seconds bilateral.  Pedal hair growth is present bilateral.  No lower extremity edema noted bilateral.  Toes are warm to touch bilateral.    Musculoskeletal:         General: Tenderness present. No signs of injury.      Right lower leg: No edema.      Left lower leg: No edema.      Comments: Muscle strength 5/5 in all muscle groups bilateral.  No tenderness nor crepitation with ROM of foot/ankle joints bilateral.  Pain with palpation to the Rt. Sub 2nd-4th met heads.  (-) Rt. Sided Lachman test.  Rt. Sided gastrocnemius equinus.  Pain with palpation to the Rt. 1st and 2nd common interdigital nerves concurrent with compressive pressure of the forefoot.  Lower limb length difference with the Rt. Side being roughly a 1/2 inch shorter than the contralateral limb.    Skin:     Capillary Refill: Capillary refill takes 2 to 3 seconds.      Findings: No bruising, ecchymosis, erythema, signs of injury, laceration, lesion, petechiae,  rash or wound.      Comments: Pedal skin has normal turgor, temperature, and texture bilateral.  Toenails x 10 appear normotrophic. Examination of the skin reveals no evidence of significant maceration, rashes, open lesions, suspicious appearing nevi or other concerning lesions.       Neurological:      General: No focal deficit present.      Mental Status: She is alert.      Sensory: No sensory deficit.      Motor: No weakness or atrophy.      Comments: Light touch is intact bilateral.  (-) jamee sign bilateral.  (-) tinel sign bilateral.             Assessment:      Encounter Diagnoses   Name Primary?    Foot pain, right Yes    Gastrocnemius equinus of right lower extremity     Metatarsalgia of right foot      Plan:     Clemencia was seen today for toe pain and foot pain.    Diagnoses and all orders for this visit:    Foot pain, right  -     X-Ray Foot Complete Right; Future  -     MRI Forefoot WO Contrast RT; Future    Gastrocnemius equinus of right lower extremity    Metatarsalgia of right foot      I counseled the patient on her conditions, their implications and medical management.    - Orders written for an x-ray of the Rt. Foot.  Personally evaluated plain films which were negative for signs of trauma or pathology at the sites of pain.    - Orders written for  MRI to assess adjacent soft tissue structures.       - Discussed performing stretching exercises to address the equinus.     - Recommend wearing supportive shoes only.  Discussed avoidance of barefoot walking, flip flops, and Crocs, as this will exacerbate current symptoms.       - Recommend wearing a pair of OTC insoles for metatarsalgia to alleviate pressure to the forefoot.     - Recommend icing the affected area a minimum of 20 minutes daily.    - Discussed avoidance of high impact activities such as squatting, stooping, and running as these activities will exacerbate symptoms.       - May consider applying a topical analgesic (voltaren cream) to help  with pain symptoms.       - RTC pending MRI results.     Bryson Hart DPM          [1]   Current Outpatient Medications   Medication Sig Dispense Refill    etodolac (LODINE) 400 MG tablet Take 1 tablet by mouth twice daily as needed for pain 30 tablet 2    multivitamin (THERAGRAN) per tablet Take 1 tablet by mouth once daily.       No current facility-administered medications for this visit.

## 2025-08-01 NOTE — PATIENT INSTRUCTIONS
- Perform stretching exercises, see handout for details, to address tightness of calf muscles.      - Recommend wearing supportive shoes only.  Avoid barefoot walking, flip flops, and Crocs, as this will exacerbate current symptoms.      -Shoes: Altra    - Recommend wearing a pair of Powerstep [place] insoles for metatarsalgia.    - Recommend icing the affected areas a minimum of 20 minutes daily.    - Avoid high impact activities such as squatting, stooping, and running as these activities will exacerbate symptoms.      - May consider applying a topical analgesic (Voltaren cream) to help with pain symptoms.

## 2025-08-04 ENCOUNTER — TELEPHONE (OUTPATIENT)
Dept: PODIATRY | Facility: CLINIC | Age: 65
End: 2025-08-04
Payer: COMMERCIAL

## 2025-08-04 NOTE — TELEPHONE ENCOUNTER
Spoke with the patient to provide results . Patient stated that she has a scheduled appointment on 08/07/25. Provider was messaged that MRI has been scheduled. Patient expressed understanding of the message.

## 2025-08-07 ENCOUNTER — HOSPITAL ENCOUNTER (OUTPATIENT)
Dept: RADIOLOGY | Facility: HOSPITAL | Age: 65
Discharge: HOME | End: 2025-08-07
Attending: PODIATRIST
Payer: COMMERCIAL

## 2025-08-07 DIAGNOSIS — M79.671 FOOT PAIN, RIGHT: ICD-10-CM

## 2025-08-07 PROCEDURE — 73718 MRI LOWER EXTREMITY W/O DYE: CPT | Mod: TC,PO,RT

## 2025-08-07 PROCEDURE — 73718 MRI LOWER EXTREMITY W/O DYE: CPT | Mod: 26,RT,, | Performed by: RADIOLOGY

## 2025-08-13 ENCOUNTER — TELEPHONE (OUTPATIENT)
Dept: PODIATRY | Facility: CLINIC | Age: 65
End: 2025-08-13
Payer: COMMERCIAL

## 2025-08-25 ENCOUNTER — OFFICE VISIT (OUTPATIENT)
Dept: FAMILY MEDICINE | Facility: CLINIC | Age: 65
End: 2025-08-25
Payer: COMMERCIAL

## 2025-08-25 VITALS
BODY MASS INDEX: 32.7 KG/M2 | DIASTOLIC BLOOD PRESSURE: 78 MMHG | WEIGHT: 191.56 LBS | SYSTOLIC BLOOD PRESSURE: 114 MMHG | HEIGHT: 64 IN | OXYGEN SATURATION: 98 % | HEART RATE: 85 BPM

## 2025-08-25 DIAGNOSIS — Z00.00 PREVENTATIVE HEALTH CARE: Primary | ICD-10-CM

## 2025-08-25 PROCEDURE — 99396 PREV VISIT EST AGE 40-64: CPT | Mod: S$GLB,,, | Performed by: PHYSICIAN ASSISTANT

## 2025-08-25 PROCEDURE — 3078F DIAST BP <80 MM HG: CPT | Mod: CPTII,S$GLB,, | Performed by: PHYSICIAN ASSISTANT

## 2025-08-25 PROCEDURE — 3008F BODY MASS INDEX DOCD: CPT | Mod: CPTII,S$GLB,, | Performed by: PHYSICIAN ASSISTANT

## 2025-08-25 PROCEDURE — 3074F SYST BP LT 130 MM HG: CPT | Mod: CPTII,S$GLB,, | Performed by: PHYSICIAN ASSISTANT

## 2025-08-25 PROCEDURE — 99999 PR PBB SHADOW E&M-EST. PATIENT-LVL III: CPT | Mod: PBBFAC,,, | Performed by: PHYSICIAN ASSISTANT

## 2025-08-25 PROCEDURE — 1159F MED LIST DOCD IN RCRD: CPT | Mod: CPTII,S$GLB,, | Performed by: PHYSICIAN ASSISTANT

## 2025-08-25 PROCEDURE — 1160F RVW MEDS BY RX/DR IN RCRD: CPT | Mod: CPTII,S$GLB,, | Performed by: PHYSICIAN ASSISTANT

## 2025-08-26 ENCOUNTER — OFFICE VISIT (OUTPATIENT)
Facility: CLINIC | Age: 65
End: 2025-08-26
Payer: COMMERCIAL

## 2025-08-26 ENCOUNTER — LAB VISIT (OUTPATIENT)
Dept: LAB | Facility: HOSPITAL | Age: 65
End: 2025-08-26
Attending: PHYSICIAN ASSISTANT
Payer: COMMERCIAL

## 2025-08-26 DIAGNOSIS — L91.8 SKIN TAG: ICD-10-CM

## 2025-08-26 DIAGNOSIS — D22.9 MULTIPLE BENIGN NEVI: ICD-10-CM

## 2025-08-26 DIAGNOSIS — Z12.83 SKIN CANCER SCREENING: ICD-10-CM

## 2025-08-26 DIAGNOSIS — D18.01 CHERRY ANGIOMA: Primary | ICD-10-CM

## 2025-08-26 PROCEDURE — 99213 OFFICE O/P EST LOW 20 MIN: CPT | Mod: S$GLB,,, | Performed by: DERMATOLOGY

## 2025-08-26 PROCEDURE — 99999 PR PBB SHADOW E&M-EST. PATIENT-LVL II: CPT | Mod: PBBFAC,,, | Performed by: DERMATOLOGY

## 2025-08-26 PROCEDURE — 1159F MED LIST DOCD IN RCRD: CPT | Mod: CPTII,S$GLB,, | Performed by: DERMATOLOGY

## (undated) DEVICE — ADHESIVE MASTISOL VIAL 48/BX

## (undated) DEVICE — TOWEL OR DISP STRL BLUE 4/PK

## (undated) DEVICE — TUBING MINIBORE EXTENSION

## (undated) DEVICE — ELECTRODE REM PLYHSV RETURN 9

## (undated) DEVICE — SEE L#120831

## (undated) DEVICE — NDL HYPODERMIC BLUNT 18G 1.5IN

## (undated) DEVICE — APPLICATOR CHLORAPREP CLR 10.5

## (undated) DEVICE — SYR DISP LL 5CC

## (undated) DEVICE — MARKER SKIN STND TIP BLUE BARR

## (undated) DEVICE — SYR GLASS 5CC LUER LOK

## (undated) DEVICE — TRAY NERVE BLOCK

## (undated) DEVICE — SYR 10CC LUER LOCK

## (undated) DEVICE — SEE MEDLINE ITEM 152622

## (undated) DEVICE — GLOVE PROTEXIS PI CLASSIC 7.5

## (undated) DEVICE — SYS LABEL CORRECT MED

## (undated) DEVICE — NDL TUOHY EPIDURAL 20G X 3.5

## (undated) DEVICE — SUT 2/0 30IN SILK BLK BRAI

## (undated) DEVICE — APPLICATOR CHLORAPREP ORN 26ML

## (undated) DEVICE — NDL SAFETY 25G X 1.5 ECLIPSE

## (undated) DEVICE — CLOSURE SKIN STERI STRIP 1/2X4

## (undated) DEVICE — PENCIL ROCKER SWITCH 10FT CORD

## (undated) DEVICE — GLOVE SURG BIOGEL LATEX SZ 7.5

## (undated) DEVICE — GLOVE SURGICAL LATEX SZ 7

## (undated) DEVICE — SUT MONOCYRL 4-0 PS2 UND

## (undated) DEVICE — SEE MEDLINE ITEM 157128

## (undated) DEVICE — NDL SPINAL SPINOCAN 22GX3.5

## (undated) DEVICE — SEE MEDLINE ITEM 157148

## (undated) DEVICE — BANDAGE ADHESIVE

## (undated) DEVICE — Device

## (undated) DEVICE — SUT 3-0 VICRYL / SH (J416)